# Patient Record
Sex: FEMALE | Race: BLACK OR AFRICAN AMERICAN | NOT HISPANIC OR LATINO | Employment: OTHER | URBAN - METROPOLITAN AREA
[De-identification: names, ages, dates, MRNs, and addresses within clinical notes are randomized per-mention and may not be internally consistent; named-entity substitution may affect disease eponyms.]

---

## 2017-01-04 ENCOUNTER — TELEPHONE (OUTPATIENT)
Dept: INTERNAL MEDICINE | Facility: CLINIC | Age: 57
End: 2017-01-04

## 2017-04-06 ENCOUNTER — HOSPITAL ENCOUNTER (INPATIENT)
Facility: HOSPITAL | Age: 57
LOS: 6 days | Discharge: HOME | DRG: 292 | End: 2017-04-12
Attending: EMERGENCY MEDICINE | Admitting: EMERGENCY MEDICINE
Payer: MEDICARE

## 2017-04-06 DIAGNOSIS — I25.5 ISCHEMIC CARDIOMYOPATHY: ICD-10-CM

## 2017-04-06 DIAGNOSIS — E88.09 HYPOALBUMINEMIA: ICD-10-CM

## 2017-04-06 DIAGNOSIS — I25.10 CORONARY ARTERY DISEASE INVOLVING NATIVE CORONARY ARTERY OF NATIVE HEART WITHOUT ANGINA PECTORIS: ICD-10-CM

## 2017-04-06 DIAGNOSIS — R07.9 CHEST PAIN: ICD-10-CM

## 2017-04-06 DIAGNOSIS — F14.10 COCAINE ABUSE: ICD-10-CM

## 2017-04-06 DIAGNOSIS — R06.03 RESPIRATORY DISTRESS: ICD-10-CM

## 2017-04-06 DIAGNOSIS — I50.9 ACUTE ON CHRONIC CONGESTIVE HEART FAILURE, UNSPECIFIED CONGESTIVE HEART FAILURE TYPE: Primary | ICD-10-CM

## 2017-04-06 DIAGNOSIS — I07.1 MODERATE TRICUSPID REGURGITATION: ICD-10-CM

## 2017-04-06 DIAGNOSIS — I10 ESSENTIAL HYPERTENSION: ICD-10-CM

## 2017-04-06 DIAGNOSIS — F25.0 SCHIZOAFFECTIVE DISORDER, BIPOLAR TYPE: ICD-10-CM

## 2017-04-06 DIAGNOSIS — I50.23 ACUTE ON CHRONIC SYSTOLIC (CONGESTIVE) HEART FAILURE: ICD-10-CM

## 2017-04-06 DIAGNOSIS — E11.9 TYPE 2 DIABETES MELLITUS WITHOUT COMPLICATION, UNSPECIFIED LONG TERM INSULIN USE STATUS: ICD-10-CM

## 2017-04-06 DIAGNOSIS — I50.9 CHF (CONGESTIVE HEART FAILURE): ICD-10-CM

## 2017-04-06 DIAGNOSIS — E83.51 HYPOCALCEMIA: ICD-10-CM

## 2017-04-06 DIAGNOSIS — R07.89 ATYPICAL CHEST PAIN: ICD-10-CM

## 2017-04-06 DIAGNOSIS — F14.99 COCAINE USE, UNSPECIFIED WITH UNSPECIFIED COCAINE-INDUCED DISORDER: ICD-10-CM

## 2017-04-06 DIAGNOSIS — D64.9 NORMOCYTIC ANEMIA: ICD-10-CM

## 2017-04-06 DIAGNOSIS — F29 PSYCHOSIS, UNSPECIFIED PSYCHOSIS TYPE: ICD-10-CM

## 2017-04-06 LAB
ALBUMIN SERPL BCP-MCNC: 2.8 G/DL
ALLENS TEST: ABNORMAL
ALP SERPL-CCNC: 116 U/L
ALT SERPL W/O P-5'-P-CCNC: 29 U/L
ANION GAP SERPL CALC-SCNC: 9 MMOL/L
AST SERPL-CCNC: 28 U/L
BASOPHILS # BLD AUTO: 0.01 K/UL
BASOPHILS NFR BLD: 0.2 %
BILIRUB SERPL-MCNC: 1.9 MG/DL
BNP SERPL-MCNC: 2588 PG/ML
BUN SERPL-MCNC: 11 MG/DL
CALCIUM SERPL-MCNC: 7.8 MG/DL
CHLORIDE SERPL-SCNC: 106 MMOL/L
CO2 SERPL-SCNC: 21 MMOL/L
CREAT SERPL-MCNC: 0.8 MG/DL
DELSYS: ABNORMAL
DIFFERENTIAL METHOD: ABNORMAL
EOSINOPHIL # BLD AUTO: 0 K/UL
EOSINOPHIL NFR BLD: 0.5 %
EP: 5
ERYTHROCYTE [DISTWIDTH] IN BLOOD BY AUTOMATED COUNT: 17.9 %
EST. GFR  (AFRICAN AMERICAN): >60 ML/MIN/1.73 M^2
EST. GFR  (NON AFRICAN AMERICAN): >60 ML/MIN/1.73 M^2
FIO2: 50
GLUCOSE SERPL-MCNC: 74 MG/DL
HCO3 UR-SCNC: 19.8 MMOL/L (ref 24–28)
HCT VFR BLD AUTO: 33.5 %
HGB BLD-MCNC: 11.2 G/DL
INR PPP: 1.3
IP: 10
LYMPHOCYTES # BLD AUTO: 1.7 K/UL
LYMPHOCYTES NFR BLD: 29.7 %
MCH RBC QN AUTO: 28.1 PG
MCHC RBC AUTO-ENTMCNC: 33.4 %
MCV RBC AUTO: 84 FL
MODE: ABNORMAL
MONOCYTES # BLD AUTO: 0.6 K/UL
MONOCYTES NFR BLD: 10.1 %
NEUTROPHILS # BLD AUTO: 3.5 K/UL
NEUTROPHILS NFR BLD: 59.2 %
PCO2 BLDA: 36 MMHG (ref 35–45)
PH SMN: 7.35 [PH] (ref 7.35–7.45)
PLATELET # BLD AUTO: 298 K/UL
PMV BLD AUTO: 9.9 FL
PO2 BLDA: 43 MMHG (ref 40–60)
POC BE: -6 MMOL/L
POC SATURATED O2: 76 % (ref 95–100)
POC TCO2: 21 MMOL/L (ref 24–29)
POTASSIUM SERPL-SCNC: 3.8 MMOL/L
PROT SERPL-MCNC: 6.2 G/DL
PROTHROMBIN TIME: 13.1 SEC
RBC # BLD AUTO: 3.98 M/UL
SAMPLE: ABNORMAL
SITE: ABNORMAL
SODIUM SERPL-SCNC: 136 MMOL/L
TROPONIN I SERPL DL<=0.01 NG/ML-MCNC: 0.02 NG/ML
WBC # BLD AUTO: 5.83 K/UL

## 2017-04-06 PROCEDURE — 63600175 PHARM REV CODE 636 W HCPCS: Performed by: EMERGENCY MEDICINE

## 2017-04-06 PROCEDURE — 27000190 HC CPAP FULL FACE MASK W/VALVE

## 2017-04-06 PROCEDURE — 85025 COMPLETE CBC W/AUTO DIFF WBC: CPT

## 2017-04-06 PROCEDURE — 99291 CRITICAL CARE FIRST HOUR: CPT

## 2017-04-06 PROCEDURE — 99285 EMERGENCY DEPT VISIT HI MDM: CPT | Mod: ,,, | Performed by: INTERNAL MEDICINE

## 2017-04-06 PROCEDURE — 96374 THER/PROPH/DIAG INJ IV PUSH: CPT

## 2017-04-06 PROCEDURE — 83880 ASSAY OF NATRIURETIC PEPTIDE: CPT

## 2017-04-06 PROCEDURE — 84484 ASSAY OF TROPONIN QUANT: CPT

## 2017-04-06 PROCEDURE — 94660 CPAP INITIATION&MGMT: CPT

## 2017-04-06 PROCEDURE — 82803 BLOOD GASES ANY COMBINATION: CPT

## 2017-04-06 PROCEDURE — 80053 COMPREHEN METABOLIC PANEL: CPT

## 2017-04-06 PROCEDURE — 25000003 PHARM REV CODE 250: Performed by: EMERGENCY MEDICINE

## 2017-04-06 PROCEDURE — 85610 PROTHROMBIN TIME: CPT

## 2017-04-06 PROCEDURE — 12000002 HC ACUTE/MED SURGE SEMI-PRIVATE ROOM

## 2017-04-06 PROCEDURE — 99900035 HC TECH TIME PER 15 MIN (STAT)

## 2017-04-06 PROCEDURE — 93010 ELECTROCARDIOGRAM REPORT: CPT | Mod: ,,, | Performed by: INTERNAL MEDICINE

## 2017-04-06 RX ORDER — ATORVASTATIN CALCIUM 20 MG/1
40 TABLET, FILM COATED ORAL DAILY
Status: DISCONTINUED | OUTPATIENT
Start: 2017-04-07 | End: 2017-04-12 | Stop reason: HOSPADM

## 2017-04-06 RX ORDER — ALBUTEROL SULFATE 0.83 MG/ML
2.5 SOLUTION RESPIRATORY (INHALATION) EVERY 6 HOURS PRN
Status: ON HOLD | COMMUNITY
End: 2017-04-11

## 2017-04-06 RX ORDER — MIRTAZAPINE 15 MG/1
30 TABLET, FILM COATED ORAL NIGHTLY
Status: DISCONTINUED | OUTPATIENT
Start: 2017-04-07 | End: 2017-04-07

## 2017-04-06 RX ORDER — LISINOPRIL 5 MG/1
5 TABLET ORAL DAILY
Status: DISCONTINUED | OUTPATIENT
Start: 2017-04-07 | End: 2017-04-12

## 2017-04-06 RX ORDER — ASPIRIN 81 MG/1
81 TABLET ORAL DAILY
Status: DISCONTINUED | OUTPATIENT
Start: 2017-04-07 | End: 2017-04-12 | Stop reason: HOSPADM

## 2017-04-06 RX ORDER — CLOPIDOGREL BISULFATE 75 MG/1
75 TABLET ORAL DAILY
Status: DISCONTINUED | OUTPATIENT
Start: 2017-04-07 | End: 2017-04-10

## 2017-04-06 RX ORDER — HALOPERIDOL 5 MG/1
5 TABLET ORAL EVERY MORNING
Status: DISCONTINUED | OUTPATIENT
Start: 2017-04-07 | End: 2017-04-07

## 2017-04-06 RX ORDER — IPRATROPIUM BROMIDE AND ALBUTEROL SULFATE 2.5; .5 MG/3ML; MG/3ML
3 SOLUTION RESPIRATORY (INHALATION) EVERY 4 HOURS PRN
Status: DISCONTINUED | OUTPATIENT
Start: 2017-04-07 | End: 2017-04-09

## 2017-04-06 RX ORDER — RISPERIDONE 1 MG/1
4 TABLET ORAL NIGHTLY
Status: DISCONTINUED | OUTPATIENT
Start: 2017-04-07 | End: 2017-04-07

## 2017-04-06 RX ORDER — ASPIRIN 325 MG
325 TABLET ORAL
Status: COMPLETED | OUTPATIENT
Start: 2017-04-06 | End: 2017-04-06

## 2017-04-06 RX ORDER — BENZTROPINE MESYLATE 1 MG/1
1 TABLET ORAL NIGHTLY
Status: DISCONTINUED | OUTPATIENT
Start: 2017-04-07 | End: 2017-04-07

## 2017-04-06 RX ORDER — FLUOXETINE HYDROCHLORIDE 20 MG/1
20 CAPSULE ORAL DAILY
Status: DISCONTINUED | OUTPATIENT
Start: 2017-04-07 | End: 2017-04-07

## 2017-04-06 RX ORDER — HALOPERIDOL 10 MG/1
10 TABLET ORAL NIGHTLY
Status: DISCONTINUED | OUTPATIENT
Start: 2017-04-07 | End: 2017-04-07

## 2017-04-06 RX ORDER — SODIUM CHLORIDE 0.9 % (FLUSH) 0.9 %
3 SYRINGE (ML) INJECTION EVERY 8 HOURS
Status: DISCONTINUED | OUTPATIENT
Start: 2017-04-07 | End: 2017-04-12 | Stop reason: HOSPADM

## 2017-04-06 RX ORDER — TRAZODONE HYDROCHLORIDE 50 MG/1
150 TABLET ORAL NIGHTLY
Status: DISCONTINUED | OUTPATIENT
Start: 2017-04-07 | End: 2017-04-12 | Stop reason: HOSPADM

## 2017-04-06 RX ORDER — FUROSEMIDE 10 MG/ML
80 INJECTION INTRAMUSCULAR; INTRAVENOUS
Status: COMPLETED | OUTPATIENT
Start: 2017-04-06 | End: 2017-04-06

## 2017-04-06 RX ORDER — NITROGLYCERIN 0.4 MG/1
0.4 TABLET SUBLINGUAL EVERY 5 MIN PRN
Status: DISCONTINUED | OUTPATIENT
Start: 2017-04-07 | End: 2017-04-12 | Stop reason: HOSPADM

## 2017-04-06 RX ADMIN — FUROSEMIDE 80 MG: 10 INJECTION, SOLUTION INTRAMUSCULAR; INTRAVENOUS at 10:04

## 2017-04-06 RX ADMIN — ASPIRIN 325 MG ORAL TABLET 325 MG: 325 PILL ORAL at 09:04

## 2017-04-06 NOTE — IP AVS SNAPSHOT
[facility]  [address]  [facility] [address]  Phone: [phone]           Patient Discharge Instructions   Our goal is to set you up for success. This packet includes information on your condition, medications, and your home care.  It will help you care for yourself to prevent having to return to the hospital.     Please ask your nurse if you have any questions.      There are many details to remember when preparing to leave the hospital. Here is what you will need to do:    1. Take your medicine. If you are prescribed medications, review your Medication List on the following pages. You may have new medications to  at the pharmacy and others that you'll need to stop taking. Review the instructions for how and when to take your medications. Talk with your doctor or nurses if you are unsure of what to do.     2. Go to your follow-up appointments. Specific follow-up information is listed in the following pages. Your may be contacted by a nurse or clinical provider about future appointments. Be sure we have all of the phone numbers to reach you. Please contact your provider's office if you are unable to make an appointment.     3. Watch for warning signs. Your doctor or nurse will give you detailed warning signs to watch for and when to call for assistance. These instructions may also include educational information about your condition. If you experience any of warning signs to your health, call your doctor.           [facility]  Unless otherwise directed by your provider, please   contact [facility], our nurse care line   that is available for 24/7 assistance.     [phone] (toll-free)     Registered nurses in the [facility]   provide: appointment scheduling, clinical advisement, health education, and other advisory services.                  ** Verify the list of medication(s) below is accurate and up to date. Carry this with you in case of  emergency. If your medications have changed, please notify your healthcare provider.             Medication List      START taking these medications        Additional Info    Begin Date AM Noon PM Bedtime    dextromethorphan-guaifenesin  mg  mg per 12 hr tablet   Commonly known as:  MUCINEX DM   Refills:  0   Dose:  1 tablet    Last time this was given:  1 tablet on 4/12/2017  9:49 AM   Instructions:  Take 1 tablet by mouth 2 (two) times daily.                        4/12/17       lidocaine 5 %   Commonly known as:  LIDODERM   Quantity:  10 patch   Refills:  0   Dose:  1 patch    Last time this was given:  1 patch on 4/11/2017  3:27 PM   Instructions:  Place 1 patch onto the skin once daily. Remove & Discard patch within 12 hours or as directed by MD            4/13/17                   predniSONE 20 MG tablet   Commonly known as:  DELTASONE   Quantity:  2 tablet   Refills:  0   Dose:  20 mg    Last time this was given:  20 mg on 4/12/2017  9:49 AM   Instructions:  Take 1 tablet (20 mg total) by mouth once daily.            4/13/17                     CHANGE how you take these medications        Additional Info    Begin Date AM Noon PM Bedtime    furosemide 40 MG tablet   Commonly known as:  LASIX   Quantity:  30 tablet   Refills:  0   Dose:  20 mg   What changed:    - how much to take  - how to take this  - when to take this  - additional instructions    Last time this was given:  40 mg on 4/11/2017  5:15 PM   Instructions:  Take 0.5 tablets (20 mg total) by mouth once daily.            4/13/17                     CONTINUE taking these medications        Additional Info    Begin Date AM Noon PM Bedtime    albuterol 2.5 mg /3 mL (0.083 %) nebulizer solution   Commonly known as:  PROVENTIL   Quantity:  1 Box   Refills:  0   Dose:  2.5 mg    Instructions:  Take 3 mLs (2.5 mg total) by nebulization every 6 (six) hours as needed for Wheezing. Rescue                            aspirin 81 MG EC tablet    Commonly known as:  ECOTRIN   Quantity:  90 tablet   Refills:  3   Dose:  81 mg    Last time this was given:  81 mg on 4/12/2017  9:49 AM   Instructions:  Take 1 tablet (81 mg total) by mouth once daily.            4/13/17                   atorvastatin 40 MG tablet   Commonly known as:  LIPITOR   Quantity:  30 tablet   Refills:  6   Dose:  40 mg    Last time this was given:  40 mg on 4/12/2017  9:49 AM   Instructions:  Take 1 tablet (40 mg total) by mouth once daily.            4/13/17                   fluticasone furoate 100 mcg/actuation Dsdv   Refills:  0   Dose:  100 mcg    Instructions:  Inhale 100 mcg into the lungs once daily. Controller            4/13/17                   glimepiride 2 MG tablet   Commonly known as:  AMARYL   Quantity:  60 tablet   Refills:  6   Dose:  2 mg    Instructions:  Take 1 tablet (2 mg total) by mouth before breakfast.            4/13/17                   lisinopril 5 MG tablet   Commonly known as:  PRINIVIL,ZESTRIL   Quantity:  30 tablet   Refills:  0   Dose:  5 mg    Last time this was given:  5 mg on 4/11/2017  9:27 AM   Instructions:  Take 1 tablet (5 mg total) by mouth once daily.            4/13/17                   nitroGLYCERIN 0.4 MG SL tablet   Commonly known as:  NITROSTAT   Quantity:  30 tablet   Refills:  4   Dose:  0.4 mg    Instructions:  Place 1 tablet (0.4 mg total) under the tongue every 5 (five) minutes as needed for Chest pain.                            trazodone 150 MG tablet   Commonly known as:  DESYREL   Quantity:  30 tablet   Refills:  0   Dose:  150 mg    Last time this was given:  150 mg on 4/11/2017  9:02 PM   Instructions:  Take 1 tablet (150 mg total) by mouth every evening.                        4/12/17       walker Misc   Quantity:  1 each   Refills:  0   Comments:  Please provide Rolator type walker    Instructions:  Use walker daily PRN                              STOP taking these medications     benztropine 1 MG tablet   Commonly known as:   COGENTIN       carvedilol 6.25 MG tablet   Commonly known as:  COREG       ciclopirox 8 % Soln   Commonly known as:  PENLAC       clopidogrel 75 mg tablet   Commonly known as:  PLAVIX       fluoxetine 20 MG capsule   Commonly known as:  PROZAC       haloperidol 10 MG tablet   Commonly known as:  HALDOL       mirtazapine 30 MG tablet   Commonly known as:  REMERON       risperidone 4 MG tablet   Commonly known as:  RISPERDAL            Where to Get Your Medications      These medications were sent to [facility]/pharmacy #[id number] - [place], [place] - [address]  [address] [facility] [address]     Phone:  [phone]     albuterol 2.5 mg /3 mL (0.083 %) nebulizer solution    furosemide 40 MG tablet    lidocaine 5 %    lisinopril 5 MG tablet    predniSONE 20 MG tablet    trazodone 150 MG tablet         You can get these medications from any pharmacy     You don't need a prescription for these medications     dextromethorphan-guaifenesin  mg  mg per 12 hr tablet                  Please bring to all follow up appointments:    1. A copy of your discharge instructions.  2. All medicines you are currently taking in their original bottles.  3. Identification and insurance card.    Please arrive 15 minutes ahead of scheduled appointment time.    Please call 24 hours in advance if you must reschedule your appointment and/or time.        Follow-up Information     Schedule an appointment as soon as possible for a visit with Iza Kwan MD.    Specialty:  Family Medicine    Contact information:    [address]  [facility] [id number] [phone]          Schedule an appointment as soon as possible for a visit with Outpatient Psychiatrist.      Referrals     Future Orders    Ambulatory referral to Outpatient Case Management     Questions:    Does the patient have a chronic or uncontrolled disease process?:      Does the patient have a new diagnosis of a catastrophic or life altering  "illness/treatment?:      Does the patient have any psycho-social issues that may affect their ability to adhere to treatment plan?:      Does patient have any behaviors or circumstances that may impede ability to adhere to treatment plan?:      Is patient at risk for admission/readmission?:            Discharge Instructions       Call [facility] for psychiatric appointment: [phone].  Prescriptions sent to [facility] pharmacy on [address].      Primary Diagnosis     Your primary diagnosis was:  Heart Failure      Admission Information     Date & Time Provider Department CSN    4/6/2017  8:34 PM Andreina Lombardi MD [facility] [id number]      Care Providers     Provider Role Specialty Primary office phone    Andreina Lombardi MD Attending Provider Hospitalist [phone]    Mauricio Angulo MD Team Attending  Hospitalist [phone]    Andreina Lombardi MD Team Attending  Hospitalist [phone]      Your Vitals Were     BP Pulse Temp Resp Height Weight    83/62 (BP Method: cNIBP) 79 97.5 °F (36.4 °C) (Oral) 24 5' 2" (1.575 m) 66.1 kg (145 lb 11.6 oz)    SpO2 BMI             91% 26.65 kg/m2         Recent Lab Values        2/26/2014 11/18/2014 1/5/2015 5/25/2016 4/7/2017               5:04 AM 12:15 PM 11:57 AM  9:50 AM  8:19 AM       A1C 6.5 (H) 6.2 6.3 (H) 5.8 6.2       Comment for A1C at  8:19 AM on 4/7/2017:  According to ADA guidelines, hemoglobin A1C <7.0% represents  optimal control in non-pregnant diabetic patients.  Different  metrics may apply to specific populations.   Standards of Medical Care in Diabetes - 2016.  For the purpose of screening for the presence of diabetes:  <5.7%     Consistent with the absence of diabetes  5.7-6.4%  Consistent with increasing risk for diabetes   (prediabetes)  >or=6.5%  Consistent with diabetes  Currently no consensus exists for use of hemoglobin A1C  for diagnosis of diabetes for children.        Allergies as of 4/12/2017     No Known Allergies "      Advance Directives     An advance directive is a document which, in the event you are no longer able to make decisions for yourself, tells your healthcare team what kind of treatment you do or do not want to receive, or who you would like to make those decisions for you.  If you do not currently have an advance directive, [facility] encourages you to create one.  For more information call:  [phone] ([phone]), [phone] ([phone]),  or log on to [email or web address].        Smoking Cessation     If you would like to quit smoking:   You may be eligible for free services if you are a [place] resident and started smoking cigarettes before September 1, 1988.  Call the Smoking Cessation Trust (SCT) toll free at (114) 013-3711 or (291) 293-3814.   Call 0-464-QUIT-NOW if you do not meet the above criteria.   Contact us via email: [email or web address]   View our website for more information: [email or web address]        Language Assistance Services     ATTENTION: Language assistance services are available, free of charge. Please call [phone].      ATENCIÓN: Si habla español, tiene a españa disposición servicios gratuitos de asistencia lingüística. Llame al [phone].     CHÚ Ý: N?u b?n nói Ti?ng Vi?t, có các d?ch v? h? tr? ngôn ng? mi?n phí dành cho b?n. G?i s? [phone].        Heart Failure Education       Heart Failure: Being Active  You have a condition called heart failure. Being active doesnt mean that you have to wear yourself out. Even a little movement each day helps to strengthen your heart. If you cant get out to exercise, you can do simple stretching and strengthening exercises at home. These are good ways to keep you well-conditioned and prevent you and your heart from becoming excessively weak.    Ideas to get you started  · Add a little movement to things you do now. Walk to mail letters. Park your car at the far end of the parking lot and walk to the store.  Walk up a flight of stairs instead of taking the elevator.  · Choose activities you enjoy. You might walk, swim, or ride an exercise bike. Things like gardening and washing the car count, too. Other possibilities include: washing dishes, walking the dog, walking around the mall, and doing aerobic activities with friends.  · Join a group exercise program at a [facility] or [facility], a senior center, or a community center. Or look into a hospital cardiac rehabilitation program. Ask your doctor if you qualify.  Tips to keep you going  · Get up and get dressed each day. Go to a coffee shop and read a newspaper or go somewhere that you'll be in the presence of other active people. Youll feel more like being active.  · Make a plan. Choose one or more activities that you enjoy and that you can easily do. Then plan to do at least one each day. You might write your plan on a calendar.  · Go with a friend or a group if you like company. This can help you feel supported and stay motivated, too.  · Plan social events that you enjoy. This will keep you mentally engaged as well as physically motivated to do things you find pleasure in.  For your safety  · Talk with your healthcare provider before starting an exercise program.  · Exercise indoors when its too hot or too cold outside, or when the air quality is poor. Try walking at a shopping mall.  · Wear socks and sturdy shoes to maintain your balance and prevent falls.  · Start slowly. Do a few minutes several times a day at first. Increase your time and speed little by little.  · Stop and rest whenever you feel tired or get short of breath.  · Dont push yourself on days when you dont feel well.  Date Last Reviewed: 3/20/2016  © 2945-6607 [employer]. [address] [place] [address], [place] [address]. All rights reserved. This information is not intended as a substitute for professional medical care. Always follow your healthcare professional's instructions.              Heart  Failure: Evaluating Your Heart  You have a condition called heart failure. To evaluate your condition, your doctor will examine you, ask questions, and do some tests. Along with looking for signs of heart failure, the doctor looks for any other health problems that may have led to heart failure. The results of your evaluation will help your doctor form a treatment plan.  Health history and physical exam  Your visit will start with a health history. Tell the doctor about any symptoms youve noticed and about all medicines you take. Then youll have a physical exam. This includes listening to your heartbeat and breathing. Youll also be checked for swelling (edema) in your legs and neck. When you have fluid buildup or fluid in the lungs, it may be called congestive heart failure.  Diagnosing heart failure     During an echocardiogram, sound waves bounce off the heart. These are converted into a picture on the screen.   The following may be done to help your doctor form a diagnosis:  · X-rays show the size and shape of your heart. These pictures can also show fluid in your lungs.  · An electrocardiogram (ECG or EKG) shows the pattern of your heartbeat. Small pads (electrodes) are placed on your chest, arms, and legs. Wires connect the pads to the ECG machine, which records your hearts electrical signals. This can give the doctor information about heart function.  · An echocardiogram uses ultrasound waves to show the structure and movement of your heart muscle. This shows how well the heart pumps. It also shows the thickness of the heart walls, and if the heart is enlarged. It is one of the most useful, non-invasive tests as it provides information about the heart's general function. This helps your doctor make treatment decisions.  · Lab tests evaluate small amounts of blood or urine for signs of problems. A BNP lab test can help diagnose and evaluate heart failure. BNP stands for B-type natriuretic peptide. The  ventricles secrete more BNP when heart failure worsens. Lab tests can also provide information about metabolic dysfunction or heart dysfunction.  Your treatment plan  Based on the results of your evaluation and tests, your doctor will develop a treatment plan. This plan is designed to relieve some of your heart failure symptoms and help make you more comfortable. Your treatment plan may include:  · Medicine to help your heart work better and improve your quality of life  · Changes in what you eat and drink to help prevent fluid from backing up in your body  · Daily monitoring of your weight and heart failure symptoms to see how well your treatment plan is working  · Exercise to help you stay healthy  · Help with quitting smoking  · Emotional and psychological support to help adjust to the changes  · Referrals to other specialists to make sure you are being treated comprehensively  Date Last Reviewed: 3/21/2016  © 7359-9399 [employer]. [address] [place] [address], [place] [address]. All rights reserved. This information is not intended as a substitute for professional medical care. Always follow your healthcare professional's instructions.              Heart Failure: Making Changes to Your Diet  You have a condition called heart failure. When you have heart failure, excess fluid is more likely to build up in your body because your heart isn't working well. This makes the heart work harder to pump blood. Fluid buildup causes symptoms such as shortness of breath and swelling (edema). This is often referred to as congestive heart failure or CHF. Controlling the amount of salt (sodium) you eat may help stop fluid from building up. Your doctor may also tell you to reduce the amount of fluid you drink.  Reading food labels    Your healthcare provider will tell you how much sodium you can eat each day. Read food labels to keep track. Keep in mind that certain foods are high in salt. These include canned, frozen,  and processed foods. Check the amount of sodium in each serving. Watch out for high-sodium ingredients. These include MSG (monosodium glutamate), baking soda, and sodium phosphate.   Eating less salt  Give yourself time to get used to eating less salt. It may take a little while. Here are some tips to help:  · Take the saltshaker off the table. Replace it with salt-free herb mixes and spices.  · Eat fresh or plain frozen vegetables. These have much less salt than canned vegetables.  · Choose low-sodium snacks like sodium-free pretzels, crackers, or air-popped popcorn.  · Dont add salt to your food when youre cooking. Instead, season your foods with pepper, lemon, garlic, or onion.  · When you eat out, ask that your food be cooked without added salt.  · Avoid eating fried foods as these often have a great deal of salt.  If youre told to limit fluids  You may need to limit how much fluid you have to help prevent swelling. This includes anything that is liquid at room temperature, such as ice cream and soup. If your doctor tells you to limit fluid, try these tips:  · Measure drinks in a measuring cup before you drink them. This will help you meet daily goals.  · Chill drinks to make them more refreshing.  · Suck on frozen lemon wedges to quench thirst.  · Only drink when youre thirsty.  · Chew sugarless gum or suck on hard candy to keep your mouth moist.  · Weigh yourself daily to know if your body's fluid content is rising.  My sodium goal  Your healthcare provider may give you a sodium goal to meet each day. This includes sodium found in food as well as salt that you add. My goal is to eat no more than ___________ mg of sodium per day.     When to call your doctor  Call your doctor right away if you have any symptoms of worsening heart failure. These can include:  · Sudden weight gain  · Increased swelling of your legs or ankles  · Trouble breathing when youre resting or at night  · Increase in the number of  pillows you have to sleep on  · Chest pain, pressure, discomfort, or pain in the jaw, neck, or back   Date Last Reviewed: 3/21/2016  © 1821-4802 [employer]. [address] [place] [address], [place] [address]. All rights reserved. This information is not intended as a substitute for professional medical care. Always follow your healthcare professional's instructions.              Heart Failure: Medicines to Help Your Heart    You have a condition called heart failure (also known as congestive heart failure, or CHF). Your doctor will likely prescribe medicines for heart failure and any underlying health problems you have. Most heart failure patients take one or more types of medicinen. Your healthcare provider will work to find the combination of medicines that works best for you.  Heart failure medicines  Here are the most common heart failure medicines:  · ACE inhibitors lower blood pressure and decrease strain on the heart. This makes it easier for the heart to pump. Angiotensin receptor blockers have similar effects. These are prescribed for some patients instead of ACE inhibitors.  · Beta-blockers relieve stress on the heart. They also improve symptoms. They may also improve the heart's pumping action over time.  · Diuretics (also called water pills) help rid your body of excess water. This can help rid your body of swelling (edema). Having less fluid to pump means your heart doesnt have to work as hard. Some diuretics make your body lose a mineral called potassium. Your doctor will tell you if you need to take supplements or eat more foods high in potassium.  · Digoxin helps your heart pump with more strength. This helps your heart pump more blood with each beat. So, more oxygen-rich blood travels to the rest of the body.  · Aldosterone antagonists help alter hormones and decrease strain on the heart.  · Hydralazine and nitrates are two separate medicines used together to treat heart failure. They may  come in one combination pill. They lower blood pressure and decrease how hard the heart has to pump.  Medicines for related conditions  Controlling other heart problems helps keep heart failure under control, too. Depending on other heart problems you have, medicines may be prescribed to:  · Lower blood pressure (antihypertensives).  · Lower cholesterol levels (statins).  · Prevent blood clots (anticoagulants or aspirin).  · Keep the heartbeat steady (antiarrhythmics).  Date Last Reviewed: 3/5/2016  © 4514-6757 [employer]. [address] [place] [address], [place] [address]. All rights reserved. This information is not intended as a substitute for professional medical care. Always follow your healthcare professional's instructions.              Heart Failure: Procedures That May Help    The heart is a muscle that pumps oxygen-rich blood to all parts of the body. When you have heart failure, the heart is not able to pump as well as it should. Blood and fluid may back up into the lungs (congestive heart failure), and some parts of the body dont get enough oxygen-rich blood to work normally. These problems lead to the symptoms of heart failure.     Certain procedures may help the heart pump better in some cases of heart failure. Some procedures are done to treat health problems that may have caused the heart failure such as coronary artery disease or heart rhythm problems. For more serious heart failure, other options are available.  Treating artery and valve problems  If you have coronary artery disease or valve disease, procedures may be done to improve blood flow. This helps the heart pump better, which can improve heart failure symptoms. First, your doctor may do a cardiac catheterization to help detect clogged blood vessels or valve damage. During this procedure, a  thin tube (catheter) in inserted into a blood vessel and guided to the heart. There a dye is injected and a special type of X-ray (angiogram)  is taken of the blood vessels. Procedures to open a blocked artery or fix damaged valves can also be done using catheterization.  · Angioplasty uses a balloon-tipped instrument at the end of the catheter. The balloon is inflated to widen the narrowed artery. In many cases, a stent is expanded to further support the narrowed artery. A stent is a metal mesh tube.  · Valve surgery repairs or replacement of faulty valves can also be done during catheterization so blood can flow properly through the chambers of the heart.  Bypass surgery is another option to help treat blocked arteries. It uses a healthy blood vessel from elsewhere in the body. The healthy blood vessel is attached above and below the blocked area so that blood can flow around the blocked artery.  Treating heart rhythm problems  A device may be placed in the chest to help a weak heart maintain a healthy, heartbeat so the heart can pump more effectively:  · Pacemaker. A pacemaker is an implanted device that regulates your heartbeat electronically. It monitors your heart's rhythm and generates a painless electric impulse that helps the heart beat in a regular rhythm. A pacemaker is programmed to meet your specific heart rhythm needs.  · Biventricular pacing/cardiac resynchronization therapy. A type of pacemaker that paces both pumping chambers of the heart at the same time to coordinate contractions and to improve the heart's function. Some people with heart failure are candidates for this therapy.  · Implantable cardioverter defibrillator. A device similar to a pacemaker that senses when the heart is beating too fast and delivers an electrical shock to convert the fast rhythm to a normal rhythm. This can be a life saving device.  In severe cases  In more serious cases of heart failure when other treatments no longer work, other options may include:  · Ventricular assist devices (VADs). These are mechanical devices used to take over the pumping function for  one or both of the heart's ventricles, or pumping chambers. A VAD may be necessary when heart failure progresses to the point that medicines and other treatments no longer help. In some cases, a VAD may be used as a bridge to transplant.  · Heart transplant. This is replacing the diseased heart with a healthy one from a donor. This is an option for a few people who are very sick. A heart transplant is very serious and not an option for all patients. Your doctor can tell you more.  Date Last Reviewed: 3/20/2016  © 1680-0306 [employer]. [address] [place] [id number]. All rights reserved. This information is not intended as a substitute for professional medical care. Always follow your healthcare professional's instructions.              Heart Failure: Tracking Your Weight  You have a condition called heart failure. When you have heart failure, a sudden weight gain or a steady rise in weight is a warning sign that your body is retaining too much water and salt. This could mean your heart failure is getting worse. If left untreated, it can cause problems for your lungs and result in shortness of breath. Weighing yourself each day is the best way to know if youre retaining water. If your weight goes up quickly, call your doctor. You will be given instructions on how to get rid of the excess water. You will likely need medicines and to avoid salt. This will help your heart work better.  Call your doctor if you gain more than 2 pounds in 1 day, more than 5 pounds in 1 week, or whatever weight gain you were told to report by your doctor. This is often a sign of worsening heart failure and needs to be evaluated and treated. Your doctor will tell you what to do next.   Tips for weighing yourself    · Weigh yourself at the same time each morning, wearing the same clothes. Weigh yourself after urinating and before eating.  · Use the same scale each day. Make sure the numbers are easy to read. Put the  scale on a flat, hard surface -- not on a rug or carpet.  · Do not stop weighing yourself. If you forget one day, weigh again the next morning.  How to use your weight chart  · Keep your weight chart near the scale. Write your weight on the chart as soon as you get off the scale.  · Fill in the month and the start date on the chart. Then write down your weight each day. Your chart will look like this:    · If you miss a day, leave the space blank. Weigh yourself the next day and write your weight in the next space.  · Take your weight chart with you when you go to see your doctor.  Date Last Reviewed: 3/20/2016  © 4069-8407 [employer]. [address] [place] [address], [place] [address]. All rights reserved. This information is not intended as a substitute for professional medical care. Always follow your healthcare professional's instructions.              Heart Failure: Warning Signs of a Flare-Up  You have a condition called heart failure. Once you have heart failure, flare-ups can happen. Below are signs that can mean your heart failure is getting worse. If you notice any of these warning signs, call your healthcare provider.  Swelling    · Your feet, ankles, or lower legs get puffier.  · You notice skin changes on your lower legs.  · Your shoes feel too tight.  · Your clothes are tighter in the waist.  · You have trouble getting rings on or off your fingers.  Shortness of breath  · You have to breathe harder even when youre doing your normal activities or when youre resting.  · You are short of breath walking up stairs or even short distances.  · You wake up at night short of breath or coughing.  · You need to use more pillows or sit up to sleep.  · You wake up tired or restless.  Other warning signs  · You feel weaker, dizzy, or more tired.  · You have chest pain or changes in your heartbeat.  · You have a cough that wont go away.  · You cant remember things or dont feel like eating.  Tracking your  weight  Gaining weight is often the first warning sign that heart failure is getting worse. Gaining even a few pounds can be a sign that your body is retaining excess water and salt. Weighing yourself each day in the morning after you urinate and before you eat, is the best way to know if you're retaining water. Get a scale that is easy to read and make sure you wear the same clothes and use the same scale every time you weigh. Your healthcare provider will show you how to track your weight. Call your doctor if you gain more than 2 pounds in 1 day, 5 pounds in 1 week, or whatever weight gain you were told to report by your doctor. This is often a sign of worsening heart failure and needs to be evaluated and treated before it compromises your breathing. Your doctor will tell you what to do next.    Date Last Reviewed: 3/15/2016  © 0531-4621 [employer]. [address] [place] [address]. All rights reserved. This information is not intended as a substitute for professional medical care. Always follow your healthcare professional's instructions.              Diabetes Discharge Instructions                                   [facility] Sign-Up     Activating your [facility] account is as easy as 1-2-3!     1) Visit [email or web address], select Sign Up Now, enter this activation code and your date of birth, then select Next.  [email or web address]  Expires: 5/27/2017 11:34 AM      2) Create a username and password to use when you visit [facility] in the future and select a security question in case you lose your password and select Next.    3) Enter your e-mail address and click Sign Up!    Additional Information  If you have questions, please e-mail [email or web address] or call [phone] to talk to our [facility] staff. Remember, [facility] is NOT to be used for urgent needs. For medical emergencies, dial 911.          [facility] complies with applicable Federal civil rights laws and does  not discriminate on the basis of race, color, national origin, age, disability, or sex.

## 2017-04-07 ENCOUNTER — OUTPATIENT CASE MANAGEMENT (OUTPATIENT)
Dept: ADMINISTRATIVE | Facility: OTHER | Age: 57
End: 2017-04-07

## 2017-04-07 PROBLEM — F14.99: Status: ACTIVE | Noted: 2017-04-07

## 2017-04-07 PROBLEM — E83.51 HYPOCALCEMIA: Status: ACTIVE | Noted: 2017-04-07

## 2017-04-07 PROBLEM — F14.10 COCAINE ABUSE: Status: ACTIVE | Noted: 2017-04-07

## 2017-04-07 PROBLEM — D64.9 NORMOCYTIC ANEMIA: Status: ACTIVE | Noted: 2017-04-07

## 2017-04-07 PROBLEM — R07.89 ATYPICAL CHEST PAIN: Status: ACTIVE | Noted: 2017-04-07

## 2017-04-07 PROBLEM — F29 PSYCHOSIS: Status: ACTIVE | Noted: 2017-04-07

## 2017-04-07 PROBLEM — R10.9 ABDOMINAL PAIN: Status: ACTIVE | Noted: 2017-04-07

## 2017-04-07 LAB
ALBUMIN SERPL BCP-MCNC: 2.9 G/DL
ALP SERPL-CCNC: 111 U/L
ALT SERPL W/O P-5'-P-CCNC: 28 U/L
AMPHET+METHAMPHET UR QL: NEGATIVE
ANION GAP SERPL CALC-SCNC: 6 MMOL/L
AST SERPL-CCNC: 29 U/L
BARBITURATES UR QL SCN>200 NG/ML: NEGATIVE
BASOPHILS # BLD AUTO: 0.01 K/UL
BASOPHILS NFR BLD: 0.2 %
BENZODIAZ UR QL SCN>200 NG/ML: NEGATIVE
BILIRUB SERPL-MCNC: 2.4 MG/DL
BUN SERPL-MCNC: 10 MG/DL
BZE UR QL SCN: ABNORMAL
CALCIUM SERPL-MCNC: 7.9 MG/DL
CANNABINOIDS UR QL SCN: NEGATIVE
CHLORIDE SERPL-SCNC: 102 MMOL/L
CO2 SERPL-SCNC: 29 MMOL/L
CREAT SERPL-MCNC: 0.9 MG/DL
CREAT UR-MCNC: <5 MG/DL
DIASTOLIC DYSFUNCTION: YES
DIFFERENTIAL METHOD: ABNORMAL
EOSINOPHIL # BLD AUTO: 0 K/UL
EOSINOPHIL NFR BLD: 0.6 %
ERYTHROCYTE [DISTWIDTH] IN BLOOD BY AUTOMATED COUNT: 17.9 %
EST. GFR  (AFRICAN AMERICAN): >60 ML/MIN/1.73 M^2
EST. GFR  (NON AFRICAN AMERICAN): >60 ML/MIN/1.73 M^2
ESTIMATED AVG GLUCOSE: 131 MG/DL
ESTIMATED PA SYSTOLIC PRESSURE: 84
GLUCOSE SERPL-MCNC: 97 MG/DL
HBA1C MFR BLD HPLC: 6.2 %
HCT VFR BLD AUTO: 33.6 %
HGB BLD-MCNC: 11.4 G/DL
LIPASE SERPL-CCNC: 25 U/L
LYMPHOCYTES # BLD AUTO: 1.8 K/UL
LYMPHOCYTES NFR BLD: 34.1 %
MAGNESIUM SERPL-MCNC: 1.7 MG/DL
MCH RBC QN AUTO: 27.9 PG
MCHC RBC AUTO-ENTMCNC: 33.9 %
MCV RBC AUTO: 82 FL
METHADONE UR QL SCN>300 NG/ML: NEGATIVE
MITRAL VALVE REGURGITATION: ABNORMAL
MONOCYTES # BLD AUTO: 0.6 K/UL
MONOCYTES NFR BLD: 10.8 %
NEUTROPHILS # BLD AUTO: 2.8 K/UL
NEUTROPHILS NFR BLD: 53.9 %
OPIATES UR QL SCN: NEGATIVE
PCP UR QL SCN>25 NG/ML: NEGATIVE
PLATELET # BLD AUTO: 279 K/UL
PMV BLD AUTO: 9.2 FL
POCT GLUCOSE: 102 MG/DL (ref 70–110)
POCT GLUCOSE: 115 MG/DL (ref 70–110)
POCT GLUCOSE: 129 MG/DL (ref 70–110)
POCT GLUCOSE: 148 MG/DL (ref 70–110)
POCT GLUCOSE: 172 MG/DL (ref 70–110)
POTASSIUM SERPL-SCNC: 4.1 MMOL/L
PROT SERPL-MCNC: 6.2 G/DL
RBC # BLD AUTO: 4.09 M/UL
RETIRED EF AND QEF - SEE NOTES: 20 (ref 55–65)
SODIUM SERPL-SCNC: 137 MMOL/L
TOXICOLOGY INFORMATION: ABNORMAL
TRICUSPID VALVE REGURGITATION: ABNORMAL
TROPONIN I SERPL DL<=0.01 NG/ML-MCNC: 0.04 NG/ML
WBC # BLD AUTO: 5.19 K/UL

## 2017-04-07 PROCEDURE — C8929 TTE W OR WO FOL WCON,DOPPLER: HCPCS

## 2017-04-07 PROCEDURE — 80053 COMPREHEN METABOLIC PANEL: CPT

## 2017-04-07 PROCEDURE — 96375 TX/PRO/DX INJ NEW DRUG ADDON: CPT

## 2017-04-07 PROCEDURE — 63600175 PHARM REV CODE 636 W HCPCS: Performed by: INTERNAL MEDICINE

## 2017-04-07 PROCEDURE — 63600175 PHARM REV CODE 636 W HCPCS: Performed by: HOSPITALIST

## 2017-04-07 PROCEDURE — 83036 HEMOGLOBIN GLYCOSYLATED A1C: CPT

## 2017-04-07 PROCEDURE — 93306 TTE W/DOPPLER COMPLETE: CPT | Mod: 26,,, | Performed by: INTERNAL MEDICINE

## 2017-04-07 PROCEDURE — 93005 ELECTROCARDIOGRAM TRACING: CPT

## 2017-04-07 PROCEDURE — 84484 ASSAY OF TROPONIN QUANT: CPT

## 2017-04-07 PROCEDURE — 36415 COLL VENOUS BLD VENIPUNCTURE: CPT

## 2017-04-07 PROCEDURE — 99222 1ST HOSP IP/OBS MODERATE 55: CPT | Mod: ,,, | Performed by: INTERNAL MEDICINE

## 2017-04-07 PROCEDURE — 25000003 PHARM REV CODE 250: Performed by: INTERNAL MEDICINE

## 2017-04-07 PROCEDURE — 97802 MEDICAL NUTRITION INDIV IN: CPT

## 2017-04-07 PROCEDURE — 82962 GLUCOSE BLOOD TEST: CPT

## 2017-04-07 PROCEDURE — 80307 DRUG TEST PRSMV CHEM ANLYZR: CPT

## 2017-04-07 PROCEDURE — 83690 ASSAY OF LIPASE: CPT

## 2017-04-07 PROCEDURE — 90792 PSYCH DIAG EVAL W/MED SRVCS: CPT | Mod: GC,,, | Performed by: PSYCHIATRY & NEUROLOGY

## 2017-04-07 PROCEDURE — 82570 ASSAY OF URINE CREATININE: CPT

## 2017-04-07 PROCEDURE — 99233 SBSQ HOSP IP/OBS HIGH 50: CPT | Mod: GC,,, | Performed by: INTERNAL MEDICINE

## 2017-04-07 PROCEDURE — 83735 ASSAY OF MAGNESIUM: CPT

## 2017-04-07 PROCEDURE — 85025 COMPLETE CBC W/AUTO DIFF WBC: CPT

## 2017-04-07 PROCEDURE — 20600001 HC STEP DOWN PRIVATE ROOM

## 2017-04-07 PROCEDURE — 96372 THER/PROPH/DIAG INJ SC/IM: CPT

## 2017-04-07 RX ORDER — IBUPROFEN 200 MG
16 TABLET ORAL
Status: DISCONTINUED | OUTPATIENT
Start: 2017-04-07 | End: 2017-04-12 | Stop reason: HOSPADM

## 2017-04-07 RX ORDER — MAGNESIUM SULFATE HEPTAHYDRATE 40 MG/ML
2 INJECTION, SOLUTION INTRAVENOUS ONCE
Status: COMPLETED | OUTPATIENT
Start: 2017-04-07 | End: 2017-04-07

## 2017-04-07 RX ORDER — HALOPERIDOL 10 MG/1
10 TABLET ORAL EVERY 8 HOURS PRN
Status: DISCONTINUED | OUTPATIENT
Start: 2017-04-07 | End: 2017-04-10

## 2017-04-07 RX ORDER — INSULIN ASPART 100 [IU]/ML
0-5 INJECTION, SOLUTION INTRAVENOUS; SUBCUTANEOUS
Status: DISCONTINUED | OUTPATIENT
Start: 2017-04-07 | End: 2017-04-12 | Stop reason: HOSPADM

## 2017-04-07 RX ORDER — FUROSEMIDE 10 MG/ML
80 INJECTION INTRAMUSCULAR; INTRAVENOUS 2 TIMES DAILY
Status: DISCONTINUED | OUTPATIENT
Start: 2017-04-07 | End: 2017-04-08

## 2017-04-07 RX ORDER — OLANZAPINE 10 MG/2ML
10 INJECTION, POWDER, FOR SOLUTION INTRAMUSCULAR 3 TIMES DAILY PRN
Status: DISCONTINUED | OUTPATIENT
Start: 2017-04-07 | End: 2017-04-12 | Stop reason: HOSPADM

## 2017-04-07 RX ORDER — ONDANSETRON 2 MG/ML
4 INJECTION INTRAMUSCULAR; INTRAVENOUS ONCE
Status: COMPLETED | OUTPATIENT
Start: 2017-04-07 | End: 2017-04-07

## 2017-04-07 RX ORDER — IBUPROFEN 200 MG
24 TABLET ORAL
Status: DISCONTINUED | OUTPATIENT
Start: 2017-04-07 | End: 2017-04-12 | Stop reason: HOSPADM

## 2017-04-07 RX ORDER — GLUCAGON 1 MG
1 KIT INJECTION
Status: DISCONTINUED | OUTPATIENT
Start: 2017-04-07 | End: 2017-04-12 | Stop reason: HOSPADM

## 2017-04-07 RX ORDER — HEPARIN SODIUM 5000 [USP'U]/ML
5000 INJECTION, SOLUTION INTRAVENOUS; SUBCUTANEOUS EVERY 8 HOURS
Status: DISCONTINUED | OUTPATIENT
Start: 2017-04-07 | End: 2017-04-08

## 2017-04-07 RX ORDER — OLANZAPINE 10 MG/1
10 TABLET, ORALLY DISINTEGRATING ORAL 3 TIMES DAILY PRN
Status: DISCONTINUED | OUTPATIENT
Start: 2017-04-07 | End: 2017-04-12 | Stop reason: HOSPADM

## 2017-04-07 RX ADMIN — Medication 3 ML: at 06:04

## 2017-04-07 RX ADMIN — HEPARIN SODIUM 5000 UNITS: 5000 INJECTION, SOLUTION INTRAVENOUS; SUBCUTANEOUS at 09:04

## 2017-04-07 RX ADMIN — ASPIRIN 81 MG: 81 TABLET, COATED ORAL at 08:04

## 2017-04-07 RX ADMIN — LISINOPRIL 5 MG: 5 TABLET ORAL at 08:04

## 2017-04-07 RX ADMIN — ATORVASTATIN CALCIUM 40 MG: 20 TABLET, FILM COATED ORAL at 08:04

## 2017-04-07 RX ADMIN — HEPARIN SODIUM 5000 UNITS: 5000 INJECTION, SOLUTION INTRAVENOUS; SUBCUTANEOUS at 06:04

## 2017-04-07 RX ADMIN — FUROSEMIDE 80 MG: 10 INJECTION, SOLUTION INTRAMUSCULAR; INTRAVENOUS at 08:04

## 2017-04-07 RX ADMIN — HEPARIN SODIUM 5000 UNITS: 5000 INJECTION, SOLUTION INTRAVENOUS; SUBCUTANEOUS at 12:04

## 2017-04-07 RX ADMIN — FLUOXETINE 20 MG: 20 CAPSULE ORAL at 08:04

## 2017-04-07 RX ADMIN — HALOPERIDOL 5 MG: 5 TABLET ORAL at 08:04

## 2017-04-07 RX ADMIN — MAGNESIUM SULFATE IN WATER 2 G: 40 INJECTION, SOLUTION INTRAVENOUS at 03:04

## 2017-04-07 RX ADMIN — TRAZODONE HYDROCHLORIDE 150 MG: 50 TABLET ORAL at 09:04

## 2017-04-07 RX ADMIN — ONDANSETRON 4 MG: 2 INJECTION INTRAMUSCULAR; INTRAVENOUS at 12:04

## 2017-04-07 RX ADMIN — TRAZODONE HYDROCHLORIDE 150 MG: 50 TABLET ORAL at 12:04

## 2017-04-07 RX ADMIN — CLOPIDOGREL 75 MG: 75 TABLET, FILM COATED ORAL at 08:04

## 2017-04-07 RX ADMIN — HALOPERIDOL 10 MG: 5 TABLET ORAL at 12:04

## 2017-04-07 RX ADMIN — FUROSEMIDE 80 MG: 10 INJECTION, SOLUTION INTRAMUSCULAR; INTRAVENOUS at 05:04

## 2017-04-07 RX ADMIN — BENZTROPINE MESYLATE 1 MG: 1 TABLET ORAL at 12:04

## 2017-04-07 RX ADMIN — HEPARIN SODIUM 5000 UNITS: 5000 INJECTION, SOLUTION INTRAVENOUS; SUBCUTANEOUS at 03:04

## 2017-04-07 NOTE — SUBJECTIVE & OBJECTIVE
Past Medical History:   Diagnosis Date    Asthma     Bipolar 1 disorder     Cardiomyopathy     Ischemic Cardiomyopathy with EF 25-30% by echo 5/17/16    Chronic systolic (congestive) heart failure     COPD (chronic obstructive pulmonary disease)     emphysema    Coronary artery disease     ROCHELLE 2006 and 2013    Depression     Diabetes mellitus     H/O echocardiogram 05/17/2016    EF 25-30%. LV diastolic dysfxn. Severe LAE. mod MR. BRAMBILA 86.    Hypertension     ICD (implantable cardioverter-defibrillator) in place 07/09/2013    MI (myocardial infarction)     x 3    PTSD (post-traumatic stress disorder)     Schizophrenia     Seizures        Past Surgical History:   Procedure Laterality Date    CARDIAC CATHETERIZATION      CARDIAC DEFIBRILLATOR PLACEMENT      [place]    [place]    CORONARY STENT PLACEMENT      INSERT / REPLACE / REMOVE PACEMAKER  2013     [facility]       Review of patient's allergies indicates:  No Known Allergies    No current facility-administered medications on file prior to encounter.      Current Outpatient Prescriptions on File Prior to Encounter   Medication Sig    aspirin (ECOTRIN) 81 MG EC tablet Take 1 tablet (81 mg total) by mouth once daily.    atorvastatin (LIPITOR) 40 MG tablet Take 1 tablet (40 mg total) by mouth once daily.    benztropine (COGENTIN) 1 MG tablet Take 1 tablet (1 mg total) by mouth every evening.    carvedilol (COREG) 6.25 MG tablet Take 1 tablet (6.25 mg total) by mouth 2 (two) times daily with meals.    ciclopirox (PENLAC) 8 % Soln Apply topically once daily. Apply topically to affected nails once daily.  Remove once weekly.  Repeat.  Follow package insert.    clopidogrel (PLAVIX) 75 mg tablet Take 1 tablet (75 mg total) by mouth once daily.    fluoxetine (PROZAC) 20 MG capsule TAKE 1 CAPSULE (20 MG TOTAL) BY MOUTH ONCE DAILY.    furosemide (LASIX) 40 MG tablet TAKE 1 TABLET (40 MG TOTAL) BY MOUTH 2 (TWO) TIMES  DAILY BEFORE MEALS.    glimepiride (AMARYL) 2 MG tablet Take 1 tablet (2 mg total) by mouth before breakfast.    haloperidol (HALDOL) 10 MG tablet Please take 1/2 tab (5 mg) in a.m and 1 tab (10 mg) at night    lisinopril (PRINIVIL,ZESTRIL) 5 MG tablet Take 1 tablet (5 mg total) by mouth once daily.    mirtazapine (REMERON) 30 MG tablet Take 1 tablet (30 mg total) by mouth every evening.    nitroGLYCERIN (NITROSTAT) 0.4 MG SL tablet Place 1 tablet (0.4 mg total) under the tongue every 5 (five) minutes as needed for Chest pain.    risperidone (RISPERDAL) 4 MG tablet Take 1 tablet (4 mg total) by mouth every evening.    trazodone (DESYREL) 150 MG tablet Take 1 tablet (150 mg total) by mouth every evening.    walker Misc Use walker daily PRN     Family History     None        Social History Main Topics    Smoking status: Current Every Day Smoker     Packs/day: 0.50     Years: 20.00     Last attempt to quit: 3/6/2013    Smokeless tobacco: Not on file    Alcohol use No    Drug use: No    Sexual activity: No     Review of Systems   Constitution: Negative for chills and fever.   HENT: Negative for ear discharge and headaches.    Eyes: Negative for pain and visual disturbance.   Cardiovascular: Positive for chest pain, dyspnea on exertion and leg swelling. Negative for irregular heartbeat, orthopnea, palpitations, paroxysmal nocturnal dyspnea and syncope.   Respiratory: Positive for shortness of breath. Negative for hemoptysis and wheezing.    Skin: Negative for rash and suspicious lesions.   Musculoskeletal: Negative for joint pain and muscle weakness.   Gastrointestinal: Positive for abdominal pain, nausea and vomiting. Negative for diarrhea, hematemesis, hematochezia and melena.   Genitourinary: Negative for dysuria and frequency.   Neurological: Negative for focal weakness and tremors.   Psychiatric/Behavioral: Negative for altered mental status, suicidal ideas and thoughts of violence.     Objective:      Vital Signs (Most Recent):  Temp: 97.5 °F (36.4 °C) (04/06/17 1947)  Pulse: 77 (04/06/17 2331)  Resp: 19 (04/06/17 2327)  BP: 117/62 (04/06/17 2331)  SpO2: 100 % (04/06/17 2331) Vital Signs (24h Range):  Temp:  [97.5 °F (36.4 °C)] 97.5 °F (36.4 °C)  Pulse:  [72-86] 77  Resp:  [19-42] 19  SpO2:  [94 %-100 %] 100 %  BP: (113-141)/(62-86) 117/62     Weight: 69 kg (152 lb 1.9 oz)  Body mass index is 27.82 kg/(m^2).    SpO2: 100 %  O2 Device (Oxygen Therapy): nasal cannula      Intake/Output Summary (Last 24 hours) at 04/07/17 0023  Last data filed at 04/07/17 0003   Gross per 24 hour   Intake                0 ml   Output              450 ml   Net             -450 ml       Lines/Drains/Airways     Peripheral Intravenous Line                 Peripheral IV - Single Lumen 04/06/17 2107 Left Antecubital less than 1 day                Physical Exam   Constitutional: She is oriented to person, place, and time. She appears well-developed.   HENT:   Head: Normocephalic and atraumatic.   Eyes: EOM are normal.   Neck: Normal range of motion.   Cardiovascular: Normal rate, regular rhythm and normal heart sounds.  Exam reveals no gallop and no friction rub.    No murmur heard.  Pulses:       Radial pulses are 2+ on the right side, and 2+ on the left side.   Pulmonary/Chest: Effort normal. She has no wheezes. She has no rales.   Abdominal: Soft. Bowel sounds are normal. She exhibits no distension. There is no tenderness. There is no rebound and no guarding.   Musculoskeletal: Normal range of motion. She exhibits no edema.   Neurological: She is alert and oriented to person, place, and time. She has normal strength. No cranial nerve deficit or sensory deficit.   Skin: Skin is warm. No rash noted. No erythema.   Psychiatric: She has a normal mood and affect.       Significant Labs:   CMP   Recent Labs  Lab 04/06/17 2107      K 3.8      CO2 21*   GLU 74   BUN 11   CREATININE 0.8   CALCIUM 7.8*   PROT 6.2   ALBUMIN 2.8*    BILITOT 1.9*   ALKPHOS 116   AST 28   ALT 29   ANIONGAP 9   ESTGFRAFRICA >60.0   EGFRNONAA >60.0   , CBC   Recent Labs  Lab 04/06/17 2107   WBC 5.83   HGB 11.2*   HCT 33.5*       and Troponin   Recent Labs  Lab 04/06/17 2107   TROPONINI 0.023       Significant Imaging: Reviewed

## 2017-04-07 NOTE — PROGRESS NOTES
Pt admitted to CSU. Pt oriented to room. Educate pt to call for assistance when needing to get up out of bed. Glucose monitored. No coverage needed. Telemetry monitor on pt. VSS. Pt denies pain.  Plan of care reviewed with pt. Pt verbalizes understanding.

## 2017-04-07 NOTE — PROGRESS NOTES
Pt drug screen presumptive positive for cocaine. Per team huddle, this is an ongoing concern as well as the fact that the pt does not take her medications as prescribed and has not been taking her psych meds regularly. Pt not appropriate for [facility] program at this time.    Removed from HF list.

## 2017-04-07 NOTE — ED NOTES
Patient identifiers verified and correct for Polly Kwan.    LOC: The patient is awake, alert and aware of environment with an appropriate affect, the patient is oriented x 3 and speaking appropriately.  APPEARANCE: Patient is clean and well groomed, patient's clothing is properly fastened.  SKIN: The skin is warm and dry, color consistent with ethnicity, patient has normal skin turgor and moist mucus membranes, skin intact, no breakdown or bruising noted.  MUSCULOSKELETAL: Patient moving all extremities spontaneously, no obvious swelling or deformities noted.  RESPIRATORY: Airway is open and patent, respirations are spontaneous, breathing is labored, patient is tachypneic, accessory muscle use noted,   CARDIAC: Patient has a normal rate and regular rhythm, no periphreal edema noted, capillary refill < 3 seconds.  ABDOMEN: abdominal distension noted, tender to palpation, normoactive bowel sounds present in all four quadrants.  NEUROLOGIC: eyes open spontaneously, behavior appropriate to situation, follows commands, facial expression symmetrical, bilateral hand grasp equal and even, purposeful motor response noted, normal sensation in all extremities when touched with a finger.

## 2017-04-07 NOTE — ED TRIAGE NOTES
Pt reports SOB, chest tightness, abdominal pain, and N/V since last night. Pt reports not taking any of her medications in three days.

## 2017-04-07 NOTE — PROGRESS NOTES
For your Information:    Please note the following patient has been assigned to Audrey Torres RN with [facility] for screening.    Reason: Acute on chronic congestive heart failure, unspecified congestive heart failure type [I50.9]    Please contact [facility] at ext [phone] with questions.    Thank you    Shweta Arce, SSC

## 2017-04-07 NOTE — ASSESSMENT & PLAN NOTE
- CP in the setting of cocaine abuse  - nitrates/CCB as tolerated; avoid BB for now  - aspirin, atorvastatin, clopidogrel, lisinopril  - trend Tn and consider ischemic re-eval  - NTG SL prn  - ECHO

## 2017-04-07 NOTE — PLAN OF CARE
04/07/17 1046   Discharge Assessment   Assessment Type Discharge Planning Assessment   Confirmed/corrected address and phone number on facesheet? No   Assessment information obtained from? Medical Record   Expected Length of Stay (days) 2   Communicated expected length of stay with patient/caregiver no   Prior to hospitilization cognitive status: Alert/Oriented   Prior to hospitalization functional status: Independent   Current cognitive status: Alert/Oriented   Current Functional Status: Independent   Arrived From home or self-care   Lives With significant other   Able to Return to Prior Arrangements yes   Is patient able to care for self after discharge? Yes   How many people do you have in your home that can help with your care after discharge? 1   Who are your caregiver(s) and their phone number(s)? SO, George Teofilo  [phone]   Patient's perception of discharge disposition home or selfcare   Readmission Within The Last 30 Days no previous admission in last 30 days   Patient currently being followed by outpatient case management? No   Patient currently receives home health services? No   Does the patient currently use HME? No   Patient currently receives private duty nursing? No   Patient currently receives any other outside agency services? No   Equipment Currently Used at Home cane, straight   Do you have any problems affording any of your prescribed medications? No   Is the patient taking medications as prescribed? yes   Do you have any financial concerns preventing you from receiving the healthcare you need? No   Does the patient have transportation to healthcare appointments? Yes   Transportation Available family or friend will provide;public transportation   On Dialysis? No   Does the patient receive services at the [facility]mad[facility] Clinic? No   Are there any open cases? No   Discharge Plan A Home   Admitted with CHF, + Cocaine. CM went to the room for DC needs assessment. Pt off the floor for testing.  Cm will return to see the pt. Information from EMR. Lives with SO and is independent in her ADLs. Plan is to DC home. No DC needs anticipated.

## 2017-04-07 NOTE — ED PROVIDER NOTES
Encounter Date: 4/6/2017    SCRIBE #1 NOTE: I, Whit Montiel, am scribing for, and in the presence of, Dr. Garza.       History     Chief Complaint   Patient presents with    Abdominal Pain     for three or four days    Chest Pain     Review of patient's allergies indicates:  No Known Allergies  HPI Comments: Time patient was seen by the provider: 8:44 PM      The patient is a 57 y.o. female with hx of: DM, CAD, CHF, MI x3, HTN, seizures that presents to the ED with a complaint of SOB with constant CP and cough since last night. She takes her albuterol PRN but did not use it today. She denies alcohol or drug usage. Pt also with vomiting, abdominal pain, and abdominal distension.    Past Medical History:   Diagnosis Date    Asthma     Bipolar 1 disorder     Cardiomyopathy     Ischemic Cardiomyopathy with EF 25-30% by echo 5/17/16    Chronic systolic (congestive) heart failure     COPD (chronic obstructive pulmonary disease)     emphysema    Coronary artery disease     ROCHELLE 2006 and 2013    Depression     Diabetes mellitus     H/O echocardiogram 05/17/2016    EF 25-30%. LV diastolic dysfxn. Severe LAE. mod MR. PASP 86.    Hypertension     ICD (implantable cardioverter-defibrillator) in place 07/09/2013    MI (myocardial infarction)     x 3    PTSD (post-traumatic stress disorder)     Schizophrenia     Seizures      Past Surgical History:   Procedure Laterality Date    CARDIAC CATHETERIZATION      CARDIAC DEFIBRILLATOR PLACEMENT      [place]  2006    [place]    CORONARY STENT PLACEMENT      INSERT / REPLACE / REMOVE PACEMAKER  2013     [facility]     Family History   Problem Relation Age of Onset    Adopted: Yes     Social History   Substance Use Topics    Smoking status: Current Every Day Smoker     Packs/day: 0.50     Years: 20.00     Last attempt to quit: 3/6/2013    Smokeless tobacco: None    Alcohol use No     Review of Systems   Constitutional: Negative for fever.    HENT: Negative for sore throat.    Respiratory: Positive for cough and shortness of breath.    Cardiovascular: Positive for chest pain.   Gastrointestinal: Positive for abdominal distention, abdominal pain and vomiting. Negative for nausea.   Genitourinary: Negative for dysuria.   Musculoskeletal: Negative for back pain.   Skin: Negative for rash.   Neurological: Negative for weakness.   Hematological: Does not bruise/bleed easily.   All other systems reviewed and are negative.      Physical Exam   Initial Vitals   BP Pulse Resp Temp SpO2   04/06/17 1947 04/06/17 1947 04/06/17 1947 04/06/17 1947 04/06/17 1947   141/83 86 22 97.5 °F (36.4 °C) 100 %     Physical Exam    Vitals reviewed.  Constitutional: She appears well-developed and well-nourished. She appears distressed.   HENT:   Head: Normocephalic and atraumatic.   Eyes: EOM are normal. Pupils are equal, round, and reactive to light.   Neck: Normal range of motion. Neck supple.   Cardiovascular: Normal rate and regular rhythm.   Pulmonary/Chest: Tachypnea noted. She is in respiratory distress. She has wheezes.   Diminished breath sounds and air movement   Abdominal: Soft. There is no tenderness.   Musculoskeletal: Normal range of motion. She exhibits edema (1+ BLE).   Neurological: She is alert and oriented to person, place, and time.   Skin: Skin is warm and dry.         ED Course   Procedures  Labs Reviewed   CBC W/ AUTO DIFFERENTIAL - Abnormal; Notable for the following:        Result Value    RBC 3.98 (*)     Hemoglobin 11.2 (*)     Hematocrit 33.5 (*)     RDW 17.9 (*)     All other components within normal limits   COMPREHENSIVE METABOLIC PANEL - Abnormal; Notable for the following:     CO2 21 (*)     Calcium 7.8 (*)     Albumin 2.8 (*)     Total Bilirubin 1.9 (*)     All other components within normal limits   B-TYPE NATRIURETIC PEPTIDE - Abnormal; Notable for the following:     BNP 2588 (*)     All other components within normal limits   PROTIME-INR -  Abnormal; Notable for the following:     Prothrombin Time 13.1 (*)     INR 1.3 (*)     All other components within normal limits   DRUG SCREEN PANEL, URINE EMERGENCY - Abnormal; Notable for the following:     Creatinine, Random Ur <5.0 (*)     All other components within normal limits   ISTAT PROCEDURE - Abnormal; Notable for the following:     POC PH 7.349 (*)     POC HCO3 19.8 (*)     POC SATURATED O2 76 (*)     POC TCO2 21 (*)     All other components within normal limits   POCT GLUCOSE - Abnormal; Notable for the following:     POCT Glucose 129 (*)     All other components within normal limits   TROPONIN I   POCT GLUCOSE MONITORING CONTINUOUS     EKG Readings: (Independently Interpreted)   Initial Reading: No STEMI. Heart Rate: 80.       X-Rays:   Independently Interpreted Readings:   Chest X-Ray: Cardiomegaly w/ pacer in left chest. No pulmonary edema.     Medical Decision Making:   History:   Old Medical Records: I decided to obtain old medical records.  Initial Assessment:   This is an emergent evaluation of a 57 y.o. female presenting with  CP, SOB, and respiratory distress. Pt has significant cardiac hx and is also endorsing COPD. Will start BiPAP, check labs, CXR. Anticipate admission.  Independently Interpreted Test(s):   I have ordered and independently interpreted X-rays - see prior notes.  Clinical Tests:   Lab Tests: Ordered and Reviewed  Radiological Study: Reviewed and Ordered  Medical Tests: Reviewed and Ordered            Scribe Attestation:   Scribe #1: I performed the above scribed service and the documentation accurately describes the services I performed. I attest to the accuracy of the note.    Attending Attestation:           Physician Attestation for Scribe:  Physician Attestation Statement for Scribe #1: I, Dr. Garza, reviewed documentation, as scribed by Whit Montiel in my presence, and it is both accurate and complete.         Attending ED Notes:   The patient to come off BiPAP.   Initial CO2 readings within normal limits.  Concern for a CHF exacerbation given her BNP levels.  IV Lasix was given.  Patient will be admitted to the hospital for further respiratory support and diuresis.          ED Course     Clinical Impression:   The primary encounter diagnosis was Acute on chronic congestive heart failure, unspecified congestive heart failure type. Diagnoses of Chest pain, Respiratory distress, CHF (congestive heart failure), Acute on chronic systolic (congestive) heart failure, Psychosis, unspecified psychosis type, and Cocaine use, unspecified with unspecified cocaine-induced disorder were also pertinent to this visit.    Disposition:   Disposition: Admitted  Condition: Janneth Garza MD  04/09/17 1328
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
## 2017-04-07 NOTE — PLAN OF CARE
Problem: Patient Care Overview  Goal: Plan of Care Review  Outcome: Ongoing (interventions implemented as appropriate)  Pt verbalizes no complaints. Denies CP, SOB, or other discomforts. Pt receiving IVP lasix to remove fluid Pt remains free of fall or injury. Pt verbalizes understanding of plan of care. Will continue to monitor.

## 2017-04-07 NOTE — PROGRESS NOTES
Pt currently PEC'd. Pt tansferred to private room. All of the following items have been removed from the pt room:  - Metal utensils or any object that can be used to cause harm  - easily accessible personal items  - Plastic bads or plastic wrappers  - Belt or shoestrings  - Linen on the floor of hte room or bathroom  - Biohazard or linen containers in the room  - Regular food trays  - Cloth clothing    Pt is in a paper linen. Sitter is with pt at bedside. Will continue to monitor

## 2017-04-07 NOTE — CONSULTS
[facility]  Adult Nutrition  Consult Note    SUMMARY     Recommendations    Recommendation/Intervention:   1. Continue current diet order.   2. Encourage good PO intake of meals. Will monitor.   Goals: PO intake >75%.   Nutrition Goal Status: new  Communication of RD Recs: reviewed with RN    Reason for Assessment    Reason for Assessment: physician consult  Diagnosis: cardiac disease  Relevent Medical History: HF, CAD, DM, HTN   Nutrition Discharge Planning: Adequate PO intake for optimal nutrition.     Nutrition Prescription Ordered    Current Diet Order: Cardiac     Nutrition Risk Screen     Nutrition Risk Screen: no indicators present    Nutrition/Diet History    Typical Food/Fluid Intake: Pt sleeping during visit. Consumed 100% of breakfast this AM, tray still in room.   Factors Affecting Nutritional Intake: other (see comments) (none)    Labs/Tests/Procedures/Meds    Pertinent Labs Reviewed: reviewed  Pertinent Labs Comments: Ca 7.8, Alb 2.8  Pertinent Medications Reviewed: reviewed  Pertinent Medications Comments: lasix    Physical Findings    Overall Physical Appearance: other (see comments) (nourished)  Oral/Mouth Cavity: tooth/teeth missing  Skin: intact    Anthropometrics    Height (inches): 62.01 in  Weight Method: Stated  Weight (kg): 69 kg  Ideal Body Weight (IBW), Female: 110.05 lb  % Ideal Body Weight, Female (lb): 138.23   BMI (kg/m2): 27.82  BMI Grade: 25 - 29.9 - overweight    Estimated/Assessed Needs    Weight Used For Calorie Calculations: 69 kg (152 lb 1.9 oz)   Height (cm): 157.5 cm  Energy Need Method: [place]-St Jeor (1.3 PAL: 1601kcal)  RMR ([place]-St. Jeor Equation): 1232.24  Weight Used For Protein Calculations: 69 kg (152 lb 1.9 oz)  Protein Requirements: 69-82g (1-1.2g/kg)  Fluid Need Method: RDA Method (or per MD)    Monitor and Evaluation    Food and Nutrient Intake: energy intake, food and beverage intake  Food and Nutrient Adminstration: diet  order  Anthropometric Measurements: weight, weight change  Biochemical Data, Medical Tests and Procedures: other (specify) (All labs)  Nutrition-Focused Physical Findings: overall appearance    Nutrition Risk    Level of Risk: other (see comments) (1X/week)    Nutrition Follow-Up    RD Follow-up?: Yes    Assessment and Plan    No nutrition related risk factor present at this time.

## 2017-04-07 NOTE — H&P
"[facility]  Cardiology  History and Physical     Patient Name: Polly Kwan  MRN: [id number]  Admission Date: 4/6/2017  Code Status: Full Code   Attending Provider: Opal Garza MD   Primary Care Physician: Iza Kwan MD  Principal Problem:Acute on chronic systolic (congestive) heart failure    Patient information was obtained from patient.     Subjective:     Chief Complaint:  CP     HPI:  The patient reports bilateral chest pain that started last night while she was "laying down". She says "my stomach was tight.. I was cold". When asked about the quality of the chest pain, she says it was "hurting.. I couldn't breathe or get comfortable". The pain is worse with coughing and it radiates to "my sides". She endorses SOB, VIEYRA, nausea and vomiting. The patient denies salty food intake and has been off her medications for "three days". The patient denies hemoptysis, fever, bloody stools, EtOH use and palpitations. She endorses swelling in her legs.       Past Medical History:   Diagnosis Date    Asthma     Bipolar 1 disorder     Cardiomyopathy     Ischemic Cardiomyopathy with EF 25-30% by echo 5/17/16    Chronic systolic (congestive) heart failure     COPD (chronic obstructive pulmonary disease)     emphysema    Coronary artery disease     ROCHELLE 2006 and 2013    Depression     Diabetes mellitus     H/O echocardiogram 05/17/2016    EF 25-30%. LV diastolic dysfxn. Severe LAE. mod MR. PASP 86.    Hypertension     ICD (implantable cardioverter-defibrillator) in place 07/09/2013    MI (myocardial infarction)     x 3    PTSD (post-traumatic stress disorder)     Schizophrenia     Seizures        Past Surgical History:   Procedure Laterality Date    CARDIAC CATHETERIZATION      CARDIAC DEFIBRILLATOR PLACEMENT      [place]  2006    [place]    CORONARY STENT PLACEMENT      INSERT / REPLACE / REMOVE PACEMAKER  2013     ICD [facility]       Review of patient's allergies " indicates:  No Known Allergies    No current facility-administered medications on file prior to encounter.      Current Outpatient Prescriptions on File Prior to Encounter   Medication Sig    aspirin (ECOTRIN) 81 MG EC tablet Take 1 tablet (81 mg total) by mouth once daily.    atorvastatin (LIPITOR) 40 MG tablet Take 1 tablet (40 mg total) by mouth once daily.    benztropine (COGENTIN) 1 MG tablet Take 1 tablet (1 mg total) by mouth every evening.    carvedilol (COREG) 6.25 MG tablet Take 1 tablet (6.25 mg total) by mouth 2 (two) times daily with meals.    ciclopirox (PENLAC) 8 % Soln Apply topically once daily. Apply topically to affected nails once daily.  Remove once weekly.  Repeat.  Follow package insert.    clopidogrel (PLAVIX) 75 mg tablet Take 1 tablet (75 mg total) by mouth once daily.    fluoxetine (PROZAC) 20 MG capsule TAKE 1 CAPSULE (20 MG TOTAL) BY MOUTH ONCE DAILY.    furosemide (LASIX) 40 MG tablet TAKE 1 TABLET (40 MG TOTAL) BY MOUTH 2 (TWO) TIMES DAILY BEFORE MEALS.    glimepiride (AMARYL) 2 MG tablet Take 1 tablet (2 mg total) by mouth before breakfast.    haloperidol (HALDOL) 10 MG tablet Please take 1/2 tab (5 mg) in a.m and 1 tab (10 mg) at night    lisinopril (PRINIVIL,ZESTRIL) 5 MG tablet Take 1 tablet (5 mg total) by mouth once daily.    mirtazapine (REMERON) 30 MG tablet Take 1 tablet (30 mg total) by mouth every evening.    nitroGLYCERIN (NITROSTAT) 0.4 MG SL tablet Place 1 tablet (0.4 mg total) under the tongue every 5 (five) minutes as needed for Chest pain.    risperidone (RISPERDAL) 4 MG tablet Take 1 tablet (4 mg total) by mouth every evening.    trazodone (DESYREL) 150 MG tablet Take 1 tablet (150 mg total) by mouth every evening.    walker Misc Use walker daily PRN     Family History     None        Social History Main Topics    Smoking status: Current Every Day Smoker     Packs/day: 0.50     Years: 20.00     Last attempt to quit: 3/6/2013    Smokeless tobacco: Not  on file    Alcohol use No    Drug use: No    Sexual activity: No     Review of Systems   Constitution: Negative for chills and fever.   HENT: Negative for ear discharge and headaches.    Eyes: Negative for pain and visual disturbance.   Cardiovascular: Positive for chest pain, dyspnea on exertion and leg swelling. Negative for irregular heartbeat, orthopnea, palpitations, paroxysmal nocturnal dyspnea and syncope.   Respiratory: Positive for shortness of breath. Negative for hemoptysis and wheezing.    Skin: Negative for rash and suspicious lesions.   Musculoskeletal: Negative for joint pain and muscle weakness.   Gastrointestinal: Positive for abdominal pain, nausea and vomiting. Negative for diarrhea, hematemesis, hematochezia and melena.   Genitourinary: Negative for dysuria and frequency.   Neurological: Negative for focal weakness and tremors.   Psychiatric/Behavioral: Negative for altered mental status, suicidal ideas and thoughts of violence.     Objective:     Vital Signs (Most Recent):  Temp: 97.5 °F (36.4 °C) (04/06/17 1947)  Pulse: 77 (04/06/17 2331)  Resp: 19 (04/06/17 2327)  BP: 117/62 (04/06/17 2331)  SpO2: 100 % (04/06/17 2331) Vital Signs (24h Range):  Temp:  [97.5 °F (36.4 °C)] 97.5 °F (36.4 °C)  Pulse:  [72-86] 77  Resp:  [19-42] 19  SpO2:  [94 %-100 %] 100 %  BP: (113-141)/(62-86) 117/62     Weight: 69 kg (152 lb 1.9 oz)  Body mass index is 27.82 kg/(m^2).    SpO2: 100 %  O2 Device (Oxygen Therapy): nasal cannula      Intake/Output Summary (Last 24 hours) at 04/07/17 0023  Last data filed at 04/07/17 0003   Gross per 24 hour   Intake                0 ml   Output              450 ml   Net             -450 ml       Lines/Drains/Airways     Peripheral Intravenous Line                 Peripheral IV - Single Lumen 04/06/17 2107 Left Antecubital less than 1 day                Physical Exam   Constitutional: She is oriented to person, place, and time. She appears well-developed.   HENT:   Head:  Normocephalic and atraumatic.   Eyes: EOM are normal.   Neck: Normal range of motion.   Cardiovascular: Normal rate, regular rhythm and normal heart sounds.  Exam reveals no gallop and no friction rub.    No murmur heard.  Pulses:       Radial pulses are 2+ on the right side, and 2+ on the left side.   Pulmonary/Chest: Effort normal. She has no wheezes. She has no rales.   Abdominal: Soft. Bowel sounds are normal. She exhibits no distension. There is no tenderness. There is no rebound and no guarding.   Musculoskeletal: Normal range of motion. She exhibits no edema.   Neurological: She is alert and oriented to person, place, and time. She has normal strength. No cranial nerve deficit or sensory deficit.   Skin: Skin is warm. No rash noted. No erythema.   Psychiatric: She has a normal mood and affect.       Significant Labs:   CMP   Recent Labs  Lab 04/06/17 2107      K 3.8      CO2 21*   GLU 74   BUN 11   CREATININE 0.8   CALCIUM 7.8*   PROT 6.2   ALBUMIN 2.8*   BILITOT 1.9*   ALKPHOS 116   AST 28   ALT 29   ANIONGAP 9   ESTGFRAFRICA >60.0   EGFRNONAA >60.0   , CBC   Recent Labs  Lab 04/06/17 2107   WBC 5.83   HGB 11.2*   HCT 33.5*       and Troponin   Recent Labs  Lab 04/06/17 2107   TROPONINI 0.023       Significant Imaging: Reviewed    Assessment and Plan:     * Acute on chronic systolic (congestive) heart failure  Ischemic cardiomyopathy  Moderate tricuspid regurgitation  - wet and warm exacerbation in the setting of medical nonadherence and cocaine abuse  - 80 mg IV Lasix BID, monitor weight/UOP  - TTE with CFD    Schizoaffective disorder, bipolar type  Cocaine abuse  - benztropine, fluoxetine, haloperidol, mirtazapine, risperidone, trazodone    - PSYCH consult    Atypical chest pain  CAD (coronary artery disease)  - CP in the setting of cocaine abuse  - nitrates/CCB as tolerated; avoid BB for now  - aspirin, atorvastatin, clopidogrel, lisinopril  - trend Tn and consider ischemic  re-eval  - NTG SL prn  - ECHO    HTN (hypertension)  - lisinopril     Abdominal pain  - no e/o acute abdomen currently  - KUB negative for obstruction  - consider RUQ US  - lipase    Diabetes mellitus  - SSI  - HgbA1c    Asthma  - no e/o exacerbation currently  - DuoNeb prn    Hypoalbuminemia  - nutrition consult    Normocytic anemia  - outpt cancer screening  - no overt s/s of GIB  - continue to monitor    Hypocalcemia  - ionized calcium      VTE Risk Mitigation         Ordered     Medium Risk of VTE  Once      04/07/17 0018     Place LEXIS hose  Until discontinued      04/07/17 0018     heparin (porcine) injection 5,000 Units  Every 8 hours     Route:  Subcutaneous        04/07/17 0012          Isaiah Hua MD  Cardiology   [facility]
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
## 2017-04-07 NOTE — PROGRESS NOTES
Consent obtained. optison given ivp via left a/c sl for imaging. Denies transfusion rxn. Tolerated well. Sl flushed before and after with ns.

## 2017-04-07 NOTE — ASSESSMENT & PLAN NOTE
- wet and warm exacerbation in the setting of medical nonadherence and cocaine abuse  - 80 mg IV Lasix BID, monitor weight/UOP  - TTE with CFD

## 2017-04-08 LAB
ALBUMIN SERPL BCP-MCNC: 2.7 G/DL
ALP SERPL-CCNC: 106 U/L
ALT SERPL W/O P-5'-P-CCNC: 23 U/L
ANION GAP SERPL CALC-SCNC: 9 MMOL/L
AST SERPL-CCNC: 22 U/L
BASOPHILS # BLD AUTO: 0.01 K/UL
BASOPHILS NFR BLD: 0.2 %
BILIRUB SERPL-MCNC: 1.5 MG/DL
BUN SERPL-MCNC: 10 MG/DL
CALCIUM SERPL-MCNC: 7.9 MG/DL
CHLORIDE SERPL-SCNC: 98 MMOL/L
CO2 SERPL-SCNC: 32 MMOL/L
CREAT SERPL-MCNC: 1 MG/DL
DIFFERENTIAL METHOD: ABNORMAL
EOSINOPHIL # BLD AUTO: 0 K/UL
EOSINOPHIL NFR BLD: 0.6 %
ERYTHROCYTE [DISTWIDTH] IN BLOOD BY AUTOMATED COUNT: 18 %
EST. GFR  (AFRICAN AMERICAN): >60 ML/MIN/1.73 M^2
EST. GFR  (NON AFRICAN AMERICAN): >60 ML/MIN/1.73 M^2
GLUCOSE SERPL-MCNC: 103 MG/DL
HCT VFR BLD AUTO: 32.5 %
HGB BLD-MCNC: 11 G/DL
LYMPHOCYTES # BLD AUTO: 1.6 K/UL
LYMPHOCYTES NFR BLD: 31.2 %
MAGNESIUM SERPL-MCNC: 1.8 MG/DL
MCH RBC QN AUTO: 27.9 PG
MCHC RBC AUTO-ENTMCNC: 33.8 %
MCV RBC AUTO: 83 FL
MONOCYTES # BLD AUTO: 0.5 K/UL
MONOCYTES NFR BLD: 9 %
NEUTROPHILS # BLD AUTO: 3 K/UL
NEUTROPHILS NFR BLD: 58.8 %
PLATELET # BLD AUTO: 304 K/UL
PMV BLD AUTO: 9.6 FL
POCT GLUCOSE: 123 MG/DL (ref 70–110)
POCT GLUCOSE: 159 MG/DL (ref 70–110)
POCT GLUCOSE: 165 MG/DL (ref 70–110)
POCT GLUCOSE: 170 MG/DL (ref 70–110)
POTASSIUM SERPL-SCNC: 3.5 MMOL/L
PROT SERPL-MCNC: 5.9 G/DL
RBC # BLD AUTO: 3.94 M/UL
SODIUM SERPL-SCNC: 139 MMOL/L
WBC # BLD AUTO: 5.1 K/UL

## 2017-04-08 PROCEDURE — 25000003 PHARM REV CODE 250: Performed by: HOSPITALIST

## 2017-04-08 PROCEDURE — 20600001 HC STEP DOWN PRIVATE ROOM

## 2017-04-08 PROCEDURE — 63600175 PHARM REV CODE 636 W HCPCS: Performed by: INTERNAL MEDICINE

## 2017-04-08 PROCEDURE — 80053 COMPREHEN METABOLIC PANEL: CPT

## 2017-04-08 PROCEDURE — 36415 COLL VENOUS BLD VENIPUNCTURE: CPT

## 2017-04-08 PROCEDURE — 63600175 PHARM REV CODE 636 W HCPCS: Performed by: HOSPITALIST

## 2017-04-08 PROCEDURE — 85025 COMPLETE CBC W/AUTO DIFF WBC: CPT

## 2017-04-08 PROCEDURE — 25000003 PHARM REV CODE 250: Performed by: INTERNAL MEDICINE

## 2017-04-08 PROCEDURE — 83735 ASSAY OF MAGNESIUM: CPT

## 2017-04-08 PROCEDURE — 99232 SBSQ HOSP IP/OBS MODERATE 35: CPT | Mod: ,,, | Performed by: HOSPITALIST

## 2017-04-08 RX ORDER — FUROSEMIDE 10 MG/ML
80 INJECTION INTRAMUSCULAR; INTRAVENOUS DAILY
Status: DISCONTINUED | OUTPATIENT
Start: 2017-04-09 | End: 2017-04-08

## 2017-04-08 RX ORDER — ENOXAPARIN SODIUM 100 MG/ML
40 INJECTION SUBCUTANEOUS EVERY 24 HOURS
Status: DISCONTINUED | OUTPATIENT
Start: 2017-04-09 | End: 2017-04-12 | Stop reason: HOSPADM

## 2017-04-08 RX ORDER — POTASSIUM CHLORIDE 20 MEQ/1
60 TABLET, EXTENDED RELEASE ORAL ONCE
Status: COMPLETED | OUTPATIENT
Start: 2017-04-08 | End: 2017-04-08

## 2017-04-08 RX ORDER — ACETAMINOPHEN 325 MG/1
650 TABLET ORAL EVERY 6 HOURS PRN
Status: DISCONTINUED | OUTPATIENT
Start: 2017-04-08 | End: 2017-04-12 | Stop reason: HOSPADM

## 2017-04-08 RX ORDER — MAGNESIUM SULFATE HEPTAHYDRATE 40 MG/ML
2 INJECTION, SOLUTION INTRAVENOUS ONCE
Status: COMPLETED | OUTPATIENT
Start: 2017-04-08 | End: 2017-04-08

## 2017-04-08 RX ORDER — LIDOCAINE 50 MG/G
1 PATCH TOPICAL
Status: DISCONTINUED | OUTPATIENT
Start: 2017-04-08 | End: 2017-04-12 | Stop reason: HOSPADM

## 2017-04-08 RX ADMIN — HEPARIN SODIUM 5000 UNITS: 5000 INJECTION, SOLUTION INTRAVENOUS; SUBCUTANEOUS at 02:04

## 2017-04-08 RX ADMIN — LISINOPRIL 5 MG: 5 TABLET ORAL at 08:04

## 2017-04-08 RX ADMIN — POTASSIUM CHLORIDE 60 MEQ: 1500 TABLET, EXTENDED RELEASE ORAL at 03:04

## 2017-04-08 RX ADMIN — TRAZODONE HYDROCHLORIDE 150 MG: 50 TABLET ORAL at 09:04

## 2017-04-08 RX ADMIN — HEPARIN SODIUM 5000 UNITS: 5000 INJECTION, SOLUTION INTRAVENOUS; SUBCUTANEOUS at 06:04

## 2017-04-08 RX ADMIN — FUROSEMIDE 80 MG: 10 INJECTION, SOLUTION INTRAMUSCULAR; INTRAVENOUS at 08:04

## 2017-04-08 RX ADMIN — ASPIRIN 81 MG: 81 TABLET, COATED ORAL at 08:04

## 2017-04-08 RX ADMIN — ACETAMINOPHEN 650 MG: 325 TABLET ORAL at 03:04

## 2017-04-08 RX ADMIN — MAGNESIUM SULFATE IN WATER 2 G: 40 INJECTION, SOLUTION INTRAVENOUS at 03:04

## 2017-04-08 RX ADMIN — ATORVASTATIN CALCIUM 40 MG: 20 TABLET, FILM COATED ORAL at 08:04

## 2017-04-08 RX ADMIN — FUROSEMIDE 80 MG: 10 INJECTION, SOLUTION INTRAMUSCULAR; INTRAVENOUS at 05:04

## 2017-04-08 RX ADMIN — CLOPIDOGREL 75 MG: 75 TABLET, FILM COATED ORAL at 08:04

## 2017-04-08 RX ADMIN — LIDOCAINE 1 PATCH: 50 PATCH TOPICAL at 03:04

## 2017-04-08 NOTE — ASSESSMENT & PLAN NOTE
- Continue Trazodone 150 mg PO qHS  - Continue Zyprexa 10 mg PO/IM q8 PRN agitation  - Hold all other psychotropic medication.  - Continue PEC  - Will admit to inpatient psychiatric unit once medically cleared.
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
## 2017-04-08 NOTE — PLAN OF CARE
Problem: Patient Care Overview  Goal: Plan of Care Review  Outcome: Ongoing (interventions implemented as appropriate)  Pt verbalizes no complaints. Denies CP, SOB, or other discomforts. Pt remains free of fall or injury. Pt currently PEC'd with a sitter. Pt calm and AAO x4. Pt has not expressed any thoughts of suicide/homicide. Pt being diuresed with IVP lasix. Pt verbalizes understanding of plan of care. Will continue to monitor.

## 2017-04-08 NOTE — PROGRESS NOTES
Brief update    -Contacted by Psychiatry service who saw patient, she was expressing auditory hallucinations and thoughts of harming others, it was recommended to PEC patient.  Paperwork completed and order placed in EPIC    -[facility] Pharm D contacted her pharmacy and previous home psychiatric meds listed in chart have not been filled since December.  Many of her cardiac medications also reportedly not filled.     -For psych med management - Will d/c - Risperidone/Fluoxetine/Haldol/Benzttropine/haldol/Mirtazapine    >Continue Trazodone.  PRN Olanzapine PO or IM for psychotic agitation    -For CHF exacerbation - will continue medical management with IV lasix/lisinopril.  Beta blocker on hold in setting of cocaine use.  If further hypertension will consider nitrates vs ccb.         Mauricio Angulo M.D.  Attending Physician  [facility] Medicine Dept.  Pager: [phone]

## 2017-04-08 NOTE — SUBJECTIVE & OBJECTIVE
"Interval History: Patient calm and cooperative on eval. AAOx3. Eating and sleeping well. States that she continues to experience AH but refuses to discuss content.     Psychotherapeutics     Start     Stop Route Frequency Ordered    04/07/17 0100  trazodone tablet 150 mg      -- Oral Nightly 04/06/17 2345    04/07/17 1311  haloperidol tablet 10 mg      -- Oral Every 8 hours PRN 04/07/17 1213    04/07/17 1409  olanzapine injection 10 mg      -- IM 3 times daily PRN 04/07/17 1311    04/07/17 1410  olanzapine zydis disintegrating tablet 10 mg      -- Oral 3 times daily PRN 04/07/17 1311           Review of Systems   Neurological: Negative for dizziness, weakness and headaches.   Psychiatric/Behavioral: Positive for hallucinations. Negative for agitation, behavioral problems, confusion, decreased concentration, dysphoric mood, self-injury and sleep disturbance. The patient is not nervous/anxious.      Objective:     Vital Signs (Most Recent):  Temp: 98.1 °F (36.7 °C) (04/08/17 1100)  Pulse: 71 (04/08/17 1515)  Resp: (!) 23 (04/08/17 1515)  BP: 98/74 (04/08/17 1515)  SpO2: 95 % (04/08/17 1515) Vital Signs (24h Range):  Temp:  [98.1 °F (36.7 °C)-98.2 °F (36.8 °C)] 98.1 °F (36.7 °C)  Pulse:  [71-85] 71  Resp:  [18-27] 23  SpO2:  [93 %-95 %] 95 %  BP: ()/(72-85) 98/74     Height: 5' 2" (157.5 cm)  Weight: 66.9 kg (147 lb 7.8 oz)  Body mass index is 26.98 kg/(m^2).      Intake/Output Summary (Last 24 hours) at 04/08/17 1601  Last data filed at 04/08/17 1500   Gross per 24 hour   Intake             1865 ml   Output             6760 ml   Net            -4895 ml       Physical Exam   Constitutional: She is oriented to person, place, and time. She appears well-developed and well-nourished.   Musculoskeletal: Normal range of motion.   Neurological: She is alert and oriented to person, place, and time.   Nursing note and vitals reviewed.    Mental Status Exam:  Appearance: age appropriate, well nourished, lying in " bed  Grooming: appropriate to situation  Arousal: alert, awake  Behavior/Cooperation: cooperative, restless and fidgety , eye contact minimal  Speech: normal tone, normal rate, normal pitch, normal volume, non-spontaneous  Language: appropriate english vocabulary  Mood: fine  Affect: constricted  Thought Process: logical  Thought Content: (+) AH with unknown content, denies SI/HI  Associations: no loose associations noted  Orientation: grossly intact  Memory: Grossly intact  Fund of Knowledge: appropriate for education level  Attention Span/Concentration: Grossly intact  Cognition: grossly intact  Insight: intact  Judgment: intact         Significant Labs:   Last 24 Hours:   Recent Lab Results       04/08/17  1137 04/08/17  0818 04/08/17  0658 04/07/17  2124 04/07/17  1723      Albumin   2.7(L)       Alkaline Phosphatase   106       ALT   23       Anion Gap   9       AST   22       Baso #   0.01       Basophil%   0.2       Total Bilirubin   1.5  Comment:  For infants and newborns, interpretation of results should be based  on gestational age, weight and in agreement with clinical  observations.  Premature Infant recommended reference ranges:  Up to 24 hours.............<8.0 mg/dL  Up to 48 hours............<12.0 mg/dL  3-5 days..................<15.0 mg/dL  6-29 days.................<15.0 mg/dL  (H)       BUN, Bld   10       Calcium   7.9(L)       Chloride   98       CO2   32(H)       Creatinine   1.0       Differential Method   Automated       eGFR if    >60.0       eGFR if non    >60.0  Comment:  Calculation used to obtain the estimated glomerular filtration  rate (eGFR) is the CKD-EPI equation. Since race is unknown   in our information system, the eGFR values for   -American and Non--American patients are given   for each creatinine result.         Eos #   0.0       Eosinophil%   0.6       Glucose   103       Gran #   3.0       Gran%   58.8       Hematocrit   32.5(L)        Hemoglobin   11.0(L)       Lymph #   1.6       Lymph%   31.2       Magnesium   1.8       MCH   27.9       MCHC   33.8       MCV   83       Mono #   0.5       Mono%   9.0       MPV   9.6       Platelets   304       POCT Glucose 123(H) 170(H)  159(H) 172(H)     Potassium   3.5       Total Protein   5.9(L)       RBC   3.94(L)       RDW   18.0(H)       Sodium   139       WBC   5.10                       Significant Imaging: None

## 2017-04-09 PROBLEM — F29 UNSPECIFIED PSYCHOSIS: Status: ACTIVE | Noted: 2017-04-06

## 2017-04-09 LAB
ALBUMIN SERPL BCP-MCNC: 2.7 G/DL
ALP SERPL-CCNC: 100 U/L
ALT SERPL W/O P-5'-P-CCNC: 21 U/L
ANION GAP SERPL CALC-SCNC: 8 MMOL/L
AST SERPL-CCNC: 19 U/L
BASOPHILS # BLD AUTO: 0 K/UL
BASOPHILS NFR BLD: 0 %
BILIRUB SERPL-MCNC: 1.1 MG/DL
BNP SERPL-MCNC: 548 PG/ML
BUN SERPL-MCNC: 14 MG/DL
CALCIUM SERPL-MCNC: 8.2 MG/DL
CHLORIDE SERPL-SCNC: 96 MMOL/L
CO2 SERPL-SCNC: 29 MMOL/L
CREAT SERPL-MCNC: 0.9 MG/DL
DIFFERENTIAL METHOD: ABNORMAL
EOSINOPHIL # BLD AUTO: 0.1 K/UL
EOSINOPHIL NFR BLD: 0.9 %
ERYTHROCYTE [DISTWIDTH] IN BLOOD BY AUTOMATED COUNT: 17.7 %
EST. GFR  (AFRICAN AMERICAN): >60 ML/MIN/1.73 M^2
EST. GFR  (NON AFRICAN AMERICAN): >60 ML/MIN/1.73 M^2
GLUCOSE SERPL-MCNC: 103 MG/DL
HCT VFR BLD AUTO: 33.3 %
HGB BLD-MCNC: 11.2 G/DL
LACTATE SERPL-SCNC: 1.8 MMOL/L
LYMPHOCYTES # BLD AUTO: 2 K/UL
LYMPHOCYTES NFR BLD: 37.3 %
MAGNESIUM SERPL-MCNC: 2 MG/DL
MCH RBC QN AUTO: 27.9 PG
MCHC RBC AUTO-ENTMCNC: 33.6 %
MCV RBC AUTO: 83 FL
MONOCYTES # BLD AUTO: 0.4 K/UL
MONOCYTES NFR BLD: 7.4 %
NEUTROPHILS # BLD AUTO: 2.9 K/UL
NEUTROPHILS NFR BLD: 54 %
PLATELET # BLD AUTO: 300 K/UL
PMV BLD AUTO: 9.5 FL
POCT GLUCOSE: 137 MG/DL (ref 70–110)
POCT GLUCOSE: 156 MG/DL (ref 70–110)
POTASSIUM SERPL-SCNC: 4.1 MMOL/L
PROT SERPL-MCNC: 6.1 G/DL
RBC # BLD AUTO: 4.02 M/UL
SODIUM SERPL-SCNC: 133 MMOL/L
WBC # BLD AUTO: 5.28 K/UL

## 2017-04-09 PROCEDURE — 36415 COLL VENOUS BLD VENIPUNCTURE: CPT

## 2017-04-09 PROCEDURE — 80053 COMPREHEN METABOLIC PANEL: CPT

## 2017-04-09 PROCEDURE — 25000242 PHARM REV CODE 250 ALT 637 W/ HCPCS: Performed by: HOSPITALIST

## 2017-04-09 PROCEDURE — 83735 ASSAY OF MAGNESIUM: CPT

## 2017-04-09 PROCEDURE — 25000003 PHARM REV CODE 250: Performed by: INTERNAL MEDICINE

## 2017-04-09 PROCEDURE — 85025 COMPLETE CBC W/AUTO DIFF WBC: CPT

## 2017-04-09 PROCEDURE — 94640 AIRWAY INHALATION TREATMENT: CPT

## 2017-04-09 PROCEDURE — 99232 SBSQ HOSP IP/OBS MODERATE 35: CPT | Mod: ,,, | Performed by: HOSPITALIST

## 2017-04-09 PROCEDURE — 83605 ASSAY OF LACTIC ACID: CPT

## 2017-04-09 PROCEDURE — 25000003 PHARM REV CODE 250: Performed by: HOSPITALIST

## 2017-04-09 PROCEDURE — 63600175 PHARM REV CODE 636 W HCPCS: Performed by: HOSPITALIST

## 2017-04-09 PROCEDURE — 20600001 HC STEP DOWN PRIVATE ROOM

## 2017-04-09 PROCEDURE — 27000221 HC OXYGEN, UP TO 24 HOURS

## 2017-04-09 PROCEDURE — 83880 ASSAY OF NATRIURETIC PEPTIDE: CPT

## 2017-04-09 RX ORDER — FAMOTIDINE 10 MG/ML
20 INJECTION INTRAVENOUS 2 TIMES DAILY
Status: DISCONTINUED | OUTPATIENT
Start: 2017-04-09 | End: 2017-04-12 | Stop reason: HOSPADM

## 2017-04-09 RX ORDER — PROCHLORPERAZINE EDISYLATE 5 MG/ML
5 INJECTION INTRAMUSCULAR; INTRAVENOUS EVERY 6 HOURS PRN
Status: DISCONTINUED | OUTPATIENT
Start: 2017-04-09 | End: 2017-04-12 | Stop reason: HOSPADM

## 2017-04-09 RX ORDER — IPRATROPIUM BROMIDE AND ALBUTEROL SULFATE 2.5; .5 MG/3ML; MG/3ML
3 SOLUTION RESPIRATORY (INHALATION)
Status: DISCONTINUED | OUTPATIENT
Start: 2017-04-09 | End: 2017-04-12 | Stop reason: HOSPADM

## 2017-04-09 RX ORDER — ONDANSETRON 2 MG/ML
4 INJECTION INTRAMUSCULAR; INTRAVENOUS EVERY 6 HOURS PRN
Status: DISCONTINUED | OUTPATIENT
Start: 2017-04-09 | End: 2017-04-12 | Stop reason: HOSPADM

## 2017-04-09 RX ADMIN — FAMOTIDINE 20 MG: 10 INJECTION, SOLUTION INTRAVENOUS at 10:04

## 2017-04-09 RX ADMIN — IPRATROPIUM BROMIDE AND ALBUTEROL SULFATE 3 ML: .5; 3 SOLUTION RESPIRATORY (INHALATION) at 08:04

## 2017-04-09 RX ADMIN — GUAIFENESIN AND DEXTROMETHORPHAN HYDROBROMIDE 1 TABLET: 600; 30 TABLET, EXTENDED RELEASE ORAL at 10:04

## 2017-04-09 RX ADMIN — SODIUM CHLORIDE 250 ML: 0.9 INJECTION, SOLUTION INTRAVENOUS at 08:04

## 2017-04-09 RX ADMIN — ASPIRIN 81 MG: 81 TABLET, COATED ORAL at 08:04

## 2017-04-09 RX ADMIN — ATORVASTATIN CALCIUM 40 MG: 20 TABLET, FILM COATED ORAL at 08:04

## 2017-04-09 RX ADMIN — IPRATROPIUM BROMIDE AND ALBUTEROL SULFATE 3 ML: .5; 3 SOLUTION RESPIRATORY (INHALATION) at 02:04

## 2017-04-09 RX ADMIN — FAMOTIDINE 20 MG: 10 INJECTION, SOLUTION INTRAVENOUS at 09:04

## 2017-04-09 RX ADMIN — GUAIFENESIN AND DEXTROMETHORPHAN HYDROBROMIDE 1 TABLET: 600; 30 TABLET, EXTENDED RELEASE ORAL at 09:04

## 2017-04-09 RX ADMIN — CLOPIDOGREL 75 MG: 75 TABLET, FILM COATED ORAL at 08:04

## 2017-04-09 RX ADMIN — TRAZODONE HYDROCHLORIDE 150 MG: 50 TABLET ORAL at 09:04

## 2017-04-09 RX ADMIN — LISINOPRIL 5 MG: 5 TABLET ORAL at 08:04

## 2017-04-09 RX ADMIN — ENOXAPARIN SODIUM 40 MG: 100 INJECTION SUBCUTANEOUS at 07:04

## 2017-04-09 RX ADMIN — IPRATROPIUM BROMIDE AND ALBUTEROL SULFATE 3 ML: .5; 3 SOLUTION RESPIRATORY (INHALATION) at 11:04

## 2017-04-09 NOTE — PROGRESS NOTES
"[facility] Hospital Medicine  Progress Note    Patient Name: Polly Kwan  MRN: [id number]  Patient Class: IP- Inpatient   Admission Date: 4/6/2017  Length of Stay: 2 days  Attending Physician: Mauricio Angulo MD  Primary Care Provider: Iza Kwan MD    [facility] Medicine Team: [facility]OS[facility] MED C Mauricio Angulo MD    Subjective:     Principal Problem:Acute on chronic systolic (congestive) heart failure    HPI:The patient reports bilateral chest pain that started last night while she was "laying down". She says "my stomach was tight.. I was cold". When asked about the quality of the chest pain, she says it was "hurting.. I couldn't breathe or get comfortable". The pain is worse with coughing and it radiates to "my sides". She endorses SOB, VIEYRA, nausea and vomiting. The patient denies salty food intake and has been off her medications for "three days". The patient denies hemoptysis, fever, bloody stools, EtOH use and palpitations. She endorses swelling in her legs.       Hospital Course: Patient admitted to -C service on 4/7.  She was started on iv lasix diuresis for management of heart failure and has remained hemodynamically stable w/o any signs of respiratory compromise or acute ischemia.  She was evaluated by Psychiatric consults and pt did express symptoms concerning for acute psychosis with hallucinations/and had expressed thoughts of harming others.  She was PEC'd, many of prior psych meds were tapered as she does not regularly take them.     Interval History: Overnight patient with high amount of urine output, she has remained hemodynamically stable.  She is more awake today, sitter report pt has been up interacting, watching tv and eating her meals.  The patient reports her dyspnea is improved, is wearing nasal cannula.     She denies any thoughts of SI/HI at this time, reports mood is good but she does answer all my questions about active psychiatric symptoms.     Review of Systems "   Constitutional: Negative for chills and fever.   HENT: Negative for sore throat.    Eyes: Negative for visual disturbance.   Respiratory: Negative for cough.    Cardiovascular: Negative for chest pain.   Gastrointestinal: Negative for abdominal pain, constipation, diarrhea and nausea.     Objective:     Vital Signs (Most Recent):  Temp: 98.1 °F (36.7 °C) (04/08/17 1100)  Pulse: 71 (04/08/17 1515)  Resp: (!) 23 (04/08/17 1515)  BP: 98/74 (04/08/17 1515)  SpO2: 95 % (04/08/17 1515) Vital Signs (24h Range):  Temp:  [98.1 °F (36.7 °C)-98.2 °F (36.8 °C)] 98.1 °F (36.7 °C)  Pulse:  [71-85] 71  Resp:  [18-27] 23  SpO2:  [93 %-95 %] 95 %  BP: ()/(74-85) 98/74     Weight: 66.9 kg (147 lb 7.8 oz)  Body mass index is 26.98 kg/(m^2).    Intake/Output Summary (Last 24 hours) at 04/08/17 1909  Last data filed at 04/08/17 1500   Gross per 24 hour   Intake             1865 ml   Output             6760 ml   Net            -4895 ml      Physical Exam   Constitutional:   Appears disheveled   HENT:   Mouth/Throat: Oropharynx is clear and moist.   Poor dentition   Eyes: Conjunctivae are normal. No scleral icterus.   Neck: Normal range of motion. JVD present.   Cardiovascular: Normal rate, regular rhythm and normal heart sounds.  Exam reveals no gallop.    No murmur heard.  Pulmonary/Chest: Effort normal and breath sounds normal.   Basilar crackles present   Abdominal: Soft. Bowel sounds are normal. She exhibits no distension. There is no tenderness. There is no guarding.   Neurological: She is alert.   Skin: Skin is warm and dry.   Psychiatric: She has a normal mood and affect. Her speech is normal. She is slowed.       Significant Labs:   BMP:   Recent Labs  Lab 04/08/17  0658         K 3.5   CL 98   CO2 32*   BUN 10   CREATININE 1.0   CALCIUM 7.9*   MG 1.8     Cardiac Markers:   Recent Labs  Lab 04/06/17  2107   BNP 2588*     Troponin:   Recent Labs  Lab 04/06/17  2107 04/07/17  0820   TROPONINI 0.023 0.045*        Significant Imaging: I have reviewed all pertinent imaging results/findings within the past 24 hours.    Assessment/Plan:      Acute on chronic sytolic HF/Ischemic  Cardiomyopathy  Cocaine Abuse  -Patient diuresing large amounts with 80mg IV BID.  Will transition to oral dose for tomorrow.   -continue lisinopril    Coronary artery disease  Continue asa/plavix   -unsure if she needs plavix     Schizophrenia  Bipolar d/o  -psych consults following and patient has been PECd. Patient endorsing ongoing auditory hallucinations to psych team  -remain on Trazodone. Use Olanzapine PO or IM for psychotic agitation PRN.   -Psychiatry will admit patient when she is medically stable.     -Full code  -DVT ppx - switch to enoxaparin    Active Diagnoses:    Diagnosis Date Noted POA    PRINCIPAL PROBLEM:  Acute on chronic systolic (congestive) heart failure [I50.23] 05/17/2016 Yes    Abdominal pain [R10.9] 04/07/2017 Yes    Cocaine abuse [F14.10] 04/07/2017 Yes    Atypical chest pain [R07.89] 04/07/2017 Yes    Normocytic anemia [D64.9] 04/07/2017 Yes    Hypocalcemia [E83.51] 04/07/2017 Yes    Cocaine use, unspecified with unspecified cocaine-induced disorder [F14.99] 04/07/2017 Yes    Psychosis [F29] 04/07/2017 Yes    Schizoaffective disorder, bipolar type [F25.0] 04/06/2017 Yes    Moderate tricuspid regurgitation [I07.1] 04/06/2017 Yes    Hypoalbuminemia [E88.09] 04/06/2017 Yes    CAD (coronary artery disease) [I25.10] 03/06/2014 Yes    Ischemic cardiomyopathy [I25.5] 03/06/2014 Yes    Asthma [J45.909] 02/25/2014 Yes    HTN (hypertension) [I10] 02/25/2014 Yes    Diabetes mellitus [E11.9] 02/25/2014 Yes      Problems Resolved During this Admission:    Diagnosis Date Noted Date Resolved POA     VTE Risk Mitigation         Ordered     Medium Risk of VTE  Once      04/07/17 0018     Place LEXIS hose  Until discontinued      04/07/17 0018     heparin (porcine) injection 5,000 Units  Every 8 hours     Route:   Subcutaneous        04/07/17 0012          Mauricio Angulo MD  Department of Hospital Medicine   [facility]

## 2017-04-09 NOTE — NURSING
BP 86/54. Pt continues to be asymptomatic, resting comfortably in bed. Dr. Marie notified, stated to continue to monitor. Educated pt on importance of getting assistance prior to getting up, verbalized understanding. Sitter at bedside. Will continue to monitor.

## 2017-04-09 NOTE — PLAN OF CARE
Problem: Patient Care Overview  Goal: Plan of Care Review  Outcome: Ongoing (interventions implemented as appropriate)  Plan of care reviewed with pt. Pt. Remains free from falls/trauma/injury. Denies SOB/CP. Sitter at bedside.  Pt. Tolerating plan of care well. Will continue to monitor.

## 2017-04-09 NOTE — PROGRESS NOTES
"[facility] Hospital Medicine  Progress Note    Patient Name: Polly Kwan  MRN: [id number]  Patient Class: IP- Inpatient   Admission Date: 4/6/2017  Length of Stay: 3 days  Attending Physician: Mauricio Angulo MD  Primary Care Provider: zIa Kwan MD    [facility] Medicine Team: [facility] [place] [place] HOSP MED C Mauricio Angulo MD    Subjective:     Principal Problem:Acute on chronic systolic (congestive) heart failure    HPI:The patient reports bilateral chest pain that started last night while she was "laying down". She says "my stomach was tight.. I was cold". When asked about the quality of the chest pain, she says it was "hurting.. I couldn't breathe or get comfortable". The pain is worse with coughing and it radiates to "my sides". She endorses SOB, VIEYRA, nausea and vomiting. The patient denies salty food intake and has been off her medications for "three days". The patient denies hemoptysis, fever, bloody stools, EtOH use and palpitations. She endorses swelling in her legs.       Hospital Course: Patient admitted to IM-C service on 4/7.  She was started on iv lasix diuresis for management of heart failure and has remained hemodynamically stable w/o any signs of respiratory compromise or acute ischemia.  She was evaluated by Psychiatric consults and pt did express symptoms concerning for acute psychosis with hallucinations/and had expressed thoughts of harming others.  She was PEC'd, many of prior psych meds were tapered as she does not regularly take them.  Patient had brisk response to IV diuresis and had large amounts of urine output    Interval History: patient with some hypotension this AM likely due to increased response to diuresis, may need IVF back.     She reports dyspnea and coughing that  Occurs after eating.  She had some wretching in the room w/o emesis. Comments on RUQ pain        Review of Systems   Constitutional: Negative for chills and fever.   HENT: Negative for sore throat.    Eyes: " Negative for visual disturbance.   Respiratory: Positive for cough.    Cardiovascular: Negative for chest pain.   Gastrointestinal: Negative for constipation and diarrhea.        Abdominal complaints as above   Genitourinary: Negative for dysuria.   Psychiatric/Behavioral: Negative for agitation. The patient is nervous/anxious.      Objective:     Vital Signs (Most Recent):  Temp: 97.5 °F (36.4 °C) (04/09/17 0831)  Pulse: 72 (04/09/17 1200)  Resp: (!) 22 (04/09/17 1200)  BP: 94/69 (04/09/17 1200)  SpO2: (!) 94 % (04/09/17 1200) Vital Signs (24h Range):  Temp:  [97.5 °F (36.4 °C)-98.5 °F (36.9 °C)] 97.5 °F (36.4 °C)  Pulse:  [68-77] 72  Resp:  [16-25] 22  SpO2:  [90 %-98 %] 94 %  BP: (75-98)/(43-74) 94/69     Weight: 66.9 kg (147 lb 7.8 oz)  Body mass index is 26.98 kg/(m^2).    Intake/Output Summary (Last 24 hours) at 04/09/17 1447  Last data filed at 04/09/17 0600   Gross per 24 hour   Intake             1460 ml   Output             4450 ml   Net            -2990 ml      Physical Exam   Constitutional:   Appears disheveled   HENT:   Mouth/Throat: Oropharynx is clear and moist.   Poor dentition   Eyes: Conjunctivae are normal. No scleral icterus.   Neck: Normal range of motion. JVD present.   Cardiovascular: Normal rate, regular rhythm and normal heart sounds.  Exam reveals no gallop.    No murmur heard.  Pulmonary/Chest: Effort normal and breath sounds normal.   Basilar crackles present   Abdominal: Soft. Bowel sounds are normal. She exhibits no distension. There is no tenderness. There is no guarding.   Neurological: She is alert.   Skin: Skin is warm and dry.   Psychiatric: She has a normal mood and affect. Her speech is normal. She is slowed.       Significant Labs:   BMP:     Recent Labs  Lab 04/09/17  0650      *   K 4.1   CL 96   CO2 29   BUN 14   CREATININE 0.9   CALCIUM 8.2*   MG 2.0     Cardiac Markers:     Recent Labs  Lab 04/09/17  1008   *     Troponin: No results for input(s):  TROPONINI in the last 48 hours.        Significant Imaging: I have reviewed all pertinent imaging results/findings within the past 24 hours.    Assessment/Plan:      Acute on chronic sytolic HF/Ischemic  Cardiomyopathy  Cocaine Abuse  -Hypotensive today - holding diuretics today and giving at 250cc bolus   -continue lisinopril  - repeat BNP tomorrow    Coronary artery disease  Continue asa/plavix   -unsure if she needs plavix     Schizophrenia  Bipolar d/o  -psych consults following and patient has been PECd. Patient endorsing ongoing auditory hallucinations to psych team  -remain on Trazodone. Use Olanzapine PO or IM for psychotic agitation PRN.   -Psychiatry will admit patient when she is medically stable.     Cough  -repeat CXR to r/o infiltrate or possible signs of aspiration  -NEBS scheduled   -  Nausea/Abdominal pain  -check lactate, ultrasound abdomen with post-prandial pain r/o biliary pathology    -Full code  -DVT ppx - switch to enoxaparin    Active Diagnoses:    Diagnosis Date Noted POA    PRINCIPAL PROBLEM:  Acute on chronic systolic (congestive) heart failure [I50.23] 05/17/2016 Yes    Abdominal pain [R10.9] 04/07/2017 Yes    Cocaine abuse [F14.10] 04/07/2017 Yes    Atypical chest pain [R07.89] 04/07/2017 Yes    Normocytic anemia [D64.9] 04/07/2017 Yes    Hypocalcemia [E83.51] 04/07/2017 Yes    Cocaine use, unspecified with unspecified cocaine-induced disorder [F14.99] 04/07/2017 Yes    Psychosis [F29] 04/07/2017 Yes    Schizoaffective disorder, bipolar type [F25.0] 04/06/2017 Yes    Moderate tricuspid regurgitation [I07.1] 04/06/2017 Yes    Hypoalbuminemia [E88.09] 04/06/2017 Yes    CAD (coronary artery disease) [I25.10] 03/06/2014 Yes    Ischemic cardiomyopathy [I25.5] 03/06/2014 Yes    Asthma [J45.909] 02/25/2014 Yes    HTN (hypertension) [I10] 02/25/2014 Yes    Diabetes mellitus [E11.9] 02/25/2014 Yes      Problems Resolved During this Admission:    Diagnosis Date Noted Date Resolved  POA     VTE Risk Mitigation         Ordered     enoxaparin injection 40 mg  Daily     Route:  Subcutaneous        04/08/17 1922     Medium Risk of VTE  Once      04/07/17 0018     Place LEXIS hose  Until discontinued      04/07/17 0018          Mauricio Angulo MD  Department of Hospital Medicine   [facility]

## 2017-04-09 NOTE — PLAN OF CARE
Problem: Patient Care Overview  Goal: Plan of Care Review  Outcome: Ongoing (interventions implemented as appropriate)  Plan of care reviewed with patient at bedside, verbalized understanding. Pt currently PEC'd, sitter at bedside, no reports of thoughts of suicide or homicide. BP low throughout shift, see clinical notes. Pt transitioning to PO lasix today. Continued to re-educate pt on fluid restriction, verbalized understanding. Will continue to monitor.

## 2017-04-09 NOTE — NURSING
Pt's BP 72/54. Pt asymptomatic in bed; educated pt on importance of not getting up without assistance. Sitter at bedside, bed alarm on. Dr. Marie notified, stated to wait 1 hr, reassess. Will implement. Will continue to monitor.

## 2017-04-10 PROBLEM — F19.951 SUBSTANCE-INDUCED PSYCHOTIC DISORDER WITH HALLUCINATIONS: Status: ACTIVE | Noted: 2017-04-06

## 2017-04-10 LAB
ANION GAP SERPL CALC-SCNC: 6 MMOL/L
BNP SERPL-MCNC: 893 PG/ML
BUN SERPL-MCNC: 13 MG/DL
CALCIUM SERPL-MCNC: 8.9 MG/DL
CHLORIDE SERPL-SCNC: 97 MMOL/L
CO2 SERPL-SCNC: 31 MMOL/L
CREAT SERPL-MCNC: 0.9 MG/DL
EST. GFR  (AFRICAN AMERICAN): >60 ML/MIN/1.73 M^2
EST. GFR  (NON AFRICAN AMERICAN): >60 ML/MIN/1.73 M^2
GLUCOSE SERPL-MCNC: 123 MG/DL
MAGNESIUM SERPL-MCNC: 2.1 MG/DL
POCT GLUCOSE: 123 MG/DL (ref 70–110)
POCT GLUCOSE: 215 MG/DL (ref 70–110)
POCT GLUCOSE: 236 MG/DL (ref 70–110)
POTASSIUM SERPL-SCNC: 4.8 MMOL/L
SODIUM SERPL-SCNC: 134 MMOL/L

## 2017-04-10 PROCEDURE — 20600001 HC STEP DOWN PRIVATE ROOM

## 2017-04-10 PROCEDURE — 94761 N-INVAS EAR/PLS OXIMETRY MLT: CPT

## 2017-04-10 PROCEDURE — 83880 ASSAY OF NATRIURETIC PEPTIDE: CPT

## 2017-04-10 PROCEDURE — 99232 SBSQ HOSP IP/OBS MODERATE 35: CPT | Mod: ,,, | Performed by: HOSPITALIST

## 2017-04-10 PROCEDURE — 80048 BASIC METABOLIC PNL TOTAL CA: CPT

## 2017-04-10 PROCEDURE — 25000242 PHARM REV CODE 250 ALT 637 W/ HCPCS: Performed by: HOSPITALIST

## 2017-04-10 PROCEDURE — 99406 BEHAV CHNG SMOKING 3-10 MIN: CPT

## 2017-04-10 PROCEDURE — 63600175 PHARM REV CODE 636 W HCPCS: Performed by: INTERNAL MEDICINE

## 2017-04-10 PROCEDURE — 25000003 PHARM REV CODE 250: Performed by: HOSPITALIST

## 2017-04-10 PROCEDURE — 36415 COLL VENOUS BLD VENIPUNCTURE: CPT

## 2017-04-10 PROCEDURE — 94640 AIRWAY INHALATION TREATMENT: CPT

## 2017-04-10 PROCEDURE — 27000221 HC OXYGEN, UP TO 24 HOURS

## 2017-04-10 PROCEDURE — 63600175 PHARM REV CODE 636 W HCPCS: Performed by: HOSPITALIST

## 2017-04-10 PROCEDURE — 25000003 PHARM REV CODE 250: Performed by: INTERNAL MEDICINE

## 2017-04-10 PROCEDURE — 99232 SBSQ HOSP IP/OBS MODERATE 35: CPT | Mod: S$PBB,,, | Performed by: PSYCHIATRY & NEUROLOGY

## 2017-04-10 PROCEDURE — 83735 ASSAY OF MAGNESIUM: CPT

## 2017-04-10 RX ORDER — FLUTICASONE FUROATE AND VILANTEROL 100; 25 UG/1; UG/1
1 POWDER RESPIRATORY (INHALATION) DAILY
Status: DISCONTINUED | OUTPATIENT
Start: 2017-04-10 | End: 2017-04-12 | Stop reason: HOSPADM

## 2017-04-10 RX ORDER — FUROSEMIDE 10 MG/ML
40 INJECTION INTRAMUSCULAR; INTRAVENOUS 2 TIMES DAILY
Status: DISCONTINUED | OUTPATIENT
Start: 2017-04-10 | End: 2017-04-11

## 2017-04-10 RX ORDER — PREDNISONE 20 MG/1
20 TABLET ORAL DAILY
Status: DISCONTINUED | OUTPATIENT
Start: 2017-04-10 | End: 2017-04-12 | Stop reason: HOSPADM

## 2017-04-10 RX ADMIN — IPRATROPIUM BROMIDE AND ALBUTEROL SULFATE 3 ML: .5; 3 SOLUTION RESPIRATORY (INHALATION) at 12:04

## 2017-04-10 RX ADMIN — LIDOCAINE 1 PATCH: 50 PATCH TOPICAL at 03:04

## 2017-04-10 RX ADMIN — FLUTICASONE FUROATE AND VILANTEROL TRIFENATATE 1 PUFF: 100; 25 POWDER RESPIRATORY (INHALATION) at 12:04

## 2017-04-10 RX ADMIN — FUROSEMIDE 40 MG: 10 INJECTION, SOLUTION INTRAMUSCULAR; INTRAVENOUS at 08:04

## 2017-04-10 RX ADMIN — FAMOTIDINE 20 MG: 10 INJECTION, SOLUTION INTRAVENOUS at 08:04

## 2017-04-10 RX ADMIN — FUROSEMIDE 40 MG: 10 INJECTION, SOLUTION INTRAMUSCULAR; INTRAVENOUS at 05:04

## 2017-04-10 RX ADMIN — IPRATROPIUM BROMIDE AND ALBUTEROL SULFATE 3 ML: .5; 3 SOLUTION RESPIRATORY (INHALATION) at 08:04

## 2017-04-10 RX ADMIN — FAMOTIDINE 20 MG: 10 INJECTION, SOLUTION INTRAVENOUS at 10:04

## 2017-04-10 RX ADMIN — Medication 3 ML: at 03:04

## 2017-04-10 RX ADMIN — Medication 3 ML: at 10:04

## 2017-04-10 RX ADMIN — ASPIRIN 81 MG: 81 TABLET, COATED ORAL at 08:04

## 2017-04-10 RX ADMIN — IPRATROPIUM BROMIDE AND ALBUTEROL SULFATE 3 ML: .5; 3 SOLUTION RESPIRATORY (INHALATION) at 09:04

## 2017-04-10 RX ADMIN — IPRATROPIUM BROMIDE AND ALBUTEROL SULFATE 3 ML: .5; 3 SOLUTION RESPIRATORY (INHALATION) at 04:04

## 2017-04-10 RX ADMIN — GUAIFENESIN AND DEXTROMETHORPHAN HYDROBROMIDE 1 TABLET: 600; 30 TABLET, EXTENDED RELEASE ORAL at 10:04

## 2017-04-10 RX ADMIN — CLOPIDOGREL 75 MG: 75 TABLET, FILM COATED ORAL at 08:04

## 2017-04-10 RX ADMIN — INSULIN ASPART 1 UNITS: 100 INJECTION, SOLUTION INTRAVENOUS; SUBCUTANEOUS at 10:04

## 2017-04-10 RX ADMIN — ATORVASTATIN CALCIUM 40 MG: 20 TABLET, FILM COATED ORAL at 08:04

## 2017-04-10 RX ADMIN — PREDNISONE 20 MG: 20 TABLET ORAL at 12:04

## 2017-04-10 RX ADMIN — TRAZODONE HYDROCHLORIDE 150 MG: 50 TABLET ORAL at 10:04

## 2017-04-10 RX ADMIN — LISINOPRIL 5 MG: 5 TABLET ORAL at 08:04

## 2017-04-10 RX ADMIN — ENOXAPARIN SODIUM 40 MG: 100 INJECTION SUBCUTANEOUS at 05:04

## 2017-04-10 RX ADMIN — GUAIFENESIN AND DEXTROMETHORPHAN HYDROBROMIDE 1 TABLET: 600; 30 TABLET, EXTENDED RELEASE ORAL at 08:04

## 2017-04-10 NOTE — PHYSICIAN QUERY
PT Name: Polly Kwan  MR #: [id number]    Physician Query Form - Respiratory Condition Clarification      CDS/: Audrey Hugo RN CCDS        Contact information: [email or web address]    This form is a permanent document in the medical record.    Query Date: April 10, 2017    By submitting this query, we are merely seeking further clarification of documentation. Please utilize your independent clinical judgment when addressing the question(s) below.    The Medical record contains the following   Indicators   Supporting Clinical Findings Location in Medical Record   X   SOB, VIEYRA, Wheezing, Productive Cough, Use of Accessory Muscles, etc. Sob, has wheezes,   breathing is labored, patient is tachypneic, accessory muscle use noted,  4/6 ed provider note  4/6 ed nurse note    X   Acute/Chronic Illness Acute on chronic systolic (congestive) heart failure 4/7 h/p   X   Radiology Findings   Cardiomegaly 4/6 cxr   X   Respiratory Distress or Failure Is in respiratory distress Ed provider note 4/6       Hypoxia or Hypercapnia     X   RR         ABGs         O2 sat Resp labored at rest, difficult to obtain sats, low 90s on 2LPMnc Ed nurse note 4/6    X   BiPAP/Intubation . Pt placed on Bipap by respiratory per Dr. Garza  Ed nurse note 4/6       Supplemental O2        Home O2, Oxygen Dependence     X   Treatment IV Lasix was given. Patient will be admitted to the hospital for further respiratory support and diuresis. 4/6 ed provider note       Other       Provider, please specify diagnosis or diagnoses associated with above clinical findings.    [  ] Acute Respiratory Failure with Hypoxia  [  ] Acute Respiratory Failure with Hypoxia and Hypercapnia  [  ] Other Acute Respiratory Failure  [X  ] Other Respiratory Diagnosis (please specify): ________acute systolic heart failure exacerbation_______________________  [  ] Clinically Undetermined    Please document in your progress notes daily for the duration of treatment until  resolved and include in your discharge summary.

## 2017-04-10 NOTE — PROGRESS NOTES
"[facility] Hospital Medicine  Progress Note    Patient Name: Polly Kwan  MRN: [id number]  Patient Class: IP- Inpatient   Admission Date: 4/6/2017  Length of Stay: 4 days  Attending Physician: Mauricio Angulo MD  Primary Care Provider: Iza Kwan MD    [facility] Medicine Team: [facility] [place] [facility]OSP MED C Mauricio Angulo MD    Subjective:     Principal Problem:Acute on chronic systolic (congestive) heart failure    HPI:The patient reports bilateral chest pain that started last night while she was "laying down". She says "my stomach was tight.. I was cold". When asked about the quality of the chest pain, she says it was "hurting.. I couldn't breathe or get comfortable". The pain is worse with coughing and it radiates to "my sides". She endorses SOB, VIEYRA, nausea and vomiting. The patient denies salty food intake and has been off her medications for "three days". The patient denies hemoptysis, fever, bloody stools, EtOH use and palpitations. She endorses swelling in her legs.       Hospital Course: Patient admitted to -C service on 4/7.  She was started on iv lasix diuresis for management of heart failure and has remained hemodynamically stable w/o any signs of respiratory compromise or acute ischemia.  She was evaluated by Psychiatric consults and pt did express symptoms concerning for acute psychosis with hallucinations/and had expressed thoughts of harming others.  She was PEC'd, many of prior psych meds were tapered as she does not regularly take them.  Patient had brisk response to IV diuresis and had large amounts of urine output    Interval History: patient reports ongoing coughing, states LE swelling has improved, no abdominal pain or naseua/wretching noted this AM.  Psych sitter at bedside.         Review of Systems   Constitutional: Negative for chills and fever.   HENT: Negative for sore throat.    Eyes: Negative for visual disturbance.   Respiratory: Positive for cough.    Cardiovascular: " Negative for chest pain.   Gastrointestinal: Negative for constipation and diarrhea.        Abdominal complaints as above   Genitourinary: Negative for dysuria.   Psychiatric/Behavioral: Negative for agitation, confusion, self-injury and suicidal ideas. The patient is not nervous/anxious and is not hyperactive.      Objective:     Vital Signs (Most Recent):  Temp: 97.6 °F (36.4 °C) (04/10/17 0737)  Pulse: 78 (04/10/17 1225)  Resp: 16 (04/10/17 1225)  BP: 117/81 (04/10/17 1110)  SpO2: 96 % (04/10/17 1225) Vital Signs (24h Range):  Temp:  [97.6 °F (36.4 °C)-98.5 °F (36.9 °C)] 97.6 °F (36.4 °C)  Pulse:  [69-87] 78  Resp:  [12-25] 16  SpO2:  [93 %-98 %] 96 %  BP: ()/(62-81) 117/81     Weight: 68.4 kg (150 lb 12.7 oz)  Body mass index is 27.58 kg/(m^2).    Intake/Output Summary (Last 24 hours) at 04/10/17 1350  Last data filed at 04/10/17 0600   Gross per 24 hour   Intake              975 ml   Output              801 ml   Net              174 ml      Physical Exam   Constitutional:   Appears disheveled   HENT:   Mouth/Throat: Oropharynx is clear and moist.   Poor dentition   Eyes: Conjunctivae are normal. No scleral icterus.   Neck: Normal range of motion. JVD present.   JVD near angle of jaw, hepatojugular reflux present   Cardiovascular: Normal rate, regular rhythm and normal heart sounds.  Exam reveals no gallop.    No murmur heard.  Pulmonary/Chest: Effort normal and breath sounds normal.   Basilar crackles present   Abdominal: Soft. Bowel sounds are normal. She exhibits no distension. There is no tenderness. There is no guarding.   Neurological: She is alert.   Skin: Skin is warm and dry.   Psychiatric: She has a normal mood and affect. Her speech is normal. She is slowed.       Significant Labs:   BMP:     Recent Labs  Lab 04/10/17  0641   *   *   K 4.8   CL 97   CO2 31*   BUN 13   CREATININE 0.9   CALCIUM 8.9   MG 2.1     Cardiac Markers:     Recent Labs  Lab 04/10/17  0641   *      Troponin: No results for input(s): TROPONINI in the last 48 hours.        Significant Imaging: I have reviewed all pertinent imaging results/findings within the past 24 hours.     Chest XRAY 17  TWO VIEW CHEST:     Comparison: 17    Findings:    Pacer.The cardiac silhouette and the pulmonary vasculature are enlarged in size.  The mediastinal and hilar contours are unremarkable.  There is no pneumothorax.  No pleural effusion.  Subsegmental atelectasis LEFT midlung zone.  No acute bony abnormality.       Impression          Cardiomegaly.       Electronically signed by: Dr. Dany Tyler MD  Date: 17  Time: 16:45         Encounter      View Encounter                  Assessment/Plan:      Acute on chronic sytolic HF/Ischemic  Cardiomyopathy  Cocaine Abuse  -Patients BNP is risig, remains with elevated JVP near angle of jaw.  Restart lasix but at a decreased dose of 40mg IV BID  -continue lisinopril  -hypertension is well controlled.     Coronary artery disease  Continue asa  -I will d/c plavix today, it has been on her med list since  and last report or record of [facility] placement was in .     Schizophrenia  Bipolar d/o  -remain on Trazodone. Use Olanzapine PO or IM for psychotic agitation PRN.    >Psychiatry may add additional medication for psychiatric symptoms f/u recs  -Psychiatry will admit patient when she is medically stable, will f/u with psychiatry progress notes if patient is still expressing psychotic symptoms.  -Will have to re-do PEC form today, apparently [facility] office was unaware of our initial filed PEC and initial PEC has .      COPD/Emphysema  -patient self reports this diagnosis  -wheezes on exam today  -start prednisone 20mg x 3 days  -Nebs are scheduled.   -repeat CXR   -  Nausea/Abdominal pain  -lactate normal and abdominal ultrasound w/o acute findings beyond dilated IVC  -Symptoms may be related to right sided heart failure and hepatic/bowel congestion. See HF  for management recs.    -Full code  -DVT ppx - switch to enoxaparin    Dispo - Inpatient psychiatry vs Home pending psych eval    Active Diagnoses:    Diagnosis Date Noted POA    PRINCIPAL PROBLEM:  Acute on chronic systolic (congestive) heart failure [I50.23] 05/17/2016 Yes    Abdominal pain [R10.9] 04/07/2017 Yes    Cocaine abuse [F14.10] 04/07/2017 Yes    Atypical chest pain [R07.89] 04/07/2017 Yes    Normocytic anemia [D64.9] 04/07/2017 Yes    Hypocalcemia [E83.51] 04/07/2017 Yes    Cocaine use, unspecified with unspecified cocaine-induced disorder [F14.99] 04/07/2017 Yes    Psychosis [F29] 04/07/2017 Yes    Unspecified psychosis [F29] 04/06/2017 Yes    Moderate tricuspid regurgitation [I07.1] 04/06/2017 Yes    Hypoalbuminemia [E88.09] 04/06/2017 Yes    CAD (coronary artery disease) [I25.10] 03/06/2014 Yes    Ischemic cardiomyopathy [I25.5] 03/06/2014 Yes    Asthma [J45.909] 02/25/2014 Yes    HTN (hypertension) [I10] 02/25/2014 Yes    Diabetes mellitus [E11.9] 02/25/2014 Yes      Problems Resolved During this Admission:    Diagnosis Date Noted Date Resolved POA     VTE Risk Mitigation         Ordered     enoxaparin injection 40 mg  Daily     Route:  Subcutaneous        04/08/17 1922     Medium Risk of VTE  Once      04/07/17 0018     Place LEXIS hose  Until discontinued      04/07/17 0018          Mauricio Angulo MD  Department of Hospital Medicine   [facility]

## 2017-04-10 NOTE — PLAN OF CARE
Problem: Patient Care Overview  Goal: Plan of Care Review  Outcome: Ongoing (interventions implemented as appropriate)  Plan of care reviewed with patient at bedside, verbalized understanding. Pt currently PEC'd, sitter at bedside, no reports of thoughts of suicide or homicide. Pt denies pain, SOB at this time. Will continue to monitor.

## 2017-04-10 NOTE — ASSESSMENT & PLAN NOTE
- Continue Trazodone 150 mg PO qHS  - Continue Zyprexa 10 mg PO/IM q8 PRN agitation  - Hold all other psychotropic medication.  - Patient continues to endorse auditory hallucinations that are ego-dystonic and alleviated by other noises/activity, but has remained under good behavior control  - Collateral from daughter indicates that substance induced psychosis is more likely  - Continue PEC.
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
## 2017-04-10 NOTE — SUBJECTIVE & OBJECTIVE
"Interval History: Patient reports feeling "good" today. She is sleeping better with the addition of trazodone. She is enjoying her dinner of [employer]. She states that she receives disability, has a roommate in her apartment [place] [address]. She admits to taking psych meds in the past, but hasn't for the past year because she can't afford the bus to her follow-up appointments.    Patient admits to using cocaine intranasally last week. She states she relapsed after 20 years of sobriety because she was "worried about my 7 daughters", one of which is getting  this week.     She denies SI/HI, but continues to hear voices "off and on" that sometimes scream and sometimes yell. They are distressing to her, but she can block out the voices by having a conversation with someone else or turning up the TV.    Collateral from patient's daughter, [phone] Elza Kwan:  Daughter knows that patient has had a problem with drugs for ~40 years. She has been hospitalized multiple times for similar reasons (heart failure, breathing problems, not taking meds, behavior issues; this is the 3rd time in 3 months).  No family history of schizophrenia, and mother always "acts normal" around her family, and has never voiced voices to family members. Daughter is not in [place] to monitor her, and pt.s roommate is not usually around.      Psychotherapeutics     Start     Stop Route Frequency Ordered    04/07/17 0100  trazodone tablet 150 mg      -- Oral Nightly 04/06/17 2345    04/07/17 1311  haloperidol tablet 10 mg      -- Oral Every 8 hours PRN 04/07/17 1213    04/07/17 1409  olanzapine injection 10 mg      -- IM 3 times daily PRN 04/07/17 1311    04/07/17 1410  olanzapine zydis disintegrating tablet 10 mg      -- Oral 3 times daily PRN 04/07/17 1311           Review of Systems   Constitutional: Negative for chills and fever.   Respiratory: Negative for cough, choking and shortness of breath.    Neurological: Negative for " "seizures, numbness and headaches.     Objective:     Vital Signs (Most Recent):  Temp: 97.5 °F (36.4 °C) (04/09/17 0831)  Pulse: 75 (04/09/17 2013)  Resp: 16 (04/09/17 2013)  BP: 97/62 (04/09/17 2010)  SpO2: 95 % (04/09/17 2013) Vital Signs (24h Range):  Temp:  [97.5 °F (36.4 °C)] 97.5 °F (36.4 °C)  Pulse:  [68-77] 75  Resp:  [12-25] 16  SpO2:  [90 %-98 %] 95 %  BP: ()/(54-73) 97/62     Height: 5' 2" (157.5 cm)  Weight: 66.9 kg (147 lb 7.8 oz)  Body mass index is 26.98 kg/(m^2).      Intake/Output Summary (Last 24 hours) at 04/09/17 2019  Last data filed at 04/09/17 1400   Gross per 24 hour   Intake             1040 ml   Output             1101 ml   Net              -61 ml       Physical Exam   Constitutional: She appears well-developed and well-nourished.   HENT:   Head: Normocephalic and atraumatic.   Few teeth, disheveled hair   Psychiatric:   Mental Status Exam:   Arousal: Alert, awake  Appearance:  poor grooming  Sensorium/Orientation: Oriented to person, place, day, month and year  Behavior/Cooperation: Cooperative, friendly  Psychomotor: +PMA  Speech: Normal rate, rhythm, prosody and tone  Language: Garbled, accented english  Mood: "Good"  Affect: Reactive, mood congruent  Thought Process: Linear, paucity of thought   Thought Content: No SI/HI; +A hallucinations; no delusions  Associations: Intact to conversation  Attention/Concentration: Intact to conversation  Memory: Recent impaired and remote impaired  Fund of Knowledge: Below average   Insight: Limited  Judgment: Appropriate for setting     Nursing note and vitals reviewed.       Significant Labs:   Last 24 Hours:   Recent Lab Results       04/09/17  1008 04/09/17  0650 04/08/17  2130      Albumin  2.7(L)      Alkaline Phosphatase  100      ALT  21      Anion Gap  8      AST  19      Baso #  0.00      Basophil%  0.0      Total Bilirubin  1.1  Comment:  For infants and newborns, interpretation of results should be based  on gestational age, weight " and in agreement with clinical  observations.  Premature Infant recommended reference ranges:  Up to 24 hours.............<8.0 mg/dL  Up to 48 hours............<12.0 mg/dL  3-5 days..................<15.0 mg/dL  6-29 days.................<15.0 mg/dL  (H)        Comment:  Values of less than 100 pg/ml are consistent with non-CHF populations.(H)       BUN, Bld  14      Calcium  8.2(L)      Chloride  96      CO2  29      Creatinine  0.9      Differential Method  Automated      eGFR if   >60.0      eGFR if non   >60.0  Comment:  Calculation used to obtain the estimated glomerular filtration  rate (eGFR) is the CKD-EPI equation. Since race is unknown   in our information system, the eGFR values for   -American and Non--American patients are given   for each creatinine result.        Eos #  0.1      Eosinophil%  0.9      Glucose  103      Gran #  2.9      Gran%  54.0      Hematocrit  33.3(L)      Hemoglobin  11.2(L)      Lactate, Cecil 1.8  Comment:  Falsely low lactic acid results can be found in samples   containing >=13.0 mg/dL total bilirubin and/or >=3.5 mg/dL   direct bilirubin.         Lymph #  2.0      Lymph%  37.3      Magnesium  2.0      MCH  27.9      MCHC  33.6      MCV  83      Mono #  0.4      Mono%  7.4      MPV  9.5      Platelets  300      POCT Glucose   137(H)     Potassium  4.1      Total Protein  6.1      RBC  4.02      RDW  17.7(H)      Sodium  133(L)      WBC  5.28            Significant Imaging: None

## 2017-04-10 NOTE — ASSESSMENT & PLAN NOTE
-Counseled on the importance of cessation  -Patient has never been to rehab and is not currently interested

## 2017-04-10 NOTE — PROGRESS NOTES
Patient Consulted by CTTS:     The following was discussed by the Tobacco Treatment Specialist:  ? Relevance of Quitting  ? Risk to Health  ? Long Term Risk  ? Risk for Others  ? Rewards of Quitting  ? Motivation Intervention to Quit          Pt stated she no longer smokes.

## 2017-04-10 NOTE — PROGRESS NOTES
Patient had ~14 beat run of [place].  Patient asymptomatic.  Notified Dr. Angulo.  NO new orders received at this time. Will continue to monitor.

## 2017-04-10 NOTE — SUBJECTIVE & OBJECTIVE
Interval History:     Patient has done well over the weekend.  She denies any suicidal / homicidal ideation.  She does endorse auditory hallucinations of multiple people saying things to her at once.  She last heard these last night.  One voice tells her to sing, the other play, one wants a man, and the other likes to eat.  She jokes that she likes the one that likes to eat best.  She is not distressed by these voices and denies any intent to hurt herself or others because of them.  She was started on seroquel.  Patient has not required any prn medication over the weekend.     Patient not distressed about voices.  Says she never follows commands they say, are worsened by cocaine, and is open to not restarting risperdal.  She is future oriented to her appointment at [facility].      Collateral:  Justinamaddy Kwan  [phone]  She has been trying to contact amrik Clifton, but hasn't been able to get ahold of him.  His number updated is [phone].  She doesn't know if hes aware shes in hospital.  He has no transportation.  She does live with him.  They move fairly regularly.  She does not believe that her mom has schizophrenia.  The only time she hallucinates is when shes on drugs.  She has a long history of marijuana and cocaine use, for atleast thirty years.  She was hospitalized for CHF 3 months ago in [place] and several other times for CHF.  In the past couple years she has lost close to 200 pounds.  She has little contact with her.  She saw her Mom physically in the hospital and thinks she is doing well.  She believes she is ready to go home.        Psychotherapeutics     Start     Stop Route Frequency Ordered    04/07/17 0100  trazodone tablet 150 mg      -- Oral Nightly 04/06/17 2345    04/07/17 1311  haloperidol tablet 10 mg      -- Oral Every 8 hours PRN 04/07/17 1213    04/07/17 1409  olanzapine injection 10 mg      -- IM 3 times daily PRN 04/07/17 1311    04/07/17 1410  olanzapine  "zydis disintegrating tablet 10 mg      -- Oral 3 times daily PRN 04/07/17 1311           Review of Systems  Objective:     Vital Signs (Most Recent):  Temp: 97.6 °F (36.4 °C) (04/10/17 0737)  Pulse: 74 (04/10/17 0737)  Resp: (!) 21 (04/10/17 0737)  BP: 105/72 (04/10/17 0737)  SpO2: 96 % (04/10/17 0737) Vital Signs (24h Range):  Temp:  [97.5 °F (36.4 °C)-98.5 °F (36.9 °C)] 97.6 °F (36.4 °C)  Pulse:  [68-82] 74  Resp:  [12-25] 21  SpO2:  [94 %-98 %] 96 %  BP: ()/(62-73) 105/72     Height: 5' 2" (157.5 cm)  Weight: 68.4 kg (150 lb 12.7 oz)  Body mass index is 27.58 kg/(m^2).      Intake/Output Summary (Last 24 hours) at 04/10/17 0822  Last data filed at 04/10/17 0600   Gross per 24 hour   Intake              975 ml   Output              801 ml   Net              174 ml       Physical Exam   Psychiatric:   Mental Status Exam:   Arousal: Alert, awake  Appearance:  fair grooming  Sensorium/Orientation: Oriented to person, place, situation, month and year  Behavior/Cooperation: Cooperative, friendly  Psychomotor: No PMA or PMR  Speech: Normal rate, rhythm, prosody and tone  Language: Fluent english  Mood: "Good"  Affect: Reactive, mood congruent  Thought Process: Linear   Thought Content: No SI/HI;  Associations: Intact  Attention/Concentration: Intact  Memory: Recent and remote intact  Fund of Knowledge: Appropriate for education level   Insight: Fair   Judgment: Appropriate for setting            "

## 2017-04-10 NOTE — ASSESSMENT & PLAN NOTE
Increase Trazodone 200 mg PO qHS  - Continue Zyprexa 10 mg PO/IM q8 PRN agitation  - Hold all other psychotropic medication.    Allow PEC to .  Patient not exhibiting signs that she is a danger to herself others nor is she gravely disabled.  She has not exhibited psychotic behavior since being admitted.  She has not required any PRN.  Her daughter saw her over the weekend and feels that her psychosis is really related to her cocaine use.  She believes she is not a danger to herself / others nor gravely disabled.  Based on collateral and behavior, we will defer choice of antipsychotic use to outpatient MHC
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
## 2017-04-11 LAB
ANION GAP SERPL CALC-SCNC: 9 MMOL/L
BUN SERPL-MCNC: 14 MG/DL
CALCIUM SERPL-MCNC: 8.5 MG/DL
CHLORIDE SERPL-SCNC: 98 MMOL/L
CO2 SERPL-SCNC: 29 MMOL/L
CREAT SERPL-MCNC: 0.8 MG/DL
EST. GFR  (AFRICAN AMERICAN): >60 ML/MIN/1.73 M^2
EST. GFR  (NON AFRICAN AMERICAN): >60 ML/MIN/1.73 M^2
GLUCOSE SERPL-MCNC: 114 MG/DL
MAGNESIUM SERPL-MCNC: 1.8 MG/DL
POCT GLUCOSE: 106 MG/DL (ref 70–110)
POCT GLUCOSE: 128 MG/DL (ref 70–110)
POCT GLUCOSE: 249 MG/DL (ref 70–110)
POTASSIUM SERPL-SCNC: 4.2 MMOL/L
SODIUM SERPL-SCNC: 136 MMOL/L

## 2017-04-11 PROCEDURE — 25000003 PHARM REV CODE 250: Performed by: INTERNAL MEDICINE

## 2017-04-11 PROCEDURE — 94761 N-INVAS EAR/PLS OXIMETRY MLT: CPT

## 2017-04-11 PROCEDURE — 94640 AIRWAY INHALATION TREATMENT: CPT

## 2017-04-11 PROCEDURE — G8989 SELF CARE D/C STATUS: HCPCS | Mod: CJ

## 2017-04-11 PROCEDURE — 99233 SBSQ HOSP IP/OBS HIGH 50: CPT | Mod: ,,, | Performed by: INTERNAL MEDICINE

## 2017-04-11 PROCEDURE — 36415 COLL VENOUS BLD VENIPUNCTURE: CPT

## 2017-04-11 PROCEDURE — 97162 PT EVAL MOD COMPLEX 30 MIN: CPT

## 2017-04-11 PROCEDURE — 94760 N-INVAS EAR/PLS OXIMETRY 1: CPT

## 2017-04-11 PROCEDURE — 80048 BASIC METABOLIC PNL TOTAL CA: CPT

## 2017-04-11 PROCEDURE — 97165 OT EVAL LOW COMPLEX 30 MIN: CPT

## 2017-04-11 PROCEDURE — 63600175 PHARM REV CODE 636 W HCPCS: Performed by: HOSPITALIST

## 2017-04-11 PROCEDURE — G8988 SELF CARE GOAL STATUS: HCPCS | Mod: CJ

## 2017-04-11 PROCEDURE — 83735 ASSAY OF MAGNESIUM: CPT

## 2017-04-11 PROCEDURE — 20600001 HC STEP DOWN PRIVATE ROOM

## 2017-04-11 PROCEDURE — 25000003 PHARM REV CODE 250: Performed by: HOSPITALIST

## 2017-04-11 PROCEDURE — 25000242 PHARM REV CODE 250 ALT 637 W/ HCPCS: Performed by: HOSPITALIST

## 2017-04-11 PROCEDURE — G8987 SELF CARE CURRENT STATUS: HCPCS | Mod: CJ

## 2017-04-11 RX ORDER — LISINOPRIL 5 MG/1
5 TABLET ORAL DAILY
Qty: 30 TABLET | Refills: 0 | Status: ON HOLD | OUTPATIENT
Start: 2017-04-11 | End: 2017-05-26

## 2017-04-11 RX ORDER — PREDNISONE 20 MG/1
20 TABLET ORAL DAILY
Qty: 2 TABLET | Refills: 0 | Status: SHIPPED | OUTPATIENT
Start: 2017-04-11 | End: 2017-04-21

## 2017-04-11 RX ORDER — FUROSEMIDE 40 MG/1
40 TABLET ORAL 2 TIMES DAILY
Qty: 60 TABLET | Refills: 0 | Status: SHIPPED | OUTPATIENT
Start: 2017-04-11 | End: 2017-04-12

## 2017-04-11 RX ORDER — LIDOCAINE 50 MG/G
1 PATCH TOPICAL DAILY
Qty: 10 PATCH | Refills: 0 | Status: ON HOLD | OUTPATIENT
Start: 2017-04-11 | End: 2018-10-08 | Stop reason: HOSPADM

## 2017-04-11 RX ORDER — TRAZODONE HYDROCHLORIDE 150 MG/1
150 TABLET ORAL NIGHTLY
Qty: 30 TABLET | Refills: 0 | Status: SHIPPED | OUTPATIENT
Start: 2017-04-11 | End: 2017-06-14 | Stop reason: SDUPTHER

## 2017-04-11 RX ORDER — ALBUTEROL SULFATE 0.83 MG/ML
2.5 SOLUTION RESPIRATORY (INHALATION) EVERY 6 HOURS PRN
Qty: 1 BOX | Refills: 0 | Status: SHIPPED | OUTPATIENT
Start: 2017-04-11 | End: 2017-06-14

## 2017-04-11 RX ORDER — FUROSEMIDE 40 MG/1
40 TABLET ORAL 2 TIMES DAILY
Status: DISCONTINUED | OUTPATIENT
Start: 2017-04-11 | End: 2017-04-12

## 2017-04-11 RX ADMIN — FAMOTIDINE 20 MG: 10 INJECTION, SOLUTION INTRAVENOUS at 09:04

## 2017-04-11 RX ADMIN — GUAIFENESIN AND DEXTROMETHORPHAN HYDROBROMIDE 1 TABLET: 600; 30 TABLET, EXTENDED RELEASE ORAL at 09:04

## 2017-04-11 RX ADMIN — IPRATROPIUM BROMIDE AND ALBUTEROL SULFATE 3 ML: .5; 3 SOLUTION RESPIRATORY (INHALATION) at 08:04

## 2017-04-11 RX ADMIN — LISINOPRIL 5 MG: 5 TABLET ORAL at 09:04

## 2017-04-11 RX ADMIN — FUROSEMIDE 40 MG: 10 INJECTION, SOLUTION INTRAMUSCULAR; INTRAVENOUS at 09:04

## 2017-04-11 RX ADMIN — ASPIRIN 81 MG: 81 TABLET, COATED ORAL at 09:04

## 2017-04-11 RX ADMIN — LIDOCAINE 1 PATCH: 50 PATCH TOPICAL at 03:04

## 2017-04-11 RX ADMIN — ATORVASTATIN CALCIUM 40 MG: 20 TABLET, FILM COATED ORAL at 09:04

## 2017-04-11 RX ADMIN — PREDNISONE 20 MG: 20 TABLET ORAL at 09:04

## 2017-04-11 RX ADMIN — IPRATROPIUM BROMIDE AND ALBUTEROL SULFATE 3 ML: .5; 3 SOLUTION RESPIRATORY (INHALATION) at 12:04

## 2017-04-11 RX ADMIN — ENOXAPARIN SODIUM 40 MG: 100 INJECTION SUBCUTANEOUS at 05:04

## 2017-04-11 RX ADMIN — Medication 3 ML: at 03:04

## 2017-04-11 RX ADMIN — Medication 3 ML: at 09:04

## 2017-04-11 RX ADMIN — ACETAMINOPHEN 650 MG: 325 TABLET ORAL at 09:04

## 2017-04-11 RX ADMIN — TRAZODONE HYDROCHLORIDE 150 MG: 50 TABLET ORAL at 09:04

## 2017-04-11 RX ADMIN — FUROSEMIDE 40 MG: 40 TABLET ORAL at 05:04

## 2017-04-11 RX ADMIN — IPRATROPIUM BROMIDE AND ALBUTEROL SULFATE 3 ML: .5; 3 SOLUTION RESPIRATORY (INHALATION) at 03:04

## 2017-04-11 RX ADMIN — FLUTICASONE FUROATE AND VILANTEROL TRIFENATATE 1 PUFF: 100; 25 POWDER RESPIRATORY (INHALATION) at 09:04

## 2017-04-11 RX ADMIN — Medication 3 ML: at 04:04

## 2017-04-11 NOTE — PT/OT/SLP EVAL
Occupational Therapy  Evaluation/Discharge    Polly Kwan   MRN: [id number]   Admitting Diagnosis: Acute on chronic systolic (congestive) heart failure    OT Date of Treatment: 04/11/17   OT Start Time: 0857  OT Stop Time: 0914  OT Total Time (min): 17 min    Billable Minutes:  Evaluation 17 minutes    Diagnosis: Acute on chronic systolic (congestive) heart failure     Past Medical History:   Diagnosis Date    Asthma     Bipolar 1 disorder     Cardiomyopathy     Ischemic Cardiomyopathy with EF 25-30% by echo 5/17/16    Chronic systolic (congestive) heart failure     COPD (chronic obstructive pulmonary disease)     emphysema    Coronary artery disease     ROCHELLE 2006 and 2013    Depression     Diabetes mellitus     H/O echocardiogram 05/17/2016    EF 25-30%. LV diastolic dysfxn. Severe LAE. mod MR. PASP 86.    Hypertension     ICD (implantable cardioverter-defibrillator) in place 07/09/2013    MI (myocardial infarction)     x 3    PTSD (post-traumatic stress disorder)     Schizophrenia     Seizures       Past Surgical History:   Procedure Laterality Date    CARDIAC CATHETERIZATION      CARDIAC DEFIBRILLATOR PLACEMENT      [place]  2006    [place]    CORONARY STENT PLACEMENT      INSERT / REPLACE / REMOVE PACEMAKER  2013     [facility]       Referring physician: Dr. Angulo  Date referred to OT: 4/10/17    General Precautions: Standard, fall  Orthopedic Precautions: N/A  Braces: N/A    Do you have any cultural, spiritual, Adventist conflicts, given your current situation?: None     Patient History:  Living Environment  Lives With: other (see comments) (roommate)  Living Arrangements: apartment  Living Environment Comment: Pt reports she lives with roommate in 1st floor apt with 1 JONI. PTA, pt was using rollator for community ambulation and cane for household ambulation and requires assist with ADLs at times. Reports roommate is able to assist upon D/C.  Equipment Currently Used  at Home: cane, straight, rollator    Prior level of function:   Bed Mobility/Transfers: needs device  Grooming: independent  Bathing: independent  Upper Body Dressing: independent  Lower Body Dressing: independent  Toileting: independent  Home Management Skills: needs assist  Driving License: Yes     Dominant hand: right    Subjective:  Communicated with RN prior to session.    Chief Complaint: None stated  Patient/Family stated goals: None stated    Pain Rating:  (Did not rate)  Location - Side: Bilateral  Location: leg  Pain Addressed: Reposition, Distraction     Objective:  Patient found with: telemetry    Cognitive Exam:  Oriented to: Person, Place, Time and Situation  Follows Commands/attention: Follows multistep commands  Communication: clear/fluent  Memory:  No Deficits noted  Safety awareness/insight to disability: intact  Coping skills/emotional control: Appropriate to situation    Visual/perceptual:  Intact    Physical Exam:  Postural examination/scapula alignment: Head forward  Skin integrity: Visible skin intact  Edema: None noted     Sensation:   Intact B UE    Upper Extremity Range of Motion:  Right Upper Extremity: WFL  Left Upper Extremity: WFL    Upper Extremity Strength:  Right Upper Extremity: WFL  Left Upper Extremity: WFL   Strength: WFL    Fine motor coordination:   Intact    Functional Mobility:  Bed Mobility:  Supine to Sit: Stand by Assistance  Sit to Supine: Stand by Assistance    Transfers:  Sit <> Stand Assistance: Contact Guard Assistance from EOB; cues for hand placement  Sit <> Stand Assistive Device: Rolling Walker    Functional Ambulation: ~100 ft with CGA using RW; increased time required    Activities of Daily Living:  LE Dressing Level of Assistance: Stand by assistance to don shoes    Balance:   Static Sit: GOOD+: Takes MAXIMAL challenges from all directions.    Dynamic Sit: GOOD+: Maintains balance through MAXIMAL excursions of active trunk motion  Static Stand: FAIR+: Takes  "MINIMAL challenges from all directions  Dynamic stand: FAIR+: Needs CLOSE SUPERVISION during gait and is able to right self with minor LOB    Therapeutic Activities and Exercises:  OT/PT eval; white board updated; encouraged to continue ambulation and ADLs with supervision/assist from RN staff during hospital stay    AM-PAC 6 CLICK ADL  How much help from another person does this patient currently need?  1 = Unable, Total/Dependent Assistance  2 = A lot, Maximum/Moderate Assistance  3 = A little, Minimum/Contact Guard/Supervision  4 = None, Modified [place]/Independent    Putting on and taking off regular lower body clothing? : 3  Bathing (including washing, rinsing, drying)?: 3  Toileting, which includes using toilet, bedpan, or urinal? : 3  Putting on and taking off regular upper body clothing?: 4  Taking care of personal grooming such as brushing teeth?: 4  Eating meals?: 4  Total Score: 21    AM-PAC Raw Score CMS "G-Code Modifier Level of Impairment Assistance   6 % Total / Unable   7 - 9 CM 80 - 100% Maximal Assist   10 - 14 CL 60 - 80% Moderate Assist   15 - 19 CK 40 - 60% Moderate Assist   20 - 22 CJ 20 - 40% Minimal Assist   23 CI 1-20% SBA / CGA   24 CH 0% Independent/ Mod I       Patient left left sidelying with all lines intact and call button in reach    Assessment:  Polly Kwan is a 57 y.o. female with a medical diagnosis of Acute on chronic systolic (congestive) heart failure and presents with WFL strength and endurance needed for ADLs and functional mobility. Pt currently at baseline functional level and does not have acute OT goals at this time. Recommend return home with assist from roommate as needed.    Activity tolerance: Good    Discharge recommendations: Discharge Facility/Level Of Care Needs: home     Barriers to discharge: Barriers to Discharge: None    Equipment recommendations: none     GOALS:   Occupational Therapy Goals        Problem: Occupational Therapy Goal    Goal " Priority Disciplines Outcome Interventions   Occupational Therapy Goal     OT, PT/OT Ongoing (interventions implemented as appropriate)              PLAN:  (D/C OT)   Plan of Care reviewed with: patient    OT G-codes  Functional Assessment Tool Used: [facility]  Score: 21  Functional Limitation: Self care  Self Care Current Status ():   Self Care Goal Status ():   Self Care Discharge Status (): ASHWINI Boyle  04/11/2017

## 2017-04-11 NOTE — PLAN OF CARE
Problem: Patient Care Overview  Goal: Plan of Care Review  Outcome: Ongoing (interventions implemented as appropriate)     No acute events throughout the night. Reviewed plan of care with pt and family; all questions/concerns addressed. VS and assessment per flow sheet, patient progressing towards goals as tolerated. Will continue to monitor.

## 2017-04-11 NOTE — PLAN OF CARE
met with pt in room.  Pt answered questions appropriately.  Pt states she lives at home with her friend George Little and plans to return.  Pt states she is ambulatory with a walker.  Pt has not used home health services.    Pt may need a ride home when she is discharged.  sw to follow.

## 2017-04-11 NOTE — PT/OT/SLP EVAL
Physical Therapy  Evaluation/Discharge     Polly Kwan   MRN: [id number]   Admitting Diagnosis: Acute on chronic systolic (congestive) heart failure    PT Received On: 04/11/17  PT Start Time: 0856     PT Stop Time: 0915    PT Total Time (min): 19 min       Billable Minutes:  Evaluation 19 (co-eval with OT)     Diagnosis: Acute on chronic systolic (congestive) heart failure  H/O schizophrenia, bipolar disorder, and PTSD    Past Medical History:   Diagnosis Date    Asthma     Bipolar 1 disorder     Cardiomyopathy     Ischemic Cardiomyopathy with EF 25-30% by echo 5/17/16    Chronic systolic (congestive) heart failure     COPD (chronic obstructive pulmonary disease)     emphysema    Coronary artery disease     ROCHELLE 2006 and 2013    Depression     Diabetes mellitus     H/O echocardiogram 05/17/2016    EF 25-30%. LV diastolic dysfxn. Severe LAE. mod MR. PASP 86.    Hypertension     ICD (implantable cardioverter-defibrillator) in place 07/09/2013    MI (myocardial infarction)     x 3    PTSD (post-traumatic stress disorder)     Schizophrenia     Seizures       Past Surgical History:   Procedure Laterality Date    CARDIAC CATHETERIZATION      CARDIAC DEFIBRILLATOR PLACEMENT      [place]  2006    [place]    CORONARY STENT PLACEMENT      INSERT / REPLACE / REMOVE PACEMAKER  2013     [facility]       Referring physician: Mauricio Angulo MD  Date referred to PT: 4/10/17    General Precautions: Standard, fall  Orthopedic Precautions: N/A   Braces: N/A       Do you have any cultural, spiritual, Sikhism conflicts, given your current situation?: none noted     Patient History:  Lives With: other (see comments) (roommate)  Living Arrangements: apartment  Home Layout: Able to live on 1st floor  Transportation Available: family or friend will provide, car  Living Environment Comment: Pt lives with a roommate in a 1st floor apt with 1 JONI. Pt reports that PTA she used SPC for household  "ambulation and rollator for community mobility. Pt reports that PTA she "somtimes" needed assist with ADLs. Pt states that her roommate is able to provide assist.   Equipment Currently Used at Home: rollator, cane, straight  DME owned (not currently used): none    Previous Level of Function:  Ambulation Skills: needs device  Transfer Skills: needs device  ADL Skills: needs assist ("sometimes")    Subjective:  Communicated with RN prior to session.  Pt agreeable to therapy.   Chief Complaint: fatigue   Patient goals: return home     Pain Rating:  (did not rate)   Location - Side: Bilateral  Location - Orientation: generalized  Location: leg  Pain Addressed: Reposition, Distraction       Objective:   Patient found with: telemetry     Cognitive Exam:  Oriented to: Person, Place, Time and Situation    Follows Commands/attention: Follows one-step commands  Communication: clear/fluent  Safety awareness/insight to disability: impaired    Physical Exam:  Postural examination/scapula alignment: Rounded shoulder and Head forward    Skin integrity: Visible skin intact  Edema: None noted     Sensation:   Intact    Lower Extremity Range of Motion:  Right Lower Extremity: WFL  Left Lower Extremity: WFL    Lower Extremity Strength:  Right Lower Extremity: WFL  Left Lower Extremity: WFL    Functional Mobility:  Bed Mobility:  Supine to Sit: Stand by Assistance  Sit to Supine: Stand by Assistance    Transfers:  Sit <> Stand Assistance: Contact Guard Assistance  Sit <> Stand Assistive Device: Rolling Walker    Increased time required and increased forward flexion during sit>stand   Min v/c for technique and RW management     Gait:   Gait Distance: ~100 ft.   Assistance 1: Contact Guard Assistance  Gait Assistive Device: Rolling walker  Gait Pattern: swing-through gait  Gait Deviation(s): decreased ivan, decreased step length, decreased stride length, increased time in double stance, decreased velocity of limb motion, decreased " weight-shifting ability (increased trunk flexion )   Min v/c for upright posture and forward gaze   Increased time required for ambulation 2* slowed ivan     Balance:   Static Sit: supervision  Dynamic Sit: SBA  Static Stand: SBA-CGA  Dynamic stand: CGA    Therapeutic Activities and Exercises:  Pt educated on role of PT and PT POC. Pt verbalized understanding.   White board updated.  Pt encouraged to sit UIC later this date. Pt educated on the need for staff members to be present for all OOB activity. Pt verbalized understanding.     AM-PAC 6 CLICK MOBILITY  How much help from another person does this patient currently need?   1 = Unable, Total/Dependent Assistance  2 = A lot, Maximum/Moderate Assistance  3 = A little, Minimum/Contact Guard/Supervision  4 = None, Modified [place]/Independent    Turning over in bed (including adjusting bedclothes, sheets and blankets)?: 4  Sitting down on and standing up from a chair with arms (e.g., wheelchair, bedside commode, etc.): 3  Moving from lying on back to sitting on the side of the bed?: 4  Moving to and from a bed to a chair (including a wheelchair)?: 3  Need to walk in hospital room?: 3  Climbing 3-5 steps with a railing?: 2  Total Score: 19     AM-PAC Raw Score CMS G-Code Modifier Level of Impairment Assistance   6 % Total / Unable   7 - 9 CM 80 - 100% Maximal Assist   10 - 14 CL 60 - 80% Moderate Assist   15 - 19 CK 40 - 60% Moderate Assist   20 - 22 CJ 20 - 40% Minimal Assist   23 CI 1-20% SBA / CGA   24 CH 0% Independent/ Mod I     Patient left supine with all lines intact and call button in reach.    Assessment:   Polly Kwan is a 57 y.o. female with a medical diagnosis of Acute on chronic systolic (congestive) heart failure and presents with psychiatric history. Pt was able to perform functional mobility without physical assist. Required CGA for standing tasks for improved safety awareness. Pt ambulated at very slowed ivan, but with no SOB or  LOB noted. Pt is close to baseline with mobility and has no acute PT needs at this time. D/C IP PT services. Please re-consult as needed.     Rehab identified problem list/impairments: Rehab identified problem list/impairments: decreased safety awareness, impaired self care skills, impaired functional mobilty, gait instability, impaired cardiopulmonary response to activity    Rehab potential is good.    Activity tolerance: Good    Discharge recommendations: Discharge Facility/Level Of Care Needs: home     Barriers to discharge: Barriers to Discharge: None    Equipment recommendations: Equipment Needed After Discharge: none     GOALS:   Physical Therapy Goals     Not on file      Multidisciplinary Problems (Resolved)        Problem: Physical Therapy Goal    Goal Priority Disciplines Outcome Goal Variances Interventions   Physical Therapy Goal   (Resolved)     PT/OT, PT Outcome(s) achieved               PLAN:    Patient d/c from IP PT as she is close to baseline with mobility and has no acute PT needs at this time.   Plan of Care reviewed with: patient        Maday Crawford, PT, DPT   4/11/2017  [phone]

## 2017-04-11 NOTE — PROGRESS NOTES
Returned patient's belongings consisting of 2 cell phones and one phone  since PEC was D/C'd.  Will continue to monitor.

## 2017-04-11 NOTE — PLAN OF CARE
04/11/17 7711   Right Care Assessment   Can the patient answer the patient profile reliably? Yes, cognitively intact   How often would a person be available to care for the patient? Infrequently   Describe the patient's ability to walk at the present time. No restrictions   How does the patient rate their overall health at the present time? Fair   Number of comorbid conditions (as recorded on the chart) Three   During the past month, has the patient often been bothered by feeling down, depressed or hopeless? No   During the past month, has the patient often been bothered by little interest or pleasure in doing things? No   No DC needs. No Show for all previous appointments.

## 2017-04-11 NOTE — PLAN OF CARE
Problem: Occupational Therapy Goal  Goal: Occupational Therapy Goal  Outcome: Ongoing (interventions implemented as appropriate)  Eval and D/C OT 4/11/17

## 2017-04-11 NOTE — PLAN OF CARE
Problem: Physical Therapy Goal  Goal: Physical Therapy Goal  Outcome: Outcome(s) achieved Date Met:  04/11/17  PT evaluation complete. No goals established as pt is close to baseline with mobility and has no acute PT needs at this time. D/C from PT services.     Maday Crawford, PT, DPT   4/11/2017  [phone]

## 2017-04-12 VITALS
DIASTOLIC BLOOD PRESSURE: 64 MMHG | HEIGHT: 62 IN | RESPIRATION RATE: 12 BRPM | HEART RATE: 80 BPM | BODY MASS INDEX: 26.82 KG/M2 | SYSTOLIC BLOOD PRESSURE: 85 MMHG | OXYGEN SATURATION: 95 % | WEIGHT: 145.75 LBS | TEMPERATURE: 98 F

## 2017-04-12 PROBLEM — E83.51 HYPOCALCEMIA: Status: RESOLVED | Noted: 2017-04-07 | Resolved: 2017-04-12

## 2017-04-12 PROBLEM — R07.89 ATYPICAL CHEST PAIN: Status: RESOLVED | Noted: 2017-04-07 | Resolved: 2017-04-12

## 2017-04-12 PROBLEM — F29 PSYCHOSIS: Status: RESOLVED | Noted: 2017-04-07 | Resolved: 2017-04-12

## 2017-04-12 LAB
ANION GAP SERPL CALC-SCNC: 10 MMOL/L
BUN SERPL-MCNC: 17 MG/DL
CALCIUM SERPL-MCNC: 9.1 MG/DL
CHLORIDE SERPL-SCNC: 97 MMOL/L
CO2 SERPL-SCNC: 29 MMOL/L
CREAT SERPL-MCNC: 0.9 MG/DL
EST. GFR  (AFRICAN AMERICAN): >60 ML/MIN/1.73 M^2
EST. GFR  (NON AFRICAN AMERICAN): >60 ML/MIN/1.73 M^2
GLUCOSE SERPL-MCNC: 104 MG/DL
MAGNESIUM SERPL-MCNC: 2.1 MG/DL
POCT GLUCOSE: 118 MG/DL (ref 70–110)
POCT GLUCOSE: 119 MG/DL (ref 70–110)
POTASSIUM SERPL-SCNC: 4.5 MMOL/L
SODIUM SERPL-SCNC: 136 MMOL/L

## 2017-04-12 PROCEDURE — 94640 AIRWAY INHALATION TREATMENT: CPT

## 2017-04-12 PROCEDURE — 63600175 PHARM REV CODE 636 W HCPCS: Performed by: HOSPITALIST

## 2017-04-12 PROCEDURE — 83735 ASSAY OF MAGNESIUM: CPT

## 2017-04-12 PROCEDURE — 25000003 PHARM REV CODE 250: Performed by: HOSPITALIST

## 2017-04-12 PROCEDURE — 25000242 PHARM REV CODE 250 ALT 637 W/ HCPCS: Performed by: HOSPITALIST

## 2017-04-12 PROCEDURE — 36415 COLL VENOUS BLD VENIPUNCTURE: CPT

## 2017-04-12 PROCEDURE — 80048 BASIC METABOLIC PNL TOTAL CA: CPT

## 2017-04-12 PROCEDURE — 99238 HOSP IP/OBS DSCHRG MGMT 30/<: CPT | Mod: ,,, | Performed by: INTERNAL MEDICINE

## 2017-04-12 PROCEDURE — 27000221 HC OXYGEN, UP TO 24 HOURS

## 2017-04-12 PROCEDURE — 25000003 PHARM REV CODE 250: Performed by: INTERNAL MEDICINE

## 2017-04-12 RX ORDER — FUROSEMIDE 40 MG/1
20 TABLET ORAL DAILY
Qty: 30 TABLET | Refills: 0 | Status: ON HOLD | OUTPATIENT
Start: 2017-04-12 | End: 2017-05-26

## 2017-04-12 RX ADMIN — FAMOTIDINE 20 MG: 10 INJECTION, SOLUTION INTRAVENOUS at 09:04

## 2017-04-12 RX ADMIN — FLUTICASONE FUROATE AND VILANTEROL TRIFENATATE 1 PUFF: 100; 25 POWDER RESPIRATORY (INHALATION) at 09:04

## 2017-04-12 RX ADMIN — Medication 3 ML: at 06:04

## 2017-04-12 RX ADMIN — ATORVASTATIN CALCIUM 40 MG: 20 TABLET, FILM COATED ORAL at 09:04

## 2017-04-12 RX ADMIN — ASPIRIN 81 MG: 81 TABLET, COATED ORAL at 09:04

## 2017-04-12 RX ADMIN — IPRATROPIUM BROMIDE AND ALBUTEROL SULFATE 3 ML: .5; 3 SOLUTION RESPIRATORY (INHALATION) at 11:04

## 2017-04-12 RX ADMIN — PREDNISONE 20 MG: 20 TABLET ORAL at 09:04

## 2017-04-12 RX ADMIN — GUAIFENESIN AND DEXTROMETHORPHAN HYDROBROMIDE 1 TABLET: 600; 30 TABLET, EXTENDED RELEASE ORAL at 09:04

## 2017-04-12 RX ADMIN — IPRATROPIUM BROMIDE AND ALBUTEROL SULFATE 3 ML: .5; 3 SOLUTION RESPIRATORY (INHALATION) at 07:04

## 2017-04-12 NOTE — ASSESSMENT & PLAN NOTE
Well controlled.  1. Monitor glucose levels.  2. Outpatient follow up with PCP for further management.

## 2017-04-12 NOTE — SUBJECTIVE & OBJECTIVE
"Interval History: "OK but I'm short of breath". No acute overnight events. Complains of SOB upon being informed of potential discharge today.    Review of Systems   Constitutional: Negative for chills, fatigue, fever and unexpected weight change.   HENT: Negative for ear pain, facial swelling, hearing loss, mouth sores, nosebleeds, rhinorrhea, sinus pressure, sore throat, tinnitus, trouble swallowing and voice change.    Eyes: Negative for photophobia, pain, redness and visual disturbance.   Respiratory: Positive for shortness of breath. Negative for cough, chest tightness and wheezing.    Cardiovascular: Negative for chest pain, palpitations and leg swelling.   Gastrointestinal: Negative for abdominal pain, blood in stool, constipation, diarrhea, nausea and vomiting.   Endocrine: Negative for cold intolerance, heat intolerance, polydipsia, polyphagia and polyuria.   Genitourinary: Negative for decreased urine volume, dysuria, flank pain, frequency, hematuria, menstrual problem, urgency, vaginal bleeding, vaginal discharge and vaginal pain.   Musculoskeletal: Negative for arthralgias, back pain, joint swelling, myalgias and neck pain.   Skin: Negative for rash.   Allergic/Immunologic: Negative for environmental allergies, food allergies and immunocompromised state.   Neurological: Negative for dizziness, seizures, syncope, weakness, light-headedness, numbness and headaches.   Hematological: Negative for adenopathy. Does not bruise/bleed easily.   Psychiatric/Behavioral: Negative for confusion, hallucinations, self-injury, sleep disturbance and suicidal ideas. The patient is not nervous/anxious.      Objective:     Vital Signs (Most Recent):  Temp: 98 °F (36.7 °C) (04/11/17 2058)  Pulse: 81 (04/11/17 2058)  Resp: 14 (04/11/17 2058)  BP: 119/86 (04/11/17 2058)  SpO2: (!) 94 % (04/11/17 2058) Vital Signs (24h Range):  Temp:  [97.4 °F (36.3 °C)-98 °F (36.7 °C)] 98 °F (36.7 °C)  Pulse:  [70-87] 81  Resp:  [14-23] " 14  SpO2:  [92 %-98 %] 94 %  BP: ()/(66-86) 119/86     Weight: 68.5 kg (151 lb 0.2 oz)  Body mass index is 27.62 kg/(m^2).    Intake/Output Summary (Last 24 hours) at 04/11/17 2137  Last data filed at 04/11/17 1300   Gross per 24 hour   Intake              960 ml   Output             4400 ml   Net            -3440 ml      Physical Exam   Constitutional: She is oriented to person, place, and time. She appears well-developed and well-nourished. She is cooperative. She is easily aroused.  Non-toxic appearance. No distress.   HENT:   Head: Normocephalic and atraumatic. Head is without right periorbital erythema and without left periorbital erythema.   Right Ear: Hearing, tympanic membrane, external ear and ear canal normal. No swelling.   Left Ear: Hearing, tympanic membrane, external ear and ear canal normal. No swelling.   Nose: Nose normal. No nasal deformity.   Mouth/Throat: Uvula is midline, oropharynx is clear and moist and mucous membranes are normal. No oropharyngeal exudate.   Eyes: Conjunctivae, EOM and lids are normal. Pupils are equal, round, and reactive to light. Right eye exhibits no discharge and no exudate. Left eye exhibits no discharge and no exudate. Right conjunctiva is not injected. Left conjunctiva is not injected. No scleral icterus.   Neck: Normal range of motion. Neck supple. No tracheal deviation present. No thyromegaly present.   Cardiovascular: Normal rate, regular rhythm, S1 normal, S2 normal, normal heart sounds and intact distal pulses.  Exam reveals no gallop and no friction rub.    No murmur heard.  Pulmonary/Chest: Effort normal and breath sounds normal. No accessory muscle usage or stridor. No tachypnea. No respiratory distress. She has no wheezes. She has no rales. She exhibits no tenderness.   Abdominal: Soft. Normal appearance and bowel sounds are normal. She exhibits no distension. There is no tenderness. There is no rebound and no guarding.   Musculoskeletal: Normal range  of motion. She exhibits no edema or tenderness.   Neurological: She is alert, oriented to person, place, and time and easily aroused. No cranial nerve deficit. Coordination normal.   Skin: Skin is warm and dry. No lesion and no rash noted. She is not diaphoretic. No cyanosis or erythema. No pallor. Nails show no clubbing.   Psychiatric: She has a normal mood and affect. Her speech is normal and behavior is normal. Judgment and thought content normal.   Nursing note and vitals reviewed.      Significant Labs:   BMP:   Recent Labs  Lab 04/11/17  0652   *      K 4.2   CL 98   CO2 29   BUN 14   CREATININE 0.8   CALCIUM 8.5*   MG 1.8     CBC: No results for input(s): WBC, HGB, HCT, PLT in the last 48 hours.    Significant Imaging: I have reviewed all pertinent imaging results/findings within the past 24 hours.

## 2017-04-12 NOTE — DISCHARGE SUMMARY
"[facility] Hospital Medicine  Discharge Summary      Patient Name: Polly Kwan  MRN: [id number]  Admission Date: 4/6/2017  Hospital Length of Stay: 6 days  Discharge Date and Time:  04/12/2017 2:11 PM  Attending Physician: Andreina Lombardi MD   Discharging Provider: Andreina Lombardi MD  Primary Care Provider: Iza Kwan MD    [facility] Medicine Team: [facility] C Andreina Lombardi MD    HPI: The patient reports bilateral chest pain that started last night while she was "laying down". She says "my stomach was tight.. I was cold". When asked about the quality of the chest pain, she says it was "hurting.. I couldn't breathe or get comfortable". The pain is worse with coughing and it radiates to "my sides". She endorses SOB, VIEYRA, nausea and vomiting. The patient denies salty food intake and has been off her medications for "three days". The patient denies hemoptysis, fever, bloody stools, EtOH use and palpitations. She endorses swelling in her legs.    * No surgery found *      Indwelling Lines/Drains at time of discharge:   Lines/Drains/Airways          No matching active lines, drains, or airways        Hospital Course: Patient admitted to -C service on 4/7. She was started on iv lasix diuresis for management of heart failure and has remained hemodynamically stable w/o any signs of respiratory compromise or acute ischemia. She was evaluated by Psychiatric consults and pt did express symptoms concerning for acute psychosis with hallucinations/and had expressed thoughts of harming others. She was PEC'd, many of prior psych meds were tapered as she does not regularly take them. Patient had brisk response to IV diuresis and had large amounts of urine output.     Her nausea, vomiting, abdominal pain, and LE edema resolved. Patient cleared by psychiatry for removal of PEC. Patient hemodynamically stable for discharge but complained of worsening SOB when notified of discharge plan. On " hospital day 6 patient was medically stable for discharge.    Consults:   Consults         Status Ordering Provider     Inpatient consult to Dietary  Once     Provider:  (Not yet assigned)    Completed ALEJANDRO SUH     Inpatient consult to Psychiatry  Once     Provider:  (Not yet assigned)    Completed ALEJANDRO SUH          Significant Diagnostic Studies: Labs:   BMP:   Recent Labs  Lab 04/11/17  0652 04/12/17  0655   * 104    136   K 4.2 4.5   CL 98 97   CO2 29 29   BUN 14 17   CREATININE 0.8 0.9   CALCIUM 8.5* 9.1   MG 1.8 2.1    and CBC No results for input(s): WBC, HGB, HCT, PLT in the last 48 hours.    Pending Diagnostic Studies:     None        Final Active Diagnoses:    Diagnosis Date Noted POA    PRINCIPAL PROBLEM:  Acute on chronic systolic (congestive) heart failure [I50.23] 05/17/2016 Yes    Abdominal pain [R10.9] 04/07/2017 Yes    Cocaine abuse [F14.10] 04/07/2017 Yes    Atypical chest pain [R07.89] 04/07/2017 Yes    Normocytic anemia [D64.9] 04/07/2017 Yes    Hypocalcemia [E83.51] 04/07/2017 Yes    Psychosis [F29] 04/07/2017 Yes    Substance-induced psychotic disorder with hallucinations [F19.951] 04/06/2017 Yes    Moderate tricuspid regurgitation [I07.1] 04/06/2017 Yes    Hypoalbuminemia [E88.09] 04/06/2017 Yes    CAD (coronary artery disease) [I25.10] 03/06/2014 Yes    Ischemic cardiomyopathy [I25.5] 03/06/2014 Yes    Asthma [J45.909] 02/25/2014 Yes    HTN (hypertension) [I10] 02/25/2014 Yes    Diabetes mellitus [E11.9] 02/25/2014 Yes      Problems Resolved During this Admission:    Diagnosis Date Noted Date Resolved POA      Discharged Condition: good    Disposition: Home or Self Care    Follow Up:  Follow-up Information     Schedule an appointment as soon as possible for a visit with Iza Kwan MD.    Specialty:  Family Medicine    Contact information:    [address]  [facility] [address]  [phone]          Schedule an appointment as soon as possible  for a visit with Outpatient Psychiatrist.        Patient Instructions:     Ambulatory referral to Outpatient Case Management   Referral Priority: Routine Referral Type: Consultation   Referral Reason: Specialty Services Required    Number of Visits Requested: 1        Medications:  Reconciled Home Medications:   Current Discharge Medication List      START taking these medications    Details   dextromethorphan-guaifenesin  mg (MUCINEX DM)  mg per 12 hr tablet Take 1 tablet by mouth 2 (two) times daily.      lidocaine (LIDODERM) 5 % Place 1 patch onto the skin once daily. Remove & Discard patch within 12 hours or as directed by MD  Qty: 10 patch, Refills: 0      predniSONE (DELTASONE) 20 MG tablet Take 1 tablet (20 mg total) by mouth once daily.  Qty: 2 tablet, Refills: 0         CONTINUE these medications which have CHANGED    Details   albuterol (PROVENTIL) 2.5 mg /3 mL (0.083 %) nebulizer solution Take 3 mLs (2.5 mg total) by nebulization every 6 (six) hours as needed for Wheezing. Rescue  Qty: 1 Box, Refills: 0      furosemide (LASIX) 40 MG tablet Take 0.5 tablets (20 mg total) by mouth once daily.  Qty: 30 tablet, Refills: 0      lisinopril (PRINIVIL,ZESTRIL) 5 MG tablet Take 1 tablet (5 mg total) by mouth once daily.  Qty: 30 tablet, Refills: 0      trazodone (DESYREL) 150 MG tablet Take 1 tablet (150 mg total) by mouth every evening.  Qty: 30 tablet, Refills: 0    Associated Diagnoses: Schizoaffective disorder, bipolar type         CONTINUE these medications which have NOT CHANGED    Details   aspirin (ECOTRIN) 81 MG EC tablet Take 1 tablet (81 mg total) by mouth once daily.  Qty: 90 tablet, Refills: 3    Associated Diagnoses: CAD (coronary artery disease); Ischemic cardiomyopathy      atorvastatin (LIPITOR) 40 MG tablet Take 1 tablet (40 mg total) by mouth once daily.  Qty: 30 tablet, Refills: 6    Associated Diagnoses: Abnormal cholesterol test      fluticasone furoate 100 mcg/actuation DsDv  Inhale 100 mcg into the lungs once daily. Controller      glimepiride (AMARYL) 2 MG tablet Take 1 tablet (2 mg total) by mouth before breakfast.  Qty: 60 tablet, Refills: 6      nitroGLYCERIN (NITROSTAT) 0.4 MG SL tablet Place 1 tablet (0.4 mg total) under the tongue every 5 (five) minutes as needed for Chest pain.  Qty: 30 tablet, Refills: 4    Associated Diagnoses: Coronary artery disease involving native coronary artery of native heart without angina pectoris      walker Misc Use walker daily PRN  Qty: 1 each, Refills: 0    Comments: Please provide Rolator type walker  Associated Diagnoses: CAD (coronary artery disease); Asthma, unspecified asthma severity, uncomplicated; HTN (hypertension); CHF (congestive heart failure), unspecified failure chronicity, unspecified type         STOP taking these medications       benztropine (COGENTIN) 1 MG tablet Comments:   Reason for Stopping:         carvedilol (COREG) 6.25 MG tablet Comments:   Reason for Stopping:         ciclopirox (PENLAC) 8 % Soln Comments:   Reason for Stopping:         clopidogrel (PLAVIX) 75 mg tablet Comments:   Reason for Stopping:         fluoxetine (PROZAC) 20 MG capsule Comments:   Reason for Stopping:         haloperidol (HALDOL) 10 MG tablet Comments:   Reason for Stopping:         mirtazapine (REMERON) 30 MG tablet Comments:   Reason for Stopping:         risperidone (RISPERDAL) 4 MG tablet Comments:   Reason for Stopping:             Time spent on the discharge of patient: 30 minutes    Andreina Lombardi MD  Department of Hospital Medicine  [facility]

## 2017-04-12 NOTE — PROGRESS NOTES
Patient complaining of abdominal pain. Dr. Harjit Lombardi notified. MD stated that she will come to evaluate patient. Will continue to monitor.

## 2017-04-12 NOTE — ASSESSMENT & PLAN NOTE
Stable and without acute exacerbation.  1. Continue albuterol ipratropium.  2. Continue fluticasone-vilanterol.

## 2017-04-12 NOTE — ASSESSMENT & PLAN NOTE
Resolved. Likely secondary to cocaine use prior to admission.  1. Monitor.   2. Nitroglycerin as needed.

## 2017-04-12 NOTE — PROGRESS NOTES
Discharge instructions, home medications and prescriptions reviewed with patient. Patient informed of what medications need to be taken on tonight. Patient informed of follow-up appointments. Understanding verbalized. Last set of vital signs stable. Telemetry dc'd. PIV dc'd. Patient to be wheeled off unit at 1345 to receive ride home for service cab. Will continue to monitor.

## 2017-04-12 NOTE — ASSESSMENT & PLAN NOTE
Likely secondary to cocaine abuse. Now resolved.  1. Continue trazodone.  2. Outpatient follow up.

## 2017-04-12 NOTE — DISCHARGE INSTRUCTIONS
Call [facility] for psychiatric appointment: [phone].  Prescriptions sent to [facility] pharmacy on [address].

## 2017-04-12 NOTE — ASSESSMENT & PLAN NOTE
Patient euvolemic on examination today. No crackles on examination or edema. Net negative of 2 liters today.   1. Transition to oral furosemide 40 mg BID.  2. Continue lisinopril.

## 2017-04-12 NOTE — ASSESSMENT & PLAN NOTE
Patient euvolemic on examination today. No crackles on examination or edema. Net negative of 2.8 liters in past 24 hours. Low normal blood pressure today. No evidence of orthostatic hypotension. Lisinopril and furosemide held today.  1. Continue furosemide 20 mg daily tomorrow.  2. Continue lisinopril starting tomorrow.  3. Outpatient follow up with PCP for further management.

## 2017-04-12 NOTE — PROGRESS NOTES
"[facility] Hospital Medicine  Progress Note    Patient Name: Polly Kwan  MRN: [id number]  Patient Class: IP- Inpatient   Admission Date: 4/6/2017  Length of Stay: 6 days  Attending Physician: Andreina Lombardi MD  Primary Care Provider: Iza Kwan MD    [facility] Medicine Team: [facility] MED C Andreina Lombardi MD    Subjective:     Principal Problem:Acute on chronic systolic (congestive) heart failure    HPI:  The patient reports bilateral chest pain that started last night while she was "laying down". She says "my stomach was tight.. I was cold". When asked about the quality of the chest pain, she says it was "hurting.. I couldn't breathe or get comfortable". The pain is worse with coughing and it radiates to "my sides". She endorses SOB, VIEYRA, nausea and vomiting. The patient denies salty food intake and has been off her medications for "three days". The patient denies hemoptysis, fever, bloody stools, EtOH use and palpitations. She endorses swelling in her legs.    Hospital Course:  Patient admitted to IM-C service on 4/7.  She was started on iv lasix diuresis for management of heart failure and has remained hemodynamically stable w/o any signs of respiratory compromise or acute ischemia.  She was evaluated by Psychiatric consults and pt did express symptoms concerning for acute psychosis with hallucinations/and had expressed thoughts of harming others.  She was PEC'd, many of prior psych meds were tapered as she does not regularly take them.  Patient had brisk response to IV diuresis and had large amounts of urine output.    Her nausea, vomiting, abdominal pain, and LE edema resolved. Patient cleared by psychiatry for removal of PEC. Patient hemodynamically stable for discharge but complained of worsening SOB when notified of discharge plan. On hospital day 6 patient was medically stable for discharge.      Interval History: "I feel a little dizzy". No acute overnight events. This " morning blood pressure low at 88/60 mmHg however after it was rechecked Bp 101/61 mmHg.     Review of Systems   Constitutional: Negative for chills, fatigue, fever and unexpected weight change.   HENT: Negative for ear pain, facial swelling, hearing loss, mouth sores, nosebleeds, rhinorrhea, sinus pressure, sore throat, tinnitus, trouble swallowing and voice change.    Eyes: Negative for photophobia, pain, redness and visual disturbance.   Respiratory: Negative for cough, chest tightness, shortness of breath and wheezing.    Cardiovascular: Negative for chest pain, palpitations and leg swelling.   Gastrointestinal: Negative for abdominal pain, blood in stool, constipation, diarrhea, nausea and vomiting.   Endocrine: Negative for cold intolerance, heat intolerance, polydipsia, polyphagia and polyuria.   Genitourinary: Negative for decreased urine volume, dysuria, flank pain, frequency, hematuria, menstrual problem, urgency, vaginal bleeding, vaginal discharge and vaginal pain.   Musculoskeletal: Negative for arthralgias, back pain, joint swelling, myalgias and neck pain.   Skin: Negative for rash.   Allergic/Immunologic: Negative for environmental allergies, food allergies and immunocompromised state.   Neurological: Negative for dizziness, seizures, syncope, weakness, light-headedness, numbness and headaches.   Hematological: Negative for adenopathy. Does not bruise/bleed easily.   Psychiatric/Behavioral: Negative for confusion, hallucinations, self-injury, sleep disturbance and suicidal ideas. The patient is not nervous/anxious.      Objective:     Vital Signs (Most Recent):  Temp: 98 °F (36.7 °C) (04/11/17 2058)  Pulse: 74 (04/12/17 0514)  Resp: 19 (04/12/17 0514)  BP: (!) 107/56 (04/12/17 0514)  SpO2: 95 % (04/12/17 0514) Vital Signs (24h Range):  Temp:  [97.4 °F (36.3 °C)-98 °F (36.7 °C)] 98 °F (36.7 °C)  Pulse:  [70-87] 74  Resp:  [14-23] 19  SpO2:  [92 %-98 %] 95 %  BP: ()/(56-86) 107/56     Weight: 68.5  kg (151 lb 0.2 oz)  Body mass index is 27.62 kg/(m^2).    Intake/Output Summary (Last 24 hours) at 04/12/17 0536  Last data filed at 04/11/17 2245   Gross per 24 hour   Intake             1200 ml   Output             4500 ml   Net            -3300 ml      Physical Exam   Constitutional: She is oriented to person, place, and time. She appears well-developed and well-nourished. She is cooperative. She is easily aroused.  Non-toxic appearance. No distress.   HENT:   Head: Normocephalic and atraumatic. Head is without right periorbital erythema and without left periorbital erythema.   Right Ear: Hearing, tympanic membrane, external ear and ear canal normal. No swelling.   Left Ear: Hearing, tympanic membrane, external ear and ear canal normal. No swelling.   Nose: Nose normal. No nasal deformity.   Mouth/Throat: Uvula is midline, oropharynx is clear and moist and mucous membranes are normal. No oropharyngeal exudate.   Eyes: Conjunctivae, EOM and lids are normal. Pupils are equal, round, and reactive to light. Right eye exhibits no discharge and no exudate. Left eye exhibits no discharge and no exudate. Right conjunctiva is not injected. Left conjunctiva is not injected. No scleral icterus.   Neck: Normal range of motion. Neck supple. No tracheal deviation present. No thyromegaly present.   Cardiovascular: Normal rate, regular rhythm, S1 normal, S2 normal, normal heart sounds and intact distal pulses.  Exam reveals no gallop and no friction rub.    No murmur heard.  Pulmonary/Chest: Effort normal and breath sounds normal. No accessory muscle usage or stridor. No tachypnea. No respiratory distress. She has no wheezes. She has no rales. She exhibits no tenderness.   Abdominal: Soft. Normal appearance and bowel sounds are normal. She exhibits no distension. There is no tenderness. There is no rebound and no guarding.   Musculoskeletal: Normal range of motion. She exhibits no edema or tenderness.   Neurological: She is  alert, oriented to person, place, and time and easily aroused. No cranial nerve deficit. Coordination normal.   Skin: Skin is warm and dry. No lesion and no rash noted. She is not diaphoretic. No cyanosis or erythema. No pallor. Nails show no clubbing.   Psychiatric: She has a normal mood and affect. Her speech is normal and behavior is normal. Judgment and thought content normal.   Nursing note and vitals reviewed.      Significant Labs: All pertinent labs within the past 24 hours have been reviewed.    Significant Imaging: I have reviewed all pertinent imaging results/findings within the past 24 hours.    Assessment/Plan:      * Acute on chronic systolic (congestive) heart failure  Patient euvolemic on examination today. No crackles on examination or edema. Net negative of 2.8 liters in past 24 hours. Low normal blood pressure today. No evidence of orthostatic hypotension. Lisinopril and furosemide held today.  1. Continue furosemide 20 mg daily tomorrow.  2. Continue lisinopril starting tomorrow.  3. Outpatient follow up with PCP for further management.       HTN (hypertension)  Well controlled.  1. Continue lisinopril 5 mg tomorrow.      Diabetes mellitus  Well controlled.  1. Monitor glucose levels.  2. Outpatient follow up with PCP for further management.      Asthma  Stable and without acute exacerbation.  1. Continue albuterol ipratropium.  2. Continue fluticasone-vilanterol.      CAD (coronary artery disease)  Stable and asymptomatic.  1. Continue aspirin.      Ischemic cardiomyopathy  Stable.  1. Continue aspirin.  2. Continue atorvastatin.  3. Continue lisinopril.  4. Cocaine cessation.       Substance-induced psychotic disorder with hallucinations  As above.      Moderate tricuspid regurgitation  Stable.  1. Outpatient management.      Hypoalbuminemia  Secondary to poor oral intake.  1. Encouraged increased oral intake.      Abdominal pain  Now resolved. No acute abnormalities on diagnostic work up.  1.  Monitor.      Cocaine abuse  Chronic in nature. No intention for cessation at this time.  1. Encouraged cocaine cessation.       Atypical chest pain  Resolved. Likely secondary to cocaine use prior to admission.  1. Monitor.   2. Nitroglycerin as needed.    Normocytic anemia  Stable and asymptomatic.  1. No indication for RBC transfusion.  2. Monitor HgB and HCT.      Hypocalcemia  Pseudo-hypocalcemia secondary to low albumin.  1. Monitor.      Psychosis  Likely secondary to cocaine abuse. Now resolved.  1. Continue trazodone.  2. Outpatient follow up.      VTE Risk Mitigation         Ordered     enoxaparin injection 40 mg  Daily     Route:  Subcutaneous        04/08/17 1922     Medium Risk of VTE  Once      04/07/17 0018     Place LEXIS hose  Until discontinued      04/07/17 0018          Andreina Lombardi MD  Department of Hospital Medicine   [facility]

## 2017-04-12 NOTE — ASSESSMENT & PLAN NOTE
Stable.  1. Continue aspirin.  2. Continue atorvastatin.  3. Continue lisinopril.  4. Cocaine cessation.

## 2017-04-12 NOTE — PROGRESS NOTES
"[facility] Hospital Medicine  Progress Note    Patient Name: Polly Kwan  MRN: [id number]  Patient Class: IP- Inpatient   Admission Date: 4/6/2017  Length of Stay: 5 days  Attending Physician: Andreina Lombardi MD  Primary Care Provider: Iza Kwan MD    [facility] Medicine Team: [facility]OS[facility] MED C Andreina Lombardi MD    Subjective:     Principal Problem:Acute on chronic systolic (congestive) heart failure    HPI:  The patient reports bilateral chest pain that started last night while she was "laying down". She says "my stomach was tight.. I was cold". When asked about the quality of the chest pain, she says it was "hurting.. I couldn't breathe or get comfortable". The pain is worse with coughing and it radiates to "my sides". She endorses SOB, VIEYRA, nausea and vomiting. The patient denies salty food intake and has been off her medications for "three days". The patient denies hemoptysis, fever, bloody stools, EtOH use and palpitations. She endorses swelling in her legs.    Hospital Course:  Patient admitted to IM-C service on 4/7.  She was started on iv lasix diuresis for management of heart failure and has remained hemodynamically stable w/o any signs of respiratory compromise or acute ischemia.  She was evaluated by Psychiatric consults and pt did express symptoms concerning for acute psychosis with hallucinations/and had expressed thoughts of harming others.  She was PEC'd, many of prior psych meds were tapered as she does not regularly take them.  Patient had brisk response to IV diuresis and had large amounts of urine output.    Her nausea, vomiting, abdominal pain, and LE edema resolved. Patient cleared by psychiatry for removal of PEC. Patient hemodynamically stable for discharge but complained of worsening SOB when notified of discharge plan.      Interval History: "OK but I'm short of breath". No acute overnight events. Complains of SOB upon being informed of potential discharge " today.    Review of Systems   Constitutional: Negative for chills, fatigue, fever and unexpected weight change.   HENT: Negative for ear pain, facial swelling, hearing loss, mouth sores, nosebleeds, rhinorrhea, sinus pressure, sore throat, tinnitus, trouble swallowing and voice change.    Eyes: Negative for photophobia, pain, redness and visual disturbance.   Respiratory: Positive for shortness of breath. Negative for cough, chest tightness and wheezing.    Cardiovascular: Negative for chest pain, palpitations and leg swelling.   Gastrointestinal: Negative for abdominal pain, blood in stool, constipation, diarrhea, nausea and vomiting.   Endocrine: Negative for cold intolerance, heat intolerance, polydipsia, polyphagia and polyuria.   Genitourinary: Negative for decreased urine volume, dysuria, flank pain, frequency, hematuria, menstrual problem, urgency, vaginal bleeding, vaginal discharge and vaginal pain.   Musculoskeletal: Negative for arthralgias, back pain, joint swelling, myalgias and neck pain.   Skin: Negative for rash.   Allergic/Immunologic: Negative for environmental allergies, food allergies and immunocompromised state.   Neurological: Negative for dizziness, seizures, syncope, weakness, light-headedness, numbness and headaches.   Hematological: Negative for adenopathy. Does not bruise/bleed easily.   Psychiatric/Behavioral: Negative for confusion, hallucinations, self-injury, sleep disturbance and suicidal ideas. The patient is not nervous/anxious.      Objective:     Vital Signs (Most Recent):  Temp: 98 °F (36.7 °C) (04/11/17 2058)  Pulse: 81 (04/11/17 2058)  Resp: 14 (04/11/17 2058)  BP: 119/86 (04/11/17 2058)  SpO2: (!) 94 % (04/11/17 2058) Vital Signs (24h Range):  Temp:  [97.4 °F (36.3 °C)-98 °F (36.7 °C)] 98 °F (36.7 °C)  Pulse:  [70-87] 81  Resp:  [14-23] 14  SpO2:  [92 %-98 %] 94 %  BP: ()/(66-86) 119/86     Weight: 68.5 kg (151 lb 0.2 oz)  Body mass index is 27.62  kg/(m^2).    Intake/Output Summary (Last 24 hours) at 04/11/17 2137  Last data filed at 04/11/17 1300   Gross per 24 hour   Intake              960 ml   Output             4400 ml   Net            -3440 ml      Physical Exam   Constitutional: She is oriented to person, place, and time. She appears well-developed and well-nourished. She is cooperative. She is easily aroused.  Non-toxic appearance. No distress.   HENT:   Head: Normocephalic and atraumatic. Head is without right periorbital erythema and without left periorbital erythema.   Right Ear: Hearing, tympanic membrane, external ear and ear canal normal. No swelling.   Left Ear: Hearing, tympanic membrane, external ear and ear canal normal. No swelling.   Nose: Nose normal. No nasal deformity.   Mouth/Throat: Uvula is midline, oropharynx is clear and moist and mucous membranes are normal. No oropharyngeal exudate.   Eyes: Conjunctivae, EOM and lids are normal. Pupils are equal, round, and reactive to light. Right eye exhibits no discharge and no exudate. Left eye exhibits no discharge and no exudate. Right conjunctiva is not injected. Left conjunctiva is not injected. No scleral icterus.   Neck: Normal range of motion. Neck supple. No tracheal deviation present. No thyromegaly present.   Cardiovascular: Normal rate, regular rhythm, S1 normal, S2 normal, normal heart sounds and intact distal pulses.  Exam reveals no gallop and no friction rub.    No murmur heard.  Pulmonary/Chest: Effort normal and breath sounds normal. No accessory muscle usage or stridor. No tachypnea. No respiratory distress. She has no wheezes. She has no rales. She exhibits no tenderness.   Abdominal: Soft. Normal appearance and bowel sounds are normal. She exhibits no distension. There is no tenderness. There is no rebound and no guarding.   Musculoskeletal: Normal range of motion. She exhibits no edema or tenderness.   Neurological: She is alert, oriented to person, place, and time and  easily aroused. No cranial nerve deficit. Coordination normal.   Skin: Skin is warm and dry. No lesion and no rash noted. She is not diaphoretic. No cyanosis or erythema. No pallor. Nails show no clubbing.   Psychiatric: She has a normal mood and affect. Her speech is normal and behavior is normal. Judgment and thought content normal.   Nursing note and vitals reviewed.      Significant Labs:   BMP:   Recent Labs  Lab 04/11/17  0652   *      K 4.2   CL 98   CO2 29   BUN 14   CREATININE 0.8   CALCIUM 8.5*   MG 1.8     CBC: No results for input(s): WBC, HGB, HCT, PLT in the last 48 hours.    Significant Imaging: I have reviewed all pertinent imaging results/findings within the past 24 hours.    Assessment/Plan:      * Acute on chronic systolic (congestive) heart failure  Patient euvolemic on examination today. No crackles on examination or edema. Net negative of 2 liters today.   1. Transition to oral furosemide 40 mg BID.  2. Continue lisinopril.      HTN (hypertension)  Well controlled.  1. Continue lisinopril.      Diabetes mellitus  Well controlled.  1. Monitor glucose levels.  2. LDSSI as needed.      Asthma  Stable and without acute exacerbation.  1. Continue albuterol ipratropium.  2. Continue fluticasone-vilanterol.      CAD (coronary artery disease)  Stable and asymptomatic.  1. Continue aspirin.      Ischemic cardiomyopathy  Stable.  1. Continue aspirin.  2. Continue atorvastatin.  3. Continue lisinopril.  4. Cocaine cessation.       Substance-induced psychotic disorder with hallucinations  As above.      Moderate tricuspid regurgitation  Stable.  1. Outpatient management.      Hypoalbuminemia  Secondary to poor oral intake.  1. Encouraged increased oral intake.      Abdominal pain  Now resolved. No acute abnormalities on diagnostic work up.  1. Monitor.      Cocaine abuse  Chronic in nature. No intention for cessation at this time.  1. Encouraged cocaine cessation.       Atypical chest  pain  Resolved. Likely secondary to cocaine use prior to admission.  1. Monitor.   2. Nitroglycerin as needed.    Normocytic anemia  Stable and asymptomatic.  1. No indication for RBC transfusion.  2. Monitor HgB and HCT.      Hypocalcemia  Pseudo-hypocalcemia secondary to low albumin.  1. Monitor.      Psychosis  Likely secondary to cocaine abuse. Now resolved.  1. Continue trazodone.  2. Outpatient follow up.      VTE Risk Mitigation         Ordered     enoxaparin injection 40 mg  Daily     Route:  Subcutaneous        04/08/17 1922     Medium Risk of VTE  Once      04/07/17 0018     Place LEXIS hose  Until discontinued      04/07/17 0018          Andreina Lombardi MD  Department of Hospital Medicine   [facility]

## 2017-04-12 NOTE — PLAN OF CARE
arranged Taxi with Service cab to  pt for 2:00 today.  Pt will return home with her friend.  No additional services indicated.

## 2017-04-12 NOTE — PROGRESS NOTES
Patient blood pressure 88/60(68). Dr. Harjit Lomabrdi notified. New orders received. Will continue to monitor.

## 2017-04-12 NOTE — SUBJECTIVE & OBJECTIVE
"Interval History: "I feel a little dizzy". No acute overnight events. This morning blood pressure low at 88/60 mmHg however after it was rechecked Bp 101/61 mmHg.     Review of Systems   Constitutional: Negative for chills, fatigue, fever and unexpected weight change.   HENT: Negative for ear pain, facial swelling, hearing loss, mouth sores, nosebleeds, rhinorrhea, sinus pressure, sore throat, tinnitus, trouble swallowing and voice change.    Eyes: Negative for photophobia, pain, redness and visual disturbance.   Respiratory: Negative for cough, chest tightness, shortness of breath and wheezing.    Cardiovascular: Negative for chest pain, palpitations and leg swelling.   Gastrointestinal: Negative for abdominal pain, blood in stool, constipation, diarrhea, nausea and vomiting.   Endocrine: Negative for cold intolerance, heat intolerance, polydipsia, polyphagia and polyuria.   Genitourinary: Negative for decreased urine volume, dysuria, flank pain, frequency, hematuria, menstrual problem, urgency, vaginal bleeding, vaginal discharge and vaginal pain.   Musculoskeletal: Negative for arthralgias, back pain, joint swelling, myalgias and neck pain.   Skin: Negative for rash.   Allergic/Immunologic: Negative for environmental allergies, food allergies and immunocompromised state.   Neurological: Negative for dizziness, seizures, syncope, weakness, light-headedness, numbness and headaches.   Hematological: Negative for adenopathy. Does not bruise/bleed easily.   Psychiatric/Behavioral: Negative for confusion, hallucinations, self-injury, sleep disturbance and suicidal ideas. The patient is not nervous/anxious.      Objective:     Vital Signs (Most Recent):  Temp: 98 °F (36.7 °C) (04/11/17 2058)  Pulse: 74 (04/12/17 0514)  Resp: 19 (04/12/17 0514)  BP: (!) 107/56 (04/12/17 0514)  SpO2: 95 % (04/12/17 0514) Vital Signs (24h Range):  Temp:  [97.4 °F (36.3 °C)-98 °F (36.7 °C)] 98 °F (36.7 °C)  Pulse:  [70-87] 74  Resp:  " [14-23] 19  SpO2:  [92 %-98 %] 95 %  BP: ()/(56-86) 107/56     Weight: 68.5 kg (151 lb 0.2 oz)  Body mass index is 27.62 kg/(m^2).    Intake/Output Summary (Last 24 hours) at 04/12/17 0536  Last data filed at 04/11/17 2245   Gross per 24 hour   Intake             1200 ml   Output             4500 ml   Net            -3300 ml      Physical Exam   Constitutional: She is oriented to person, place, and time. She appears well-developed and well-nourished. She is cooperative. She is easily aroused.  Non-toxic appearance. No distress.   HENT:   Head: Normocephalic and atraumatic. Head is without right periorbital erythema and without left periorbital erythema.   Right Ear: Hearing, tympanic membrane, external ear and ear canal normal. No swelling.   Left Ear: Hearing, tympanic membrane, external ear and ear canal normal. No swelling.   Nose: Nose normal. No nasal deformity.   Mouth/Throat: Uvula is midline, oropharynx is clear and moist and mucous membranes are normal. No oropharyngeal exudate.   Eyes: Conjunctivae, EOM and lids are normal. Pupils are equal, round, and reactive to light. Right eye exhibits no discharge and no exudate. Left eye exhibits no discharge and no exudate. Right conjunctiva is not injected. Left conjunctiva is not injected. No scleral icterus.   Neck: Normal range of motion. Neck supple. No tracheal deviation present. No thyromegaly present.   Cardiovascular: Normal rate, regular rhythm, S1 normal, S2 normal, normal heart sounds and intact distal pulses.  Exam reveals no gallop and no friction rub.    No murmur heard.  Pulmonary/Chest: Effort normal and breath sounds normal. No accessory muscle usage or stridor. No tachypnea. No respiratory distress. She has no wheezes. She has no rales. She exhibits no tenderness.   Abdominal: Soft. Normal appearance and bowel sounds are normal. She exhibits no distension. There is no tenderness. There is no rebound and no guarding.   Musculoskeletal: Normal  range of motion. She exhibits no edema or tenderness.   Neurological: She is alert, oriented to person, place, and time and easily aroused. No cranial nerve deficit. Coordination normal.   Skin: Skin is warm and dry. No lesion and no rash noted. She is not diaphoretic. No cyanosis or erythema. No pallor. Nails show no clubbing.   Psychiatric: She has a normal mood and affect. Her speech is normal and behavior is normal. Judgment and thought content normal.   Nursing note and vitals reviewed.      Significant Labs: All pertinent labs within the past 24 hours have been reviewed.    Significant Imaging: I have reviewed all pertinent imaging results/findings within the past 24 hours.

## 2017-04-25 ENCOUNTER — OUTPATIENT CASE MANAGEMENT (OUTPATIENT)
Dept: ADMINISTRATIVE | Facility: OTHER | Age: 57
End: 2017-04-25

## 2017-04-25 NOTE — PROGRESS NOTES
Called patient on all noted phone contacts without success. No answer or no voicemail set up. Will try again at later time.

## 2017-04-27 ENCOUNTER — OUTPATIENT CASE MANAGEMENT (OUTPATIENT)
Dept: ADMINISTRATIVE | Facility: OTHER | Age: 57
End: 2017-04-27

## 2017-04-27 NOTE — LETTER
April 27, 2017    Polly Kwan  [address] [address]             [facility]  [address] [place] [address] Dear Mrs. Kwan:    Your physician referred you to Outpatient Case Management Services. We have made several attempts to contact you but have not been able to reach you.Please feel free to contact me if you have any questions regarding this service. I can be reached @ [phone] Mon- Friday between the hours of 7:30-4:00.  [facility] also has 24/7 On-call program-where a nurse can be reached for anytime assistance @  [phone].     Sincerely,  Audrey Torres RN  Outpatient    [facility]    Audrey Torres RN

## 2017-04-27 NOTE — PROGRESS NOTES
Second attempt for initial screening for OPCM services. Called primary number [phone]- no answer no voicemail option. Called Mobile number - no answer- indicated no voicemail set up on line. Unalble to leave message.    Letter sent. Will re-attempt next week.

## 2017-05-01 ENCOUNTER — OUTPATIENT CASE MANAGEMENT (OUTPATIENT)
Dept: ADMINISTRATIVE | Facility: OTHER | Age: 57
End: 2017-05-01

## 2017-05-21 ENCOUNTER — HOSPITAL ENCOUNTER (INPATIENT)
Facility: HOSPITAL | Age: 57
LOS: 5 days | Discharge: HOME | DRG: 292 | End: 2017-05-26
Attending: EMERGENCY MEDICINE | Admitting: EMERGENCY MEDICINE
Payer: MEDICARE

## 2017-05-21 DIAGNOSIS — I25.5 ISCHEMIC CARDIOMYOPATHY: ICD-10-CM

## 2017-05-21 DIAGNOSIS — R07.9 CHEST PAIN: ICD-10-CM

## 2017-05-21 DIAGNOSIS — R07.9 CHEST PAIN IN ADULT: ICD-10-CM

## 2017-05-21 DIAGNOSIS — I50.9 ACUTE EXACERBATION OF CHF (CONGESTIVE HEART FAILURE): ICD-10-CM

## 2017-05-21 DIAGNOSIS — I50.9 ACUTE ON CHRONIC CONGESTIVE HEART FAILURE, UNSPECIFIED CONGESTIVE HEART FAILURE TYPE: ICD-10-CM

## 2017-05-21 DIAGNOSIS — E78.9 ABNORMAL CHOLESTEROL TEST: ICD-10-CM

## 2017-05-21 DIAGNOSIS — I50.23 ACUTE ON CHRONIC SYSTOLIC CONGESTIVE HEART FAILURE: Primary | ICD-10-CM

## 2017-05-21 DIAGNOSIS — F17.200 SMOKING ADDICTION: ICD-10-CM

## 2017-05-21 DIAGNOSIS — I25.10 CAD (CORONARY ARTERY DISEASE): ICD-10-CM

## 2017-05-21 DIAGNOSIS — I50.23 ACUTE ON CHRONIC SYSTOLIC CHF (CONGESTIVE HEART FAILURE): ICD-10-CM

## 2017-05-21 LAB
ALBUMIN SERPL BCP-MCNC: 3.4 G/DL
ALP SERPL-CCNC: 88 U/L
ALT SERPL W/O P-5'-P-CCNC: 11 U/L
ANION GAP SERPL CALC-SCNC: 13 MMOL/L
AST SERPL-CCNC: 22 U/L
BASOPHILS # BLD AUTO: 0.01 K/UL
BASOPHILS NFR BLD: 0.2 %
BILIRUB SERPL-MCNC: 2.6 MG/DL
BNP SERPL-MCNC: 1977 PG/ML
BUN SERPL-MCNC: 11 MG/DL
CALCIUM SERPL-MCNC: 8.8 MG/DL
CHLORIDE SERPL-SCNC: 102 MMOL/L
CO2 SERPL-SCNC: 24 MMOL/L
CREAT SERPL-MCNC: 0.8 MG/DL
DIFFERENTIAL METHOD: ABNORMAL
EOSINOPHIL # BLD AUTO: 0 K/UL
EOSINOPHIL NFR BLD: 0.5 %
ERYTHROCYTE [DISTWIDTH] IN BLOOD BY AUTOMATED COUNT: 19.2 %
EST. GFR  (AFRICAN AMERICAN): >60 ML/MIN/1.73 M^2
EST. GFR  (NON AFRICAN AMERICAN): >60 ML/MIN/1.73 M^2
GLUCOSE SERPL-MCNC: 106 MG/DL
HCT VFR BLD AUTO: 38.6 %
HGB BLD-MCNC: 13 G/DL
LYMPHOCYTES # BLD AUTO: 2 K/UL
LYMPHOCYTES NFR BLD: 47.6 %
MCH RBC QN AUTO: 27.5 PG
MCHC RBC AUTO-ENTMCNC: 33.7 %
MCV RBC AUTO: 82 FL
MONOCYTES # BLD AUTO: 0.3 K/UL
MONOCYTES NFR BLD: 7.5 %
NEUTROPHILS # BLD AUTO: 1.9 K/UL
NEUTROPHILS NFR BLD: 44 %
PLATELET # BLD AUTO: 273 K/UL
PMV BLD AUTO: 9.8 FL
POTASSIUM SERPL-SCNC: 3.3 MMOL/L
PROT SERPL-MCNC: 7.5 G/DL
RBC # BLD AUTO: 4.72 M/UL
SODIUM SERPL-SCNC: 139 MMOL/L
TROPONIN I SERPL DL<=0.01 NG/ML-MCNC: 0.02 NG/ML
WBC # BLD AUTO: 4.29 K/UL

## 2017-05-21 PROCEDURE — 96376 TX/PRO/DX INJ SAME DRUG ADON: CPT

## 2017-05-21 PROCEDURE — 93010 ELECTROCARDIOGRAM REPORT: CPT | Mod: ,,, | Performed by: INTERNAL MEDICINE

## 2017-05-21 PROCEDURE — 82962 GLUCOSE BLOOD TEST: CPT

## 2017-05-21 PROCEDURE — 25000003 PHARM REV CODE 250: Performed by: STUDENT IN AN ORGANIZED HEALTH CARE EDUCATION/TRAINING PROGRAM

## 2017-05-21 PROCEDURE — 63600175 PHARM REV CODE 636 W HCPCS: Performed by: STUDENT IN AN ORGANIZED HEALTH CARE EDUCATION/TRAINING PROGRAM

## 2017-05-21 PROCEDURE — 93283 PRGRMG EVAL IMPLANTABLE DFB: CPT | Mod: PBBFAC | Performed by: INTERNAL MEDICINE

## 2017-05-21 PROCEDURE — 93005 ELECTROCARDIOGRAM TRACING: CPT

## 2017-05-21 PROCEDURE — 96375 TX/PRO/DX INJ NEW DRUG ADDON: CPT

## 2017-05-21 PROCEDURE — 85025 COMPLETE CBC W/AUTO DIFF WBC: CPT

## 2017-05-21 PROCEDURE — 93283 PRGRMG EVAL IMPLANTABLE DFB: CPT

## 2017-05-21 PROCEDURE — 80053 COMPREHEN METABOLIC PANEL: CPT

## 2017-05-21 PROCEDURE — 63600175 PHARM REV CODE 636 W HCPCS: Performed by: EMERGENCY MEDICINE

## 2017-05-21 PROCEDURE — 84484 ASSAY OF TROPONIN QUANT: CPT

## 2017-05-21 PROCEDURE — 99285 EMERGENCY DEPT VISIT HI MDM: CPT | Mod: ,,, | Performed by: EMERGENCY MEDICINE

## 2017-05-21 PROCEDURE — 99285 EMERGENCY DEPT VISIT HI MDM: CPT | Mod: 25

## 2017-05-21 PROCEDURE — 94640 AIRWAY INHALATION TREATMENT: CPT

## 2017-05-21 PROCEDURE — 83880 ASSAY OF NATRIURETIC PEPTIDE: CPT

## 2017-05-21 PROCEDURE — 12000002 HC ACUTE/MED SURGE SEMI-PRIVATE ROOM

## 2017-05-21 PROCEDURE — 96374 THER/PROPH/DIAG INJ IV PUSH: CPT

## 2017-05-21 PROCEDURE — 25000242 PHARM REV CODE 250 ALT 637 W/ HCPCS: Performed by: EMERGENCY MEDICINE

## 2017-05-21 RX ORDER — IPRATROPIUM BROMIDE AND ALBUTEROL SULFATE 2.5; .5 MG/3ML; MG/3ML
3 SOLUTION RESPIRATORY (INHALATION)
Status: COMPLETED | OUTPATIENT
Start: 2017-05-21 | End: 2017-05-21

## 2017-05-21 RX ORDER — POTASSIUM CHLORIDE 20 MEQ/15ML
20 SOLUTION ORAL
Status: COMPLETED | OUTPATIENT
Start: 2017-05-21 | End: 2017-05-21

## 2017-05-21 RX ORDER — FUROSEMIDE 10 MG/ML
40 INJECTION INTRAMUSCULAR; INTRAVENOUS
Status: COMPLETED | OUTPATIENT
Start: 2017-05-21 | End: 2017-05-21

## 2017-05-21 RX ORDER — ONDANSETRON 2 MG/ML
8 INJECTION INTRAMUSCULAR; INTRAVENOUS ONCE
Status: COMPLETED | OUTPATIENT
Start: 2017-05-21 | End: 2017-05-21

## 2017-05-21 RX ADMIN — ONDANSETRON 8 MG: 2 INJECTION INTRAMUSCULAR; INTRAVENOUS at 11:05

## 2017-05-21 RX ADMIN — IPRATROPIUM BROMIDE AND ALBUTEROL SULFATE 3 ML: .5; 3 SOLUTION RESPIRATORY (INHALATION) at 11:05

## 2017-05-21 RX ADMIN — FUROSEMIDE 40 MG: 10 INJECTION, SOLUTION INTRAVENOUS at 11:05

## 2017-05-21 RX ADMIN — POTASSIUM CHLORIDE 20 MEQ: 20 SOLUTION ORAL at 11:05

## 2017-05-21 RX ADMIN — ALUMINUM HYDROXIDE, MAGNESIUM HYDROXIDE, AND SIMETHICONE 50 ML: 200; 200; 20 SUSPENSION ORAL at 09:05

## 2017-05-21 RX ADMIN — POTASSIUM CHLORIDE 20 MEQ: 20 SOLUTION ORAL at 09:05

## 2017-05-22 PROBLEM — F32.A DEPRESSION: Status: ACTIVE | Noted: 2017-05-22

## 2017-05-22 LAB
ALBUMIN SERPL BCP-MCNC: 3.3 G/DL
ALP SERPL-CCNC: 88 U/L
ALT SERPL W/O P-5'-P-CCNC: 11 U/L
AMPHET+METHAMPHET UR QL: NEGATIVE
ANION GAP SERPL CALC-SCNC: 10 MMOL/L
AST SERPL-CCNC: 16 U/L
BARBITURATES UR QL SCN>200 NG/ML: NEGATIVE
BASOPHILS # BLD AUTO: 0.01 K/UL
BASOPHILS NFR BLD: 0.2 %
BENZODIAZ UR QL SCN>200 NG/ML: NEGATIVE
BILIRUB SERPL-MCNC: 2.3 MG/DL
BUN SERPL-MCNC: 11 MG/DL
BZE UR QL SCN: NORMAL
CALCIUM SERPL-MCNC: 8.5 MG/DL
CANNABINOIDS UR QL SCN: NEGATIVE
CHLORIDE SERPL-SCNC: 101 MMOL/L
CO2 SERPL-SCNC: 28 MMOL/L
CREAT SERPL-MCNC: 1 MG/DL
CREAT UR-MCNC: 32 MG/DL
DIFFERENTIAL METHOD: ABNORMAL
EOSINOPHIL # BLD AUTO: 0 K/UL
EOSINOPHIL NFR BLD: 0.2 %
ERYTHROCYTE [DISTWIDTH] IN BLOOD BY AUTOMATED COUNT: 18.7 %
EST. GFR  (AFRICAN AMERICAN): >60 ML/MIN/1.73 M^2
EST. GFR  (NON AFRICAN AMERICAN): >60 ML/MIN/1.73 M^2
ETHANOL UR-MCNC: <10 MG/DL
GLUCOSE SERPL-MCNC: 110 MG/DL
HCT VFR BLD AUTO: 35 %
HGB BLD-MCNC: 11.7 G/DL
LYMPHOCYTES # BLD AUTO: 2.3 K/UL
LYMPHOCYTES NFR BLD: 47.1 %
MAGNESIUM SERPL-MCNC: 1.9 MG/DL
MCH RBC QN AUTO: 27.4 PG
MCHC RBC AUTO-ENTMCNC: 33.4 %
MCV RBC AUTO: 82 FL
METHADONE UR QL SCN>300 NG/ML: NEGATIVE
MONOCYTES # BLD AUTO: 0.4 K/UL
MONOCYTES NFR BLD: 7.3 %
NEUTROPHILS # BLD AUTO: 2.2 K/UL
NEUTROPHILS NFR BLD: 45 %
OPIATES UR QL SCN: NEGATIVE
PCP UR QL SCN>25 NG/ML: NEGATIVE
PLATELET # BLD AUTO: 272 K/UL
PMV BLD AUTO: 10.1 FL
POCT GLUCOSE: 118 MG/DL (ref 70–110)
POCT GLUCOSE: 120 MG/DL (ref 70–110)
POCT GLUCOSE: 123 MG/DL (ref 70–110)
POCT GLUCOSE: 126 MG/DL (ref 70–110)
POTASSIUM SERPL-SCNC: 3 MMOL/L
PROT SERPL-MCNC: 6.9 G/DL
RBC # BLD AUTO: 4.27 M/UL
SODIUM SERPL-SCNC: 139 MMOL/L
TOXICOLOGY INFORMATION: NORMAL
WBC # BLD AUTO: 4.93 K/UL

## 2017-05-22 PROCEDURE — 80053 COMPREHEN METABOLIC PANEL: CPT

## 2017-05-22 PROCEDURE — 20600001 HC STEP DOWN PRIVATE ROOM

## 2017-05-22 PROCEDURE — 25000003 PHARM REV CODE 250: Performed by: HOSPITALIST

## 2017-05-22 PROCEDURE — 63600175 PHARM REV CODE 636 W HCPCS: Performed by: INTERNAL MEDICINE

## 2017-05-22 PROCEDURE — 25000003 PHARM REV CODE 250: Performed by: INTERNAL MEDICINE

## 2017-05-22 PROCEDURE — 94640 AIRWAY INHALATION TREATMENT: CPT

## 2017-05-22 PROCEDURE — 63600175 PHARM REV CODE 636 W HCPCS: Performed by: HOSPITALIST

## 2017-05-22 PROCEDURE — 27000221 HC OXYGEN, UP TO 24 HOURS

## 2017-05-22 PROCEDURE — 25000242 PHARM REV CODE 250 ALT 637 W/ HCPCS: Performed by: INTERNAL MEDICINE

## 2017-05-22 PROCEDURE — 94761 N-INVAS EAR/PLS OXIMETRY MLT: CPT

## 2017-05-22 PROCEDURE — 80307 DRUG TEST PRSMV CHEM ANLYZR: CPT

## 2017-05-22 PROCEDURE — 85025 COMPLETE CBC W/AUTO DIFF WBC: CPT

## 2017-05-22 PROCEDURE — 83735 ASSAY OF MAGNESIUM: CPT

## 2017-05-22 PROCEDURE — 36415 COLL VENOUS BLD VENIPUNCTURE: CPT

## 2017-05-22 RX ORDER — FUROSEMIDE 10 MG/ML
80 INJECTION INTRAMUSCULAR; INTRAVENOUS 2 TIMES DAILY
Status: DISCONTINUED | OUTPATIENT
Start: 2017-05-22 | End: 2017-05-24

## 2017-05-22 RX ORDER — POLYETHYLENE GLYCOL 3350 17 G/17G
17 POWDER, FOR SOLUTION ORAL DAILY
Status: DISCONTINUED | OUTPATIENT
Start: 2017-05-22 | End: 2017-05-26 | Stop reason: HOSPADM

## 2017-05-22 RX ORDER — IBUPROFEN 200 MG
1 TABLET ORAL DAILY
Status: DISCONTINUED | OUTPATIENT
Start: 2017-05-22 | End: 2017-05-26 | Stop reason: HOSPADM

## 2017-05-22 RX ORDER — AMOXICILLIN 250 MG
2 CAPSULE ORAL DAILY
Status: DISCONTINUED | OUTPATIENT
Start: 2017-05-22 | End: 2017-05-26 | Stop reason: HOSPADM

## 2017-05-22 RX ORDER — LISINOPRIL 5 MG/1
5 TABLET ORAL DAILY
Status: DISCONTINUED | OUTPATIENT
Start: 2017-05-22 | End: 2017-05-26 | Stop reason: HOSPADM

## 2017-05-22 RX ORDER — FUROSEMIDE 10 MG/ML
80 INJECTION INTRAMUSCULAR; INTRAVENOUS 3 TIMES DAILY
Status: DISCONTINUED | OUTPATIENT
Start: 2017-05-22 | End: 2017-05-22

## 2017-05-22 RX ORDER — NITROGLYCERIN 0.4 MG/1
0.4 TABLET SUBLINGUAL EVERY 5 MIN PRN
Status: DISCONTINUED | OUTPATIENT
Start: 2017-05-22 | End: 2017-05-26 | Stop reason: HOSPADM

## 2017-05-22 RX ORDER — ATORVASTATIN CALCIUM 20 MG/1
40 TABLET, FILM COATED ORAL DAILY
Status: DISCONTINUED | OUTPATIENT
Start: 2017-05-22 | End: 2017-05-26 | Stop reason: HOSPADM

## 2017-05-22 RX ORDER — IBUPROFEN 200 MG
24 TABLET ORAL
Status: DISCONTINUED | OUTPATIENT
Start: 2017-05-22 | End: 2017-05-26 | Stop reason: HOSPADM

## 2017-05-22 RX ORDER — POTASSIUM CHLORIDE 20 MEQ/1
40 TABLET, EXTENDED RELEASE ORAL DAILY
Status: DISCONTINUED | OUTPATIENT
Start: 2017-05-22 | End: 2017-05-26 | Stop reason: HOSPADM

## 2017-05-22 RX ORDER — GLUCAGON 1 MG
1 KIT INJECTION
Status: DISCONTINUED | OUTPATIENT
Start: 2017-05-22 | End: 2017-05-26 | Stop reason: HOSPADM

## 2017-05-22 RX ORDER — ONDANSETRON 2 MG/ML
4 INJECTION INTRAMUSCULAR; INTRAVENOUS
Status: COMPLETED | OUTPATIENT
Start: 2017-05-22 | End: 2017-05-22

## 2017-05-22 RX ORDER — TRAZODONE HYDROCHLORIDE 50 MG/1
150 TABLET ORAL NIGHTLY
Status: DISCONTINUED | OUTPATIENT
Start: 2017-05-22 | End: 2017-05-22

## 2017-05-22 RX ORDER — SODIUM CHLORIDE 0.9 % (FLUSH) 0.9 %
3 SYRINGE (ML) INJECTION EVERY 8 HOURS
Status: DISCONTINUED | OUTPATIENT
Start: 2017-05-22 | End: 2017-05-26 | Stop reason: HOSPADM

## 2017-05-22 RX ORDER — FUROSEMIDE 10 MG/ML
40 INJECTION INTRAMUSCULAR; INTRAVENOUS ONCE
Status: COMPLETED | OUTPATIENT
Start: 2017-05-22 | End: 2017-05-22

## 2017-05-22 RX ORDER — INSULIN ASPART 100 [IU]/ML
0-5 INJECTION, SOLUTION INTRAVENOUS; SUBCUTANEOUS
Status: DISCONTINUED | OUTPATIENT
Start: 2017-05-22 | End: 2017-05-26 | Stop reason: HOSPADM

## 2017-05-22 RX ORDER — POTASSIUM CHLORIDE 20 MEQ/1
20 TABLET, EXTENDED RELEASE ORAL ONCE
Status: COMPLETED | OUTPATIENT
Start: 2017-05-22 | End: 2017-05-22

## 2017-05-22 RX ORDER — TRAZODONE HYDROCHLORIDE 50 MG/1
150 TABLET ORAL NIGHTLY
Status: DISCONTINUED | OUTPATIENT
Start: 2017-05-22 | End: 2017-05-26 | Stop reason: HOSPADM

## 2017-05-22 RX ORDER — HEPARIN SODIUM 5000 [USP'U]/ML
5000 INJECTION, SOLUTION INTRAVENOUS; SUBCUTANEOUS EVERY 8 HOURS
Status: DISCONTINUED | OUTPATIENT
Start: 2017-05-22 | End: 2017-05-26 | Stop reason: HOSPADM

## 2017-05-22 RX ORDER — MAGNESIUM SULFATE HEPTAHYDRATE 40 MG/ML
2 INJECTION, SOLUTION INTRAVENOUS ONCE
Status: COMPLETED | OUTPATIENT
Start: 2017-05-22 | End: 2017-05-22

## 2017-05-22 RX ORDER — ASPIRIN 81 MG/1
81 TABLET ORAL DAILY
Status: DISCONTINUED | OUTPATIENT
Start: 2017-05-22 | End: 2017-05-26 | Stop reason: HOSPADM

## 2017-05-22 RX ORDER — FUROSEMIDE 10 MG/ML
40 INJECTION INTRAMUSCULAR; INTRAVENOUS 2 TIMES DAILY
Status: DISCONTINUED | OUTPATIENT
Start: 2017-05-22 | End: 2017-05-22

## 2017-05-22 RX ORDER — BISACODYL 5 MG
5 TABLET, DELAYED RELEASE (ENTERIC COATED) ORAL DAILY PRN
Status: DISCONTINUED | OUTPATIENT
Start: 2017-05-22 | End: 2017-05-26 | Stop reason: HOSPADM

## 2017-05-22 RX ORDER — IBUPROFEN 200 MG
16 TABLET ORAL
Status: DISCONTINUED | OUTPATIENT
Start: 2017-05-22 | End: 2017-05-26 | Stop reason: HOSPADM

## 2017-05-22 RX ORDER — ALBUTEROL SULFATE 0.83 MG/ML
2.5 SOLUTION RESPIRATORY (INHALATION) EVERY 4 HOURS
Status: DISCONTINUED | OUTPATIENT
Start: 2017-05-22 | End: 2017-05-26 | Stop reason: HOSPADM

## 2017-05-22 RX ADMIN — LISINOPRIL 5 MG: 5 TABLET ORAL at 09:05

## 2017-05-22 RX ADMIN — ONDANSETRON 4 MG: 2 INJECTION INTRAMUSCULAR; INTRAVENOUS at 12:05

## 2017-05-22 RX ADMIN — NICOTINE 1 PATCH: 21 PATCH, EXTENDED RELEASE TRANSDERMAL at 09:05

## 2017-05-22 RX ADMIN — POTASSIUM CHLORIDE 40 MEQ: 1500 TABLET, EXTENDED RELEASE ORAL at 09:05

## 2017-05-22 RX ADMIN — STANDARDIZED SENNA CONCENTRATE AND DOCUSATE SODIUM 2 TABLET: 8.6; 5 TABLET, FILM COATED ORAL at 09:05

## 2017-05-22 RX ADMIN — BECLOMETHASONE DIPROPIONATE 2 PUFF: 40 AEROSOL, METERED RESPIRATORY (INHALATION) at 10:05

## 2017-05-22 RX ADMIN — ALBUTEROL SULFATE 2.5 MG: 2.5 SOLUTION RESPIRATORY (INHALATION) at 08:05

## 2017-05-22 RX ADMIN — ALBUTEROL SULFATE 2.5 MG: 2.5 SOLUTION RESPIRATORY (INHALATION) at 07:05

## 2017-05-22 RX ADMIN — ASPIRIN 81 MG: 81 TABLET, COATED ORAL at 09:05

## 2017-05-22 RX ADMIN — HEPARIN SODIUM 5000 UNITS: 5000 INJECTION, SOLUTION INTRAVENOUS; SUBCUTANEOUS at 10:05

## 2017-05-22 RX ADMIN — POTASSIUM CHLORIDE 20 MEQ: 1500 TABLET, EXTENDED RELEASE ORAL at 09:05

## 2017-05-22 RX ADMIN — FUROSEMIDE 40 MG: 10 INJECTION, SOLUTION INTRAVENOUS at 06:05

## 2017-05-22 RX ADMIN — HEPARIN SODIUM 5000 UNITS: 5000 INJECTION, SOLUTION INTRAVENOUS; SUBCUTANEOUS at 02:05

## 2017-05-22 RX ADMIN — SODIUM CHLORIDE, PRESERVATIVE FREE 3 ML: 5 INJECTION INTRAVENOUS at 02:05

## 2017-05-22 RX ADMIN — SODIUM CHLORIDE, PRESERVATIVE FREE 3 ML: 5 INJECTION INTRAVENOUS at 05:05

## 2017-05-22 RX ADMIN — FUROSEMIDE 40 MG: 10 INJECTION, SOLUTION INTRAVENOUS at 09:05

## 2017-05-22 RX ADMIN — ALBUTEROL SULFATE 2.5 MG: 2.5 SOLUTION RESPIRATORY (INHALATION) at 03:05

## 2017-05-22 RX ADMIN — ALBUTEROL SULFATE 2.5 MG: 2.5 SOLUTION RESPIRATORY (INHALATION) at 12:05

## 2017-05-22 RX ADMIN — TRAZODONE HYDROCHLORIDE 150 MG: 50 TABLET ORAL at 08:05

## 2017-05-22 RX ADMIN — ATORVASTATIN CALCIUM 40 MG: 20 TABLET, FILM COATED ORAL at 09:05

## 2017-05-22 RX ADMIN — HEPARIN SODIUM 5000 UNITS: 5000 INJECTION, SOLUTION INTRAVENOUS; SUBCUTANEOUS at 05:05

## 2017-05-22 RX ADMIN — SODIUM CHLORIDE, PRESERVATIVE FREE 3 ML: 5 INJECTION INTRAVENOUS at 10:05

## 2017-05-22 RX ADMIN — TRAZODONE HYDROCHLORIDE 150 MG: 50 TABLET ORAL at 01:05

## 2017-05-22 RX ADMIN — MAGNESIUM SULFATE HEPTAHYDRATE 2 G: 40 INJECTION, SOLUTION INTRAVENOUS at 09:05

## 2017-05-22 RX ADMIN — FUROSEMIDE 40 MG: 10 INJECTION, SOLUTION INTRAVENOUS at 05:05

## 2017-05-22 RX ADMIN — POLYETHYLENE GLYCOL 3350 17 G: 17 POWDER, FOR SOLUTION ORAL at 09:05

## 2017-05-22 RX ADMIN — ALBUTEROL SULFATE 2.5 MG: 2.5 SOLUTION RESPIRATORY (INHALATION) at 11:05

## 2017-05-22 RX ADMIN — FUROSEMIDE 80 MG: 10 INJECTION, SOLUTION INTRAVENOUS at 08:05

## 2017-05-22 NOTE — ASSESSMENT & PLAN NOTE
- More so in the setting of pacemker wandering in skin pocket.   - More so in the setting of acute decompensation and ascites.   - However, trend troponin

## 2017-05-22 NOTE — SUBJECTIVE & OBJECTIVE
Past Medical History:   Diagnosis Date    Asthma     Bipolar 1 disorder     Cardiomyopathy     Ischemic Cardiomyopathy with EF 25-30% by echo 5/17/16    Chronic systolic (congestive) heart failure     COPD (chronic obstructive pulmonary disease)     emphysema    Coronary artery disease     ROCHELLE 2006 and 2013    Depression     Diabetes mellitus     H/O echocardiogram 05/17/2016    EF 25-30%. LV diastolic dysfxn. Severe LAE. mod MR. BRAMBILA 86.    Hypertension     ICD (implantable cardioverter-defibrillator) in place 07/09/2013    MI (myocardial infarction)     x 3    PTSD (post-traumatic stress disorder)     Schizophrenia     Seizures        Past Surgical History:   Procedure Laterality Date    CARDIAC CATHETERIZATION      CARDIAC DEFIBRILLATOR PLACEMENT      [place]  2006    [place]    CORONARY STENT PLACEMENT      INSERT / REPLACE / REMOVE PACEMAKER  2013     [facility]       Review of patient's allergies indicates:  No Known Allergies    No current facility-administered medications on file prior to encounter.      Current Outpatient Prescriptions on File Prior to Encounter   Medication Sig    albuterol (PROVENTIL) 2.5 mg /3 mL (0.083 %) nebulizer solution Take 3 mLs (2.5 mg total) by nebulization every 6 (six) hours as needed for Wheezing. Rescue    aspirin (ECOTRIN) 81 MG EC tablet Take 1 tablet (81 mg total) by mouth once daily.    atorvastatin (LIPITOR) 40 MG tablet Take 1 tablet (40 mg total) by mouth once daily.    fluticasone furoate 100 mcg/actuation DsDv Inhale 100 mcg into the lungs once daily. Controller    furosemide (LASIX) 40 MG tablet Take 0.5 tablets (20 mg total) by mouth once daily.    glimepiride (AMARYL) 2 MG tablet Take 1 tablet (2 mg total) by mouth before breakfast.    lidocaine (LIDODERM) 5 % Place 1 patch onto the skin once daily. Remove & Discard patch within 12 hours or as directed by MD    lisinopril (PRINIVIL,ZESTRIL) 5 MG tablet Take 1  tablet (5 mg total) by mouth once daily.    nitroGLYCERIN (NITROSTAT) 0.4 MG SL tablet Place 1 tablet (0.4 mg total) under the tongue every 5 (five) minutes as needed for Chest pain.    trazodone (DESYREL) 150 MG tablet Take 1 tablet (150 mg total) by mouth every evening.    walker Misc Use walker daily PRN     Family History     None        Social History Main Topics    Smoking status: Current Every Day Smoker     Packs/day: 0.50     Years: 20.00     Last attempt to quit: 3/6/2013    Smokeless tobacco: Not on file      Comment: pt stated she quit    Alcohol use No    Drug use:      Types: Cocaine      Comment: once on wednesday    Sexual activity: No     Review of Systems   Constitution: Positive for decreased appetite, weakness, malaise/fatigue and weight gain. Negative for chills, diaphoresis, fever and night sweats.   HENT: Negative.    Cardiovascular: Positive for chest pain, claudication, dyspnea on exertion, orthopnea and paroxysmal nocturnal dyspnea. Negative for cyanosis, irregular heartbeat, leg swelling, near-syncope, palpitations and syncope.   Respiratory: Positive for shortness of breath. Negative for cough, hemoptysis, sleep disturbances due to breathing, snoring, sputum production and wheezing.    Endocrine: Negative.    Hematologic/Lymphatic: Negative.    Musculoskeletal: Negative.    Genitourinary: Negative.  Negative for bladder incontinence, decreased libido, dysuria, flank pain, frequency, genital sores, hematuria, hesitancy, incomplete emptying, menorrhagia, missed menses, nocturia, non-menstrual bleeding, pelvic pain and urgency.   Psychiatric/Behavioral: Positive for depression, hypervigilance and substance abuse. Negative for altered mental status, hallucinations, memory loss, suicidal ideas and thoughts of violence. The patient does not have insomnia and is not nervous/anxious.    Allergic/Immunologic: Negative.      Objective:     Vital Signs (Most Recent):  Temp: 98 °F (36.7 °C)  (05/21/17 2006)  Pulse: 75 (05/22/17 0016)  Resp: (!) 22 (05/21/17 2346)  BP: 109/86 (05/22/17 0046)  SpO2: 100 % (05/22/17 0046) Vital Signs (24h Range):  Temp:  [98 °F (36.7 °C)] 98 °F (36.7 °C)  Pulse:  [69-76] 75  Resp:  [16-24] 22  SpO2:  [97 %-100 %] 100 %  BP: (101-135)/(60-86) 109/86     Weight: 66.1 kg (145 lb 11.6 oz)  Body mass index is 26.65 kg/m².    SpO2: 100 %  O2 Device (Oxygen Therapy): room air    No intake or output data in the 24 hours ending 05/22/17 0105    Lines/Drains/Airways     Peripheral Intravenous Line                 Peripheral IV - Single Lumen 05/21/17 2030 Left Forearm less than 1 day                Physical Exam   Constitutional: She is oriented to person, place, and time. She appears well-developed and well-nourished.   HENT:   Head: Normocephalic and atraumatic.   Mouth/Throat: Oropharynx is clear and moist.   Eyes: Conjunctivae and EOM are normal. Pupils are equal, round, and reactive to light.   Neck: Normal range of motion. Neck supple. JVD present. No tracheal deviation present. No thyromegaly present.   Cardiovascular: Normal rate, regular rhythm, normal heart sounds and intact distal pulses.  Exam reveals no gallop and no friction rub.    No murmur heard.  Pulmonary/Chest: Effort normal and breath sounds normal. No stridor.   Abdominal: Soft. Bowel sounds are normal. She exhibits distension. She exhibits no mass. There is no tenderness. There is no rebound and no guarding.   Musculoskeletal: Normal range of motion.   Lymphadenopathy:     She has no cervical adenopathy.   Neurological: She is alert and oriented to person, place, and time. She has normal reflexes. She displays normal reflexes. No cranial nerve deficit. She exhibits normal muscle tone. Coordination normal.   Skin: Skin is warm and dry.   Psychiatric: She has a normal mood and affect. Her behavior is normal. Judgment and thought content normal.   Nursing note and vitals reviewed.      Significant Labs:   CMP    Recent Labs  Lab 05/21/17 2039      K 3.3*      CO2 24      BUN 11   CREATININE 0.8   CALCIUM 8.8   PROT 7.5   ALBUMIN 3.4*   BILITOT 2.6*   ALKPHOS 88   AST 22   ALT 11   ANIONGAP 13   ESTGFRAFRICA >60.0   EGFRNONAA >60.0   , CBC   Recent Labs  Lab 05/21/17 2039   WBC 4.29   HGB 13.0   HCT 38.6       and INR No results for input(s): INR, PROTIME in the last 48 hours.    Significant Imaging: X-Ray: CXR: X-Ray Chest 1 View (CXR):   Results for orders placed or performed during the hospital encounter of 05/21/17   X-Ray Chest 1 View    Narrative    Chest AP portable    Indication:Chest pain    Comparison:4/9/2017    Findings: Left chest wall pacer again noted. The cardiomediastinal silhouette is enlarged, similar to the previous exam.  There is slight obscuration of the costophrenic angles, may reflect tracer effusions.  The trachea is midline.  The lungs are symmetrically expanded bilaterally with mildly prominent central hilar interstitial attenuation suggesting congestive change. No large focal consolidation seen.  There is no pneumothorax.  The osseous structures are remarkable for degenerative changes of the spine and shoulders.    Impression    Cardiomegaly with mild interstitial edema, no large focal consolidation.  There may be trace effusions versus atelectasis.        Electronically signed by: ABNER DAIGLE MD  Date:     05/21/17  Time:    22:11

## 2017-05-22 NOTE — PLAN OF CARE
05/22/17 0859   Discharge Assessment   Assessment Type Discharge Planning Assessment   Assessment information obtained from? Medical Record   Expected Length of Stay (days) 2   Prior to hospitilization cognitive status: Alert/Oriented   Prior to hospitalization functional status: Independent   Current cognitive status: Alert/Oriented   Current Functional Status: Independent   Arrived From home or self-care   Lives With significant other   Able to Return to Prior Arrangements yes   Is patient able to care for self after discharge? Yes   How many people do you have in your home that can help with your care after discharge? 1   Who are your caregiver(s) and their phone number(s)? LA George  [phone]   Patient's perception of discharge disposition home or selfcare   Readmission Within The Last 30 Days no previous admission in last 30 days   Patient currently being followed by outpatient case management? No   Patient currently receives home health services? No   Does the patient currently use HME? Yes   Patient currently receives private duty nursing? No   Patient currently receives any other outside agency services? No   Equipment Currently Used at Home cane, straight;rollator   Do you have any problems affording any of your prescribed medications? No   Is the patient taking medications as prescribed? yes   Do you have any financial concerns preventing you from receiving the healthcare you need? No   Does the patient have transportation to healthcare appointments? Yes   Transportation Available family or friend will provide   On Dialysis? No   Does the patient receive services at the [facility]mad[facility] Clinic? No   Are there any open cases? No   Discharge Plan A Home with family   Admitted with CHF. Known to this CM from previous admit. Lives with SO. Independent in her ADLs. Plan is to DC home. No DC needs anticipated.

## 2017-05-22 NOTE — ASSESSMENT & PLAN NOTE
- Continue ASA, Statin and ACE-I   - BB on hold as low BP.   - Consider optimizing medical therapy upon discharge.

## 2017-05-22 NOTE — ED NOTES
Patient identifiers verified and correct for Polly Kwan.    LOC: The patient is awake, alert and aware of environment with an appropriate affect, the patient is oriented x 3 and speaking appropriately.  APPEARANCE: Patient resting comfortably and in no acute distress, patient is clean and well groomed, patient's clothing is properly fastened.  SKIN: The skin is warm and dry, color consistent with ethnicity, patient has normal skin turgor and moist mucus membranes, skin intact, no breakdown or bruising noted.  MUSCULOSKELETAL: Patient moving all extremities spontaneously, no obvious swelling or deformities noted.  RESPIRATORY: Airway is open and patent, respirations are spontaneous, patient has an increased effort and rate of 22, no accessory muscle use noted, bilateral breath sounds slightly diminishing.  CARDIAC: Patient has a normal rate and regular rhythm, periphreal edema noted to bilateral ankles, capillary refill < 3 seconds.  ABDOMEN: Hard and non tender to palpation, distention noted, normoactive bowel sounds present in all four quadrants.  NEUROLOGIC: Pupils equal bilaterally, eyes open spontaneously, behavior appropriate to situation, follows commands, facial expression symmetrical, bilateral hand grasp equal and even, purposeful motor response noted, normal sensation in all extremities when touched with a finger.

## 2017-05-22 NOTE — ED PROVIDER NOTES
"Encounter Date: 5/21/2017       History     Chief Complaint   Patient presents with    Chest Pain     Pt arrives by EMS for chest pain. after chest pain her defib discharged once. pt relapsed on cocaine wednesday after 30 years clean as per pt     Review of patient's allergies indicates:  No Known Allergies  HPI   56 yo F with PMHx CAD s/p 2 ROCHELLE 2006 and 2013 and ICD with HFrEF (last EF 30% 05/17/16), DM, cocaine abuse, COPD, and HTN presents with 1 day of severe chest pain described as mid-sternal "burning" and associated with multiple episodes of emesis today.  She notes that she has had feeling of abdominal fullness for a few days and takes furosemide for additional fluid causing abdominal fullness.  She describes feeling full and having difficulty keeping anything down starting mid-morning.  She had progression of the discomfort to her chest which was associated with nausea and SOB.  She took a breathing treatment from her neighbor which made her feel somewhat better.  She also tried half a Tums which she was unable to keep down and chest discomfort continued.  Pain does not radiate anywhere, is not worse with exertion.  She does state that burning pain in the chest is worse with lying down.  Denies fever/chills, LE edema, LE pain, radiation of pain to back/neck/jaw/shoulders.  She states that she took her aspirin today.  Also notes one episode of ICD firing.    Past Medical History:   Diagnosis Date    Asthma     Bipolar 1 disorder     Cardiomyopathy     Ischemic Cardiomyopathy with EF 25-30% by echo 5/17/16    Chronic systolic (congestive) heart failure     COPD (chronic obstructive pulmonary disease)     emphysema    Coronary artery disease     ROCHELLE 2006 and 2013    Depression     Diabetes mellitus     H/O echocardiogram 05/17/2016    EF 25-30%. LV diastolic dysfxn. Severe LAE. mod MR. PASP 86.    Hypertension     ICD (implantable cardioverter-defibrillator) in place 07/09/2013    MI (myocardial " infarction)     x 3    PTSD (post-traumatic stress disorder)     Schizophrenia     Seizures      Past Surgical History:   Procedure Laterality Date    CARDIAC CATHETERIZATION      CARDIAC DEFIBRILLATOR PLACEMENT      [place]    CORONARY STENT PLACEMENT      INSERT / REPLACE / REMOVE PAC[facility]AKER  [facility]     Family History   Problem Relation Age of Onset    Adopted: Yes     Social History   Substance Use Topics    Smoking status: Current Every Day Smoker     Packs/day: 0.50     Years: 20.00     Last attempt to quit: 3/6/2013    Smokeless tobacco: Not on file      Comment: pt stated she quit    Alcohol use No     Review of Systems   Constitutional: Positive for diaphoresis. Negative for chills and fever.   HENT: Negative for sore throat.    Respiratory: Positive for shortness of breath. Negative for cough.    Cardiovascular: Positive for chest pain. Negative for palpitations and leg swelling.   Gastrointestinal: Positive for abdominal distention and abdominal pain. Negative for constipation, diarrhea, nausea and vomiting.   Genitourinary: Negative for dysuria, frequency and hematuria.   Musculoskeletal: Negative for arthralgias and myalgias.   Skin: Negative for rash and wound.   Neurological: Negative for weakness and numbness.   Hematological: Negative for adenopathy. Does not bruise/bleed easily.   Psychiatric/Behavioral: Negative for agitation and confusion.   All other systems reviewed and are negative.      Physical Exam     Initial Vitals [05/21/17 2006]   BP Pulse Resp Temp SpO2   112/60 70 16 98 °F (36.7 °C) 98 %     Physical Exam  General:  Well-developed, nad   HEENT:  NCAT, PERRL, EOMI.  Poor dentition with oropharyngeal membranes non-erythematous/without exudate  Neck:  Supple, no lad, no JVD  Resp:  Symmetric expansion, no increased wob, CTAB  CVS:  RRR, no m/r/g.  Patient cites chest wall tenderness, tearful during palpation.  Trace LE edema.   Peripheral pulses 2+ (radial, dorsalis pedis)  GI:  Abd distended, diffusely tender to palpation, +BS, tympanitic to percussion  MSk:  No joint swelling, no joint tenderness  Skin:  No rashes, no wounds  Neuro:  CNs intact, symmetric.  Strength 4/5 throughout.  Psych:  Tearful intermittently.  AAOx3.      ED Course   Procedures  Labs Reviewed   CBC W/ AUTO DIFFERENTIAL - Abnormal; Notable for the following:        Result Value    RDW 19.2 (*)     All other components within normal limits   COMPREHENSIVE METABOLIC PANEL - Abnormal; Notable for the following:     Potassium 3.3 (*)     Albumin 3.4 (*)     Total Bilirubin 2.6 (*)     All other components within normal limits   B-TYPE NATRIURETIC PEPTIDE - Abnormal; Notable for the following:     BNP 1,977 (*)     All other components within normal limits    Narrative:     ADD-ON BNP #[id number] PER NIKKI MARY MD 21:53  05/21/2017    TROPONIN I   B-TYPE NATRIURETIC PEPTIDE   PHOSPHORUS   HEMOGLOBIN A1C   POCT GLUCOSE MONITORING CONTINUOUS     EKG Readings: (Independently Interpreted)   Sinus, no acute st elevation,        X-Rays:   Independently Interpreted Readings:   Other Readings:  Cxr:  Cardiomegaly, no consolidation, mild congestive changes    Medical Decision Making:   Initial Assessment:   58 yo F with extensive past cardiac history as detailed above with discharge of ICD this am as well as burning mid-sternal CP associated with nausea/vomiting and worse with lying down.  Also has SOB.  BNP elevated with concern for edema on CXR.  Differential Diagnosis:   Acute on chronic HFrEF, ACS, PNA, GERD/Esophagitis  ED Management:  10:42 PM BNP elevated to 1977.  CXR showing moderate interstitial edema with question of edema vs atelectasis at bases.  Discussed patient with hospital medicine and ED Cardiology fellow.  Cardiology consulted, will admit to IM C for possible CHF exacerbation with ICD discharge.              Attending Attestation:   Physician Attestation  Statement for Resident:  As the supervising MD   Physician Attestation Statement: I have personally seen and examined this patient.   I agree with the above history. -:   As the supervising MD I agree with the above PE.    As the supervising MD I agree with the above treatment, course, plan, and disposition.   -: Imp:  Cp, recurrent cocaine use, defib discharge today - r/o acs, arrhythmia, anemia, electrolyte disorder, chf, pna, other.  Ekg, cxr, labs aicd interrogation, cards consult, probable admit.                      ED Course     Clinical Impression:   The primary encounter diagnosis was Acute on chronic systolic congestive heart failure. Diagnoses of Chest pain and Acute on chronic congestive heart failure, unspecified congestive heart failure type were also pertinent to this visit.          Hema Riley MD  05/22/17 0067

## 2017-05-22 NOTE — ASSESSMENT & PLAN NOTE
- Normal BS, Last BM this AM  - Suspect acute decompesated heart failure related issue  -Improving.

## 2017-05-22 NOTE — H&P
[facility]  Cardiology  History and Physical     Patient Name: Polly Kwan  MRN: [id number]  Admission Date: 5/21/2017  Code Status: Full Code   Attending Provider: Mauricio Angulo MD   Primary Care Physician: Iza Kwan MD  Principal Problem:Acute exacerbation of CHF (congestive heart failure)    Patient information was obtained from patient and ER records.     Subjective:     Chief Complaint:  Chest Pain     HPI:  Ms. Kwan is a 57 RADHA lady with  PMH of ICM with LVEF 20%, s/p DC ICD 2013 (medtronic), CAD s/p PCI x2 (2006 and 2013), HTN, DM2, polysubstance abuse (tobacco and cocaine), and schizophrenia who presents after ICD discharge earlier tonight.     Patient is alert and oriented and converse coherently.     She reports progressive VIEYRA over the past week, now occurring with minimal exertion, as well as 4-pillow orthopnea and nightly PND. She also reports abdominal fullness/bloating with nausea and an episode of emesis today. She experienced chest pain today as well, described as sharp, left-sided localized around her ICD generator and left breast, worse with deep inspiration and palpation, worse sense ICD discharge. She has a history of cocaine abuse and reports last use this past Wednesday at a party.    She also reported that her pacemaker device is mobile inside the skin pocket and gives her pain while moving her left arm.     At the time of my interview,she denied CP, SOB or palpitation. However, she complained of nausea after KCl pills were given.        Past Medical History:   Diagnosis Date    Asthma     Bipolar 1 disorder     Cardiomyopathy     Ischemic Cardiomyopathy with EF 25-30% by echo 5/17/16    Chronic systolic (congestive) heart failure     COPD (chronic obstructive pulmonary disease)     emphysema    Coronary artery disease     ROCHELLE 2006 and 2013    Depression     Diabetes mellitus     H/O echocardiogram 05/17/2016    EF 25-30%. LV diastolic dysfxn. Severe LAE.  mod MR. BRAMBILA 86.    Hypertension     ICD (implantable cardioverter-defibrillator) in place 07/09/2013    MI (myocardial infarction)     x 3    PTSD (post-traumatic stress disorder)     Schizophrenia     Seizures        Past Surgical History:   Procedure Laterality Date    CARDIAC CATHETERIZATION      CARDIAC DEFIBRILLATOR PLACEMENT      [place]  2006    [place]    CORONARY STENT PLACEMENT      INSERT / REPLACE / REMOVE PACEMAKER  2013     ICD [facility]       Review of patient's allergies indicates:  No Known Allergies    No current facility-administered medications on file prior to encounter.      Current Outpatient Prescriptions on File Prior to Encounter   Medication Sig    albuterol (PROVENTIL) 2.5 mg /3 mL (0.083 %) nebulizer solution Take 3 mLs (2.5 mg total) by nebulization every 6 (six) hours as needed for Wheezing. Rescue    aspirin (ECOTRIN) 81 MG EC tablet Take 1 tablet (81 mg total) by mouth once daily.    atorvastatin (LIPITOR) 40 MG tablet Take 1 tablet (40 mg total) by mouth once daily.    fluticasone furoate 100 mcg/actuation DsDv Inhale 100 mcg into the lungs once daily. Controller    furosemide (LASIX) 40 MG tablet Take 0.5 tablets (20 mg total) by mouth once daily.    glimepiride (AMARYL) 2 MG tablet Take 1 tablet (2 mg total) by mouth before breakfast.    lidocaine (LIDODERM) 5 % Place 1 patch onto the skin once daily. Remove & Discard patch within 12 hours or as directed by MD    lisinopril (PRINIVIL,ZESTRIL) 5 MG tablet Take 1 tablet (5 mg total) by mouth once daily.    nitroGLYCERIN (NITROSTAT) 0.4 MG SL tablet Place 1 tablet (0.4 mg total) under the tongue every 5 (five) minutes as needed for Chest pain.    trazodone (DESYREL) 150 MG tablet Take 1 tablet (150 mg total) by mouth every evening.    walker Misc Use walker daily PRN     Family History     None        Social History Main Topics    Smoking status: Current Every Day Smoker     Packs/day: 0.50      Years: 20.00     Last attempt to quit: 3/6/2013    Smokeless tobacco: Not on file      Comment: pt stated she quit    Alcohol use No    Drug use:      Types: Cocaine      Comment: once on wednesday    Sexual activity: No     Review of Systems   Constitution: Positive for decreased appetite, weakness, malaise/fatigue and weight gain. Negative for chills, diaphoresis, fever and night sweats.   HENT: Negative.    Cardiovascular: Positive for chest pain, claudication, dyspnea on exertion, orthopnea and paroxysmal nocturnal dyspnea. Negative for cyanosis, irregular heartbeat, leg swelling, near-syncope, palpitations and syncope.   Respiratory: Positive for shortness of breath. Negative for cough, hemoptysis, sleep disturbances due to breathing, snoring, sputum production and wheezing.    Endocrine: Negative.    Hematologic/Lymphatic: Negative.    Musculoskeletal: Negative.    Genitourinary: Negative.  Negative for bladder incontinence, decreased libido, dysuria, flank pain, frequency, genital sores, hematuria, hesitancy, incomplete emptying, menorrhagia, missed menses, nocturia, non-menstrual bleeding, pelvic pain and urgency.   Psychiatric/Behavioral: Positive for depression, hypervigilance and substance abuse. Negative for altered mental status, hallucinations, memory loss, suicidal ideas and thoughts of violence. The patient does not have insomnia and is not nervous/anxious.    Allergic/Immunologic: Negative.      Objective:     Vital Signs (Most Recent):  Temp: 98 °F (36.7 °C) (05/21/17 2006)  Pulse: 75 (05/22/17 0016)  Resp: (!) 22 (05/21/17 2346)  BP: 109/86 (05/22/17 0046)  SpO2: 100 % (05/22/17 0046) Vital Signs (24h Range):  Temp:  [98 °F (36.7 °C)] 98 °F (36.7 °C)  Pulse:  [69-76] 75  Resp:  [16-24] 22  SpO2:  [97 %-100 %] 100 %  BP: (101-135)/(60-86) 109/86     Weight: 66.1 kg (145 lb 11.6 oz)  Body mass index is 26.65 kg/m².    SpO2: 100 %  O2 Device (Oxygen Therapy): room air    No intake or output  data in the 24 hours ending 05/22/17 0105    Lines/Drains/Airways     Peripheral Intravenous Line                 Peripheral IV - Single Lumen 05/21/17 2030 Left Forearm less than 1 day                Physical Exam   Constitutional: She is oriented to person, place, and time. She appears well-developed and well-nourished.   HENT:   Head: Normocephalic and atraumatic.   Mouth/Throat: Oropharynx is clear and moist.   Eyes: Conjunctivae and EOM are normal. Pupils are equal, round, and reactive to light.   Neck: Normal range of motion. Neck supple. JVD present. No tracheal deviation present. No thyromegaly present.   Cardiovascular: Normal rate, regular rhythm, normal heart sounds and intact distal pulses.  Exam reveals no gallop and no friction rub.    No murmur heard.  Pulmonary/Chest: Effort normal and breath sounds normal. No stridor.   Abdominal: Soft. Bowel sounds are normal. She exhibits distension. She exhibits no mass. There is no tenderness. There is no rebound and no guarding.   Musculoskeletal: Normal range of motion.   Lymphadenopathy:     She has no cervical adenopathy.   Neurological: She is alert and oriented to person, place, and time. She has normal reflexes. She displays normal reflexes. No cranial nerve deficit. She exhibits normal muscle tone. Coordination normal.   Skin: Skin is warm and dry.   Psychiatric: She has a normal mood and affect. Her behavior is normal. Judgment and thought content normal.   Nursing note and vitals reviewed.      Significant Labs:   CMP   Recent Labs  Lab 05/21/17 2039      K 3.3*      CO2 24      BUN 11   CREATININE 0.8   CALCIUM 8.8   PROT 7.5   ALBUMIN 3.4*   BILITOT 2.6*   ALKPHOS 88   AST 22   ALT 11   ANIONGAP 13   ESTGFRAFRICA >60.0   EGFRNONAA >60.0   , CBC   Recent Labs  Lab 05/21/17 2039   WBC 4.29   HGB 13.0   HCT 38.6       and INR No results for input(s): INR, PROTIME in the last 48 hours.    Significant Imaging: X-Ray: CXR: X-Ray  Chest 1 View (CXR):   Results for orders placed or performed during the hospital encounter of 05/21/17   X-Ray Chest 1 View    Narrative    Chest AP portable    Indication:Chest pain    Comparison:4/9/2017    Findings: Left chest wall pacer again noted. The cardiomediastinal silhouette is enlarged, similar to the previous exam.  There is slight obscuration of the costophrenic angles, may reflect tracer effusions.  The trachea is midline.  The lungs are symmetrically expanded bilaterally with mildly prominent central hilar interstitial attenuation suggesting congestive change. No large focal consolidation seen.  There is no pneumothorax.  The osseous structures are remarkable for degenerative changes of the spine and shoulders.    Impression    Cardiomegaly with mild interstitial edema, no large focal consolidation.  There may be trace effusions versus atelectasis.        Electronically signed by: ABNER DAIGLE MD  Date:     05/21/17  Time:    22:11      Assessment and Plan:     Depression    - Currently non suicidal non homicidal.  - Continue home dose of Trazodone.           Chest pain    - More so in the setting of pacemker wandering in skin pocket.   - More so in the setting of acute decompensation and ascites.   - However, trend troponin        Abdominal pain    - Normal BS, Last BM this AM  - Suspect acute decompesated heart failure related issue  -Improving.         CAD (coronary artery disease)    - Continue ASA, Statin and ACE-I   - BB on hold as low BP.   - Consider optimizing medical therapy upon discharge.         Smoking addiction    - Bed side education   - Nicotine patch.         Diabetes mellitus    - Hold oral hypoglycemic  - Start Insulin sliding scale.         HTN (hypertension)    - Currently stable; can not add any antihypertensive due to low EF.         Schizophrenia    - Thoughts coherent at the moment.   - Continue with Trazodone 150 mg daily         * Acute exacerbation of CHF (congestive  heart failure)    - In the setting of ICMP and low EF at 20%  - JVD, abd distension + elevated BNP  - Stare on intermittent IV LASIX 40 BID  - Strict I/O, daily weight, low salt diet, fluid restriction to 1.5 Litre/24 HRs.   - Not a candidate for advanced therapies.               VTE Risk Mitigation         Ordered     heparin (porcine) injection 5,000 Units  Every 8 hours     Route:  Subcutaneous        05/22/17 0017     Medium Risk of VTE  Once      05/22/17 0017          Hansa Rivera MD  Cardiology   [facility]

## 2017-05-22 NOTE — HPI
Ms. Kwan is a 57 RADHA lady with PMH of ICM with LVEF 20%, s/p DC ICD 2013 (medtronic), CAD s/p PCI x2 (2006 and 2013), HTN, DM2, polysubstance abuse (tobacco and cocaine), and schizophrenia who presents after ICD discharge earlier tonight.     Patient is alert and oriented and converse coherently.     She reports progressive VIEYRA over the past week, now occurring with minimal exertion, as well as 4-pillow orthopnea and nightly PND. She also reports abdominal fullness/bloating with nausea and an episode of emesis today. She experienced chest pain today as well, described as sharp, left-sided localized around her ICD generator and left breast, worse with deep inspiration and palpation, worse sense ICD discharge. She has a history of cocaine abuse and reports last use this past Wednesday at a party.    She also reported that her pacemaker device is mobile inside the skin pocket and gives her pain while moving her left arm.     At the time of my interview,she denied CP, SOB or palpitation. However, she complained of nausea after KCl pills were given.

## 2017-05-22 NOTE — ASSESSMENT & PLAN NOTE
- In the setting of ICMP and low EF at 20%  - JVD, abd distension + elevated BNP  - Stare on intermittent IV LASIX 40 BID  - Strict I/O, daily weight, low salt diet, fluid restriction to 1.5 Litre/24 HRs.   - Not a candidate for advanced therapies.

## 2017-05-22 NOTE — PROGRESS NOTES
Discussed patient status and history in team huddle. Patient with history of non compliance with pysch meds and follow up care. Presenting with positive UDS for cocaine abuse. RN attempted to assess patient for CHF education, but patient unable to participate (unable to focus, unable to verbalize understanding). RN left educational packet in patient's room. Patient is not a candidate for enrollment for [facility] HF Program this admission.    Removed from hf list.

## 2017-05-22 NOTE — ED TRIAGE NOTES
Polly Kwan, a 57 y.o. female presents to the ED complaining of chest pressure that started 2 hours ago that pt describes as extreme pressure. Then at 7pm pt states her defibrillator fired once. Pt also complaining of SOB, nausea and vomiting. Pt denies diarrhea and fever. Pt received 324mg ASA and three nitro sprays in route with EMS      Chief Complaint   Patient presents with    Chest Pain     Pt arrives by EMS for chest pain. after chest pain her defib discharged once. pt relapsed on cocaine wednesday after 30 years clean as per pt     Review of patient's allergies indicates:  No Known Allergies  Past Medical History:   Diagnosis Date    Asthma     Bipolar 1 disorder     Cardiomyopathy     Ischemic Cardiomyopathy with EF 25-30% by echo 5/17/16    Chronic systolic (congestive) heart failure     COPD (chronic obstructive pulmonary disease)     emphysema    Coronary artery disease     ROCHELLE 2006 and 2013    Depression     Diabetes mellitus     H/O echocardiogram 05/17/2016    EF 25-30%. LV diastolic dysfxn. Severe LAE. mod MRCynthia PASP 86.    Hypertension     ICD (implantable cardioverter-defibrillator) in place 07/09/2013    MI (myocardial infarction)     x 3    PTSD (post-traumatic stress disorder)     Schizophrenia     Seizures

## 2017-05-22 NOTE — CONSULTS
Cardiology Consult Note    Requesting Provider: Mauricio Angulo MD  Admission Date:  5/21/2017  Hospital Day:  0    Reason for Consult: ICD discharge    HPI:   Polly Kwan is a 57 y.o. female with a history significant for ICM with LVEF 20%, s/p DC ICD 2013 (medtronic), CAD s/p PCI in 2006 and 2013, HTN, DM2, polysubstance abuse (tobacco and cocaine), and schizophrenia who presents after ICD discharge earlier tonight. She reports progressive VIEYRA over the past week, now occurring with minimal exertion, as well as 4-pillow orthopnea and nightly PND. She also reports abdominal fullness/bloating with nausea and an episode of emesis today. She experienced chest pain today as well, described as sharp, left-sided localized around her ICD generator and left breast, worse with deep inspiration and palpation, worse sense ICD discharge. She has a history of cocaine abuse and reports last use this past Wednesday at a party.    ROS:    Constitution: Positive for fatigue. Negative for fever or chills. Negative for weight loss or gain.   HENT: Negative for sore throat or headaches. Negative for rhinorrhea.  Eyes: Negative for blurred or double vision.   Cardiovascular: Positive for chest pain, dyspnea on exertion, orthopnea, PND, leg swelling. Negative for irregular heartbeat, near-syncope/syncope.   Pulmonary: Positive for SOB. Negative for cough.   Gastrointestinal: Negative for abdominal pain. Negative for nausea/ vomiting. Negative for diarrhea.   : Negative for dysuria.   Neurological: Negative for focal weakness or sensory changes  Psychiatric/Behavioral: Negative for anxiety or depression.  Skin: Negative for rash or unusual lesions. Negative for jaundice.  Musculoskeletal: Negative for joint pain or swelling. Negative for muscle weakness.    PMH:     Past Medical History:   Diagnosis Date    Asthma     Bipolar 1 disorder     Cardiomyopathy     Ischemic Cardiomyopathy with EF 25-30% by echo 5/17/16    Chronic  systolic (congestive) heart failure     COPD (chronic obstructive pulmonary disease)     emphysema    Coronary artery disease     ROCHELLE 2006 and 2013    Depression     Diabetes mellitus     H/O echocardiogram 05/17/2016    EF 25-30%. LV diastolic dysfxn. Severe LAE. mod MR. BRAMBILA 86.    Hypertension     ICD (implantable cardioverter-defibrillator) in place 07/09/2013    MI (myocardial infarction)     x 3    PTSD (post-traumatic stress disorder)     Schizophrenia     Seizures      Past Surgical History:   Procedure Laterality Date    CARDIAC CATHETERIZATION      CARDIAC DEFIBRILLATOR PLACEMENT      [place]  2006    [place]    CORONARY STENT PLACEMENT      INSERT / REPLACE / REMOVE PACEMAKER  2013     ICD [facility]        Social History:     Social History   Substance Use Topics    Smoking status: Current Every Day Smoker     Packs/day: 0.50     Years: 20.00     Last attempt to quit: 3/6/2013    Smokeless tobacco: Not on file      Comment: pt stated she quit    Alcohol use No        Family History:     Family History   Problem Relation Age of Onset    Adopted: Yes       Allergies:     Review of patient's allergies indicates:  No Known Allergies     Medications:     No current facility-administered medications on file prior to encounter.      Current Outpatient Prescriptions on File Prior to Encounter   Medication Sig Dispense Refill    albuterol (PROVENTIL) 2.5 mg /3 mL (0.083 %) nebulizer solution Take 3 mLs (2.5 mg total) by nebulization every 6 (six) hours as needed for Wheezing. Rescue 1 Box 0    aspirin (ECOTRIN) 81 MG EC tablet Take 1 tablet (81 mg total) by mouth once daily. 90 tablet 3    atorvastatin (LIPITOR) 40 MG tablet Take 1 tablet (40 mg total) by mouth once daily. 30 tablet 6    fluticasone furoate 100 mcg/actuation DsDv Inhale 100 mcg into the lungs once daily. Controller      furosemide (LASIX) 40 MG tablet Take 0.5 tablets (20 mg total) by mouth once daily.  30 tablet 0    glimepiride (AMARYL) 2 MG tablet Take 1 tablet (2 mg total) by mouth before breakfast. 60 tablet 6    lidocaine (LIDODERM) 5 % Place 1 patch onto the skin once daily. Remove & Discard patch within 12 hours or as directed by MD 10 patch 0    lisinopril (PRINIVIL,ZESTRIL) 5 MG tablet Take 1 tablet (5 mg total) by mouth once daily. 30 tablet 0    nitroGLYCERIN (NITROSTAT) 0.4 MG SL tablet Place 1 tablet (0.4 mg total) under the tongue every 5 (five) minutes as needed for Chest pain. 30 tablet 4    trazodone (DESYREL) 150 MG tablet Take 1 tablet (150 mg total) by mouth every evening. 30 tablet 0    walker Misc Use walker daily PRN 1 each 0             Physical Exam:     Vitals:  Temp:  [98 °F (36.7 °C)]   Pulse:  [69-73]   Resp:  [16-24]   BP: (112-135)/(60-81)   SpO2:  [97 %-100 %]  I/O's:  No intake or output data in the 24 hours ending 05/21/17 2317   General: NAD, conversant  HEENT: Mild scleral icterus, EOMI, poor dentition  Neck: JVD to midneck, +HJR  Chest: CP reproducible with deep palpation of left chest.  CV: RRR, normal S1/S2, 2/6 HSM  Pulm: Bibasilar rales  GI: Abdomen mildly distended, soft, diffuse mild TTP, +hepatomegaly  Extremities: trace LE edema, warm with palpable pulses  Skin: No ecchymosis, erythema, or ulcers  Psych: AOx3, appropriate affect  Neuro: No focal deficits    Labs:       Recent Labs  Lab 05/21/17 2039      K 3.3*      CO2 24   BUN 11   CREATININE 0.8   ANIONGAP 13       Recent Labs  Lab 05/21/17 2039   AST 22   ALT 11   ALKPHOS 88   BILITOT 2.6*   ALBUMIN 3.4*       Recent Labs  Lab 05/21/17 2039   CALCIUM 8.8     Lab Results   Component Value Date    BNP 1,977 (H) 05/21/2017     (H) 04/10/2017     (H) 04/09/2017     Lab Results   Component Value Date    CHOL 230 (H) 11/18/2014    HDL 75 11/18/2014    TRIG 98 11/18/2014     Lab Results   Component Value Date    HGBA1C 6.2 04/07/2017     Lab Results   Component Value Date    TSH 2.215  05/25/2016      Recent Labs  Lab 05/21/17 2039   WBC 4.29   HGB 13.0   HCT 38.6      GRAN 44.0  1.9     No results for input(s): PTT, INR in the last 168 hours.  No results for input(s): PHART, IBI6PNH, PO2ART, NUV8QOU, Z6NBVMLC in the last 168 hours.  No results for input(s): LACTATE in the last 168 hours.    Recent Labs  Lab 05/21/17 2039   TROPONINI 0.018         Pertinent Imaging/Tests:  Echo 4/7/17:   1 - Eccentric hypertrophy.     2 - Severely depressed left ventricular systolic function (EF 20-25%).     3 - Left ventricular diastolic dysfunction.     4 - Biatrial enlargement.     5 - Right ventricular enlargement with moderately depressed systolic function.     6 - Pulmonary hypertension. The estimated PA systolic pressure is 84 mmHg.     7 - Moderate to severe mitral regurgitation.     8 - Moderate tricuspid regurgitation.     9 - Increased central venous pressure.     EKG: NSR with PVCs, L axis deviation, remote lateral infarct - no significant change from prior    CXR: Cardiomegaly with mild interstitial edema, no large focal consolidation.  There may be trace effusions versus atelectasis.    ICD interrogation:  Medtronic DC ICD, Mode AAI <=> DDD.  Today at 19:13 pt had an episode of SVT with a  (182 bpm) that fell into the programmed VF zone (>176 bpm). ATP was unsuccessful x2 resulting in 19.7 J shock with successful cardioversion.    Assessment & Recommendations:    57 y.o. female with a history significant for ICM with LVEF 20%, s/p DC ICD 2013 (medtronic), CAD s/p PCI in 2006 and 2013, HTN, DM2, polysubstance abuse (tobacco and cocaine), and schizophrenia who presents with inappropriate ICD discharge (SVT), non-cardiac CP, and acute decompensated heart failure with hepatic congestion, all in the setting of recent cocaine use.    - admit to IM-C, telemetry  - diuresis with IV Lasix, start with 40 mg IV BID, titrate with goal net negative 1-2 L daily  - strict I/Os, daily weights, 2 gm  Na/fluid restricted diet  - check urine drug screen  - given history of cocaine use, avoid B-blockers  - titrate Lisinopril as able  - cont ASA and Lipitor  - arrange cardiology f/u at discharge (previously seen by Dr. Buck, last visit was a year ago)    Dante Calzada MD  Cardiology Fellow, PGY-6

## 2017-05-23 PROBLEM — R10.9 ABDOMINAL PAIN: Chronic | Status: ACTIVE | Noted: 2017-04-07

## 2017-05-23 LAB
ALBUMIN SERPL BCP-MCNC: 2.9 G/DL
ALP SERPL-CCNC: 76 U/L
ALT SERPL W/O P-5'-P-CCNC: 7 U/L
ANION GAP SERPL CALC-SCNC: 7 MMOL/L
AST SERPL-CCNC: 13 U/L
BASOPHILS # BLD AUTO: 0.01 K/UL
BASOPHILS NFR BLD: 0.3 %
BILIRUB SERPL-MCNC: 1.7 MG/DL
BUN SERPL-MCNC: 11 MG/DL
CALCIUM SERPL-MCNC: 8.4 MG/DL
CHLORIDE SERPL-SCNC: 101 MMOL/L
CO2 SERPL-SCNC: 28 MMOL/L
CREAT SERPL-MCNC: 1.1 MG/DL
DIFFERENTIAL METHOD: ABNORMAL
EOSINOPHIL # BLD AUTO: 0 K/UL
EOSINOPHIL NFR BLD: 0.8 %
ERYTHROCYTE [DISTWIDTH] IN BLOOD BY AUTOMATED COUNT: 18.6 %
EST. GFR  (AFRICAN AMERICAN): >60 ML/MIN/1.73 M^2
EST. GFR  (NON AFRICAN AMERICAN): 55.9 ML/MIN/1.73 M^2
GLUCOSE SERPL-MCNC: 132 MG/DL
HCT VFR BLD AUTO: 32.9 %
HGB BLD-MCNC: 11 G/DL
LYMPHOCYTES # BLD AUTO: 1.8 K/UL
LYMPHOCYTES NFR BLD: 46.3 %
MAGNESIUM SERPL-MCNC: 1.9 MG/DL
MCH RBC QN AUTO: 27.4 PG
MCHC RBC AUTO-ENTMCNC: 33.4 %
MCV RBC AUTO: 82 FL
MONOCYTES # BLD AUTO: 0.2 K/UL
MONOCYTES NFR BLD: 6.1 %
NEUTROPHILS # BLD AUTO: 1.8 K/UL
NEUTROPHILS NFR BLD: 46.2 %
PLATELET # BLD AUTO: 231 K/UL
PMV BLD AUTO: 9.9 FL
POCT GLUCOSE: 101 MG/DL (ref 70–110)
POCT GLUCOSE: 175 MG/DL (ref 70–110)
POCT GLUCOSE: 99 MG/DL (ref 70–110)
POTASSIUM SERPL-SCNC: 3.5 MMOL/L
PROT SERPL-MCNC: 6.1 G/DL
RBC # BLD AUTO: 4.02 M/UL
SODIUM SERPL-SCNC: 136 MMOL/L
WBC # BLD AUTO: 3.8 K/UL

## 2017-05-23 PROCEDURE — 99406 BEHAV CHNG SMOKING 3-10 MIN: CPT

## 2017-05-23 PROCEDURE — 94640 AIRWAY INHALATION TREATMENT: CPT

## 2017-05-23 PROCEDURE — 20600001 HC STEP DOWN PRIVATE ROOM

## 2017-05-23 PROCEDURE — 85025 COMPLETE CBC W/AUTO DIFF WBC: CPT

## 2017-05-23 PROCEDURE — 25000003 PHARM REV CODE 250: Performed by: HOSPITALIST

## 2017-05-23 PROCEDURE — 27000221 HC OXYGEN, UP TO 24 HOURS

## 2017-05-23 PROCEDURE — 63600175 PHARM REV CODE 636 W HCPCS: Performed by: HOSPITALIST

## 2017-05-23 PROCEDURE — 94761 N-INVAS EAR/PLS OXIMETRY MLT: CPT

## 2017-05-23 PROCEDURE — 36415 COLL VENOUS BLD VENIPUNCTURE: CPT

## 2017-05-23 PROCEDURE — 25000003 PHARM REV CODE 250: Performed by: INTERNAL MEDICINE

## 2017-05-23 PROCEDURE — 83735 ASSAY OF MAGNESIUM: CPT

## 2017-05-23 PROCEDURE — 99233 SBSQ HOSP IP/OBS HIGH 50: CPT | Mod: ,,, | Performed by: INTERNAL MEDICINE

## 2017-05-23 PROCEDURE — 25000242 PHARM REV CODE 250 ALT 637 W/ HCPCS: Performed by: INTERNAL MEDICINE

## 2017-05-23 PROCEDURE — 99900035 HC TECH TIME PER 15 MIN (STAT)

## 2017-05-23 PROCEDURE — 80053 COMPREHEN METABOLIC PANEL: CPT

## 2017-05-23 RX ORDER — ACETAMINOPHEN 500 MG
500 TABLET ORAL EVERY 6 HOURS PRN
Status: DISCONTINUED | OUTPATIENT
Start: 2017-05-23 | End: 2017-05-26 | Stop reason: HOSPADM

## 2017-05-23 RX ORDER — PANTOPRAZOLE SODIUM 40 MG/1
40 TABLET, DELAYED RELEASE ORAL DAILY
Status: DISCONTINUED | OUTPATIENT
Start: 2017-05-23 | End: 2017-05-26 | Stop reason: HOSPADM

## 2017-05-23 RX ADMIN — HEPARIN SODIUM 5000 UNITS: 5000 INJECTION, SOLUTION INTRAVENOUS; SUBCUTANEOUS at 01:05

## 2017-05-23 RX ADMIN — SODIUM CHLORIDE, PRESERVATIVE FREE 3 ML: 5 INJECTION INTRAVENOUS at 06:05

## 2017-05-23 RX ADMIN — ACETAMINOPHEN 500 MG: 500 TABLET ORAL at 01:05

## 2017-05-23 RX ADMIN — ALBUTEROL SULFATE 2.5 MG: 2.5 SOLUTION RESPIRATORY (INHALATION) at 11:05

## 2017-05-23 RX ADMIN — POLYETHYLENE GLYCOL 3350 17 G: 17 POWDER, FOR SOLUTION ORAL at 08:05

## 2017-05-23 RX ADMIN — SODIUM CHLORIDE, PRESERVATIVE FREE 3 ML: 5 INJECTION INTRAVENOUS at 05:05

## 2017-05-23 RX ADMIN — ALBUTEROL SULFATE 2.5 MG: 2.5 SOLUTION RESPIRATORY (INHALATION) at 03:05

## 2017-05-23 RX ADMIN — HEPARIN SODIUM 5000 UNITS: 5000 INJECTION, SOLUTION INTRAVENOUS; SUBCUTANEOUS at 09:05

## 2017-05-23 RX ADMIN — ASPIRIN 81 MG: 81 TABLET, COATED ORAL at 08:05

## 2017-05-23 RX ADMIN — SODIUM CHLORIDE, PRESERVATIVE FREE 3 ML: 5 INJECTION INTRAVENOUS at 09:05

## 2017-05-23 RX ADMIN — HEPARIN SODIUM 5000 UNITS: 5000 INJECTION, SOLUTION INTRAVENOUS; SUBCUTANEOUS at 06:05

## 2017-05-23 RX ADMIN — PANTOPRAZOLE SODIUM 40 MG: 40 TABLET, DELAYED RELEASE ORAL at 01:05

## 2017-05-23 RX ADMIN — BECLOMETHASONE DIPROPIONATE 2 PUFF: 40 AEROSOL, METERED RESPIRATORY (INHALATION) at 08:05

## 2017-05-23 RX ADMIN — ALBUTEROL SULFATE 2.5 MG: 2.5 SOLUTION RESPIRATORY (INHALATION) at 08:05

## 2017-05-23 RX ADMIN — ATORVASTATIN CALCIUM 40 MG: 20 TABLET, FILM COATED ORAL at 08:05

## 2017-05-23 RX ADMIN — POTASSIUM CHLORIDE 40 MEQ: 1500 TABLET, EXTENDED RELEASE ORAL at 08:05

## 2017-05-23 RX ADMIN — FUROSEMIDE 80 MG: 10 INJECTION, SOLUTION INTRAVENOUS at 08:05

## 2017-05-23 RX ADMIN — ALBUTEROL SULFATE 2.5 MG: 2.5 SOLUTION RESPIRATORY (INHALATION) at 07:05

## 2017-05-23 RX ADMIN — LISINOPRIL 5 MG: 5 TABLET ORAL at 08:05

## 2017-05-23 RX ADMIN — TRAZODONE HYDROCHLORIDE 150 MG: 50 TABLET ORAL at 09:05

## 2017-05-23 RX ADMIN — FUROSEMIDE 80 MG: 10 INJECTION, SOLUTION INTRAVENOUS at 05:05

## 2017-05-23 RX ADMIN — BECLOMETHASONE DIPROPIONATE 2 PUFF: 40 AEROSOL, METERED RESPIRATORY (INHALATION) at 09:05

## 2017-05-23 RX ADMIN — NICOTINE 1 PATCH: 21 PATCH, EXTENDED RELEASE TRANSDERMAL at 08:05

## 2017-05-23 RX ADMIN — STANDARDIZED SENNA CONCENTRATE AND DOCUSATE SODIUM 2 TABLET: 8.6; 5 TABLET, FILM COATED ORAL at 08:05

## 2017-05-23 RX ADMIN — BISACODYL 5 MG: 5 TABLET, COATED ORAL at 03:05

## 2017-05-23 NOTE — NURSING
Spoke with Dr. Rivera about pt complaining of chest pain, that was relieved when MD came to beside. No orders given at this time will continue to monitor.

## 2017-05-23 NOTE — PROGRESS NOTES
"[facility] Hospital Medicine  Progress Note    Patient Name: Polly Kwan  MRN: [id number]  Patient Class: IP- Inpatient   Admission Date: 5/21/2017  Length of Stay: 2 days  Attending Physician: Andreina Lombardi MD  Primary Care Provider: Iza Kwan MD    [place] [facility] Medicine Team: [facility] Andreina Lombardi MD    Subjective:     Principal Problem:Acute exacerbation of CHF (congestive heart failure)    HPI:  Ms. Kwan is a 57 RADHA lady with  PMH of ICM with LVEF 20%, s/p DC ICD 2013 (medtronic), CAD s/p PCI x2 (2006 and 2013), HTN, DM2, polysubstance abuse (tobacco and cocaine), and schizophrenia who presents after ICD discharge earlier tonight.      Patient is alert and oriented and converse coherently.      She reports progressive VIEYRA over the past week, now occurring with minimal exertion, as well as 4-pillow orthopnea and nightly PND. She also reports abdominal fullness/bloating with nausea and an episode of emesis today. She experienced chest pain today as well, described as sharp, left-sided localized around her ICD generator and left breast, worse with deep inspiration and palpation, worse sense ICD discharge. She has a history of cocaine abuse and reports last use this past Wednesday at a party.     She also reported that her pacemaker device is mobile inside the skin pocket and gives her pain while moving her left arm.      At the time of my interview,she denied CP, SOB or palpitation. However, she complained of nausea after KCl pills were given.     Hospital Course:  Patient admitted to Hospital Medicine Team C for diuresis as she is not a candidate for advanced options. On hospital day 2 the patient complained of continued abdominal pain. She had no chest pain or SOB.    Interval History: "Fine". No acute overnight events.    Review of Systems   Constitutional: Negative for chills, fatigue, fever and unexpected weight change.   HENT: Negative for ear pain, facial swelling, " hearing loss, mouth sores, nosebleeds, rhinorrhea, sinus pressure, sore throat, tinnitus, trouble swallowing and voice change.    Eyes: Negative for photophobia, pain, redness and visual disturbance.   Respiratory: Negative for cough, chest tightness, shortness of breath and wheezing.    Cardiovascular: Negative for chest pain, palpitations and leg swelling.   Gastrointestinal: Positive for abdominal pain. Negative for blood in stool, constipation, diarrhea, nausea and vomiting.   Endocrine: Negative for cold intolerance, heat intolerance, polydipsia, polyphagia and polyuria.   Genitourinary: Negative for decreased urine volume, dysuria, flank pain, frequency, hematuria, menstrual problem, urgency, vaginal bleeding, vaginal discharge and vaginal pain.   Musculoskeletal: Negative for arthralgias, back pain, joint swelling, myalgias and neck pain.   Skin: Negative for rash.   Allergic/Immunologic: Negative for environmental allergies, food allergies and immunocompromised state.   Neurological: Negative for dizziness, seizures, syncope, weakness, light-headedness, numbness and headaches.   Hematological: Negative for adenopathy. Does not bruise/bleed easily.   Psychiatric/Behavioral: Negative for confusion, hallucinations, self-injury, sleep disturbance and suicidal ideas. The patient is not nervous/anxious.      Objective:     Vital Signs (Most Recent):  Temp: 98.2 °F (36.8 °C) (05/23/17 0715)  Pulse: 76 (05/23/17 0832)  Resp: 16 (05/23/17 0832)  BP: (!) 109/59 (05/23/17 0715)  SpO2: 98 % (05/23/17 0816) Vital Signs (24h Range):  Temp:  [97.1 °F (36.2 °C)-98.6 °F (37 °C)] 98.2 °F (36.8 °C)  Pulse:  [62-77] 76  Resp:  [13-18] 16  SpO2:  [95 %-99 %] 98 %  BP: ()/(57-75) 109/59     Weight: 65.6 kg (144 lb 10 oz)  Body mass index is 26.45 kg/m².    Intake/Output Summary (Last 24 hours) at 05/23/17 0907  Last data filed at 05/23/17 0400   Gross per 24 hour   Intake              660 ml   Output             2400 ml    Net            -1740 ml      Physical Exam   Constitutional: She is oriented to person, place, and time. She appears well-developed and well-nourished. She is cooperative. She is easily aroused.  Non-toxic appearance. No distress.   HENT:   Head: Normocephalic and atraumatic. Head is without right periorbital erythema and without left periorbital erythema.   Right Ear: Hearing, tympanic membrane, external ear and ear canal normal. No swelling.   Left Ear: Hearing, tympanic membrane, external ear and ear canal normal. No swelling.   Nose: Nose normal. No nasal deformity.   Mouth/Throat: Uvula is midline, oropharynx is clear and moist and mucous membranes are normal. No oropharyngeal exudate.   Eyes: Conjunctivae, EOM and lids are normal. Pupils are equal, round, and reactive to light. Right eye exhibits no discharge and no exudate. Left eye exhibits no discharge and no exudate. Right conjunctiva is not injected. Left conjunctiva is not injected. No scleral icterus.   Neck: Normal range of motion. Neck supple. Hepatojugular reflux and JVD present. No tracheal deviation present. No thyromegaly present.   Cardiovascular: Normal rate, regular rhythm, S1 normal, S2 normal, normal heart sounds and intact distal pulses.  Exam reveals no gallop and no friction rub.    No murmur heard.  Pulmonary/Chest: Effort normal. No accessory muscle usage or stridor. No tachypnea. No respiratory distress. She has no wheezes. She has rales in the right middle field, the right lower field, the left middle field and the left lower field. She exhibits no tenderness.   Abdominal: Soft. Normal appearance and bowel sounds are normal. She exhibits no distension. There is no tenderness. There is no rebound and no guarding.   Musculoskeletal: Normal range of motion. She exhibits no edema or tenderness.   Neurological: She is alert, oriented to person, place, and time and easily aroused. No cranial nerve deficit. Coordination normal.   Skin: Skin  is warm and dry. No lesion and no rash noted. She is not diaphoretic. No cyanosis or erythema. No pallor. Nails show no clubbing.   Psychiatric: She has a normal mood and affect. Her speech is normal and behavior is normal. Judgment and thought content normal.   Nursing note and vitals reviewed.      Significant Labs:   BMP:   Recent Labs  Lab 05/23/17  0650   *      K 3.5      CO2 28   BUN 11   CREATININE 1.1   CALCIUM 8.4*   MG 1.9     CBC:   Recent Labs  Lab 05/21/17  2039 05/22/17  0427 05/23/17  0650   WBC 4.29 4.93 3.80*   HGB 13.0 11.7* 11.0*   HCT 38.6 35.0* 32.9*    272 231     Cardiac Markers:   Recent Labs  Lab 05/21/17  2135   BNP 1,977*       Significant Imaging: I have reviewed all pertinent imaging results/findings within the past 24 hours.    Assessment/Plan:      * Acute exacerbation of CHF (congestive heart failure)    Significant signs of acute decompensation. Net negative 800 ml in 24 hours.   1. Furosemide 80 mg IV BID.  2. Fluid restriction: 1,500 ml  3. Strict I&O. Weight daily.        Chest pain    Likely secondary to above. Now resolved.  1. Monitor.          CAD (coronary artery disease)    Stable and without anginal symptoms.  1. Aspirin 81 mg daily.  2. Atorvastatin 40 mg daily.  3.Antihypertensive therapy.          HTN (hypertension)    As per the 2014 Evidence-Based Guideline for the Management of High Blood Pressure in Adults from the Eight National Joint Committee (JNC-8), the patients goal BP should be <140/90. Currently well controlled.   1. Lisinopril 5 mg daily.                Abdominal pain    Likely secondary to extra volume (i.e. acute on chronic CHF). Currently stable and no acute findings on examination.   1. Diuresis as above.          Depression    Chronic and stable. No suicidal ideation.   1. Trazodone nightly.          Cocaine abuse    Chronic. Patient uninterested in cessation. Likely to lead to readmissions.  1. Monitor for signs of  withdrawal.          Smoking addiction    Patient uninterested in cessation.   1. Nicotine patch.          Diabetes mellitus    Well controlled on diabetic diet.  1. Glucose levels AC and HS.  2. Low dose correction SSI as needed.          Schizophrenia    Stable.   1. Trazodone as above.            VTE Risk Mitigation         Ordered     heparin (porcine) injection 5,000 Units  Every 8 hours     Route:  Subcutaneous        05/22/17 0017     Medium Risk of VTE  Once      05/22/17 0017          Andreina Lombardi MD  Department of Hospital Medicine   [facility]

## 2017-05-23 NOTE — ASSESSMENT & PLAN NOTE
As per the 2014 Evidence-Based Guideline for the Management of High Blood Pressure in Adults from the Eight National Joint Committee (JNC-8), the patients goal BP should be <140/90. Currently well controlled.   1. Lisinopril 5 mg daily.

## 2017-05-23 NOTE — PLAN OF CARE
Problem: Patient Care Overview  Goal: Plan of Care Review  Outcome: Ongoing (interventions implemented as appropriate)  Pt had no significant event overnight. Pt remained free of falls throughout the night pt told to call if needed assistance. Pt BG was  126 which required no coverage. Pt received 80mg IVP Lasix to help pull off fluid.

## 2017-05-23 NOTE — ASSESSMENT & PLAN NOTE
Well controlled on diabetic diet.  1. Glucose levels AC and HS.  2. Low dose correction SSI as needed.

## 2017-05-23 NOTE — ASSESSMENT & PLAN NOTE
Likely secondary to extra volume (i.e. acute on chronic CHF). Currently stable and no acute findings on examination.   1. Diuresis as above.

## 2017-05-23 NOTE — HOSPITAL COURSE
Patient admitted to Hospital Medicine Team C for diuresis as she is not a candidate for advanced options. On hospital day 2 the patient complained of continued abdominal pain. She had no chest pain or SOB. However on hospital day 3 the patient developed retrosternal chest pain with associated SOB. He pain was mildly improved with sublingual nitroglycerin. On hospital day 4 she continued to complained of generalized pain. She was still mildly SOB. On hospital day 5 she complained of difficulty catching her breath due to abdominal distention. She was euvolemic and hemodynamically stable for discharge.

## 2017-05-23 NOTE — ASSESSMENT & PLAN NOTE
Chronic. Patient uninterested in cessation. Likely to lead to readmissions.  1. Monitor for signs of withdrawal.

## 2017-05-23 NOTE — ASSESSMENT & PLAN NOTE
Stable and without anginal symptoms.  1. Aspirin 81 mg daily.  2. Atorvastatin 40 mg daily.  3.Antihypertensive therapy.

## 2017-05-23 NOTE — SUBJECTIVE & OBJECTIVE
"Interval History: "Fine". No acute overnight events.    Review of Systems   Constitutional: Negative for chills, fatigue, fever and unexpected weight change.   HENT: Negative for ear pain, facial swelling, hearing loss, mouth sores, nosebleeds, rhinorrhea, sinus pressure, sore throat, tinnitus, trouble swallowing and voice change.    Eyes: Negative for photophobia, pain, redness and visual disturbance.   Respiratory: Negative for cough, chest tightness, shortness of breath and wheezing.    Cardiovascular: Negative for chest pain, palpitations and leg swelling.   Gastrointestinal: Positive for abdominal pain. Negative for blood in stool, constipation, diarrhea, nausea and vomiting.   Endocrine: Negative for cold intolerance, heat intolerance, polydipsia, polyphagia and polyuria.   Genitourinary: Negative for decreased urine volume, dysuria, flank pain, frequency, hematuria, menstrual problem, urgency, vaginal bleeding, vaginal discharge and vaginal pain.   Musculoskeletal: Negative for arthralgias, back pain, joint swelling, myalgias and neck pain.   Skin: Negative for rash.   Allergic/Immunologic: Negative for environmental allergies, food allergies and immunocompromised state.   Neurological: Negative for dizziness, seizures, syncope, weakness, light-headedness, numbness and headaches.   Hematological: Negative for adenopathy. Does not bruise/bleed easily.   Psychiatric/Behavioral: Negative for confusion, hallucinations, self-injury, sleep disturbance and suicidal ideas. The patient is not nervous/anxious.      Objective:     Vital Signs (Most Recent):  Temp: 98.2 °F (36.8 °C) (05/23/17 0715)  Pulse: 76 (05/23/17 0832)  Resp: 16 (05/23/17 0832)  BP: (!) 109/59 (05/23/17 0715)  SpO2: 98 % (05/23/17 0816) Vital Signs (24h Range):  Temp:  [97.1 °F (36.2 °C)-98.6 °F (37 °C)] 98.2 °F (36.8 °C)  Pulse:  [62-77] 76  Resp:  [13-18] 16  SpO2:  [95 %-99 %] 98 %  BP: ()/(57-75) 109/59     Weight: 65.6 kg (144 lb 10 " oz)  Body mass index is 26.45 kg/m².    Intake/Output Summary (Last 24 hours) at 05/23/17 0907  Last data filed at 05/23/17 0400   Gross per 24 hour   Intake              660 ml   Output             2400 ml   Net            -1740 ml      Physical Exam   Constitutional: She is oriented to person, place, and time. She appears well-developed and well-nourished. She is cooperative. She is easily aroused.  Non-toxic appearance. No distress.   HENT:   Head: Normocephalic and atraumatic. Head is without right periorbital erythema and without left periorbital erythema.   Right Ear: Hearing, tympanic membrane, external ear and ear canal normal. No swelling.   Left Ear: Hearing, tympanic membrane, external ear and ear canal normal. No swelling.   Nose: Nose normal. No nasal deformity.   Mouth/Throat: Uvula is midline, oropharynx is clear and moist and mucous membranes are normal. No oropharyngeal exudate.   Eyes: Conjunctivae, EOM and lids are normal. Pupils are equal, round, and reactive to light. Right eye exhibits no discharge and no exudate. Left eye exhibits no discharge and no exudate. Right conjunctiva is not injected. Left conjunctiva is not injected. No scleral icterus.   Neck: Normal range of motion. Neck supple. Hepatojugular reflux and JVD present. No tracheal deviation present. No thyromegaly present.   Cardiovascular: Normal rate, regular rhythm, S1 normal, S2 normal, normal heart sounds and intact distal pulses.  Exam reveals no gallop and no friction rub.    No murmur heard.  Pulmonary/Chest: Effort normal. No accessory muscle usage or stridor. No tachypnea. No respiratory distress. She has no wheezes. She has rales in the right middle field, the right lower field, the left middle field and the left lower field. She exhibits no tenderness.   Abdominal: Soft. Normal appearance and bowel sounds are normal. She exhibits no distension. There is no tenderness. There is no rebound and no guarding.   Musculoskeletal:  Normal range of motion. She exhibits no edema or tenderness.   Neurological: She is alert, oriented to person, place, and time and easily aroused. No cranial nerve deficit. Coordination normal.   Skin: Skin is warm and dry. No lesion and no rash noted. She is not diaphoretic. No cyanosis or erythema. No pallor. Nails show no clubbing.   Psychiatric: She has a normal mood and affect. Her speech is normal and behavior is normal. Judgment and thought content normal.   Nursing note and vitals reviewed.      Significant Labs:   BMP:   Recent Labs  Lab 05/23/17  0650   *      K 3.5      CO2 28   BUN 11   CREATININE 1.1   CALCIUM 8.4*   MG 1.9     CBC:   Recent Labs  Lab 05/21/17  2039 05/22/17  0427 05/23/17  0650   WBC 4.29 4.93 3.80*   HGB 13.0 11.7* 11.0*   HCT 38.6 35.0* 32.9*    272 231     Cardiac Markers:   Recent Labs  Lab 05/21/17  2135   BNP 1,977*       Significant Imaging: I have reviewed all pertinent imaging results/findings within the past 24 hours.

## 2017-05-23 NOTE — PROGRESS NOTES
Patient Consulted by CTTS:     The following was discussed by the Tobacco Treatment Specialist:  ? Relevance of Quitting  ? Risk to Health  ? Long Term Risk  ? Risk for Others  ? Rewards of Quitting  ? Motivation Intervention to Quit    Pt states that she is ready to quit smoking.  Referral made to ambulatory smoking cessation clinic.

## 2017-05-24 LAB
ALBUMIN SERPL BCP-MCNC: 3.1 G/DL
ALP SERPL-CCNC: 83 U/L
ALT SERPL W/O P-5'-P-CCNC: 8 U/L
ANION GAP SERPL CALC-SCNC: 8 MMOL/L
AST SERPL-CCNC: 14 U/L
BASOPHILS # BLD AUTO: 0 K/UL
BASOPHILS NFR BLD: 0 %
BILIRUB SERPL-MCNC: 1.6 MG/DL
BUN SERPL-MCNC: 11 MG/DL
CALCIUM SERPL-MCNC: 8.5 MG/DL
CHLORIDE SERPL-SCNC: 100 MMOL/L
CO2 SERPL-SCNC: 28 MMOL/L
CREAT SERPL-MCNC: 1 MG/DL
DIFFERENTIAL METHOD: ABNORMAL
EOSINOPHIL # BLD AUTO: 0 K/UL
EOSINOPHIL NFR BLD: 0.9 %
ERYTHROCYTE [DISTWIDTH] IN BLOOD BY AUTOMATED COUNT: 18.7 %
EST. GFR  (AFRICAN AMERICAN): >60 ML/MIN/1.73 M^2
EST. GFR  (NON AFRICAN AMERICAN): >60 ML/MIN/1.73 M^2
GLUCOSE SERPL-MCNC: 88 MG/DL
HCT VFR BLD AUTO: 34 %
HGB BLD-MCNC: 11.4 G/DL
LYMPHOCYTES # BLD AUTO: 2 K/UL
LYMPHOCYTES NFR BLD: 45.7 %
MAGNESIUM SERPL-MCNC: 1.7 MG/DL
MCH RBC QN AUTO: 27.5 PG
MCHC RBC AUTO-ENTMCNC: 33.5 %
MCV RBC AUTO: 82 FL
MONOCYTES # BLD AUTO: 0.3 K/UL
MONOCYTES NFR BLD: 7.2 %
NEUTROPHILS # BLD AUTO: 2 K/UL
NEUTROPHILS NFR BLD: 46 %
PLATELET # BLD AUTO: 263 K/UL
PMV BLD AUTO: 10.3 FL
POCT GLUCOSE: 123 MG/DL (ref 70–110)
POCT GLUCOSE: 126 MG/DL (ref 70–110)
POCT GLUCOSE: 133 MG/DL (ref 70–110)
POCT GLUCOSE: 133 MG/DL (ref 70–110)
POTASSIUM SERPL-SCNC: 3.8 MMOL/L
PROT SERPL-MCNC: 6.6 G/DL
RBC # BLD AUTO: 4.15 M/UL
SODIUM SERPL-SCNC: 136 MMOL/L
TROPONIN I SERPL DL<=0.01 NG/ML-MCNC: 0.01 NG/ML
WBC # BLD AUTO: 4.29 K/UL

## 2017-05-24 PROCEDURE — 80053 COMPREHEN METABOLIC PANEL: CPT

## 2017-05-24 PROCEDURE — 94640 AIRWAY INHALATION TREATMENT: CPT

## 2017-05-24 PROCEDURE — 63600175 PHARM REV CODE 636 W HCPCS: Performed by: HOSPITALIST

## 2017-05-24 PROCEDURE — 94761 N-INVAS EAR/PLS OXIMETRY MLT: CPT

## 2017-05-24 PROCEDURE — 63600175 PHARM REV CODE 636 W HCPCS: Performed by: STUDENT IN AN ORGANIZED HEALTH CARE EDUCATION/TRAINING PROGRAM

## 2017-05-24 PROCEDURE — 99222 1ST HOSP IP/OBS MODERATE 55: CPT | Mod: GC,,, | Performed by: INTERNAL MEDICINE

## 2017-05-24 PROCEDURE — 20600001 HC STEP DOWN PRIVATE ROOM

## 2017-05-24 PROCEDURE — 85025 COMPLETE CBC W/AUTO DIFF WBC: CPT

## 2017-05-24 PROCEDURE — 25000242 PHARM REV CODE 250 ALT 637 W/ HCPCS: Performed by: INTERNAL MEDICINE

## 2017-05-24 PROCEDURE — 84484 ASSAY OF TROPONIN QUANT: CPT

## 2017-05-24 PROCEDURE — 27000221 HC OXYGEN, UP TO 24 HOURS

## 2017-05-24 PROCEDURE — 25000003 PHARM REV CODE 250: Performed by: INTERNAL MEDICINE

## 2017-05-24 PROCEDURE — 36415 COLL VENOUS BLD VENIPUNCTURE: CPT

## 2017-05-24 PROCEDURE — 25000003 PHARM REV CODE 250: Performed by: HOSPITALIST

## 2017-05-24 PROCEDURE — 93005 ELECTROCARDIOGRAM TRACING: CPT

## 2017-05-24 PROCEDURE — 83735 ASSAY OF MAGNESIUM: CPT

## 2017-05-24 PROCEDURE — 93010 ELECTROCARDIOGRAM REPORT: CPT | Mod: ,,, | Performed by: INTERNAL MEDICINE

## 2017-05-24 PROCEDURE — 99233 SBSQ HOSP IP/OBS HIGH 50: CPT | Mod: ,,, | Performed by: INTERNAL MEDICINE

## 2017-05-24 RX ORDER — GLYCERIN 1 G/1
1 SUPPOSITORY RECTAL ONCE
Status: COMPLETED | OUTPATIENT
Start: 2017-05-24 | End: 2017-05-24

## 2017-05-24 RX ORDER — LIDOCAINE 50 MG/G
1 PATCH TOPICAL
Status: DISCONTINUED | OUTPATIENT
Start: 2017-05-24 | End: 2017-05-26 | Stop reason: HOSPADM

## 2017-05-24 RX ORDER — ONDANSETRON 4 MG/1
4 TABLET, ORALLY DISINTEGRATING ORAL EVERY 8 HOURS PRN
Status: DISCONTINUED | OUTPATIENT
Start: 2017-05-24 | End: 2017-05-26 | Stop reason: HOSPADM

## 2017-05-24 RX ADMIN — LISINOPRIL 5 MG: 5 TABLET ORAL at 09:05

## 2017-05-24 RX ADMIN — ONDANSETRON 4 MG: 4 TABLET, ORALLY DISINTEGRATING ORAL at 03:05

## 2017-05-24 RX ADMIN — BECLOMETHASONE DIPROPIONATE 2 PUFF: 40 AEROSOL, METERED RESPIRATORY (INHALATION) at 09:05

## 2017-05-24 RX ADMIN — TRAZODONE HYDROCHLORIDE 150 MG: 50 TABLET ORAL at 09:05

## 2017-05-24 RX ADMIN — NITROGLYCERIN 0.4 MG: 0.4 TABLET SUBLINGUAL at 08:05

## 2017-05-24 RX ADMIN — PANTOPRAZOLE SODIUM 40 MG: 40 TABLET, DELAYED RELEASE ORAL at 09:05

## 2017-05-24 RX ADMIN — ALBUTEROL SULFATE 2.5 MG: 2.5 SOLUTION RESPIRATORY (INHALATION) at 08:05

## 2017-05-24 RX ADMIN — POTASSIUM CHLORIDE 40 MEQ: 1500 TABLET, EXTENDED RELEASE ORAL at 09:05

## 2017-05-24 RX ADMIN — SODIUM CHLORIDE, PRESERVATIVE FREE 3 ML: 5 INJECTION INTRAVENOUS at 09:05

## 2017-05-24 RX ADMIN — HEPARIN SODIUM 5000 UNITS: 5000 INJECTION, SOLUTION INTRAVENOUS; SUBCUTANEOUS at 09:05

## 2017-05-24 RX ADMIN — HEPARIN SODIUM 5000 UNITS: 5000 INJECTION, SOLUTION INTRAVENOUS; SUBCUTANEOUS at 05:05

## 2017-05-24 RX ADMIN — STANDARDIZED SENNA CONCENTRATE AND DOCUSATE SODIUM 2 TABLET: 8.6; 5 TABLET, FILM COATED ORAL at 09:05

## 2017-05-24 RX ADMIN — FUROSEMIDE 10 MG/HR: 10 INJECTION, SOLUTION INTRAMUSCULAR; INTRAVENOUS at 05:05

## 2017-05-24 RX ADMIN — ATORVASTATIN CALCIUM 40 MG: 20 TABLET, FILM COATED ORAL at 09:05

## 2017-05-24 RX ADMIN — HEPARIN SODIUM 5000 UNITS: 5000 INJECTION, SOLUTION INTRAVENOUS; SUBCUTANEOUS at 03:05

## 2017-05-24 RX ADMIN — GLYCERIN 1 SUPPOSITORY: 2.1 SUPPOSITORY RECTAL at 04:05

## 2017-05-24 RX ADMIN — FUROSEMIDE 80 MG: 10 INJECTION, SOLUTION INTRAVENOUS at 09:05

## 2017-05-24 RX ADMIN — ALBUTEROL SULFATE 2.5 MG: 2.5 SOLUTION RESPIRATORY (INHALATION) at 04:05

## 2017-05-24 RX ADMIN — NICOTINE 1 PATCH: 21 PATCH, EXTENDED RELEASE TRANSDERMAL at 09:05

## 2017-05-24 RX ADMIN — ASPIRIN 81 MG: 81 TABLET, COATED ORAL at 09:05

## 2017-05-24 RX ADMIN — BECLOMETHASONE DIPROPIONATE 2 PUFF: 40 AEROSOL, METERED RESPIRATORY (INHALATION) at 02:05

## 2017-05-24 RX ADMIN — ACETAMINOPHEN 500 MG: 500 TABLET ORAL at 11:05

## 2017-05-24 RX ADMIN — POLYETHYLENE GLYCOL 3350 17 G: 17 POWDER, FOR SOLUTION ORAL at 09:05

## 2017-05-24 RX ADMIN — BISACODYL 5 MG: 5 TABLET, COATED ORAL at 11:05

## 2017-05-24 RX ADMIN — LIDOCAINE 1 PATCH: 50 PATCH TOPICAL at 03:05

## 2017-05-24 RX ADMIN — ALBUTEROL SULFATE 2.5 MG: 2.5 SOLUTION RESPIRATORY (INHALATION) at 12:05

## 2017-05-24 NOTE — ASSESSMENT & PLAN NOTE
Likely secondary to extra volume (i.e. acute on chronic CHF). Currently stable and no acute findings on examination. No elevation in LFT's.  1. Diuresis as above.

## 2017-05-24 NOTE — SUBJECTIVE & OBJECTIVE
"Interval History: I hurt all over". This morning with chest pain and associated SOB.     Review of Systems   Constitutional: Negative for chills, fatigue, fever and unexpected weight change.   HENT: Negative for ear pain, facial swelling, hearing loss, mouth sores, nosebleeds, rhinorrhea, sinus pressure, sore throat, tinnitus, trouble swallowing and voice change.    Eyes: Negative for photophobia, pain, redness and visual disturbance.   Respiratory: Positive for shortness of breath. Negative for cough, chest tightness and wheezing.    Cardiovascular: Positive for chest pain. Negative for palpitations and leg swelling.   Gastrointestinal: Positive for abdominal pain. Negative for blood in stool, constipation, diarrhea, nausea and vomiting.   Endocrine: Negative for cold intolerance, heat intolerance, polydipsia, polyphagia and polyuria.   Genitourinary: Negative for decreased urine volume, dysuria, flank pain, frequency, hematuria, menstrual problem, urgency, vaginal bleeding, vaginal discharge and vaginal pain.   Musculoskeletal: Negative for arthralgias, back pain, joint swelling, myalgias and neck pain.   Skin: Negative for rash.   Allergic/Immunologic: Negative for environmental allergies, food allergies and immunocompromised state.   Neurological: Negative for dizziness, seizures, syncope, weakness, light-headedness, numbness and headaches.   Hematological: Negative for adenopathy. Does not bruise/bleed easily.   Psychiatric/Behavioral: Negative for confusion, hallucinations, self-injury, sleep disturbance and suicidal ideas. The patient is not nervous/anxious.      Objective:     Vital Signs (Most Recent):  Temp: 97.7 °F (36.5 °C) (05/24/17 0747)  Pulse: 74 (05/24/17 0801)  Resp: (!) 22 (05/24/17 0801)  BP: (!) 94/52 (05/24/17 0836)  SpO2: 97 % (05/24/17 0801) Vital Signs (24h Range):  Temp:  [96.7 °F (35.9 °C)-98.9 °F (37.2 °C)] 97.7 °F (36.5 °C)  Pulse:  [62-78] 74  Resp:  [16-22] 22  SpO2:  [96 %-100 %] 97 " %  BP: ()/(51-69) 94/52     Weight: 66.6 kg (146 lb 13.2 oz)  Body mass index is 26.85 kg/m².    Intake/Output Summary (Last 24 hours) at 05/24/17 1017  Last data filed at 05/24/17 0900   Gross per 24 hour   Intake             1450 ml   Output             2150 ml   Net             -700 ml      Physical Exam   Constitutional: She is oriented to person, place, and time. She appears well-developed and well-nourished. She is cooperative. She is easily aroused.  Non-toxic appearance. No distress.   HENT:   Head: Normocephalic and atraumatic. Head is without right periorbital erythema and without left periorbital erythema.   Right Ear: Hearing, tympanic membrane, external ear and ear canal normal. No swelling.   Left Ear: Hearing, tympanic membrane, external ear and ear canal normal. No swelling.   Nose: Nose normal. No nasal deformity.   Mouth/Throat: Uvula is midline, oropharynx is clear and moist and mucous membranes are normal. No oropharyngeal exudate.   Eyes: Conjunctivae, EOM and lids are normal. Pupils are equal, round, and reactive to light. Right eye exhibits no discharge and no exudate. Left eye exhibits no discharge and no exudate. Right conjunctiva is not injected. Left conjunctiva is not injected. No scleral icterus.   Neck: Normal range of motion. Neck supple. Hepatojugular reflux and JVD present. No tracheal deviation present. No thyromegaly present.   Cardiovascular: Normal rate, regular rhythm, S1 normal, S2 normal, normal heart sounds and intact distal pulses.  Exam reveals no gallop and no friction rub.    No murmur heard.  Pulmonary/Chest: Effort normal. No accessory muscle usage or stridor. No tachypnea. No respiratory distress. She has no wheezes. She has rales in the right middle field, the right lower field, the left middle field and the left lower field. She exhibits no tenderness.   Abdominal: Soft. Normal appearance and bowel sounds are normal. She exhibits distension, fluid wave and  ascites. There is generalized tenderness. There is no rebound and no guarding.   Musculoskeletal: Normal range of motion. She exhibits no edema or tenderness.   Neurological: She is alert, oriented to person, place, and time and easily aroused. No cranial nerve deficit. Coordination normal.   Skin: Skin is warm and dry. No lesion and no rash noted. She is not diaphoretic. No cyanosis or erythema. No pallor. Nails show no clubbing.   Psychiatric: She has a normal mood and affect. Her speech is normal and behavior is normal. Judgment and thought content normal.   Nursing note and vitals reviewed.      Significant Labs:   BMP:   Recent Labs  Lab 05/24/17  0453   GLU 88      K 3.8      CO2 28   BUN 11   CREATININE 1.0   CALCIUM 8.5*   MG 1.7     CBC:   Recent Labs  Lab 05/23/17  0650 05/24/17  0453   WBC 3.80* 4.29   HGB 11.0* 11.4*   HCT 32.9* 34.0*    263     Cardiac Markers: No results for input(s): CKMB, MYOGLOBIN, BNP, TROPISTAT in the last 48 hours.    Significant Imaging: I have reviewed all pertinent imaging results/findings within the past 24 hours.

## 2017-05-24 NOTE — PROGRESS NOTES
"Patient complaining of abdominal fullness, and the possible need to have a BM.  Last BM 5/22. Administered bisacodyl per MAR orders.  Notified Dr. Lombardi.  Dr. Lombardi states that the abdominal fullness is likely due to "fluid buildup", and we will continue to diurese. No new orders received at this time.  Will continue to monitor.    "

## 2017-05-24 NOTE — PROGRESS NOTES
05/24/17 1403   Vital Signs   Pulse 70   Heart Rate Source SpO2   Resp 12   SpO2 99 %   Flow (L/min) 2   O2 Device (Oxygen Therapy) nasal cannula   BP (!) 106/51   MAP (mmHg) 72   BP Location Left arm   BP Method Automatic   Patient Position Lying   pT c/o SOB while talking with increase in effort. pT also c/o abdominal, chest, and left back pain. 2 stool softeners & 1 laxative per order. Harjit Lombardi MD updated. Ordered lidocaine patch & suppository. WIll continue to assess pT & implement orders

## 2017-05-24 NOTE — PROGRESS NOTES
05/24/17 0747 05/24/17 0801   Vital Signs   Resp 18 (!) 22   SpO2 97 % 97 %   Flow (L/min) 2 2   O2 Device (Oxygen Therapy) nasal cannula nasal cannula   pT c/o SOB. Valeria RT called and Resp tx implemented at bedside. pT c/o CP post coughing. Harjit Lombardi MD notified. STAT 12 lead ekg ordered. Ordered give prn NTG. Orders implemented

## 2017-05-24 NOTE — CONSULTS
[facility]  Cardiology Consult Note    Attending Physician: Andreina Lombardi MD  Reason for Consult: acute decompensated Heart failure     HPI:   Polly Kwan is a 57 y.o. female with a history significant for ICM with LVEF 20%, s/p DC ICD 2013 (medtronic), CAD s/p PCI in 2006 and 2013, HTN, DM2, polysubstance abuse (tobacco and cocaine), and schizophrenia who presents after ICD discharge earlier tonight 05/22/2017. She reports progressive VIEYRA over the past week, now occurring with minimal exertion, as well as 4-pillow orthopnea and nightly PND. She also reports abdominal fullness/bloating with nausea and an episode of emesis today. She experienced chest pain today as well, described as sharp, left-sided localized around her ICD generator and left breast, worse with deep inspiration and palpation, worse sense ICD discharge. She has a history of cocaine abuse and reports last use this past Wednesday at a party.    ROS   Constitutional: denies unintentional weight loss, night sweats, fever or chills  Eyes: denies visual changes  ENT: denies nasal congestion, ear pain or sore throat  Respiratory: +cough and dyspnea on exertion and pleurisy  Cardiovascular: + chest pain and dyspnea on exertion. + orthopnea and PND denies chest pressure/discomfort, pressure/discomfort, palpitations, lower extremity edema.    Gastrointestinal: +  abdominal pain and nausea. Denies vomiting or change in bowel habits  Genitourinary: denies hematuria or dysuria  Musculoskeletal: denies arthralgias or myalgias  Neurological: denies seizures or tremors  Behavioral/Psych: denies auditory or visual hallucinations, SI, HI     PMH:     Past Medical History:   Diagnosis Date    Asthma     Bipolar 1 disorder     Cardiomyopathy     Ischemic Cardiomyopathy with EF 25-30% by echo 5/17/16    Chronic systolic (congestive) heart failure     COPD (chronic obstructive pulmonary disease)     emphysema    Coronary artery disease     ROCHELLE  2006 and 2013    Depression     Diabetes mellitus     H/O echocardiogram 05/17/2016    EF 25-30%. LV diastolic dysfxn. Severe LAE. mod MR. PASP 86.    Hypertension     ICD (implantable cardioverter-defibrillator) in place 07/09/2013    MI (myocardial infarction)     x 3    PTSD (post-traumatic stress disorder)     Schizophrenia     Seizures      Past Surgical History:   Procedure Laterality Date    CARDIAC CATHETERIZATION      CARDIAC DEFIBRILLATOR PLACEMENT      [place]  2006    [place]    CORONARY STENT PLACEMENT      INSERT / REPLACE / REMOVE PACEMAKER  2013     ICD [facility]        Allergies:     Review of patient's allergies indicates:  No Known Allergies     Medications:     No current facility-administered medications on file prior to encounter.      Current Outpatient Prescriptions on File Prior to Encounter   Medication Sig Dispense Refill    albuterol (PROVENTIL) 2.5 mg /3 mL (0.083 %) nebulizer solution Take 3 mLs (2.5 mg total) by nebulization every 6 (six) hours as needed for Wheezing. Rescue 1 Box 0    aspirin (ECOTRIN) 81 MG EC tablet Take 1 tablet (81 mg total) by mouth once daily. 90 tablet 3    atorvastatin (LIPITOR) 40 MG tablet Take 1 tablet (40 mg total) by mouth once daily. 30 tablet 6    fluticasone furoate 100 mcg/actuation DsDv Inhale 100 mcg into the lungs once daily. Controller      furosemide (LASIX) 40 MG tablet Take 0.5 tablets (20 mg total) by mouth once daily. 30 tablet 0    glimepiride (AMARYL) 2 MG tablet Take 1 tablet (2 mg total) by mouth before breakfast. 60 tablet 6    lidocaine (LIDODERM) 5 % Place 1 patch onto the skin once daily. Remove & Discard patch within 12 hours or as directed by MD 10 patch 0    lisinopril (PRINIVIL,ZESTRIL) 5 MG tablet Take 1 tablet (5 mg total) by mouth once daily. 30 tablet 0    nitroGLYCERIN (NITROSTAT) 0.4 MG SL tablet Place 1 tablet (0.4 mg total) under the tongue every 5 (five) minutes as needed for  Chest pain. 30 tablet 4    trazodone (DESYREL) 150 MG tablet Take 1 tablet (150 mg total) by mouth every evening. 30 tablet 0    walker Misc Use walker daily PRN 1 each 0        Social History:     Social History   Substance Use Topics    Smoking status: Current Every Day Smoker     Packs/day: 0.50     Years: 20.00     Last attempt to quit: 3/6/2013    Smokeless tobacco: Not on file      Comment: pt stated she quit    Alcohol use No        Family History:     Family History   Problem Relation Age of Onset    Adopted: Yes        Physical Exam:     Vitals:  Temp:  [96.7 °F (35.9 °C)-98.9 °F (37.2 °C)]   Pulse:  [62-78]   Resp:  [12-22]   BP: ()/(51-69)   SpO2:  [96 %-100 %]   I/O's:    Intake/Output Summary (Last 24 hours) at 05/24/17 1518  Last data filed at 05/24/17 0900   Gross per 24 hour   Intake             1190 ml   Output             1150 ml   Net               40 ml        Physical Exam  General: Well developed, oriented to person, place, and time. Not in distress  Neck:  JVD present.   Cardiovascular: Normal rate, regular rhythm, S1 normal, S2 normal, normal heart sounds and intact distal pulses.  Exam reveals no gallop and no friction rub. No murmur heard.  Pulmonary/Chest: Effort normal. No respiratory distress. She has no wheezes. She has rales in the right middle field, the right lower field, the left middle field and the left lower field. She exhibits no tenderness.   Abdominal: Soft. Normal appearance and bowel sounds are normal. She exhibits distension, fluid wave and ascites. There is generalized tenderness. There is no rebound and no guarding.    skin: Warm and dry, No bruising, rash, ulceration, or jaundice   Neurological: No obvious focal motor or sensory defects, normal muscle tone.     Labs:     Recent Results (from the past 336 hour(s))   CBC auto differential    Collection Time: 05/24/17  4:53 AM   Result Value Ref Range    WBC 4.29 3.90 - 12.70 K/uL    Hemoglobin 11.4 (L) 12.0 -  16.0 g/dL    Hematocrit 34.0 (L) 37.0 - 48.5 %    Platelets 263 150 - 350 K/uL   CBC auto differential    Collection Time: 17  6:50 AM   Result Value Ref Range    WBC 3.80 (L) 3.90 - 12.70 K/uL    Hemoglobin 11.0 (L) 12.0 - 16.0 g/dL    Hematocrit 32.9 (L) 37.0 - 48.5 %    Platelets 231 150 - 350 K/uL   CBC auto differential    Collection Time: 17  4:27 AM   Result Value Ref Range    WBC 4.93 3.90 - 12.70 K/uL    Hemoglobin 11.7 (L) 12.0 - 16.0 g/dL    Hematocrit 35.0 (L) 37.0 - 48.5 %    Platelets 272 150 - 350 K/uL       No results found for this or any previous visit (from the past 336 hour(s)).    Lab Results   Component Value Date    CHOL 230 (H) 2014    HDL 75 2014    LDLCALC 135.4 2014    TRIG 98 2014         Recent Labs  Lab 179 17  0835   TROPONINI 0.018 0.012       Lab Results   Component Value Date    HGBA1C 6.2 2017       Estimated Creatinine Clearance: 55.6 mL/min (based on Cr of 1).    Imaging:    Echo: 2017  1 - Eccentric hypertrophy.     2 - Severely depressed left ventricular systolic function (EF 20-25%).     3 - Left ventricular diastolic dysfunction.     4 - Biatrial enlargement.     5 - Right ventricular enlargement with moderately depressed systolic function.     6 - Pulmonary hypertension. The estimated PA systolic pressure is 84 mmHg.     7 - Moderate to severe mitral regurgitation.     8 - Moderate tricuspid regurgitation.     9 - Increased central venous pressure.     EK2017 at 08:26 am  Unusual P axis and short [place], probable junctional rhythm with occasional Premature ventricular complexes  Right ventricular hypertrophy    Assessment & Recommendations:     ADHF  - pt. Is volume overloaded.   - recommend to start lasix gtt 10 mg/h (ordered)   - stop Lasix IV pushes   - Echo : EF 20 with diastolic dysfunction   - strict I/Os, daily weights, 2 gm Na/fluid restricted diet  - given history of cocaine use, avoid  B-blockers    Chest pain:  - it's reducible.   - Troponin and EKG wnl   - less likely to be cardiac     Thank you for this consult. Further recommendations are pending the attending addendum. Please do not hesitate to page with questions.     Signed:  Kaur Luciano MD  [id number]

## 2017-05-24 NOTE — PLAN OF CARE
Problem: Patient Care Overview  Goal: Plan of Care Review  Plan of care was reviewed with the pt and family, pt and family verbalized understanding. VSS, side rails up x3, oriented to room and to call bell, asked to call nurse for anything. Neuro checks performed q4h, no neuro changes noted throughout shift. Pt denied pain throughout shift. Checked on pt at least every 2 hours throughout shift. Pt remained free from falls/injuries or skin breakdown throughout shift. RN will continue to monitor pt.

## 2017-05-24 NOTE — PROGRESS NOTES
"[facility] Hospital Medicine  Progress Note    Patient Name: Polly Kwan  MRN: [id number]  Patient Class: IP- Inpatient   Admission Date: 5/21/2017  Length of Stay: 3 days  Attending Physician: Andreina Lombardi MD  Primary Care Provider: Iza Kwan MD    [facility] Medicine Team: [facility] Andreina Lombardi MD    Subjective:     Principal Problem:Acute exacerbation of CHF (congestive heart failure)    HPI:  Ms. Kwan is a 57 RADHA lady with  PMH of ICM with LVEF 20%, s/p DC ICD 2013 (medtronic), CAD s/p PCI x2 (2006 and 2013), HTN, DM2, polysubstance abuse (tobacco and cocaine), and schizophrenia who presents after ICD discharge earlier tonight.      Patient is alert and oriented and converse coherently.      She reports progressive VIEYRA over the past week, now occurring with minimal exertion, as well as 4-pillow orthopnea and nightly PND. She also reports abdominal fullness/bloating with nausea and an episode of emesis today. She experienced chest pain today as well, described as sharp, left-sided localized around her ICD generator and left breast, worse with deep inspiration and palpation, worse sense ICD discharge. She has a history of cocaine abuse and reports last use this past Wednesday at a party.     She also reported that her pacemaker device is mobile inside the skin pocket and gives her pain while moving her left arm.      At the time of my interview,she denied CP, SOB or palpitation. However, she complained of nausea after KCl pills were given.     Hospital Course:  Patient admitted to Hospital Medicine Team C for diuresis as she is not a candidate for advanced options. On hospital day 2 the patient complained of continued abdominal pain. She had no chest pain or SOB. However on hospital day 3 the patient developed retrosternal chest pain with associated SOB. He pain was mildly improved with sublingual nitroglycerin.     Interval History: I hurt all over". This morning with " chest pain and associated SOB.     Review of Systems   Constitutional: Negative for chills, fatigue, fever and unexpected weight change.   HENT: Negative for ear pain, facial swelling, hearing loss, mouth sores, nosebleeds, rhinorrhea, sinus pressure, sore throat, tinnitus, trouble swallowing and voice change.    Eyes: Negative for photophobia, pain, redness and visual disturbance.   Respiratory: Positive for shortness of breath. Negative for cough, chest tightness and wheezing.    Cardiovascular: Positive for chest pain. Negative for palpitations and leg swelling.   Gastrointestinal: Positive for abdominal pain. Negative for blood in stool, constipation, diarrhea, nausea and vomiting.   Endocrine: Negative for cold intolerance, heat intolerance, polydipsia, polyphagia and polyuria.   Genitourinary: Negative for decreased urine volume, dysuria, flank pain, frequency, hematuria, menstrual problem, urgency, vaginal bleeding, vaginal discharge and vaginal pain.   Musculoskeletal: Negative for arthralgias, back pain, joint swelling, myalgias and neck pain.   Skin: Negative for rash.   Allergic/Immunologic: Negative for environmental allergies, food allergies and immunocompromised state.   Neurological: Negative for dizziness, seizures, syncope, weakness, light-headedness, numbness and headaches.   Hematological: Negative for adenopathy. Does not bruise/bleed easily.   Psychiatric/Behavioral: Negative for confusion, hallucinations, self-injury, sleep disturbance and suicidal ideas. The patient is not nervous/anxious.      Objective:     Vital Signs (Most Recent):  Temp: 97.7 °F (36.5 °C) (05/24/17 0747)  Pulse: 74 (05/24/17 0801)  Resp: (!) 22 (05/24/17 0801)  BP: (!) 94/52 (05/24/17 0836)  SpO2: 97 % (05/24/17 0801) Vital Signs (24h Range):  Temp:  [96.7 °F (35.9 °C)-98.9 °F (37.2 °C)] 97.7 °F (36.5 °C)  Pulse:  [62-78] 74  Resp:  [16-22] 22  SpO2:  [96 %-100 %] 97 %  BP: ()/(51-69) 94/52     Weight: 66.6 kg (146  lb 13.2 oz)  Body mass index is 26.85 kg/m².    Intake/Output Summary (Last 24 hours) at 05/24/17 1017  Last data filed at 05/24/17 0900   Gross per 24 hour   Intake             1450 ml   Output             2150 ml   Net             -700 ml      Physical Exam   Constitutional: She is oriented to person, place, and time. She appears well-developed and well-nourished. She is cooperative. She is easily aroused.  Non-toxic appearance. No distress.   HENT:   Head: Normocephalic and atraumatic. Head is without right periorbital erythema and without left periorbital erythema.   Right Ear: Hearing, tympanic membrane, external ear and ear canal normal. No swelling.   Left Ear: Hearing, tympanic membrane, external ear and ear canal normal. No swelling.   Nose: Nose normal. No nasal deformity.   Mouth/Throat: Uvula is midline, oropharynx is clear and moist and mucous membranes are normal. No oropharyngeal exudate.   Eyes: Conjunctivae, EOM and lids are normal. Pupils are equal, round, and reactive to light. Right eye exhibits no discharge and no exudate. Left eye exhibits no discharge and no exudate. Right conjunctiva is not injected. Left conjunctiva is not injected. No scleral icterus.   Neck: Normal range of motion. Neck supple. Hepatojugular reflux and JVD present. No tracheal deviation present. No thyromegaly present.   Cardiovascular: Normal rate, regular rhythm, S1 normal, S2 normal, normal heart sounds and intact distal pulses.  Exam reveals no gallop and no friction rub.    No murmur heard.  Pulmonary/Chest: Effort normal. No accessory muscle usage or stridor. No tachypnea. No respiratory distress. She has no wheezes. She has rales in the right middle field, the right lower field, the left middle field and the left lower field. She exhibits no tenderness.   Abdominal: Soft. Normal appearance and bowel sounds are normal. She exhibits distension, fluid wave and ascites. There is generalized tenderness. There is no  rebound and no guarding.   Musculoskeletal: Normal range of motion. She exhibits no edema or tenderness.   Neurological: She is alert, oriented to person, place, and time and easily aroused. No cranial nerve deficit. Coordination normal.   Skin: Skin is warm and dry. No lesion and no rash noted. She is not diaphoretic. No cyanosis or erythema. No pallor. Nails show no clubbing.   Psychiatric: She has a normal mood and affect. Her speech is normal and behavior is normal. Judgment and thought content normal.   Nursing note and vitals reviewed.      Significant Labs:   BMP:   Recent Labs  Lab 05/24/17  0453   GLU 88      K 3.8      CO2 28   BUN 11   CREATININE 1.0   CALCIUM 8.5*   MG 1.7     CBC:   Recent Labs  Lab 05/23/17  0650 05/24/17  0453   WBC 3.80* 4.29   HGB 11.0* 11.4*   HCT 32.9* 34.0*    263     Cardiac Markers: No results for input(s): CKMB, MYOGLOBIN, BNP, TROPISTAT in the last 48 hours.    Significant Imaging: I have reviewed all pertinent imaging results/findings within the past 24 hours.    Assessment/Plan:      * Acute exacerbation of CHF (congestive heart failure)    Significant signs of acute decompensation. Net negative 860 ml with a 2lbs weight gain in 24 hours.   1. Furosemide 80 mg IV BID.  2. Fluid restriction: 1,200 ml  3. Strict I&O. Weight daily.        Chest pain    Likely secondary to above. New episode of chest pain and SOB today. ECG without ST or T wave abnormalities consistent with ACS.  1. Troponin every 6 hours x 2.  2. Sublingual nitroglycerin as needed.   3. Cardiology co-management service evaluation.          CAD (coronary artery disease)    Stable and without anginal symptoms.  1. Aspirin 81 mg daily.  2. Atorvastatin 40 mg daily.  3. Antihypertensive therapy.          HTN (hypertension)    As per the 2014 Evidence-Based Guideline for the Management of High Blood Pressure in Adults from the Eight National Joint Committee (JNC-8), the patients goal BP should be  <140/90. Currently well controlled.   1. Lisinopril 5 mg daily.                Abdominal pain    Likely secondary to extra volume (i.e. acute on chronic CHF). Currently stable and no acute findings on examination. No elevation in LFT's.  1. Diuresis as above.          Depression    Chronic and stable. No suicidal ideation.   1. Trazodone nightly.          Cocaine abuse    Chronic. Patient uninterested in cessation. Likely to lead to readmissions.  1. Monitor for signs of withdrawal.          Smoking addiction    Patient uninterested in cessation.   1. Nicotine patch.          Diabetes mellitus    Well controlled on diabetic diet.  1. Glucose levels AC and HS.  2. Low dose correction SSI as needed.          Schizophrenia    Stable.   1. Trazodone as above.            VTE Risk Mitigation         Ordered     heparin (porcine) injection 5,000 Units  Every 8 hours     Route:  Subcutaneous        05/22/17 0017     Medium Risk of VTE  Once      05/22/17 0017          Andreina Lombardi MD  Department of Hospital Medicine   [facility]

## 2017-05-24 NOTE — ASSESSMENT & PLAN NOTE
Significant signs of acute decompensation. Net negative 860 ml with a 2lbs weight gain in 24 hours.   1. Furosemide 80 mg IV BID.  2. Fluid restriction: 1,500 ml  3. Strict I&O. Weight daily.

## 2017-05-24 NOTE — ASSESSMENT & PLAN NOTE
Likely secondary to above. New episode of chest pain and SOB today. ECG without ST or T wave abnormalities consistent with ACS.  1. Troponin every 6 hours x 2.  2. Sublingual nitroglycerin as needed.   3. Cardiology co-management service evaluation.

## 2017-05-25 LAB
ALBUMIN SERPL BCP-MCNC: 3.4 G/DL
ALP SERPL-CCNC: 88 U/L
ALT SERPL W/O P-5'-P-CCNC: 8 U/L
ANION GAP SERPL CALC-SCNC: 9 MMOL/L
AST SERPL-CCNC: 17 U/L
BASOPHILS # BLD AUTO: 0 K/UL
BASOPHILS NFR BLD: 0 %
BILIRUB SERPL-MCNC: 1.7 MG/DL
BUN SERPL-MCNC: 10 MG/DL
CALCIUM SERPL-MCNC: 9.3 MG/DL
CHLORIDE SERPL-SCNC: 100 MMOL/L
CO2 SERPL-SCNC: 29 MMOL/L
CREAT SERPL-MCNC: 1 MG/DL
DIFFERENTIAL METHOD: ABNORMAL
EOSINOPHIL # BLD AUTO: 0 K/UL
EOSINOPHIL NFR BLD: 0.7 %
ERYTHROCYTE [DISTWIDTH] IN BLOOD BY AUTOMATED COUNT: 18.8 %
EST. GFR  (AFRICAN AMERICAN): >60 ML/MIN/1.73 M^2
EST. GFR  (NON AFRICAN AMERICAN): >60 ML/MIN/1.73 M^2
GLUCOSE SERPL-MCNC: 99 MG/DL
HCT VFR BLD AUTO: 35.5 %
HGB BLD-MCNC: 12 G/DL
LYMPHOCYTES # BLD AUTO: 1.9 K/UL
LYMPHOCYTES NFR BLD: 43.8 %
MAGNESIUM SERPL-MCNC: 2.1 MG/DL
MAGNESIUM SERPL-MCNC: 2.2 MG/DL
MCH RBC QN AUTO: 27.8 PG
MCHC RBC AUTO-ENTMCNC: 33.8 %
MCV RBC AUTO: 82 FL
MONOCYTES # BLD AUTO: 0.3 K/UL
MONOCYTES NFR BLD: 7.6 %
NEUTROPHILS # BLD AUTO: 2 K/UL
NEUTROPHILS NFR BLD: 47.7 %
PLATELET # BLD AUTO: 270 K/UL
PMV BLD AUTO: 9.7 FL
POCT GLUCOSE: 107 MG/DL (ref 70–110)
POCT GLUCOSE: 136 MG/DL (ref 70–110)
POCT GLUCOSE: 147 MG/DL (ref 70–110)
POCT GLUCOSE: 156 MG/DL (ref 70–110)
POCT GLUCOSE: 247 MG/DL (ref 70–110)
POTASSIUM SERPL-SCNC: 4.3 MMOL/L
POTASSIUM SERPL-SCNC: 4.7 MMOL/L
PROT SERPL-MCNC: 7 G/DL
RBC # BLD AUTO: 4.32 M/UL
SODIUM SERPL-SCNC: 138 MMOL/L
WBC # BLD AUTO: 4.22 K/UL

## 2017-05-25 PROCEDURE — 85025 COMPLETE CBC W/AUTO DIFF WBC: CPT

## 2017-05-25 PROCEDURE — 99232 SBSQ HOSP IP/OBS MODERATE 35: CPT | Mod: GC,,, | Performed by: INTERNAL MEDICINE

## 2017-05-25 PROCEDURE — 99233 SBSQ HOSP IP/OBS HIGH 50: CPT | Mod: ,,, | Performed by: INTERNAL MEDICINE

## 2017-05-25 PROCEDURE — 36415 COLL VENOUS BLD VENIPUNCTURE: CPT

## 2017-05-25 PROCEDURE — 25000003 PHARM REV CODE 250: Performed by: HOSPITALIST

## 2017-05-25 PROCEDURE — 83735 ASSAY OF MAGNESIUM: CPT

## 2017-05-25 PROCEDURE — 94640 AIRWAY INHALATION TREATMENT: CPT

## 2017-05-25 PROCEDURE — 63600175 PHARM REV CODE 636 W HCPCS: Performed by: INTERNAL MEDICINE

## 2017-05-25 PROCEDURE — 84132 ASSAY OF SERUM POTASSIUM: CPT

## 2017-05-25 PROCEDURE — 25000242 PHARM REV CODE 250 ALT 637 W/ HCPCS: Performed by: INTERNAL MEDICINE

## 2017-05-25 PROCEDURE — 20600001 HC STEP DOWN PRIVATE ROOM

## 2017-05-25 PROCEDURE — 25000003 PHARM REV CODE 250: Performed by: INTERNAL MEDICINE

## 2017-05-25 PROCEDURE — 80053 COMPREHEN METABOLIC PANEL: CPT

## 2017-05-25 PROCEDURE — 83735 ASSAY OF MAGNESIUM: CPT | Mod: 91

## 2017-05-25 PROCEDURE — 27000221 HC OXYGEN, UP TO 24 HOURS

## 2017-05-25 RX ORDER — FUROSEMIDE 10 MG/ML
80 INJECTION INTRAMUSCULAR; INTRAVENOUS 2 TIMES DAILY
Status: DISCONTINUED | OUTPATIENT
Start: 2017-05-25 | End: 2017-05-26

## 2017-05-25 RX ADMIN — NICOTINE 1 PATCH: 21 PATCH, EXTENDED RELEASE TRANSDERMAL at 08:05

## 2017-05-25 RX ADMIN — TRAZODONE HYDROCHLORIDE 150 MG: 50 TABLET ORAL at 10:05

## 2017-05-25 RX ADMIN — HEPARIN SODIUM 5000 UNITS: 5000 INJECTION, SOLUTION INTRAVENOUS; SUBCUTANEOUS at 01:05

## 2017-05-25 RX ADMIN — ALBUTEROL SULFATE 2.5 MG: 2.5 SOLUTION RESPIRATORY (INHALATION) at 12:05

## 2017-05-25 RX ADMIN — INSULIN ASPART 2 UNITS: 100 INJECTION, SOLUTION INTRAVENOUS; SUBCUTANEOUS at 04:05

## 2017-05-25 RX ADMIN — BECLOMETHASONE DIPROPIONATE 2 PUFF: 40 AEROSOL, METERED RESPIRATORY (INHALATION) at 10:05

## 2017-05-25 RX ADMIN — SODIUM CHLORIDE, PRESERVATIVE FREE 3 ML: 5 INJECTION INTRAVENOUS at 05:05

## 2017-05-25 RX ADMIN — ALBUTEROL SULFATE 2.5 MG: 2.5 SOLUTION RESPIRATORY (INHALATION) at 03:05

## 2017-05-25 RX ADMIN — ASPIRIN 81 MG: 81 TABLET, COATED ORAL at 08:05

## 2017-05-25 RX ADMIN — ALBUTEROL SULFATE 2.5 MG: 2.5 SOLUTION RESPIRATORY (INHALATION) at 11:05

## 2017-05-25 RX ADMIN — HEPARIN SODIUM 5000 UNITS: 5000 INJECTION, SOLUTION INTRAVENOUS; SUBCUTANEOUS at 05:05

## 2017-05-25 RX ADMIN — LISINOPRIL 5 MG: 5 TABLET ORAL at 08:05

## 2017-05-25 RX ADMIN — HEPARIN SODIUM 5000 UNITS: 5000 INJECTION, SOLUTION INTRAVENOUS; SUBCUTANEOUS at 10:05

## 2017-05-25 RX ADMIN — ATORVASTATIN CALCIUM 40 MG: 20 TABLET, FILM COATED ORAL at 08:05

## 2017-05-25 RX ADMIN — FUROSEMIDE 80 MG: 10 INJECTION, SOLUTION INTRAVENOUS at 05:05

## 2017-05-25 RX ADMIN — ALBUTEROL SULFATE 2.5 MG: 2.5 SOLUTION RESPIRATORY (INHALATION) at 07:05

## 2017-05-25 RX ADMIN — SODIUM CHLORIDE, PRESERVATIVE FREE 3 ML: 5 INJECTION INTRAVENOUS at 10:05

## 2017-05-25 RX ADMIN — STANDARDIZED SENNA CONCENTRATE AND DOCUSATE SODIUM 2 TABLET: 8.6; 5 TABLET, FILM COATED ORAL at 08:05

## 2017-05-25 RX ADMIN — ACETAMINOPHEN 500 MG: 500 TABLET ORAL at 03:05

## 2017-05-25 RX ADMIN — ACETAMINOPHEN 500 MG: 500 TABLET ORAL at 08:05

## 2017-05-25 RX ADMIN — PANTOPRAZOLE SODIUM 40 MG: 40 TABLET, DELAYED RELEASE ORAL at 08:05

## 2017-05-25 RX ADMIN — BECLOMETHASONE DIPROPIONATE 2 PUFF: 40 AEROSOL, METERED RESPIRATORY (INHALATION) at 08:05

## 2017-05-25 RX ADMIN — POLYETHYLENE GLYCOL 3350 17 G: 17 POWDER, FOR SOLUTION ORAL at 08:05

## 2017-05-25 RX ADMIN — LIDOCAINE 1 PATCH: 50 PATCH TOPICAL at 08:05

## 2017-05-25 RX ADMIN — POTASSIUM CHLORIDE 40 MEQ: 1500 TABLET, EXTENDED RELEASE ORAL at 08:05

## 2017-05-25 RX ADMIN — ACETAMINOPHEN 500 MG: 500 TABLET ORAL at 10:05

## 2017-05-25 NOTE — PROGRESS NOTES
05/25/17 1417   Vital Signs   BP (!) 95/53   MAP (mmHg) 72   BP Location Left arm   BP Method Automatic   Patient Position Lying   pT 17 beat run VT. Asymptomatic. Harjit Purdy MD notified. Stated will place BMP orders

## 2017-05-25 NOTE — PROGRESS NOTES
Progress Note      Patient Name: Polly Kwan  YOB: 1960    Admit Date: 5/21/2017                     LOS: 4    SUBJECTIVE:     Reason for Admission:  Acute exacerbation of CHF (congestive heart failure)    This is Polly Kwan     -- Over the night, she diuresed very well  -- net 5.4 L in last 24h and net - 7.6 L since admission   -- reports mild improvement in her breathing     OBJECTIVE:     Vital Signs (Most Recent):    Temp: 98.3 °F (36.8 °C) (05/25/17 0830)  Pulse: 71 (05/25/17 0830)  Resp: 12 (05/25/17 0830)  BP: (!) 100/59 (05/25/17 0830)  SpO2: 95 % (05/25/17 0830) Vital Signs Range (Last 24H):  Temp:  [97.8 °F (36.6 °C)-98.3 °F (36.8 °C)]   Pulse:  [63-82]   Resp:  [12-20]   BP: ()/(51-64)   SpO2:  [93 %-100 %]        I & O (Last 24H):  Intake/Output Summary (Last 24 hours) at 05/25/17 0856  Last data filed at 05/25/17 0830   Gross per 24 hour   Intake             1225 ml   Output             6525 ml   Net            -5300 ml    Body mass index is 25.44 kg/m².     Physical Exam:  General: Well developed, oriented to person, place, and time. Not in distress  Neck:  JVD present.   Cardiovascular: Normal rate, regular rhythm, S1 normal, S2 normal, normal heart sounds and intact distal pulses.  Exam reveals no gallop and no friction rub. No murmur heard.  Pulmonary/Chest:decrease breath sound b/l. No respiratory distress. She has no wheezes. She has rales in the right middle field, the right lower field, the left middle field and the left lower field. She exhibits no tenderness.   Abdominal: Soft. Normal appearance and bowel sounds are normal. Mild distension. There is generalized tenderness. There is no rebound and no guarding.     Diagnostic Results:    Labs:    Complete Blood Count Coagulation Profile       Recent Labs  Lab 05/23/17  0650 05/24/17  0453 05/25/17  0437   WBC 3.80* 4.29 4.22   HGB 11.0* 11.4* 12.0   HCT 32.9* 34.0* 35.5*    263 270   MCV 82 82 82   RDW 18.6* 18.7*  18.8*    No results for input(s): INR, APTT in the last 168 hours.    Invalid input(s): PT     Basic Metabolic Panel  Liver Function Test     Recent Labs  Lab 05/23/17  0650 05/24/17  0453 05/25/17  0437    136 138   K 3.5 3.8 4.7    100 100   CO2 28 28 29   BUN 11 11 10   CREATININE 1.1 1.0 1.0   CALCIUM 8.4* 8.5* 9.3   MG 1.9 1.7 2.1      Recent Labs  Lab 05/23/17  0650 05/24/17  0453 05/25/17  0437   PROT 6.1 6.6 7.0   BILITOT 1.7* 1.6* 1.7*   ALKPHOS 76 83 88   ALT 7* 8* 8*   AST 13 14 17        POCT Glucose: HbA1c:      Recent Labs  Lab 05/23/17  1705 05/23/17  2125 05/24/17  0756 05/24/17  1149 05/24/17  1750 05/24/17  2118   POCTGLUCOSE 99 175* 133* 133* 123* 136*    Hemoglobin A1C   Date Value Ref Range Status   04/07/2017 6.2 4.5 - 6.2 % Final     Comment:     According to ADA guidelines, hemoglobin A1C <7.0% represents  optimal control in non-pregnant diabetic patients.  Different  metrics may apply to specific populations.   Standards of Medical Care in Diabetes - 2016.  For the purpose of screening for the presence of diabetes:  <5.7%     Consistent with the absence of diabetes  5.7-6.4%  Consistent with increasing risk for diabetes   (prediabetes)  >or=6.5%  Consistent with diabetes  Currently no consensus exists for use of hemoglobin A1C  for diagnosis of diabetes for children.     05/25/2016 5.8 4.5 - 6.2 % Final   01/05/2015 6.3 (H) 4.5 - 6.2 % Final        Estimated Creatinine Clearance: 54.2 mL/min (based on Cr of 1).    Imaging:   CXR:   Cardiomegaly with mild interstitial edema, no large focal consolidation.  There may be trace effusions versus atelectasis.    In-Hospital Medication:  Scheduled Meds:   albuterol sulfate  2.5 mg Nebulization Q4H    aspirin  81 mg Oral Daily    atorvastatin  40 mg Oral Daily    beclomethasone  2 puff Inhalation BID    heparin (porcine)  5,000 Units Subcutaneous Q8H    lidocaine  1 patch Transdermal Q24H    lisinopril  5 mg Oral Daily    nicotine   1 patch Transdermal Daily    pantoprazole  40 mg Oral Daily    polyethylene glycol  17 g Oral Daily    potassium chloride  40 mEq Oral Daily    senna-docusate 8.6-50 mg  2 tablet Oral Daily    sodium chloride 0.9%  3 mL Intravenous Q8H    trazodone  150 mg Oral QHS     Continuous Infusions:   furosemide (LASIX) 1 mg/mL infusion (non-titrating) 10 mg/hr (05/24/17 1723)     PRN Meds:.acetaminophen, bisacodyl, dextrose 50%, dextrose 50%, glucagon (human recombinant), glucose, glucose, insulin aspart, nitroGLYCERIN, ondansetron    ASSESSMENT/PLAN:     Polly Kwan is a 57 y.o. female with a history significant for ICM with LVEF 20%, s/p DC ICD 2013 (medtronic), CAD s/p PCI in 2006 and 2013, HTN, DM2, polysubstance abuse (tobacco and cocaine), and schizophrenia who presents admitted as case of decompensated heart failure     ADHF  - pt. diuresed very well  - net 5.4 L in last 24h and net - 7.6 L since admission    - stop lasix gtt 10 mg/h   - start lasix 80 mg IV BID for 2-3 days then switch to PO   - consider increasing Lisinopril to 10 mg if BP can tolerate   - Echo : EF 20 with diastolic dysfunction   - strict I/Os, daily weights, 2 gm Na/fluid restricted diet  - given history of cocaine use, avoid B-blockers       Thank you for this consult. Will sign off. Further recommendations are pending the attending addendum. Please do not hesitate to page with questions.      Signed:  Kaur Luciano MD  [id number]

## 2017-05-26 VITALS
WEIGHT: 138.69 LBS | DIASTOLIC BLOOD PRESSURE: 56 MMHG | SYSTOLIC BLOOD PRESSURE: 104 MMHG | HEIGHT: 62 IN | BODY MASS INDEX: 25.52 KG/M2 | HEART RATE: 67 BPM | RESPIRATION RATE: 17 BRPM | TEMPERATURE: 99 F | OXYGEN SATURATION: 97 %

## 2017-05-26 PROBLEM — R07.9 CHEST PAIN: Status: RESOLVED | Noted: 2017-05-21 | Resolved: 2017-05-26

## 2017-05-26 PROBLEM — R10.9 ABDOMINAL PAIN: Status: RESOLVED | Noted: 2017-04-07 | Resolved: 2017-05-26

## 2017-05-26 LAB
ALBUMIN SERPL BCP-MCNC: 3 G/DL
ALP SERPL-CCNC: 87 U/L
ALT SERPL W/O P-5'-P-CCNC: 9 U/L
ANION GAP SERPL CALC-SCNC: 7 MMOL/L
AST SERPL-CCNC: 14 U/L
BASOPHILS # BLD AUTO: 0.01 K/UL
BASOPHILS NFR BLD: 0.2 %
BILIRUB SERPL-MCNC: 1.4 MG/DL
BUN SERPL-MCNC: 17 MG/DL
BURR CELLS BLD QL SMEAR: ABNORMAL
CALCIUM SERPL-MCNC: 9 MG/DL
CHLORIDE SERPL-SCNC: 97 MMOL/L
CO2 SERPL-SCNC: 31 MMOL/L
CREAT SERPL-MCNC: 1.1 MG/DL
DACRYOCYTES BLD QL SMEAR: ABNORMAL
DIFFERENTIAL METHOD: ABNORMAL
EOSINOPHIL # BLD AUTO: 0.1 K/UL
EOSINOPHIL NFR BLD: 1.1 %
ERYTHROCYTE [DISTWIDTH] IN BLOOD BY AUTOMATED COUNT: 18.9 %
EST. GFR  (AFRICAN AMERICAN): >60 ML/MIN/1.73 M^2
EST. GFR  (NON AFRICAN AMERICAN): 55.9 ML/MIN/1.73 M^2
GLUCOSE SERPL-MCNC: 125 MG/DL
HCT VFR BLD AUTO: 36.6 %
HGB BLD-MCNC: 12 G/DL
LYMPHOCYTES # BLD AUTO: 2.1 K/UL
LYMPHOCYTES NFR BLD: 46.1 %
MAGNESIUM SERPL-MCNC: 2 MG/DL
MCH RBC QN AUTO: 27.3 PG
MCHC RBC AUTO-ENTMCNC: 32.8 %
MCV RBC AUTO: 83 FL
MONOCYTES # BLD AUTO: 0.4 K/UL
MONOCYTES NFR BLD: 7.7 %
NEUTROPHILS # BLD AUTO: 2 K/UL
NEUTROPHILS NFR BLD: 44.9 %
OVALOCYTES BLD QL SMEAR: ABNORMAL
PLATELET # BLD AUTO: 267 K/UL
PLATELET BLD QL SMEAR: ABNORMAL
PMV BLD AUTO: 10.1 FL
POCT GLUCOSE: 127 MG/DL (ref 70–110)
POTASSIUM SERPL-SCNC: 4.5 MMOL/L
PROT SERPL-MCNC: 6.7 G/DL
RBC # BLD AUTO: 4.4 M/UL
SCHISTOCYTES BLD QL SMEAR: PRESENT
SODIUM SERPL-SCNC: 135 MMOL/L
WBC # BLD AUTO: 4.56 K/UL

## 2017-05-26 PROCEDURE — 25000003 PHARM REV CODE 250: Performed by: HOSPITALIST

## 2017-05-26 PROCEDURE — 94640 AIRWAY INHALATION TREATMENT: CPT

## 2017-05-26 PROCEDURE — 25000003 PHARM REV CODE 250: Performed by: INTERNAL MEDICINE

## 2017-05-26 PROCEDURE — 80053 COMPREHEN METABOLIC PANEL: CPT

## 2017-05-26 PROCEDURE — 36415 COLL VENOUS BLD VENIPUNCTURE: CPT

## 2017-05-26 PROCEDURE — 99239 HOSP IP/OBS DSCHRG MGMT >30: CPT | Mod: ,,, | Performed by: INTERNAL MEDICINE

## 2017-05-26 PROCEDURE — 85025 COMPLETE CBC W/AUTO DIFF WBC: CPT

## 2017-05-26 PROCEDURE — 25000242 PHARM REV CODE 250 ALT 637 W/ HCPCS: Performed by: INTERNAL MEDICINE

## 2017-05-26 PROCEDURE — 83735 ASSAY OF MAGNESIUM: CPT

## 2017-05-26 PROCEDURE — 27000221 HC OXYGEN, UP TO 24 HOURS

## 2017-05-26 RX ORDER — LISINOPRIL 5 MG/1
5 TABLET ORAL DAILY
Qty: 30 TABLET | Refills: 0 | Status: SHIPPED | OUTPATIENT
Start: 2017-05-26 | End: 2017-05-30 | Stop reason: SDUPTHER

## 2017-05-26 RX ORDER — FUROSEMIDE 80 MG/1
80 TABLET ORAL 2 TIMES DAILY
Status: DISCONTINUED | OUTPATIENT
Start: 2017-05-26 | End: 2017-05-26 | Stop reason: HOSPADM

## 2017-05-26 RX ORDER — BISACODYL 5 MG
5 TABLET, DELAYED RELEASE (ENTERIC COATED) ORAL DAILY PRN
Refills: 0 | COMMUNITY
Start: 2017-05-26 | End: 2017-05-26

## 2017-05-26 RX ORDER — FUROSEMIDE 80 MG/1
80 TABLET ORAL 2 TIMES DAILY
Qty: 60 TABLET | Refills: 0 | Status: SHIPPED | OUTPATIENT
Start: 2017-05-26 | End: 2017-05-26

## 2017-05-26 RX ORDER — PSEUDOEPHEDRINE/ACETAMINOPHEN 30MG-500MG
100 TABLET ORAL ONCE
Status: COMPLETED | OUTPATIENT
Start: 2017-05-26 | End: 2017-05-26

## 2017-05-26 RX ORDER — ASPIRIN 81 MG/1
81 TABLET ORAL DAILY
Qty: 30 TABLET | Refills: 0 | Status: SHIPPED | OUTPATIENT
Start: 2017-05-26 | End: 2017-05-30 | Stop reason: SDUPTHER

## 2017-05-26 RX ORDER — ATORVASTATIN CALCIUM 40 MG/1
40 TABLET, FILM COATED ORAL DAILY
Qty: 30 TABLET | Refills: 0 | Status: SHIPPED | OUTPATIENT
Start: 2017-05-26 | End: 2017-05-30 | Stop reason: SDUPTHER

## 2017-05-26 RX ORDER — SYRING-NEEDL,DISP,INSUL,0.3 ML 29 G X1/2"
300 SYRINGE, EMPTY DISPOSABLE MISCELLANEOUS ONCE
Status: COMPLETED | OUTPATIENT
Start: 2017-05-26 | End: 2017-05-26

## 2017-05-26 RX ORDER — FUROSEMIDE 80 MG/1
80 TABLET ORAL 2 TIMES DAILY
Qty: 60 TABLET | Refills: 0 | Status: SHIPPED | OUTPATIENT
Start: 2017-05-26 | End: 2017-05-30 | Stop reason: SDUPTHER

## 2017-05-26 RX ORDER — BISACODYL 5 MG
5 TABLET, DELAYED RELEASE (ENTERIC COATED) ORAL DAILY PRN
Refills: 0 | COMMUNITY
Start: 2017-05-26 | End: 2017-06-25

## 2017-05-26 RX ADMIN — MAGESIUM CITRATE 300 ML: 1.75 LIQUID ORAL at 03:05

## 2017-05-26 RX ADMIN — FUROSEMIDE 80 MG: 80 TABLET ORAL at 05:05

## 2017-05-26 RX ADMIN — HEPARIN SODIUM 5000 UNITS: 5000 INJECTION, SOLUTION INTRAVENOUS; SUBCUTANEOUS at 06:05

## 2017-05-26 RX ADMIN — SODIUM CHLORIDE, PRESERVATIVE FREE 3 ML: 5 INJECTION INTRAVENOUS at 06:05

## 2017-05-26 RX ADMIN — STANDARDIZED SENNA CONCENTRATE AND DOCUSATE SODIUM 2 TABLET: 8.6; 5 TABLET, FILM COATED ORAL at 08:05

## 2017-05-26 RX ADMIN — ATORVASTATIN CALCIUM 40 MG: 20 TABLET, FILM COATED ORAL at 08:05

## 2017-05-26 RX ADMIN — Medication 100 ML: at 03:05

## 2017-05-26 RX ADMIN — ALBUTEROL SULFATE 2.5 MG: 2.5 SOLUTION RESPIRATORY (INHALATION) at 03:05

## 2017-05-26 RX ADMIN — ACETAMINOPHEN 500 MG: 500 TABLET ORAL at 01:05

## 2017-05-26 RX ADMIN — PANTOPRAZOLE SODIUM 40 MG: 40 TABLET, DELAYED RELEASE ORAL at 08:05

## 2017-05-26 RX ADMIN — HEPARIN SODIUM 5000 UNITS: 5000 INJECTION, SOLUTION INTRAVENOUS; SUBCUTANEOUS at 01:05

## 2017-05-26 RX ADMIN — NICOTINE 1 PATCH: 21 PATCH, EXTENDED RELEASE TRANSDERMAL at 08:05

## 2017-05-26 RX ADMIN — LIDOCAINE 1 PATCH: 50 PATCH TOPICAL at 05:05

## 2017-05-26 RX ADMIN — ALBUTEROL SULFATE 2.5 MG: 2.5 SOLUTION RESPIRATORY (INHALATION) at 04:05

## 2017-05-26 RX ADMIN — POTASSIUM CHLORIDE 40 MEQ: 1500 TABLET, EXTENDED RELEASE ORAL at 08:05

## 2017-05-26 RX ADMIN — SODIUM CHLORIDE, PRESERVATIVE FREE 3 ML: 5 INJECTION INTRAVENOUS at 01:05

## 2017-05-26 RX ADMIN — ALBUTEROL SULFATE 2.5 MG: 2.5 SOLUTION RESPIRATORY (INHALATION) at 12:05

## 2017-05-26 RX ADMIN — BECLOMETHASONE DIPROPIONATE 2 PUFF: 40 AEROSOL, METERED RESPIRATORY (INHALATION) at 08:05

## 2017-05-26 RX ADMIN — POLYETHYLENE GLYCOL 3350 17 G: 17 POWDER, FOR SOLUTION ORAL at 08:05

## 2017-05-26 RX ADMIN — ASPIRIN 81 MG: 81 TABLET, COATED ORAL at 08:05

## 2017-05-26 RX ADMIN — SODIUM CHLORIDE 500 ML: 0.9 INJECTION, SOLUTION INTRAVENOUS at 03:05

## 2017-05-26 RX ADMIN — ONDANSETRON 4 MG: 4 TABLET, ORALLY DISINTEGRATING ORAL at 03:05

## 2017-05-26 RX ADMIN — ALBUTEROL SULFATE 2.5 MG: 2.5 SOLUTION RESPIRATORY (INHALATION) at 07:05

## 2017-05-26 NOTE — ASSESSMENT & PLAN NOTE
Likely secondary to above. New episode of chest pain and SOB today. ECG without ST or T wave abnormalities to consider ACS. Currently resolved.  1. Monitor.

## 2017-05-26 NOTE — PROGRESS NOTES
[facility] Hospital Medicine  Progress Note    Patient Name: Polly Kwan  MRN: [id number]  Patient Class: IP- Inpatient   Admission Date: 5/21/2017  Length of Stay: 4 days  Attending Physician: Andreina Lombardi MD  Primary Care Provider: Iza Kwan MD    [facility] Medicine Team: [facility] HOSP MED C Andreina Lombardi MD    Subjective:     Principal Problem:Acute exacerbation of CHF (congestive heart failure)    HPI:  Ms. Kwan is a 57 RADHA lady with  PMH of ICM with LVEF 20%, s/p DC ICD 2013 (medtronic), CAD s/p PCI x2 (2006 and 2013), HTN, DM2, polysubstance abuse (tobacco and cocaine), and schizophrenia who presents after ICD discharge earlier tonight.      Patient is alert and oriented and converse coherently.      She reports progressive VIEYRA over the past week, now occurring with minimal exertion, as well as 4-pillow orthopnea and nightly PND. She also reports abdominal fullness/bloating with nausea and an episode of emesis today. She experienced chest pain today as well, described as sharp, left-sided localized around her ICD generator and left breast, worse with deep inspiration and palpation, worse sense ICD discharge. She has a history of cocaine abuse and reports last use this past Wednesday at a party.     She also reported that her pacemaker device is mobile inside the skin pocket and gives her pain while moving her left arm.      At the time of my interview,she denied CP, SOB or palpitation. However, she complained of nausea after KCl pills were given.     Hospital Course:  Patient admitted to Hospital Medicine Team C for diuresis as she is not a candidate for advanced options. On hospital day 2 the patient complained of continued abdominal pain. She had no chest pain or SOB. However on hospital day 3 the patient developed retrosternal chest pain with associated SOB. He pain was mildly improved with sublingual nitroglycerin. On hospital day 4 she continued to complained of  "generalized pain. She was still mildly SOB.    Interval History: "I'm still hurting". No acute overnight events. Diuresed 5 liters in 24 hours.    Review of Systems   Constitutional: Negative for chills, fatigue, fever and unexpected weight change.   HENT: Negative for ear pain, facial swelling, hearing loss, mouth sores, nosebleeds, rhinorrhea, sinus pressure, sore throat, tinnitus, trouble swallowing and voice change.    Eyes: Negative for photophobia, pain, redness and visual disturbance.   Respiratory: Positive for shortness of breath. Negative for cough, chest tightness and wheezing.    Cardiovascular: Positive for chest pain. Negative for palpitations and leg swelling.   Gastrointestinal: Positive for abdominal pain. Negative for blood in stool, constipation, diarrhea, nausea and vomiting.   Endocrine: Negative for cold intolerance, heat intolerance, polydipsia, polyphagia and polyuria.   Genitourinary: Negative for decreased urine volume, dysuria, flank pain, frequency, hematuria, menstrual problem, urgency, vaginal bleeding, vaginal discharge and vaginal pain.   Musculoskeletal: Negative for arthralgias, back pain, joint swelling, myalgias and neck pain.   Skin: Negative for rash.   Allergic/Immunologic: Negative for environmental allergies, food allergies and immunocompromised state.   Neurological: Negative for dizziness, seizures, syncope, weakness, light-headedness, numbness and headaches.   Hematological: Negative for adenopathy. Does not bruise/bleed easily.   Psychiatric/Behavioral: Negative for confusion, hallucinations, self-injury, sleep disturbance and suicidal ideas. The patient is not nervous/anxious.      Objective:     Vital Signs (Most Recent):  Temp: 97.5 °F (36.4 °C) (05/25/17 1542)  Pulse: 69 (05/25/17 1933)  Resp: 16 (05/25/17 1933)  BP: (!) 91/53 (05/25/17 1542)  SpO2: 99 % (05/25/17 1933) Vital Signs (24h Range):  Temp:  [97.3 °F (36.3 °C)-98.3 °F (36.8 °C)] 97.5 °F (36.4 °C)  Pulse:  " [61-82] 69  Resp:  [12-18] 16  SpO2:  [95 %-100 %] 99 %  BP: ()/(53-64) 91/53     Weight: 63.1 kg (139 lb 1.8 oz)  Body mass index is 25.44 kg/m².    Intake/Output Summary (Last 24 hours) at 05/25/17 1951  Last data filed at 05/25/17 1500   Gross per 24 hour   Intake             1305 ml   Output             4875 ml   Net            -3570 ml      Physical Exam   Constitutional: She is oriented to person, place, and time. She appears well-developed and well-nourished. She is cooperative. She is easily aroused.  Non-toxic appearance. No distress.   HENT:   Head: Normocephalic and atraumatic. Head is without right periorbital erythema and without left periorbital erythema.   Right Ear: Hearing, tympanic membrane, external ear and ear canal normal. No swelling.   Left Ear: Hearing, tympanic membrane, external ear and ear canal normal. No swelling.   Nose: Nose normal. No nasal deformity.   Mouth/Throat: Uvula is midline, oropharynx is clear and moist and mucous membranes are normal. No oropharyngeal exudate.   Eyes: Conjunctivae, EOM and lids are normal. Pupils are equal, round, and reactive to light. Right eye exhibits no discharge and no exudate. Left eye exhibits no discharge and no exudate. Right conjunctiva is not injected. Left conjunctiva is not injected. No scleral icterus.   Neck: Normal range of motion. Neck supple. Hepatojugular reflux and JVD present. No tracheal deviation present. No thyromegaly present.   Cardiovascular: Normal rate, regular rhythm, S1 normal, S2 normal, normal heart sounds and intact distal pulses.  Exam reveals no gallop and no friction rub.    No murmur heard.  Pulmonary/Chest: Effort normal. No accessory muscle usage or stridor. No tachypnea. No respiratory distress. She has no wheezes. She has rales in the right lower field and the left lower field. She exhibits no tenderness.   Abdominal: Soft. Normal appearance and bowel sounds are normal. She exhibits distension, fluid wave  and ascites. There is generalized tenderness. There is no rebound and no guarding.   Musculoskeletal: Normal range of motion. She exhibits no edema or tenderness.   Neurological: She is alert, oriented to person, place, and time and easily aroused. No cranial nerve deficit. Coordination normal.   Skin: Skin is warm and dry. No lesion and no rash noted. She is not diaphoretic. No cyanosis or erythema. No pallor. Nails show no clubbing.   Psychiatric: She has a normal mood and affect. Her speech is normal and behavior is normal. Judgment and thought content normal.   Nursing note and vitals reviewed.      Significant Labs:   BMP:   Recent Labs  Lab 05/25/17  0437 05/25/17  1435   GLU 99  --      --    K 4.7 4.3     --    CO2 29  --    BUN 10  --    CREATININE 1.0  --    CALCIUM 9.3  --    MG 2.1 2.2     CBC:   Recent Labs  Lab 05/24/17  0453 05/25/17  0437   WBC 4.29 4.22   HGB 11.4* 12.0   HCT 34.0* 35.5*    270       Significant Imaging: I have reviewed all pertinent imaging results/findings within the past 24 hours.    Assessment/Plan:      * Acute exacerbation of CHF (congestive heart failure)    Still with signs of acute HF however much improved. Net negative 5 liters in past 24 hours with furosemide drip initiated by Cardiology service.   1. Furosemide 80 mg IV BID.  2. Fluid restriction: 1,500 ml  3. Strict I&O. Weight daily.        Chest pain    Likely secondary to above. New episode of chest pain and SOB today. ECG without ST or T wave abnormalities to consider ACS. Currently resolved.  1. Monitor.        CAD (coronary artery disease)    Stable and without anginal symptoms.  1. Aspirin 81 mg daily.  2. Atorvastatin 40 mg daily.  3. Antihypertensive therapy.          Essential hypertension    As per the 2014 Evidence-Based Guideline for the Management of High Blood Pressure in Adults from the Eight National Joint Committee (JNC-8), the patients goal BP should be <140/90. Currently well  controlled.   1. Lisinopril 5 mg daily.                Abdominal pain    Likely secondary to extra volume (i.e. acute on chronic CHF). Currently stable and no acute findings on examination. No elevation in LFT's.  1. Diuresis as above.          Depression    Chronic and stable. No suicidal ideation.   1. Trazodone nightly.          Cocaine abuse    Chronic. Patient uninterested in cessation. Likely to lead to readmissions.  1. Monitor for signs of withdrawal.          Smoking addiction    Patient uninterested in cessation.   1. Nicotine patch.          Diabetes mellitus    Well controlled on diabetic diet.  1. Glucose levels AC and HS.  2. Low dose correction SSI as needed.          Schizophrenia    Stable.   1. Trazodone as above.            VTE Risk Mitigation         Ordered     heparin (porcine) injection 5,000 Units  Every 8 hours     Route:  Subcutaneous        05/22/17 0017     Medium Risk of VTE  Once      05/22/17 0017          Andreina Lombardi MD  Department of Hospital Medicine   [facility]

## 2017-05-26 NOTE — PROGRESS NOTES
[facility] Hospital Medicine  Progress Note    Patient Name: Polly Kwan  MRN: [id number]  Patient Class: IP- Inpatient   Admission Date: 5/21/2017  Length of Stay: 5 days  Attending Physician: Andreina Lombardi MD  Primary Care Provider: Iza Kwan MD    [facility] Medicine Team: [facility] HOSP MED C Andreina Lombardi MD    Subjective:     Principal Problem:Acute exacerbation of CHF (congestive heart failure)    HPI:  Ms. Kwan is a 57 RADHA lady with  PMH of ICM with LVEF 20%, s/p DC ICD 2013 (medtronic), CAD s/p PCI x2 (2006 and 2013), HTN, DM2, polysubstance abuse (tobacco and cocaine), and schizophrenia who presents after ICD discharge earlier tonight.      Patient is alert and oriented and converse coherently.      She reports progressive VIEYRA over the past week, now occurring with minimal exertion, as well as 4-pillow orthopnea and nightly PND. She also reports abdominal fullness/bloating with nausea and an episode of emesis today. She experienced chest pain today as well, described as sharp, left-sided localized around her ICD generator and left breast, worse with deep inspiration and palpation, worse sense ICD discharge. She has a history of cocaine abuse and reports last use this past Wednesday at a party.     She also reported that her pacemaker device is mobile inside the skin pocket and gives her pain while moving her left arm.      At the time of my interview,she denied CP, SOB or palpitation. However, she complained of nausea after KCl pills were given.     Hospital Course:  Patient admitted to Hospital Medicine Team C for diuresis as she is not a candidate for advanced options. On hospital day 2 the patient complained of continued abdominal pain. She had no chest pain or SOB. However on hospital day 3 the patient developed retrosternal chest pain with associated SOB. He pain was mildly improved with sublingual nitroglycerin. On hospital day 4 she continued to complained of  "generalized pain. She was still mildly SOB. On hospital day 5 she complained of difficulty catching her breath due to abdominal distention. She was euvolemic and hemodynamically stable for discharge.     Interval History: "I can't catch my breath. I haven't had a bowel movement". No acute overnight events.     Review of Systems   Constitutional: Negative for chills, fatigue, fever and unexpected weight change.   HENT: Negative for ear pain, facial swelling, hearing loss, mouth sores, nosebleeds, rhinorrhea, sinus pressure, sore throat, tinnitus, trouble swallowing and voice change.    Eyes: Negative for photophobia, pain, redness and visual disturbance.   Respiratory: Positive for shortness of breath. Negative for cough, chest tightness and wheezing.    Cardiovascular: Negative for chest pain, palpitations and leg swelling.   Gastrointestinal: Positive for abdominal distention and constipation. Negative for abdominal pain, blood in stool, diarrhea, nausea and vomiting.   Endocrine: Negative for cold intolerance, heat intolerance, polydipsia, polyphagia and polyuria.   Genitourinary: Negative for decreased urine volume, dysuria, flank pain, frequency, hematuria, menstrual problem, urgency, vaginal bleeding, vaginal discharge and vaginal pain.   Musculoskeletal: Negative for arthralgias, back pain, joint swelling, myalgias and neck pain.   Skin: Negative for rash.   Allergic/Immunologic: Negative for environmental allergies, food allergies and immunocompromised state.   Neurological: Negative for dizziness, seizures, syncope, weakness, light-headedness, numbness and headaches.   Hematological: Negative for adenopathy. Does not bruise/bleed easily.   Psychiatric/Behavioral: Negative for confusion, hallucinations, self-injury, sleep disturbance and suicidal ideas. The patient is not nervous/anxious.      Objective:     Vital Signs (Most Recent):  Temp: 97.9 °F (36.6 °C) (05/26/17 1135)  Pulse: 64 (05/26/17 1135)  Resp: " 18 (05/26/17 1135)  BP: 98/61 (05/26/17 1135)  SpO2: 97 % (05/26/17 1135) Vital Signs (24h Range):  Temp:  [97.3 °F (36.3 °C)-98.6 °F (37 °C)] 97.9 °F (36.6 °C)  Pulse:  [60-80] 64  Resp:  [12-18] 18  SpO2:  [95 %-100 %] 97 %  BP: (83-98)/(51-61) 98/61     Weight: 62.9 kg (138 lb 10.7 oz)  Body mass index is 25.36 kg/m².    Intake/Output Summary (Last 24 hours) at 05/26/17 1159  Last data filed at 05/26/17 0400   Gross per 24 hour   Intake              985 ml   Output             2400 ml   Net            -1415 ml      Physical Exam   Constitutional: She is oriented to person, place, and time. She appears well-developed and well-nourished. She is cooperative. She is easily aroused.  Non-toxic appearance. No distress.   HENT:   Head: Normocephalic and atraumatic. Head is without right periorbital erythema and without left periorbital erythema.   Right Ear: Hearing, tympanic membrane, external ear and ear canal normal. No swelling.   Left Ear: Hearing, tympanic membrane, external ear and ear canal normal. No swelling.   Nose: Nose normal. No nasal deformity.   Mouth/Throat: Uvula is midline, oropharynx is clear and moist and mucous membranes are normal. No oropharyngeal exudate.   Eyes: Conjunctivae, EOM and lids are normal. Pupils are equal, round, and reactive to light. Right eye exhibits no discharge and no exudate. Left eye exhibits no discharge and no exudate. Right conjunctiva is not injected. Left conjunctiva is not injected. No scleral icterus.   Neck: Normal range of motion. Neck supple. No hepatojugular reflux and no JVD present. No tracheal deviation present. No thyromegaly present.   Cardiovascular: Normal rate, regular rhythm, S1 normal, S2 normal, normal heart sounds and intact distal pulses.  Exam reveals no gallop and no friction rub.    No murmur heard.  Pulmonary/Chest: Effort normal. No accessory muscle usage or stridor. No tachypnea. No respiratory distress. She has no wheezes. She has no rales. She  exhibits no tenderness.   Abdominal: Soft. Normal appearance and bowel sounds are normal. She exhibits distension. She exhibits no fluid wave and no ascites. There is no tenderness. There is no rebound and no guarding.   Musculoskeletal: Normal range of motion. She exhibits no edema or tenderness.   Neurological: She is alert, oriented to person, place, and time and easily aroused. No cranial nerve deficit. Coordination normal.   Skin: Skin is warm and dry. No lesion and no rash noted. She is not diaphoretic. No cyanosis or erythema. No pallor. Nails show no clubbing.   Psychiatric: She has a normal mood and affect. Her speech is normal and behavior is normal. Judgment and thought content normal.   Nursing note and vitals reviewed.      Significant Labs:   BMP:   Recent Labs  Lab 05/26/17  0526   *   *   K 4.5   CL 97   CO2 31*   BUN 17   CREATININE 1.1   CALCIUM 9.0   MG 2.0     CBC:   Recent Labs  Lab 05/25/17  0437 05/26/17  0526   WBC 4.22 4.56   HGB 12.0 12.0   HCT 35.5* 36.6*    267       Significant Imaging: I have reviewed all pertinent imaging results/findings within the past 24 hours.    Assessment/Plan:      * Acute exacerbation of CHF (congestive heart failure)    Patient euvolemic on examination today. She has lost 8 lbs since admission.  1. Furosemide 80 mg BID by mouth.  2. Fluid restriction: 1,500 ml  3. Strict I&O. Weight daily.        Chest pain    Likely secondary to above. ECG without ST or T wave abnormalities to consider ACS. Currently resolved.  1. Monitor.        CAD (coronary artery disease)    Stable and without anginal symptoms.  1. Aspirin 81 mg daily.  2. Atorvastatin 40 mg daily.  3. Antihypertensive therapy.          Essential hypertension    As per the 2014 Evidence-Based Guideline for the Management of High Blood Pressure in Adults from the Eight National Joint Committee (JNC-8), the patients goal BP should be <140/90. Currently well controlled.   1. Lisinopril 5  mg daily.                Abdominal pain    Likely secondary to extra volume (i.e. acute on chronic CHF). Currently stable and no acute findings on examination. No elevation in LFT's. Suspect constipation also contributing.  1. Diuresis as above.  2. Brown bomb enema.          Depression    Chronic and stable. No suicidal ideation.   1. Trazodone nightly.          Cocaine abuse    Chronic. Patient uninterested in cessation. Likely to lead to readmissions. Long discussion on need to stop to prevent readmissions.   1. Monitor for signs of withdrawal.          Smoking addiction    Patient uninterested in cessation.   1. Nicotine patch.          Diabetes mellitus    Well controlled on diabetic diet.  1. Glucose levels AC and HS.  2. Low dose correction SSI as needed.          Schizophrenia    Stable.   1. Trazodone as above.            VTE Risk Mitigation         Ordered     heparin (porcine) injection 5,000 Units  Every 8 hours     Route:  Subcutaneous        05/22/17 0017     Medium Risk of VTE  Once      05/22/17 0017          Andreina Lombardi MD  Department of Hospital Medicine   [facility]

## 2017-05-26 NOTE — PROGRESS NOTES
05/26/17 0815   Vital Signs   BP (!) 83/54   MAP (mmHg) 64   BP Location Right arm   BP Method Automatic   Patient Position Lying   Harjit Lombardi MD notified. Ordered hold 0900 Lisonpril & Lasix PO tablets. Orders implemented

## 2017-05-26 NOTE — SUBJECTIVE & OBJECTIVE
"Interval History: "I'm still hurting". No acute overnight events. Diuresed 5 liters in 24 hours.    Review of Systems   Constitutional: Negative for chills, fatigue, fever and unexpected weight change.   HENT: Negative for ear pain, facial swelling, hearing loss, mouth sores, nosebleeds, rhinorrhea, sinus pressure, sore throat, tinnitus, trouble swallowing and voice change.    Eyes: Negative for photophobia, pain, redness and visual disturbance.   Respiratory: Positive for shortness of breath. Negative for cough, chest tightness and wheezing.    Cardiovascular: Positive for chest pain. Negative for palpitations and leg swelling.   Gastrointestinal: Positive for abdominal pain. Negative for blood in stool, constipation, diarrhea, nausea and vomiting.   Endocrine: Negative for cold intolerance, heat intolerance, polydipsia, polyphagia and polyuria.   Genitourinary: Negative for decreased urine volume, dysuria, flank pain, frequency, hematuria, menstrual problem, urgency, vaginal bleeding, vaginal discharge and vaginal pain.   Musculoskeletal: Negative for arthralgias, back pain, joint swelling, myalgias and neck pain.   Skin: Negative for rash.   Allergic/Immunologic: Negative for environmental allergies, food allergies and immunocompromised state.   Neurological: Negative for dizziness, seizures, syncope, weakness, light-headedness, numbness and headaches.   Hematological: Negative for adenopathy. Does not bruise/bleed easily.   Psychiatric/Behavioral: Negative for confusion, hallucinations, self-injury, sleep disturbance and suicidal ideas. The patient is not nervous/anxious.      Objective:     Vital Signs (Most Recent):  Temp: 97.5 °F (36.4 °C) (05/25/17 1542)  Pulse: 69 (05/25/17 1933)  Resp: 16 (05/25/17 1933)  BP: (!) 91/53 (05/25/17 1542)  SpO2: 99 % (05/25/17 1933) Vital Signs (24h Range):  Temp:  [97.3 °F (36.3 °C)-98.3 °F (36.8 °C)] 97.5 °F (36.4 °C)  Pulse:  [61-82] 69  Resp:  [12-18] 16  SpO2:  [95 %-100 " %] 99 %  BP: ()/(53-64) 91/53     Weight: 63.1 kg (139 lb 1.8 oz)  Body mass index is 25.44 kg/m².    Intake/Output Summary (Last 24 hours) at 05/25/17 1951  Last data filed at 05/25/17 1500   Gross per 24 hour   Intake             1305 ml   Output             4875 ml   Net            -3570 ml      Physical Exam   Constitutional: She is oriented to person, place, and time. She appears well-developed and well-nourished. She is cooperative. She is easily aroused.  Non-toxic appearance. No distress.   HENT:   Head: Normocephalic and atraumatic. Head is without right periorbital erythema and without left periorbital erythema.   Right Ear: Hearing, tympanic membrane, external ear and ear canal normal. No swelling.   Left Ear: Hearing, tympanic membrane, external ear and ear canal normal. No swelling.   Nose: Nose normal. No nasal deformity.   Mouth/Throat: Uvula is midline, oropharynx is clear and moist and mucous membranes are normal. No oropharyngeal exudate.   Eyes: Conjunctivae, EOM and lids are normal. Pupils are equal, round, and reactive to light. Right eye exhibits no discharge and no exudate. Left eye exhibits no discharge and no exudate. Right conjunctiva is not injected. Left conjunctiva is not injected. No scleral icterus.   Neck: Normal range of motion. Neck supple. Hepatojugular reflux and JVD present. No tracheal deviation present. No thyromegaly present.   Cardiovascular: Normal rate, regular rhythm, S1 normal, S2 normal, normal heart sounds and intact distal pulses.  Exam reveals no gallop and no friction rub.    No murmur heard.  Pulmonary/Chest: Effort normal. No accessory muscle usage or stridor. No tachypnea. No respiratory distress. She has no wheezes. She has rales in the right lower field and the left lower field. She exhibits no tenderness.   Abdominal: Soft. Normal appearance and bowel sounds are normal. She exhibits distension, fluid wave and ascites. There is generalized tenderness.  There is no rebound and no guarding.   Musculoskeletal: Normal range of motion. She exhibits no edema or tenderness.   Neurological: She is alert, oriented to person, place, and time and easily aroused. No cranial nerve deficit. Coordination normal.   Skin: Skin is warm and dry. No lesion and no rash noted. She is not diaphoretic. No cyanosis or erythema. No pallor. Nails show no clubbing.   Psychiatric: She has a normal mood and affect. Her speech is normal and behavior is normal. Judgment and thought content normal.   Nursing note and vitals reviewed.      Significant Labs:   BMP:   Recent Labs  Lab 05/25/17  0437 05/25/17  1435   GLU 99  --      --    K 4.7 4.3     --    CO2 29  --    BUN 10  --    CREATININE 1.0  --    CALCIUM 9.3  --    MG 2.1 2.2     CBC:   Recent Labs  Lab 05/24/17  0453 05/25/17  0437   WBC 4.29 4.22   HGB 11.4* 12.0   HCT 34.0* 35.5*    270       Significant Imaging: I have reviewed all pertinent imaging results/findings within the past 24 hours.

## 2017-05-26 NOTE — PLAN OF CARE
Problem: Patient Care Overview  Goal: Plan of Care Review  Outcome: Revised  Treatment plan implemented. VSS. VT reported to MD. Electrolytes WNL. Patient stable alert and oriented. IVs intact; flushed & saline locked. Lasix gtt d/c per order. Strict I&O's implemented. Pain tx with prn pain medication. Discussed treatment plan. Reviewed medications and side effects, appointments, and answered questions with patient and family. Will continue to assess pT & implement orders.

## 2017-05-26 NOTE — ASSESSMENT & PLAN NOTE
Stable and without anginal symptoms.  1. Aspirin 81 mg daily.  2. Atorvastatin 40 mg daily.  3. Antihypertensive therapy.

## 2017-05-26 NOTE — ASSESSMENT & PLAN NOTE
Patient euvolemic on examination today. She has lost 8 lbs since admission.  1. Furosemide 80 mg BID by mouth.  2. Fluid restriction: 1,500 ml  3. Strict I&O. Weight daily.

## 2017-05-26 NOTE — PROGRESS NOTES
Harjit Lombardi MD notified pT would like medications sent to [facility] on [address]. Stated medications sent. pT updated.

## 2017-05-26 NOTE — PLAN OF CARE
Problem: Patient Care Overview  Goal: Plan of Care Review  Outcome: Revised  Patient is ready for discharge. Treatment plan implemented. Ex-WNL VS reported to MD. Enema given per order; pT stating relief met. Patient stable alert and oriented. IVs removed. Telemetry box removed. Headache tx with prn tylenol. Discussed discharge plan with Polly Kwan at bedside. Reviewed medications and side effects, appointments, and answered questions with patient and family. CANDI Magaña set up transportation for pT. Will continue to assess pT  & implement orders.

## 2017-05-26 NOTE — ASSESSMENT & PLAN NOTE
Still with signs of acute HF however much improved. Net negative 5 liters in past 24 hours with furosemide drip initiated by Cardiology service.   1. Furosemide 80 mg IV BID.  2. Fluid restriction: 1,500 ml  3. Strict I&O. Weight daily.

## 2017-05-26 NOTE — ASSESSMENT & PLAN NOTE
Likely secondary to extra volume (i.e. acute on chronic CHF). Currently stable and no acute findings on examination. No elevation in LFT's. Suspect constipation also contributing.  1. Diuresis as above.  2. Brown bomb enema.

## 2017-05-26 NOTE — PLAN OF CARE
Problem: Patient Care Overview  Goal: Plan of Care Review  Outcome: Ongoing (interventions implemented as appropriate)  No significant events noted throughout shift. Free of falls/trauma/inury. VSS. Denies any CP, SOB, palpitations, or dizziness. General skin remains intact with no new breakdown. Turning/repositioning independently in bed. Lasix IVP per MD orders, tolerating well. No c/o pain or any other discomforts this shift. Plan of care reviewed and updated, verbalizes understanding. No acute distress noted. Will continue to monitor.

## 2017-05-26 NOTE — ASSESSMENT & PLAN NOTE
Likely secondary to above. ECG without ST or T wave abnormalities to consider ACS. Currently resolved.  1. Monitor.

## 2017-05-26 NOTE — PROGRESS NOTES
Pt BP 86/58 manually. Notified Dr. Rivera. States he is ok with it at this time as pt has been runining low 90's   /50's. Pt asymptomatic with no complaints at this time. Will continue to monitor pt.

## 2017-05-26 NOTE — ASSESSMENT & PLAN NOTE
Chronic. Patient uninterested in cessation. Likely to lead to readmissions. Long discussion on need to stop to prevent readmissions.   1. Monitor for signs of withdrawal.

## 2017-05-26 NOTE — SUBJECTIVE & OBJECTIVE
"Interval History: "I can't catch my breath. I haven't had a bowel movement". No acute overnight events.     Review of Systems   Constitutional: Negative for chills, fatigue, fever and unexpected weight change.   HENT: Negative for ear pain, facial swelling, hearing loss, mouth sores, nosebleeds, rhinorrhea, sinus pressure, sore throat, tinnitus, trouble swallowing and voice change.    Eyes: Negative for photophobia, pain, redness and visual disturbance.   Respiratory: Positive for shortness of breath. Negative for cough, chest tightness and wheezing.    Cardiovascular: Negative for chest pain, palpitations and leg swelling.   Gastrointestinal: Positive for abdominal distention and constipation. Negative for abdominal pain, blood in stool, diarrhea, nausea and vomiting.   Endocrine: Negative for cold intolerance, heat intolerance, polydipsia, polyphagia and polyuria.   Genitourinary: Negative for decreased urine volume, dysuria, flank pain, frequency, hematuria, menstrual problem, urgency, vaginal bleeding, vaginal discharge and vaginal pain.   Musculoskeletal: Negative for arthralgias, back pain, joint swelling, myalgias and neck pain.   Skin: Negative for rash.   Allergic/Immunologic: Negative for environmental allergies, food allergies and immunocompromised state.   Neurological: Negative for dizziness, seizures, syncope, weakness, light-headedness, numbness and headaches.   Hematological: Negative for adenopathy. Does not bruise/bleed easily.   Psychiatric/Behavioral: Negative for confusion, hallucinations, self-injury, sleep disturbance and suicidal ideas. The patient is not nervous/anxious.      Objective:     Vital Signs (Most Recent):  Temp: 97.9 °F (36.6 °C) (05/26/17 1135)  Pulse: 64 (05/26/17 1135)  Resp: 18 (05/26/17 1135)  BP: 98/61 (05/26/17 1135)  SpO2: 97 % (05/26/17 1135) Vital Signs (24h Range):  Temp:  [97.3 °F (36.3 °C)-98.6 °F (37 °C)] 97.9 °F (36.6 °C)  Pulse:  [60-80] 64  Resp:  [12-18] " 18  SpO2:  [95 %-100 %] 97 %  BP: (83-98)/(51-61) 98/61     Weight: 62.9 kg (138 lb 10.7 oz)  Body mass index is 25.36 kg/m².    Intake/Output Summary (Last 24 hours) at 05/26/17 1159  Last data filed at 05/26/17 0400   Gross per 24 hour   Intake              985 ml   Output             2400 ml   Net            -1415 ml      Physical Exam   Constitutional: She is oriented to person, place, and time. She appears well-developed and well-nourished. She is cooperative. She is easily aroused.  Non-toxic appearance. No distress.   HENT:   Head: Normocephalic and atraumatic. Head is without right periorbital erythema and without left periorbital erythema.   Right Ear: Hearing, tympanic membrane, external ear and ear canal normal. No swelling.   Left Ear: Hearing, tympanic membrane, external ear and ear canal normal. No swelling.   Nose: Nose normal. No nasal deformity.   Mouth/Throat: Uvula is midline, oropharynx is clear and moist and mucous membranes are normal. No oropharyngeal exudate.   Eyes: Conjunctivae, EOM and lids are normal. Pupils are equal, round, and reactive to light. Right eye exhibits no discharge and no exudate. Left eye exhibits no discharge and no exudate. Right conjunctiva is not injected. Left conjunctiva is not injected. No scleral icterus.   Neck: Normal range of motion. Neck supple. No hepatojugular reflux and no JVD present. No tracheal deviation present. No thyromegaly present.   Cardiovascular: Normal rate, regular rhythm, S1 normal, S2 normal, normal heart sounds and intact distal pulses.  Exam reveals no gallop and no friction rub.    No murmur heard.  Pulmonary/Chest: Effort normal. No accessory muscle usage or stridor. No tachypnea. No respiratory distress. She has no wheezes. She has no rales. She exhibits no tenderness.   Abdominal: Soft. Normal appearance and bowel sounds are normal. She exhibits distension. She exhibits no fluid wave and no ascites. There is no tenderness. There is no  rebound and no guarding.   Musculoskeletal: Normal range of motion. She exhibits no edema or tenderness.   Neurological: She is alert, oriented to person, place, and time and easily aroused. No cranial nerve deficit. Coordination normal.   Skin: Skin is warm and dry. No lesion and no rash noted. She is not diaphoretic. No cyanosis or erythema. No pallor. Nails show no clubbing.   Psychiatric: She has a normal mood and affect. Her speech is normal and behavior is normal. Judgment and thought content normal.   Nursing note and vitals reviewed.      Significant Labs:   BMP:   Recent Labs  Lab 05/26/17  0526   *   *   K 4.5   CL 97   CO2 31*   BUN 17   CREATININE 1.1   CALCIUM 9.0   MG 2.0     CBC:   Recent Labs  Lab 05/25/17  0437 05/26/17  0526   WBC 4.22 4.56   HGB 12.0 12.0   HCT 35.5* 36.6*    267       Significant Imaging: I have reviewed all pertinent imaging results/findings within the past 24 hours.

## 2017-05-26 NOTE — PLAN OF CARE
SW consulted for transportation. Pt has Medicaid transportation benefits. SW requested ride with [facility] ([phone]) to pt's residential address on [place]. Nurses' station number and pt mobile number provided for follow up. Medicaid reports they have until 8:30PM to assign a .     Confirmation #[id number]    Pt provided w/ number to [employer] transport line so she can utilize it for doctors' appointments.    Kinga Meng LMSW, CCM  On Call SW

## 2017-05-27 NOTE — DISCHARGE SUMMARY
[facility] Hospital Medicine  Discharge Summary      Patient Name: Polly Kwan  MRN: [id number]  Admission Date: 5/21/2017  Hospital Length of Stay: 5 days  Discharge Date and Time: 5/26/2017  6:28 PM  Attending Physician: No att. providers found   Discharging Provider: Andreina Lombardi MD  Primary Care Provider: Iza Kwan MD    [facility] Medicine Team: [facility] HOSP MED C Andreina Lombardi MD    HPI: Ms. Kwan is a 57 RADHA lady with  PMH of ICM with LVEF 20%, s/p DC ICD 2013 (medtronic), CAD s/p PCI x2 (2006 and 2013), HTN, DM2, polysubstance abuse (tobacco and cocaine), and schizophrenia who presents after ICD discharge earlier tonight.      Patient is alert and oriented and converse coherently.      She reports progressive VIEYRA over the past week, now occurring with minimal exertion, as well as 4-pillow orthopnea and nightly PND. She also reports abdominal fullness/bloating with nausea and an episode of emesis today. She experienced chest pain today as well, described as sharp, left-sided localized around her ICD generator and left breast, worse with deep inspiration and palpation, worse sense ICD discharge. She has a history of cocaine abuse and reports last use this past Wednesday at a party.     She also reported that her pacemaker device is mobile inside the skin pocket and gives her pain while moving her left arm.      At the time of my interview,she denied CP, SOB or palpitation. However, she complained of nausea after KCl pills were given.     * No surgery found *      Indwelling Lines/Drains at time of discharge:   Lines/Drains/Airways          No matching active lines, drains, or airways        Hospital Course: Patient admitted to Hospital Medicine Team C for diuresis as she is not a candidate for advanced options. On hospital day 2 the patient complained of continued abdominal pain. She had no chest pain or SOB. However on hospital day 3 the patient developed retrosternal chest  pain with associated SOB. He pain was mildly improved with sublingual nitroglycerin. On hospital day 4 she continued to complained of generalized pain. She was still mildly SOB. On hospital day 5 she complained of difficulty catching her breath due to abdominal distention. She was euvolemic and hemodynamically stable for discharge.     Consults:   Consults         Status Ordering Provider     Inpatient consult to Cardiology  Once     Provider:  (Not yet assigned)    Completed ESME COSME     Inpatient consult to Cardiology  Once     Provider:  (Not yet assigned)    Completed ANNITA ZIEGLER          Significant Diagnostic Studies: Labs:   BMP:   Recent Labs  Lab 05/25/17  0437 05/25/17  1435 05/26/17  0526   GLU 99  --  125*     --  135*   K 4.7 4.3 4.5     --  97   CO2 29  --  31*   BUN 10  --  17   CREATININE 1.0  --  1.1   CALCIUM 9.3  --  9.0   MG 2.1 2.2 2.0    and CBC   Recent Labs  Lab 05/25/17  0437 05/26/17  0526   WBC 4.22 4.56   HGB 12.0 12.0   HCT 35.5* 36.6*    267       Pending Diagnostic Studies:     None        Final Active Diagnoses:    Diagnosis Date Noted POA    PRINCIPAL PROBLEM:  Acute exacerbation of CHF (congestive heart failure) [I50.9] 02/25/2014 Yes    Chest pain [R07.9] 05/21/2017 Yes    CAD (coronary artery disease) [I25.10] 03/06/2014 Yes    Essential hypertension [I10] 02/25/2014 Yes    Abdominal pain [R10.9] 04/07/2017 Yes    Depression [F32.9] 05/22/2017 Yes    Cocaine abuse [F14.10] 04/07/2017 Yes    Diabetes mellitus [E11.9] 02/25/2014 Yes    Smoking addiction [F17.200] 02/25/2014 Yes    Schizophrenia [F20.9] 02/25/2014 Yes      Problems Resolved During this Admission:    Diagnosis Date Noted Date Resolved POA      Discharged Condition: fair    Disposition: Home or Self Care    Follow Up:  Follow-up Information     [facility] Z[facility] - Cardiology. Schedule an appointment as soon as possible for a visit in 2 weeks.    Specialty:   Cardiology  Contact information:  Candie Zamarripa  [facility] [address] [phone]  Additional information:  3rd floor           SANDY Montaño On 6/6/2017.    Specialty:  Internal Medicine  Why:  @ 8:00  Contact information:  Candie Zamarripa  [facility] [address]  [phone]                 Patient Instructions:     Ambulatory referral to Smoking Cessation Program   Referral Priority: Routine Referral Type: Consultation   Referral Reason: Specialty Services Required    Requested Specialty: CTTS    Number of Visits Requested: 1        Medications:  Reconciled Home Medications:   Discharge Medication List as of 5/26/2017  5:20 PM      CONTINUE these medications which have CHANGED    Details   aspirin (ECOTRIN) 81 MG EC tablet Take 1 tablet (81 mg total) by mouth once daily., Starting Fri 5/26/2017, Until Sat 5/26/2018, Print      atorvastatin (LIPITOR) 40 MG tablet Take 1 tablet (40 mg total) by mouth once daily., Starting Fri 5/26/2017, Print      bisacodyl (DULCOLAX) 5 mg EC tablet Take 1 tablet (5 mg total) by mouth daily as needed for Constipation., Starting Fri 5/26/2017, Until Sun 6/25/2017, OTC      furosemide (LASIX) 80 MG tablet Take 1 tablet (80 mg total) by mouth 2 (two) times daily., Starting Fri 5/26/2017, Until Sat 5/26/2018, Print      lisinopril (PRINIVIL,ZESTRIL) 5 MG tablet Take 1 tablet (5 mg total) by mouth once daily., Starting Fri 5/26/2017, Until Sat 5/26/2018, Print         CONTINUE these medications which have NOT CHANGED    Details   albuterol (PROVENTIL) 2.5 mg /3 mL (0.083 %) nebulizer solution Take 3 mLs (2.5 mg total) by nebulization every 6 (six) hours as needed for Wheezing. Rescue, Starting 4/11/2017, Until Fri 4/21/17, Normal      fluticasone furoate 100 mcg/actuation DsDv Inhale 100 mcg into the lungs once daily. Controller, Until Discontinued, Historical Med      glimepiride (AMARYL) 2 MG tablet Take 1 tablet (2 mg total) by mouth before  breakfast., Starting 10/7/2014, Until Discontinued, Normal      lidocaine (LIDODERM) 5 % Place 1 patch onto the skin once daily. Remove & Discard patch within 12 hours or as directed by MD, Starting 4/11/2017, Until Discontinued, Normal      nitroGLYCERIN (NITROSTAT) 0.4 MG SL tablet Place 1 tablet (0.4 mg total) under the tongue every 5 (five) minutes as needed for Chest pain., Starting 6/8/2016, Until Discontinued, Normal      trazodone (DESYREL) 150 MG tablet Take 1 tablet (150 mg total) by mouth every evening., Starting 4/11/2017, Until Discontinued, Normal      walker Misc Use walker daily PRN, Print           Time spent on the discharge of patient: 40 minutes    Andreina Lombardi MD  Department of Hospital Medicine  [facility]

## 2017-05-30 ENCOUNTER — TELEPHONE (OUTPATIENT)
Dept: CARDIOLOGY | Facility: CLINIC | Age: 57
End: 2017-05-30

## 2017-05-30 DIAGNOSIS — I25.5 ISCHEMIC CARDIOMYOPATHY: ICD-10-CM

## 2017-05-30 DIAGNOSIS — I25.10 CORONARY ARTERY DISEASE INVOLVING NATIVE CORONARY ARTERY OF NATIVE HEART WITHOUT ANGINA PECTORIS: ICD-10-CM

## 2017-05-30 DIAGNOSIS — E78.9 ABNORMAL CHOLESTEROL TEST: ICD-10-CM

## 2017-05-30 RX ORDER — FUROSEMIDE 80 MG/1
80 TABLET ORAL 2 TIMES DAILY
Qty: 60 TABLET | Refills: 6 | Status: SHIPPED | OUTPATIENT
Start: 2017-05-30 | End: 2018-01-02 | Stop reason: SDUPTHER

## 2017-05-30 RX ORDER — ATORVASTATIN CALCIUM 40 MG/1
40 TABLET, FILM COATED ORAL DAILY
Qty: 30 TABLET | Refills: 6 | Status: SHIPPED | OUTPATIENT
Start: 2017-05-30 | End: 2017-11-16 | Stop reason: SDUPTHER

## 2017-05-30 RX ORDER — LISINOPRIL 5 MG/1
5 TABLET ORAL DAILY
Qty: 30 TABLET | Refills: 6 | Status: SHIPPED | OUTPATIENT
Start: 2017-05-30 | End: 2017-11-16 | Stop reason: SDUPTHER

## 2017-05-30 RX ORDER — ASPIRIN 81 MG/1
81 TABLET ORAL DAILY
Qty: 30 TABLET | Refills: 6 | Status: SHIPPED | OUTPATIENT
Start: 2017-05-30 | End: 2017-12-12 | Stop reason: SDUPTHER

## 2017-05-30 RX ORDER — NITROGLYCERIN 0.4 MG/1
0.4 TABLET SUBLINGUAL EVERY 5 MIN PRN
Qty: 30 TABLET | Refills: 6 | Status: SHIPPED | OUTPATIENT
Start: 2017-05-30 | End: 2017-11-16 | Stop reason: SDUPTHER

## 2017-05-30 NOTE — TELEPHONE ENCOUNTER
----- Message from Rosemary Holly sent at 5/30/2017  2:33 PM CDT -----  Contact: pt   Pt called because she is just getting out of the hospital and states she is out of all of her medications.  She said that they were supposed to send her medications to the pharmacy but she said the pharmacy does not have them. She is out of the medications.  It looks like some of them were supposed to be refilled but they are marked as print so they probably did not go through. She is a pt of Dr. Buck, LOV 5/23/2016.  She wants them sent to [facility]/pharmacy #[id number] - [place], [place] - [address].  Pt can be reached @ [phone] with any questions.  Thanks!

## 2017-06-05 ENCOUNTER — OFFICE VISIT (OUTPATIENT)
Dept: CARDIOLOGY | Facility: CLINIC | Age: 57
End: 2017-06-05
Payer: MEDICARE

## 2017-06-05 VITALS
DIASTOLIC BLOOD PRESSURE: 60 MMHG | HEART RATE: 78 BPM | HEIGHT: 62 IN | WEIGHT: 145.06 LBS | BODY MASS INDEX: 26.69 KG/M2 | SYSTOLIC BLOOD PRESSURE: 100 MMHG

## 2017-06-05 DIAGNOSIS — I34.0 MITRAL VALVE INSUFFICIENCY, UNSPECIFIED ETIOLOGY: ICD-10-CM

## 2017-06-05 DIAGNOSIS — I27.20 PULMONARY HYPERTENSION: ICD-10-CM

## 2017-06-05 DIAGNOSIS — F17.200 SMOKING ADDICTION: ICD-10-CM

## 2017-06-05 DIAGNOSIS — Z95.810 ICD (IMPLANTABLE CARDIOVERTER-DEFIBRILLATOR) IN PLACE: Chronic | ICD-10-CM

## 2017-06-05 DIAGNOSIS — E11.9 TYPE 2 DIABETES MELLITUS WITHOUT COMPLICATION, UNSPECIFIED LONG TERM INSULIN USE STATUS: Chronic | ICD-10-CM

## 2017-06-05 DIAGNOSIS — J44.9 CHRONIC OBSTRUCTIVE PULMONARY DISEASE, UNSPECIFIED COPD TYPE: Chronic | ICD-10-CM

## 2017-06-05 DIAGNOSIS — I50.42 CHRONIC COMBINED SYSTOLIC AND DIASTOLIC CHF, NYHA CLASS 4: Primary | ICD-10-CM

## 2017-06-05 DIAGNOSIS — I25.10 CORONARY ARTERY DISEASE INVOLVING NATIVE CORONARY ARTERY OF NATIVE HEART WITHOUT ANGINA PECTORIS: Chronic | ICD-10-CM

## 2017-06-05 DIAGNOSIS — I25.5 ISCHEMIC CARDIOMYOPATHY: ICD-10-CM

## 2017-06-05 DIAGNOSIS — I10 ESSENTIAL HYPERTENSION: Chronic | ICD-10-CM

## 2017-06-05 DIAGNOSIS — I51.7 BIATRIAL ENLARGEMENT: ICD-10-CM

## 2017-06-05 DIAGNOSIS — I07.1 MODERATE TRICUSPID REGURGITATION: ICD-10-CM

## 2017-06-05 DIAGNOSIS — F14.10 COCAINE ABUSE: ICD-10-CM

## 2017-06-05 PROBLEM — I50.9 ACUTE ON CHRONIC CONGESTIVE HEART FAILURE: Status: RESOLVED | Noted: 2017-04-06 | Resolved: 2017-06-05

## 2017-06-05 PROCEDURE — 99214 OFFICE O/P EST MOD 30 MIN: CPT | Mod: PBBFAC | Performed by: NURSE PRACTITIONER

## 2017-06-05 PROCEDURE — 99999 PR PBB SHADOW E&M-EST. PATIENT-LVL IV: CPT | Mod: PBBFAC,,, | Performed by: NURSE PRACTITIONER

## 2017-06-05 PROCEDURE — 99215 OFFICE O/P EST HI 40 MIN: CPT | Mod: S$PBB,,, | Performed by: NURSE PRACTITIONER

## 2017-06-05 RX ORDER — SPIRONOLACTONE 25 MG/1
12.5 TABLET ORAL DAILY
Qty: 45 TABLET | Refills: 3 | Status: SHIPPED | OUTPATIENT
Start: 2017-06-05 | End: 2017-06-14 | Stop reason: SDUPTHER

## 2017-06-05 NOTE — PATIENT INSTRUCTIONS
1. Restrict salt intake to no more than 2,000 mg in one day  2. Restrict all fluid to no more than 1.5 L in a day or 6 glasses (8 ounces each or 1 cup)  3. Weigh yourself daily. Notify us if you gain 3 pounds in a day or 5 pounds in a week.  4. Start taking spironalactone 12.5mg daily  5. Please have your blood drawn in 1 month fasting  6. Return to clinic in 1 month  Left- or Right- Side Congestive Heart Failure    The heart is a large muscle. It is a pump that circulates blood throughout the body. Blood carries oxygen to all of the organs, including the brain, muscles, and skin. After your body takes the oxygen out of the blood, the blood returns to the heart. The right side of the heart collects the blood from the body and pumps it to the lungs. In the lungs, it gets fresh oxygen and gives up carbon dioxide. The oxygen-rich blood from the lungs then returns to the left side of the heart, where it is pumped back out to the rest of your body, starting the process all over.  Congestive heart failure (CHF) occurs when the heart muscle is weakened. This affects the pumping action of the heart. Heart failure can affect the right side of the heart or the left side. But heart failure may affect not only the right side of the heart or only the left side. Although it may have started on one side, it can and often eventually does affect both sides.  Right-side heart failure  When the right side of the heart is weakened, it cant handle the blood it is getting from the rest of the body. This blood returns to the heart through veins. When too much pressure builds up in the veins, fluid leaks out into the tissues. Gravity then causes that fluid to move to those parts of the body that are the lowest. So one of the first symptoms of right-side CHF can include swelling in the feet and ankles. If the condition gets worse, the swelling can even go up past the knees. Sometimes it gets so severe, the liver can get congested as  well.  Left-side heart failure  When the left side of the heart is weakened, it cant handle the blood it gets from the lungs. Pressure then builds up in the veins of the lungs, causing fluid to leak into the lung tissues. This may cause CHF and pulmonary edema. This causes you to feel short of breath, weak, or dizzy. These symptoms are often worse with exertion, such as when climbing stairs or walking up hills. Lying with your head flat is uncomfortable and can make your breathing worse. This may make sleeping difficult. You may need to use extra pillows to elevate your upper body to sleep well. The same is true when just resting during the daytime.  There are many causes of heart failure including:  · Coronary artery disease  · Past heart attack (also known as acute myocardial infarction, or AMI)  · High blood pressure  · Damaged heart valve  · Diabetes  · Obesity  · Cigarette smoking  · Alcohol abuse  Heart failure is a chronic condition. There is no cure. The purpose of medical treatment is to improve the pumping action of the heart. The main way to do this is to remove excess water from the body. A number of medicines can help reach this goal, improve symptoms, and prevent the heart from becoming weaker. Sometimes, heart failure can become so severe that a device is placed in the heart to help with pumping. Another major goal is to better treat the causes of heart failure, such as diabetes and high blood pressure, by making changes in your lifestyle and maximizing medical control when needed.  Home care  Follow these guidelines when caring for yourself at home:  · Check your weight every day. This is very important because a sudden increase in weight gain could mean worsening heart failure. Keep these things in mind:  ¨ Use the same scale every day.  ¨ Weigh yourself at the same time every day.  ¨ Make sure the scale is on a hard floor surface, not on a rug or carpet.  ¨ Keep a record of your weight every day  so your healthcare provider can see it. If you are not given a log sheet for this, keep a separate journal for this purpose.   · Cut back on the amount of salt (sodium) you eat. Follow your healthcare provider's recommendation on how much salt or sodium you should have each day.  ¨ Avoid high-salt foods. These include olives, pickles, smoked meats, salted potato chips, and most prepared foods.  ¨ Don't add salt to your food at the table. Use only small amounts of salt when cooking.  ¨ Read the labels carefully on food packages to learn how much salt or sodium is in each serving in the package. Remember, a can or package of food may contain more than 1 serving. So if you eat all the food in the package, you may be getting more salt than you think.  · Follow your healthcare provider's recommendations about how much fluid you should have. Be aware that some foods, such as soup, pudding, and juicy fruits like oranges or melons, contain liquid. You'll need to count the liquid in those foods as part of your daily fluid intake. Your provider can help you with this.  · Stop smoking.  · Cut back on how much alcohol you drink.  · Lose weight if you are overweight. The excess weight adds a lot of stress on the workload of the heart.  · Stay active. Talk with your provider about an exercise program that is safe for your heart.  · Keep your feet elevated to reduce swelling. Ask your provider about support hose as a preventive treatment for daytime leg swelling.  Besides taking your medicine as instructed, an important part of treatment is lifestyle changes. These include diet, physical activity, stopping smoking, and weight control.  Improve your diet by including more fresh foods, cutting back on how much sugar and saturated fat you eat, and eating fewer processed foods and less salt.  Follow-up care  Follow up with your healthcare provider, or as advised.  Make sure to keep any appointments that were made for you. These can  help better control your congestive heart failure. You will need to follow up with your provider on a routine basis to make sure your heart failure is well managed.  If an X-ray, ECG, or other tests were done, you will be told of any new findings that may affect your care.  Call 911  Call 911 if you:  · Become severely short of breath  · Feel lightheaded, or feel like you might pass out or faint  · Have chest pain or discomfort that is different than usual, the medicines your doctor told you to use for this don't help, or the pain lasts longer than 10 to 15 minutes  · You suddenly develop a rapid heart rate  When to seek medical advice  The following may be signs that your heart failure is getting worse. Call your healthcare provider right away if any of these happen:  · Sudden weight gain. This means 3 or more pounds in one day, or 5 or more pounds in 1 week.  · Trouble breathing not related to being active  · New or increased swelling of your legs or ankles  · Swelling or pain in your abdomen  · Breathing trouble at night. This means waking up short of breath or needing more pillows to breathe.  · Frequent coughing that doesnt go away  · Feeling much more tired than usual  Date Last Reviewed: 1/4/2016  © 0447-3882 [employer]. [address] [place] [address], [place] [address]. All rights reserved. This information is not intended as a substitute for professional medical care. Always follow your healthcare professional's instructions.        Low-Salt Diet  This diet removes foods that are high in salt. It also limits the amount of salt you use when cooking. It is most often used for people with high blood pressure, edema (fluid retention), and kidney, liver, or heart disease.  Table salt contains the mineral sodium. Your body needs sodium to work normally. But too much sodium can make your health problems worse. Your healthcare provider is recommending a low-salt (also called low-sodium) diet for you. Your  total daily allowance of salt is 1,500 to 2,300 milligrams (mg). It is less than 1 teaspoon of table salt. This means you can have only about 500 to 700 mg of sodium at each meal. People with certain health problems should limit salt intake to the lower end of the recommended range.    When you cook, dont add much salt. If you can cook without using salt, even better. Dont add salt to your food at the table.  When shopping, read food labels. Salt is often called sodium on the label. Choose foods that are salt-free, low salt, or very low salt. Note that foods with reduced salt may not lower your salt intake enough.    Beans, potatoes, and pasta  Ok: Dry beans, split peas, lentils, potatoes, rice, macaroni, pasta, spaghetti without added salt  Avoid: Potato chips, tortilla chips, and similar products  Breads and cereals  Ok: Low-sodium breads, rolls, cereals, and cakes; low-salt crackers, matzo crackers  Avoid: Salted crackers, pretzels, popcorn, Slovak toast, pancakes, muffins  Dairy  Ok: Milk, chocolate milk, hot chocolate mix, low-salt cheeses, and yogurt  Avoid: Processed cheese and cheese spreads; Roquefort, Camembert, and cottage cheese; buttermilk, instant breakfast drink  Desserts  Ok: Ice cream, frozen yogurt, juice bars, gelatin, cookies and pies, sugar, honey, jelly, hard candy  Avoid: Most pies, cakes and cookies prepared or processed with salt; instant pudding  Drinks  Ok: Tea, coffee, fizzy (carbonated) drinks, juices  Avoid: Flavored coffees, electrolyte replacement drinks, sports drinks  Meats  Ok: All fresh meat, fish, poultry, low-salt tuna, eggs, egg substitute  Avoid: Smoked, pickled, brine-cured, or salted meats and fish. This includes justin, chipped beef, corned beef, hot dogs, deli meats, ham, kosher meats, salt pork, sausage, canned tuna, salted codfish, smoked salmon, herring, sardines, or anchovies.  Seasonings and spices  Ok: Most seasonings are okay. Good substitutes for salt include:  fresh herb blends, hot sauce, lemon, garlic, lin, vinegar, dry mustard, parsley, cilantro, horseradish, tomato paste, regular margarine, mayonnaise, unsalted butter, cream cheese, vegetable oil, cream, low-salt salad dressing and gravy.  Avoid: Regular ketchup, relishes, pickles, soy sauce, teriyaki sauce, Worcestershire sauce, BBQ sauce, tartar sauce, meat tenderizer, chili sauce, regular gravy, regular salad dressing, salted butter  Soups  Ok: Low-salt soups and broths made with allowed foods  Avoid: Bouillon cubes, soups with smoked or salted meats, regular soup and broth  Vegetables  Ok: Most vegetables are okay; also low-salt tomato and vegetable juices  Avoid: Sauerkraut and other brine-soaked vegetables; pickles and other pickled vegetables; tomato juice, olives  Date Last Reviewed: 8/1/2016 © 2000-2016 [employer]. [address] [place] [address]. All rights reserved. This information is not intended as a substitute for professional medical care. Always follow your healthcare professional's instructions.        Using Herbs and Spices    Herbs and spices add flavor to cooking without adding fat or sodium. That's why they're great for healthy cooking. Try these tips to help create tasty, healthy meals.  How much to use?  If a recipe calls for: 1 Tablespoon of fresh herbs You can use: 1 teaspoon of dried herbs Or: 1/4 teaspoon of powdered herbs   Tips for Getting the Most of Herbs and Spices  · Use kitchen emerita or a sharp knife to cut leaves of fresh herbs. Cutting the leaves finely will release the most flavor.  · When using whole spices, don't grind them until you need them. Crushing the berries and seeds with a mortar and pestle or grating whole nutmeg or cinnamon sticks just before adding them to your recipe will guarantee the most flavor.  · Dried herbs pack more flavor for the same quantity than fresh herbs, and powdered herbs are more potent than dried flakes. If you are  using powdered herbs in a recipe that calls for fresh, you'll want to decrease the amount you add.  · When adding fresh or dried spices and herbs to cold recipes, such as dips or salad dressings, allow the food to sit in the refrigerator for at least a couple of hours before serving so the flavors can blend.  · Add fresh spices and herbs to hot dishes as close to serving time as possible for the most flavorful results. Dried herbs and spices should be added early in the cooking process to prevent a powdery taste.  · The longer dried herbs and spices sit in your cupboard without being used, the more flavor they lose. Whole spices last longer than ground or powdered spices. Store dried herbs and spices in a cool, dry, dark place. Keep powdered herbs and spices for no longer than a year.  Date Last Reviewed: 6/1/2015  © 2755-0599 [employer]. [address] [place] [address]. All rights reserved. This information is not intended as a substitute for professional medical care. Always follow your healthcare professional's instructions.        Low-Salt Choices  Eating salt (sodium) can make your body retain too much water. Excess water makes your heart work harder. Canned, packaged, and frozen foods are easy to prepare, but they are often high in sodium. Here are some ideas for low-salt foods you can easily prepare yourself.    For breakfast  · Fruit or 100% fruit juice  · Whole-wheat bread or an English muffin. Compare sodium content on labels.  · Low-fat milk or yogurt  · Unsalted eggs  · Shredded wheat  · Corn tortillas  · Unsalted steamed rice  · Regular (not instant) hot cereal, made without salt  Stay away from:  · Sausage, justin, and ham  · Flour tortillas  · Packaged muffins, pancakes, and biscuits  · Instant hot cereals  · Cottage cheese  For lunch and dinner  · Fresh fish, chicken, turkey, or meat--baked, broiled, or roasted without salt  · Dry beans, cooked without salt  · Tofu, stir-fried  without salt  · Unsalted fresh fruit and vegetables, or frozen or canned fruit and vegetables with no added salt  Stay away from:  · Lunch or deli meat that is cured or smoked  · Cheese  · Tomato juice and catsup  · Canned vegetables, soups, and fish not labeled as no-salt-added or reduced sodium  · Packaged gravies and sauces  · Olives, pickles, and relish  · Bottled salad dressings  For snacks and desserts  · Yogurt  · Unsalted, air popped popcorn  · Unsalted nuts or seeds  Stay away from:  · Pies and cakes  · Packaged dessert mixes  · Pizza  · Canned and packaged puddings  · Pretzels, chips, crackers, and nuts--unless the label says unsalted  Date Last Reviewed: 6/17/2015  © 7920-1355 [employer]. [address] [place] [address]. All rights reserved. This information is not intended as a substitute for professional medical care. Always follow your healthcare professional's instructions.        Tips for Using Less Salt    Most people with heart problems need to eat less salt (sodium). Reducing the amount of salt you eat may help control your blood pressure. The higher your blood pressure, the greater your risk for heart disease, stroke, blindness, and kidney problems.  At the store  · Make low-salt choices by reading labels carefully. Look for the total amount of sodium per serving.  · Use more fresh food. Buy more fruits and vegetables. Select lean meats, fish, and poultry.  · Use fewer frozen, canned, and packaged foods which often contain a lot of sodium.  · Use plain frozen vegetables without sauces or toppings. These products are often low- or no-sodium.  · Opt for reduced-sodium or no-salt-added versions of canned vegetables and soups.  In the kitchen  · Don't add salt to food when you're cooking. Season with flavorings such as onion, garlic, pepper, salt-free herbal blends, and lemon or lime juice.  · Use a cookbook containing low-salt recipes. It can give you ideas for tasty meals  that are healthy for your heart.  · Sprinkle salt-free herbal blends on vegetables and meat.  · Drain and rinse canned foods, such as canned beans and vegetables, before cooking or eating.  Eating out  · Tell the  you're on a low-salt diet. Ask questions about the menu.  · Order fish, chicken, and meat broiled, baked, poached, or grilled without salt, butter, or breading.  · Use lemon, pepper, and salt-free herb mixes to add flavor.  · Choose plain steamed rice, boiled noodles, and baked or boiled potatoes. Top potatoes with chives and a little sour cream.     Beware! Salt goes by many other names. Limit foods with these words listed as ingredients: salt, sodium, soy sauce, baking soda, baking powder, MSG, monosodium, Na (the chemical symbol for sodium). Some antacids are also high in salt.   Date Last Reviewed: 6/19/2015  © 4880-4345 [employer]. [address] [place] [address], [place] [address]. All rights reserved. This information is not intended as a substitute for professional medical care. Always follow your healthcare professional's instructions.

## 2017-06-05 NOTE — PROGRESS NOTES
Ms. Kwan is a patient of Dr. Buck and was last seen in [facility] Visit 5/23/16.    Subjective:   Patient ID:  Polly Kwan is a 57 y.o. female who presents for follow-up of Breathing Problem    HPI: Ms. Kwan is a 57 y.o.  with a PMH of ICM with LVEF 20%, HFrEF NYHA class IV, s/p ICD 2013 (medtronic), CAD s/p PCI x2 (2006 and 2013), MI x3 (2006, 2011, 2013), HTN, DM2, polysubstance abuse (tobacco and cocaine), COPD, and schizophrenia who presents following up after hospitalization for ICD discharge 5/21-5/26/17.  At the time of admission, she had reported progressive VIEYRA over the past week, orthopnea, and nightly PND.  She had abdominal fullness, nausea, and vomiting.   She was also recently admitted for 4/6-4/12/17 for acute on chronic systolic heart failure. She was not discharged on the HF program.  Not weighing self daily.  Does not understand low salt diet based on dietary recall.  Patient denies chest pain with exertion or at rest, palpitations, dizziness, syncope, edema, or claudication.  Continues to have SOB, VIEYRA, orthopnea (4 pillows), and PND.   Taking GDMT for coronary artery disease: low dose ASA and high intensity statin therapy. Hyperlipidemia treated with Atorvastatin 40mg. No recent lipid profile.  Hypertension is treated with lisinopril 5mg.  HFrEF is treated with an ACEI and loop diuretic (lasix 80mg BID).  She has severe COPD sx and is not on a beta blocker for the potential of antagonizing B2.  She is using the nicotine patch to help her quit smoking 1/2 PPD of cigarettes and has smoked for >20 years. She has a history of cocaine abuse and reports last use 5/17/17.     4/7/17 Echo  CONCLUSIONS     1 - Eccentric hypertrophy.     2 - Severely depressed left ventricular systolic function (EF 20-25%).     3 - Left ventricular diastolic dysfunction.     4 - Biatrial enlargement.     5 - Right ventricular enlargement with moderately depressed systolic function.     6 -  Pulmonary hypertension. The estimated PA systolic pressure is 84 mmHg.     7 - Moderate to severe mitral regurgitation.     8 - Moderate tricuspid regurgitation.     9 - Increased central venous pressure    Review of Systems   Constitution: Negative for decreased appetite, diaphoresis, weakness, malaise/fatigue, weight gain and weight loss.   HENT: Negative for headaches.    Eyes: Negative for visual disturbance.   Cardiovascular: Positive for dyspnea on exertion, orthopnea and paroxysmal nocturnal dyspnea. Negative for chest pain, claudication, irregular heartbeat, leg swelling, near-syncope, palpitations and syncope.        Denies chest pressure   Respiratory: Positive for shortness of breath. Negative for cough, hemoptysis, sleep disturbances due to breathing and snoring.    Endocrine: Negative for cold intolerance and heat intolerance.   Hematologic/Lymphatic: Negative for bleeding problem. Does not bruise/bleed easily.   Musculoskeletal: Negative for myalgias.   Gastrointestinal: Negative for bloating, abdominal pain, anorexia, change in bowel habit, constipation, diarrhea, nausea and vomiting.   Neurological: Negative for difficulty with concentration, disturbances in coordination, excessive daytime sleepiness, dizziness, light-headedness, loss of balance and numbness.   Psychiatric/Behavioral: The patient does not have insomnia.      Allergies and current medications updated and reviewed:  Review of patient's allergies indicates:  No Known Allergies  Current Outpatient Prescriptions   Medication Sig    aspirin (ECOTRIN) 81 MG EC tablet Take 1 tablet (81 mg total) by mouth once daily.    atorvastatin (LIPITOR) 40 MG tablet Take 1 tablet (40 mg total) by mouth once daily.    bisacodyl (DULCOLAX) 5 mg EC tablet Take 1 tablet (5 mg total) by mouth daily as needed for Constipation.    furosemide (LASIX) 80 MG tablet Take 1 tablet (80 mg total) by mouth 2 (two) times daily.    lidocaine (LIDODERM) 5 % Place 1  "patch onto the skin once daily. Remove & Discard patch within 12 hours or as directed by MD    lisinopril (PRINIVIL,ZESTRIL) 5 MG tablet Take 1 tablet (5 mg total) by mouth once daily.    walker Misc Use walker daily PRN    albuterol (PROVENTIL) 2.5 mg /3 mL (0.083 %) nebulizer solution Take 3 mLs (2.5 mg total) by nebulization every 6 (six) hours as needed for Wheezing. Rescue    fluticasone furoate 100 mcg/actuation DsDv Inhale 100 mcg into the lungs once daily. Controller    glimepiride (AMARYL) 2 MG tablet Take 1 tablet (2 mg total) by mouth before breakfast.    nitroGLYCERIN (NITROSTAT) 0.4 MG SL tablet Place 1 tablet (0.4 mg total) under the tongue every 5 (five) minutes as needed for Chest pain.    spironolactone (ALDACTONE) 25 MG tablet Take 0.5 tablets (12.5 mg total) by mouth once daily.    trazodone (DESYREL) 150 MG tablet Take 1 tablet (150 mg total) by mouth every evening.     No current facility-administered medications for this visit.        Objective:     Right Arm BP - Sittin/70 (17 08)  Left Arm BP - Sittin/60 (17 08)    /60   Pulse 78   Ht 5' 2" (1.575 m)   Wt 65.8 kg (145 lb 1 oz)   BMI 26.53 kg/m²     Physical Exam   Constitutional: She is oriented to person, place, and time. Vital signs are normal. She appears well-developed and well-nourished. She appears lethargic. She is active and cooperative. She is easily aroused. No distress.   HENT:   Head: Normocephalic and atraumatic.   Missing many teeth   Eyes: Conjunctivae and lids are normal. No scleral icterus.   Neck: Neck supple. JVD present. Normal carotid pulses and no hepatojugular reflux present. Carotid bruit is not present.   Cardiovascular: Normal rate, regular rhythm, S1 normal, S2 normal and intact distal pulses.  PMI is not displaced.  Exam reveals no gallop and no friction rub.    No murmur heard.  Pulses:       Carotid pulses are 2+ on the right side, and 2+ on the left side.       " Radial pulses are 2+ on the right side, and 2+ on the left side.        Dorsalis pedis pulses are 1+ on the right side, and 1+ on the left side.        Posterior tibial pulses are 0 on the right side, and 0 on the left side.   Pulmonary/Chest: Effort normal. No respiratory distress. She has decreased breath sounds in the right upper field, the right middle field, the right lower field, the left upper field, the left middle field and the left lower field. She has wheezes (expiratory). She has no rhonchi. She has no rales. She exhibits no tenderness.   POX 97% on room air   Abdominal: Soft. Normal appearance and bowel sounds are normal. She exhibits no distension, no fluid wave, no abdominal bruit, no ascites and no pulsatile midline mass. There is no splenomegaly. There is tenderness (Suspect RUQ tenderness r/t right sided heart failure causing increased pressure in liver.) in the right upper quadrant.   Musculoskeletal: She exhibits no edema.   Neurological: She is oriented to person, place, and time and easily aroused. She appears lethargic. Gait normal.   Skin: Skin is warm, dry and intact. No rash noted. She is not diaphoretic. Nails show no clubbing.   Psychiatric: She has a normal mood and affect. Her behavior is normal. Judgment and thought content normal. Her speech is delayed. Cognition and memory are normal.   Nursing note and vitals reviewed.      Chemistry        Component Value Date/Time     (L) 05/26/2017 0526    K 4.5 05/26/2017 0526    CL 97 05/26/2017 0526    CO2 31 (H) 05/26/2017 0526    BUN 17 05/26/2017 0526    CREATININE 1.1 05/26/2017 0526     (H) 05/26/2017 0526        Component Value Date/Time    CALCIUM 9.0 05/26/2017 0526    ALKPHOS 87 05/26/2017 0526    AST 14 05/26/2017 0526    ALT 9 (L) 05/26/2017 0526    BILITOT 1.4 (H) 05/26/2017 0526        Lab Results   Component Value Date    HGBA1C 6.2 04/07/2017       Recent Labs  Lab 11/18/14  1215  05/25/16  0950  04/09/17  1008  04/10/17  0641  05/21/17  2135  05/26/17  0526   WHITE BLOOD CELL COUNT 5.69  < >  --   < >  --   --   < >  --   < > 4.56   HEMOGLOBIN 13.3  < >  --   < >  --   --   < >  --   < > 12.0   HEMATOCRIT 38.2  < >  --   < >  --   --   < >  --   < > 36.6 L   MCV 84  < >  --   < >  --   --   < >  --   < > 83   PLATELETS 282  < >  --   < >  --   --   < >  --   < > 267   BNP  --   < >  --   < > 548 H 893 H  --  1,977 H  --   --    TSH  --   < > 2.215  --   --   --   --   --   --   --    CHOLESTEROL 230 H  --   --   --   --   --   --   --   --   --    HDL 75  --   --   --   --   --   --   --   --   --    LDL CHOLESTEROL 135.4  --   --   --   --   --   --   --   --   --    TRIGLYCERIDES 98  --   --   --   --   --   --   --   --   --    HDL/CHOLESTEROL RATIO 32.6  --   --   --   --   --   --   --   --   --    < > = values in this interval not displayed.      Recent Labs  Lab 05/16/16  2326 07/25/16  1947 04/06/17  2107   INR 1.1 1.2 1.3 H      Test(s) Reviewed  I have reviewed the following in detail:  [] Stress test   [] Angiography   [x] Echocardiogram   [x] Labs   [] Other:       Assessment:     1. Chronic combined systolic and diastolic CHF, NYHA class 4  Continues to have SOB at rest and VIEYRA. Mild to moderate JVD. Can not definitively assess for weight gain. There is a 7 pound increase in weight from discharge to today. Taking an ACEI but can not taking a BB d/t underlying lung disease. Not taking either entresto or spironolactone. Goal is to control blood pressure and maintain euvolemia.    2. Coronary artery disease involving native coronary artery of native heart without angina pectoris  GDMT: low dose ASA and moderate intensity statin therapy   3. Ischemic cardiomyopathy     4. Essential hypertension  At goal, <130/80   5. Mitral valve insufficiency, unspecified etiology     6. Moderate tricuspid regurgitation  May account for JVD noted on exam   7. Type 2 diabetes mellitus without complication, unspecified long term  insulin use status  At goal per USPTF guidelines for age <65 years. A1C <6.5   8. Chronic obstructive pulmonary disease, unspecified COPD type  Has not established care with a new pulmonologist since relocating from [place]   9. ICD (implantable cardioverter-defibrillator) in place  ICD reprogrammed in the hospital. Not followed by the arrhythmia clinic out patient.    10. Pulmonary hypertension  Elevated PA pressure on echo   11. Biatrial enlargement        Plan:     Chronic combined systolic and diastolic CHF, NYHA class 4  Comments:  Daily weights. 2gm sodium diet. 1.5L fluid restriction. Add spironolactone 12.5mg. Discussed low salt diet and importance of exercise.   Orders:  -     spironolactone (ALDACTONE) 25 MG tablet; Take 0.5 tablets (12.5 mg total) by mouth once daily.  Dispense: 45 tablet; Refill: 3  -     Comprehensive metabolic panel; Standing    Coronary artery disease involving native coronary artery of native heart without angina pectoris  Comments:  Continue GDMT  Orders:  -     Lipid panel; Future; Expected date: 06/05/2017    Ischemic cardiomyopathy  -     Cancel: Ambulatory Referral to Electrophysiology    Essential hypertension  Comments:  No changes    Mitral valve insufficiency, unspecified etiology    Moderate tricuspid regurgitation    Type 2 diabetes mellitus without complication, unspecified long term insulin use status  Comments:  Follow up with PCP    Chronic obstructive pulmonary disease, unspecified COPD type  Comments:  Instructed to establish care with a new pulmonologist. Pulmonary referral entered.   Orders:  -     Ambulatory referral to Pulmonology    ICD (implantable cardioverter-defibrillator) in place  Comments:  Instructed to make an appointment with arrhythmia clinic for follow up  Orders:  -     Cancel: Ambulatory Referral to Electrophysiology    Pulmonary hypertension    Biatrial enlargement    Smoking addiction  Comments:  Encouraged to continue use of the nicotine  patches and work towards complete smoking cessation. Discussed negative impact on her already weakened heart.     Cocaine abuse  Comments:  Encouraged to avoid use given the negative impact on her cardiovascular system.      Return in about 4 weeks (around 7/3/2017). Plan to titrate spironolactone to 25mg daily if tolerates 12.5mg.  Consider titrating up the ACEI dose to maximum tolerated or maximum therapeutic dose.

## 2017-06-14 ENCOUNTER — OFFICE VISIT (OUTPATIENT)
Dept: INTERNAL MEDICINE | Facility: CLINIC | Age: 57
End: 2017-06-14
Payer: MEDICARE

## 2017-06-14 VITALS
SYSTOLIC BLOOD PRESSURE: 100 MMHG | HEIGHT: 63 IN | HEART RATE: 85 BPM | WEIGHT: 146.06 LBS | BODY MASS INDEX: 25.88 KG/M2 | OXYGEN SATURATION: 99 % | DIASTOLIC BLOOD PRESSURE: 60 MMHG

## 2017-06-14 DIAGNOSIS — I50.42 CHRONIC COMBINED SYSTOLIC AND DIASTOLIC CHF, NYHA CLASS 4: ICD-10-CM

## 2017-06-14 DIAGNOSIS — I10 ESSENTIAL HYPERTENSION: ICD-10-CM

## 2017-06-14 DIAGNOSIS — Z95.810 ICD (IMPLANTABLE CARDIOVERTER-DEFIBRILLATOR) IN PLACE: ICD-10-CM

## 2017-06-14 DIAGNOSIS — J44.9 CHRONIC OBSTRUCTIVE PULMONARY DISEASE, UNSPECIFIED COPD TYPE: ICD-10-CM

## 2017-06-14 DIAGNOSIS — F25.0 SCHIZOAFFECTIVE DISORDER, BIPOLAR TYPE: ICD-10-CM

## 2017-06-14 DIAGNOSIS — I25.10 CORONARY ARTERY DISEASE INVOLVING NATIVE CORONARY ARTERY OF NATIVE HEART WITHOUT ANGINA PECTORIS: ICD-10-CM

## 2017-06-14 DIAGNOSIS — F20.9 SCHIZOPHRENIA, UNSPECIFIED TYPE: ICD-10-CM

## 2017-06-14 DIAGNOSIS — E11.9 TYPE 2 DIABETES MELLITUS WITHOUT COMPLICATION, UNSPECIFIED LONG TERM INSULIN USE STATUS: Primary | ICD-10-CM

## 2017-06-14 DIAGNOSIS — I25.5 ISCHEMIC CARDIOMYOPATHY: ICD-10-CM

## 2017-06-14 DIAGNOSIS — F17.200 SMOKING ADDICTION: ICD-10-CM

## 2017-06-14 PROCEDURE — 4010F ACE/ARB THERAPY RXD/TAKEN: CPT | Mod: ,,, | Performed by: FAMILY MEDICINE

## 2017-06-14 PROCEDURE — 99213 OFFICE O/P EST LOW 20 MIN: CPT | Mod: PBBFAC | Performed by: FAMILY MEDICINE

## 2017-06-14 PROCEDURE — 99999 PR PBB SHADOW E&M-EST. PATIENT-LVL III: CPT | Mod: PBBFAC,,, | Performed by: FAMILY MEDICINE

## 2017-06-14 PROCEDURE — 99214 OFFICE O/P EST MOD 30 MIN: CPT | Mod: S$PBB,,, | Performed by: FAMILY MEDICINE

## 2017-06-14 PROCEDURE — 3044F HG A1C LEVEL LT 7.0%: CPT | Mod: ,,, | Performed by: FAMILY MEDICINE

## 2017-06-14 RX ORDER — SPIRONOLACTONE 25 MG/1
12.5 TABLET ORAL DAILY
Qty: 45 TABLET | Refills: 3 | OUTPATIENT
Start: 2017-06-14 | End: 2018-06-14

## 2017-06-14 RX ORDER — ALBUTEROL SULFATE 90 UG/1
2 AEROSOL, METERED RESPIRATORY (INHALATION) EVERY 6 HOURS PRN
Qty: 18 G | Refills: 0 | Status: SHIPPED | OUTPATIENT
Start: 2017-06-14 | End: 2017-11-16 | Stop reason: SDUPTHER

## 2017-06-14 RX ORDER — ALBUTEROL SULFATE 90 UG/1
2 AEROSOL, METERED RESPIRATORY (INHALATION) EVERY 6 HOURS PRN
COMMUNITY
End: 2017-06-14 | Stop reason: SDUPTHER

## 2017-06-14 RX ORDER — SPIRONOLACTONE 25 MG/1
12.5 TABLET ORAL DAILY
Qty: 45 TABLET | Refills: 3 | Status: SHIPPED | OUTPATIENT
Start: 2017-06-14 | End: 2017-11-16 | Stop reason: SDUPTHER

## 2017-06-14 RX ORDER — TRAZODONE HYDROCHLORIDE 150 MG/1
150 TABLET ORAL NIGHTLY
Qty: 90 TABLET | Refills: 1 | OUTPATIENT
Start: 2017-06-14

## 2017-06-14 RX ORDER — TRAZODONE HYDROCHLORIDE 150 MG/1
150 TABLET ORAL NIGHTLY
Qty: 90 TABLET | Refills: 1 | Status: SHIPPED | OUTPATIENT
Start: 2017-06-14 | End: 2017-11-16 | Stop reason: SDUPTHER

## 2017-06-14 RX ORDER — ALBUTEROL SULFATE 90 UG/1
2 AEROSOL, METERED RESPIRATORY (INHALATION) EVERY 6 HOURS PRN
Qty: 18 G | Refills: 0 | OUTPATIENT
Start: 2017-06-14

## 2017-06-14 NOTE — TELEPHONE ENCOUNTER
----- Message from Robyn Fitch sent at 6/14/2017 11:07 AM CDT -----  Dr Kwan wants to see Ms Matias in 6 week nothing coming up to me.  Please schedule

## 2017-06-14 NOTE — TELEPHONE ENCOUNTER
Scheduled appointment    Patient would like the prescriptions filled today sent to the [facility] on file

## 2017-06-14 NOTE — PROGRESS NOTES
"Subjective:       Patient ID: Polly Kwan is a 57 y.o. female.    Chief Complaint: Annual Exam    HPI 58 y/o female former smoker (says she quit since discharge) with schizophrenia, DM2, HTN, CHF combined diastolic and systolic, CAD, ICM, ICD placed in 2013, COPD, cocaine use (last was a month ago) is here for hospital follow up, she was at [facility] from 5/21-5/26 after her ICD discharged, she was diuresed and her psychiatric meds were weaned off, she reports she was risperdol, trazodone and haldol.  She reports she has not started the spironolactone as is was too expensive and she could not afford  DM2: checking 2 times a day, low 100, [id number], has been off meds for over 6 months, recent a1c 6.2, has lipids and cmp set for 7/5  She reports she has been trying to eat better and limit the salt, she reports she feels tired all the time, she is not sleeping well at night, she reports she would like to get back on her trazodone and is willing to set up with psychiatry here, denies f/n/v/d/constipation, says she has occasional cp relieved with her nitro, none since discharge, reports she feels sob all the time, she says she has been out of her ventolin.    Review of Systems   Constitutional: Negative for activity change, appetite change, fatigue and fever.   Respiratory: Positive for shortness of breath. Negative for cough.    Cardiovascular: Positive for chest pain. Negative for palpitations and leg swelling.   Gastrointestinal: Negative for constipation, diarrhea, nausea and vomiting.   Genitourinary: Negative for difficulty urinating and dysuria.   Skin: Negative for rash.   Neurological: Negative for dizziness and light-headedness.   Psychiatric/Behavioral: Positive for sleep disturbance.       Objective:      /60   Pulse 85   Ht 5' 2.5" (1.588 m)   Wt 66.3 kg (146 lb 0.9 oz)   SpO2 99%   BMI 26.29 kg/m²   Physical Exam   Constitutional: No distress.   HENT:   Head: Normocephalic and atraumatic.   Mouth/Throat: " No oropharyngeal exudate.   Poor dentition   Neck: Normal range of motion. Neck supple. No thyromegaly present.   Cardiovascular: Normal rate, regular rhythm and normal heart sounds.    Pulmonary/Chest: Effort normal and breath sounds normal. No respiratory distress.   Abdominal: Soft. Bowel sounds are normal. She exhibits no distension. There is no tenderness.   Musculoskeletal: She exhibits no edema.   Lymphadenopathy:     She has no cervical adenopathy.   Neurological: She is alert.   Skin: Skin is warm and dry.   Psychiatric: She has a normal mood and affect.   Nursing note and vitals reviewed.      Assessment:       1. Type 2 diabetes mellitus without complication, unspecified long term insulin use status    2. Schizoaffective disorder, bipolar type    3. Coronary artery disease involving native coronary artery of native heart without angina pectoris    4. Chronic obstructive pulmonary disease, unspecified COPD type    5. Chronic combined systolic and diastolic CHF, NYHA class 4    6. Essential hypertension    7. ICD (implantable cardioverter-defibrillator) in place    8. Ischemic cardiomyopathy    9. Schizophrenia, unspecified type    10. Smoking addiction    11. Chronic combined systolic and diastolic CHF, NYHA class 4        Plan:   Polly was seen today for annual exam.    Diagnoses and all orders for this visit:    Type 2 diabetes mellitus without complication, unspecified long term insulin use status  -     TSH; Future    Schizoaffective disorder, bipolar type  -     Ambulatory Referral to Psychiatry  -     trazodone (DESYREL) 150 MG tablet; Take 1 tablet (150 mg total) by mouth every evening.    Coronary artery disease involving native coronary artery of native heart without angina pectoris    Chronic obstructive pulmonary disease, unspecified COPD type  -     Complete PFT w/ bronchodilator; Future  -     albuterol (VENTOLIN HFA) 90 mcg/actuation inhaler; Inhale 2 puffs into the lungs every 6 (six) hours  as needed for Wheezing. Rescue    Chronic combined systolic and diastolic CHF, NYHA class 4  -     spironolactone (ALDACTONE) 25 MG tablet; Take 0.5 tablets (12.5 mg total) by mouth once daily.    Essential hypertension  -     spironolactone (ALDACTONE) 25 MG tablet; Take 0.5 tablets (12.5 mg total) by mouth once daily.    ICD (implantable cardioverter-defibrillator) in place  -     spironolactone (ALDACTONE) 25 MG tablet; Take 0.5 tablets (12.5 mg total) by mouth once daily.    Ischemic cardiomyopathy  -     spironolactone (ALDACTONE) 25 MG tablet; Take 0.5 tablets (12.5 mg total) by mouth once daily.    Schizophrenia, unspecified type    Smoking addiction    Chronic combined systolic and diastolic CHF, NYHA class 4  Comments:  Daily weights. 2gm sodium diet. 1.5L fluid restriction. Add spironolactone 12.5mg. Discussed low salt diet and importance of exercise.   Orders:  -     spironolactone (ALDACTONE) 25 MG tablet; Take 0.5 tablets (12.5 mg total) by mouth once daily.

## 2017-07-05 ENCOUNTER — HOSPITAL ENCOUNTER (OUTPATIENT)
Dept: PULMONOLOGY | Facility: CLINIC | Age: 57
Discharge: HOME | End: 2017-07-05
Payer: MEDICARE

## 2017-07-05 ENCOUNTER — OFFICE VISIT (OUTPATIENT)
Dept: CARDIOLOGY | Facility: CLINIC | Age: 57
End: 2017-07-05
Payer: MEDICARE

## 2017-07-05 ENCOUNTER — OFFICE VISIT (OUTPATIENT)
Dept: PULMONOLOGY | Facility: CLINIC | Age: 57
End: 2017-07-05
Payer: MEDICARE

## 2017-07-05 VITALS
WEIGHT: 145 LBS | DIASTOLIC BLOOD PRESSURE: 48 MMHG | SYSTOLIC BLOOD PRESSURE: 106 MMHG | BODY MASS INDEX: 26.68 KG/M2 | RESPIRATION RATE: 16 BRPM | OXYGEN SATURATION: 98 % | HEART RATE: 73 BPM | HEIGHT: 62 IN

## 2017-07-05 VITALS
BODY MASS INDEX: 24.5 KG/M2 | DIASTOLIC BLOOD PRESSURE: 50 MMHG | HEIGHT: 63 IN | SYSTOLIC BLOOD PRESSURE: 99 MMHG | HEART RATE: 71 BPM | OXYGEN SATURATION: 98 % | WEIGHT: 138.25 LBS

## 2017-07-05 DIAGNOSIS — J44.9 CHRONIC OBSTRUCTIVE PULMONARY DISEASE, UNSPECIFIED COPD TYPE: ICD-10-CM

## 2017-07-05 DIAGNOSIS — I34.0 MITRAL VALVE INSUFFICIENCY, UNSPECIFIED ETIOLOGY: ICD-10-CM

## 2017-07-05 DIAGNOSIS — I25.10 CORONARY ARTERY DISEASE INVOLVING NATIVE CORONARY ARTERY OF NATIVE HEART WITHOUT ANGINA PECTORIS: Chronic | ICD-10-CM

## 2017-07-05 DIAGNOSIS — J44.9 CHRONIC OBSTRUCTIVE PULMONARY DISEASE, UNSPECIFIED COPD TYPE: Chronic | ICD-10-CM

## 2017-07-05 DIAGNOSIS — Z95.810 ICD (IMPLANTABLE CARDIOVERTER-DEFIBRILLATOR) IN PLACE: ICD-10-CM

## 2017-07-05 DIAGNOSIS — F20.9 SCHIZOPHRENIA, UNSPECIFIED TYPE: ICD-10-CM

## 2017-07-05 DIAGNOSIS — I10 ESSENTIAL HYPERTENSION: Chronic | ICD-10-CM

## 2017-07-05 DIAGNOSIS — R29.818 SUSPECTED SLEEP APNEA: ICD-10-CM

## 2017-07-05 DIAGNOSIS — I27.20 PULMONARY HYPERTENSION: ICD-10-CM

## 2017-07-05 DIAGNOSIS — I25.5 ISCHEMIC CARDIOMYOPATHY: ICD-10-CM

## 2017-07-05 DIAGNOSIS — I50.42 CHRONIC COMBINED SYSTOLIC AND DIASTOLIC CHF, NYHA CLASS 4: Primary | Chronic | ICD-10-CM

## 2017-07-05 DIAGNOSIS — I50.42 CHRONIC COMBINED SYSTOLIC AND DIASTOLIC CHF, NYHA CLASS 4: ICD-10-CM

## 2017-07-05 DIAGNOSIS — F17.200 TOBACCO USE DISORDER: ICD-10-CM

## 2017-07-05 DIAGNOSIS — E11.9 TYPE 2 DIABETES MELLITUS WITHOUT COMPLICATION, UNSPECIFIED LONG TERM INSULIN USE STATUS: ICD-10-CM

## 2017-07-05 DIAGNOSIS — J44.9 CHRONIC OBSTRUCTIVE PULMONARY DISEASE, UNSPECIFIED COPD TYPE: Primary | ICD-10-CM

## 2017-07-05 DIAGNOSIS — I07.1 MODERATE TRICUSPID REGURGITATION: ICD-10-CM

## 2017-07-05 DIAGNOSIS — R06.09 DYSPNEA ON EXERTION: ICD-10-CM

## 2017-07-05 LAB
POST FEV1 FVC: 0.72
POST FEV1: 1.62
POST FVC: 2.26
PRE FEV1 FVC: 73
PRE FEV1: 1.39
PRE FVC: 1.91
PREDICTED FEV1 FVC: 81
PREDICTED FEV1: 2.48
PREDICTED FVC: 3.07

## 2017-07-05 PROCEDURE — 99214 OFFICE O/P EST MOD 30 MIN: CPT | Mod: PBBFAC,27 | Performed by: NURSE PRACTITIONER

## 2017-07-05 PROCEDURE — 99999 PR PBB SHADOW E&M-EST. PATIENT-LVL IV: CPT | Mod: PBBFAC,,, | Performed by: NURSE PRACTITIONER

## 2017-07-05 PROCEDURE — 4010F ACE/ARB THERAPY RXD/TAKEN: CPT | Mod: ,,, | Performed by: NURSE PRACTITIONER

## 2017-07-05 PROCEDURE — 94729 DIFFUSING CAPACITY: CPT | Mod: 26,S$PBB,, | Performed by: INTERNAL MEDICINE

## 2017-07-05 PROCEDURE — 99204 OFFICE O/P NEW MOD 45 MIN: CPT | Mod: 25,S$PBB,, | Performed by: INTERNAL MEDICINE

## 2017-07-05 PROCEDURE — 3044F HG A1C LEVEL LT 7.0%: CPT | Mod: ,,, | Performed by: NURSE PRACTITIONER

## 2017-07-05 PROCEDURE — 99214 OFFICE O/P EST MOD 30 MIN: CPT | Mod: S$PBB,,, | Performed by: NURSE PRACTITIONER

## 2017-07-05 PROCEDURE — 99999 PR PBB SHADOW E&M-EST. PATIENT-LVL III: CPT | Mod: PBBFAC,,, | Performed by: INTERNAL MEDICINE

## 2017-07-05 PROCEDURE — 94060 EVALUATION OF WHEEZING: CPT | Mod: 26,S$PBB,, | Performed by: INTERNAL MEDICINE

## 2017-07-05 NOTE — PROGRESS NOTES
Subjective:       Patient ID: Polly Kwan is a 57 y.o. female.    Chief Complaint: Shortness of Breath and COPD    HPI Ms. Kwan is a 57-year-old smoker, who is referred for evaluation of suspected   chronic obstructive lung disease.  She is here alone and is unable to give a   detailed history regarding her past and current medical problems.  Review of the   electronic medical record indicates that she has had past problems with   combined right and left heart failure, pulmonary hypertension, schizophrenia,   bipolar disorder, and abuse of tobacco and cocaine.    Ms. Kwan has the history of chronic obstructive lung disease, but there are no   past studies to indicate the severity of her impairment.  She reports a regular   cough with a minimal amount of nonpurulent sputum.  She describes being out of   breath with modest activities around her home.  She sleeps on 3-4 pillows to   avoid shortness of breath.  She is currently using a Ventolin inhaler or   albuterol aerosols from her friend's nebulizer.  She does not recall being on   maintenance medications for treatment of COPD in the past.    Unfortunately, Ms. Kwan smokes as much as 3 packs of cigarettes per day and has   smoked for 30 years.  She currently states she is smoking less ____.  Her   family history is largely unknown.  She does not apparently have any important   past occupational exposures.    DATA:  I have reviewed the images from chest x-rays from April and May of this   year.  The cardiac silhouette is markedly enlarged.  There is a defibrillator in   place with the battery on the left.  There do not appear to be any areas of   consolidation within the lungs and no significant pleural effusions.    Finalizing this dictation was delayed by the recent disruption in transcription services.    For expediency, it is being signed without review.      CB/IN  dd: 07/16/2017 09:25:43 (CDT)  td: 07/16/2017 23:49:24 (CDT)  Doc ID   #[id number]  Job ID  #[id number]    CC:       Review of Systems   Constitutional: Negative for fever and fatigue.   HENT: Negative for postnasal drip, sinus pressure, voice change and congestion.    Respiratory: Positive for cough, shortness of breath and dyspnea on extertion. Negative for sputum production and wheezing.    Cardiovascular: Positive for chest pain and leg swelling.   Genitourinary: Negative for difficulty urinating.   Musculoskeletal: Positive for back pain. Negative for arthralgias.   Skin: Negative for rash.   Gastrointestinal: Negative for abdominal pain and acid reflux.   Neurological: Negative for dizziness and weakness.   Hematological: Negative for adenopathy.   Psychiatric/Behavioral: The patient is nervous/anxious.        Objective:      Physical Exam   Constitutional: She appears well-developed and well-nourished. She appears distressed.   Chronically ill appearing, here in wheelchair.   HENT:   Head: Normocephalic.   Nose: Nose normal.   Mouth/Throat: No oropharyngeal exudate.   Neck: Normal range of motion. No JVD present. No tracheal deviation present. No thyromegaly present.   Cardiovascular: Normal rate, regular rhythm and normal heart sounds.    No murmur heard.  Pulmonary/Chest: Symmetric chest wall expansion. No stridor. She has no wheezes. She has no rhonchi. She has no rales. She exhibits no tenderness.   Abdominal: Soft. There is no tenderness.   Musculoskeletal: She exhibits edema (mild LE edema).   Lymphadenopathy:     She has no cervical adenopathy.   Neurological: Gait abnormal.   Restless affect, with repetative facial movements suggestive of tardive dyskinesia   Skin: Skin is warm and dry. No rash noted. No erythema. Nails show no clubbing.   Vitals reviewed.    Personal Diagnostic Review    No flowsheet data found.      Assessment:       1. Chronic obstructive pulmonary disease, unspecified COPD type    2. Dyspnea on exertion    3. Tobacco use disorder    4. Chronic combined systolic and diastolic  CHF, NYHA class 4    5. Schizophrenia, unspecified type    6. Pulmonary hypertension        Outpatient Encounter Prescriptions as of 7/5/2017   Medication Sig Dispense Refill    albuterol (VENTOLIN HFA) 90 mcg/actuation inhaler Inhale 2 puffs into the lungs every 6 (six) hours as needed for Wheezing. Rescue 18 g 0    aspirin (ECOTRIN) 81 MG EC tablet Take 1 tablet (81 mg total) by mouth once daily. 30 tablet 6    atorvastatin (LIPITOR) 40 MG tablet Take 1 tablet (40 mg total) by mouth once daily. 30 tablet 6    fluticasone furoate 100 mcg/actuation DsDv Inhale 100 mcg into the lungs once daily. Controller      furosemide (LASIX) 80 MG tablet Take 1 tablet (80 mg total) by mouth 2 (two) times daily. 60 tablet 6    lidocaine (LIDODERM) 5 % Place 1 patch onto the skin once daily. Remove & Discard patch within 12 hours or as directed by MD 10 patch 0    lisinopril (PRINIVIL,ZESTRIL) 5 MG tablet Take 1 tablet (5 mg total) by mouth once daily. 30 tablet 6    nitroGLYCERIN (NITROSTAT) 0.4 MG SL tablet Place 1 tablet (0.4 mg total) under the tongue every 5 (five) minutes as needed for Chest pain. 30 tablet 6    spironolactone (ALDACTONE) 25 MG tablet Take 0.5 tablets (12.5 mg total) by mouth once daily. 45 tablet 3    trazodone (DESYREL) 150 MG tablet Take 1 tablet (150 mg total) by mouth every evening. 90 tablet 1     No facility-administered encounter medications on file as of 7/5/2017.      Orders Placed This Encounter   Procedures    CAPILLARY BLOOD GAS - PULM     Standing Status:   Future     Number of Occurrences:   1     Standing Expiration Date:   7/5/2018    Spirometry with/without bronchodilator     Standing Status:   Future     Number of Occurrences:   1     Standing Expiration Date:   7/5/2018    DLCO-Carbon Monoxide Diffusing Capacity     Standing Status:   Future     Number of Occurrences:   1     Standing Expiration Date:   7/5/2018     Plan:     Pre/Post Spirometry, DLCO, and CBG.  If above  studies confirm significant obstruction (or at least show ventilatory   impairment), then arrange for a trial with a nebulizer with DuoNeb.  Return visit here as needed.    NOTE:  I doubt that she can manage regular maintenance respiratory   medication use in view of her chronic mental illness.

## 2017-07-05 NOTE — PATIENT INSTRUCTIONS
Pre/Post Spirometry, DLCO, and CBG.  If above studies confirm significant obstruction (or at least show ventilatory   impairment), then arrange for a trial with a nebulizer with DuoNeb.  Return visit here as needed.

## 2017-07-05 NOTE — PATIENT INSTRUCTIONS
1. Daily weights and keep log for next visit.  2. Call for weight gain of 2 pounds in a day or 5 pounds in a week.  3. Continue current medications and return to  Cardiology in 6 months  4. Restrict salt to no more than 2,000mg of sodium a day  5. Restrict fluids to no more than 2 liters a day  Discharge Instructions: Limiting Fluids  Your doctor has prescribed fluid restriction for you. This means you need to limit the amount of fluids that you drink. If your kidneys are healthy, they balance the amount of fluid that enters and leaves your body. If your body cannot maintain this fluid balance because of illness or injury, you may need to limit the amount you drink.  This sheet can help you take in the right amount of fluid each day.  Measuring fluids  · Drink the amount of fluid recommended by your doctor. Dont drink more or less.  · Write down the liquid limits your doctor recommended. Amounts are usually given in a certain number of ml or cc (these two units are equivalent). Here are some measurements to help you:  ¨ 1 cup = 8 oz = 240 cc = 240 ml  ¨ 4 cups = 32 oz = 1,000 ml (or cc) = 1 liter  ¨ 6½ cups = 50 oz = 1,500 ml (or cc) = 1.5 liters  ¨ 8-1/3 cups = 67 oz = 2,000 ml (or  cc) = 2 liters  ¨ 1 can of soda = 1½ cups = 12 oz = 360 ml (or cc)   ¨ 2 cups = 500 ml (or cc)   · Measure the amount of water you will drink during the day and put it in a container. Drink water from that container only.  Limiting fluids  · Drink only when you are thirsty.  · Try the following tips to help you feel less thirsty:  ¨ Rinse your mouth with water; dont swallow.  ¨ Rinse your mouth with cool mouthwash.  ¨ Chew sugar-free gum or hard candy.  ¨ Suck on a few ice chips.  ¨ Try sucking on a lemon wedge to moisten your mouth.  ¨ Freeze grapes, berries, or small bits of fruit and let them thaw slowly in your mouth.  · Drink from a cup or a small glass.  · Divide your fluids up during the day; divide them between meals and  "snacks.  · Take your medicine with your liquids at meal time.  · Eat only a limited amount of the following liquid foods": soups, frozen juice bars, gravy, ices, sauces, milk shakes, pudding, sherbet, custard, ice cream, ice cubes, or any other food that becomes liquid when it stays at room temperature.  · Avoid salty foods. Remember, some foods may not taste salty but may still contain a high amount of salt (also called sodium). Read food labels to find the amount of sodium. A food is low sodium if it contains 140 mg or less of sodium per serving.  · If you have diabetes, control your blood sugar level to help control your thirst.  Follow-up  Make a follow-up appointment as directed by our staff.     When to call your doctor  Call your doctor right away if you have any of the following:  · Dizziness and lightheadedness  · Fainting  · Chest pain  · Shortness of breath  · Weight gain of more than 2 pounds in 24 hours or more than 5 pounds in 1 week  · Swelling in the hands, feet, or ankles  · Confusion         Date Last Reviewed: 6/8/2015  © 5514-5583 [employer]. [address] [place] [address], [place] [address]. All rights reserved. This information is not intended as a substitute for professional medical care. Always follow your healthcare professional's instructions.        "

## 2017-07-05 NOTE — PROGRESS NOTES
Ms. Kwan is a patient of Dr. Buck and was last seen in [facility] 6/5/2017.    Subjective:   Patient ID:  Polly Kwan is a 57 y.o. female who presents for follow-up of Chronic combined systolic and diastolic CHF, NYHA class 4 (4 weeks fu)    HPI: Ms. Kwan is a 57 y.o.  with a PMH of ICM with LVEF 20%, HFrEF NYHA class IV, s/p ICD 2013 (medtronic), CAD s/p PCI x2 (2006 and 2013), MI x3 (2006, 2011, 2013), HTN, DM2, polysubstance abuse (tobacco and cocaine), COPD, and schizophrenia.  She is in clinic for routine follow up after starting spironolactone 12.5mg.  Reports no change in degree of SOB and VIEYRA.  Reports daily weights but did not bring a log.  Patient denies chest pain with exertion or at rest, palpitations, dizziness, syncope, or claudication.  Continues to have SOB, VIEYRA, orthopnea (5 pillows), and PND.  Taking GDMT for coronary artery disease: low dose ASA and high intensity statin therapy. Hyperlipidemia treated with Atorvastatin 40 mg.   Reports chest tightness for the last 2 days that is located along the bra strap area and typically occurs at rest.  Dizziness with bending over and standing up.   No recent lipid profile and she missed her lab draw appointment scheduled at last visit.  Hypertension is treated with lisinopril 5 mg.  HFrEF is treated with an ACEI and loop diuretic (lasix 80mg BID).  She has severe COPD sx and is not on a beta blocker for the potential of antagonizing B2.      Review of Systems   Constitution: Negative for decreased appetite, diaphoresis, weakness, malaise/fatigue, weight gain and weight loss.   HENT: Negative for headaches.    Eyes: Negative for visual disturbance.   Cardiovascular: Positive for dyspnea on exertion, leg swelling, orthopnea and paroxysmal nocturnal dyspnea. Negative for chest pain, claudication, irregular heartbeat, near-syncope, palpitations and syncope.        Denies chest pressure   Respiratory: Positive for shortness of breath and  sleep disturbances due to breathing. Negative for cough, hemoptysis and snoring.    Endocrine: Negative for cold intolerance and heat intolerance.   Hematologic/Lymphatic: Negative for bleeding problem. Does not bruise/bleed easily.   Musculoskeletal: Negative for myalgias.   Gastrointestinal: Negative for bloating, abdominal pain, anorexia, change in bowel habit, constipation, diarrhea, nausea and vomiting.   Neurological: Negative for difficulty with concentration, disturbances in coordination, excessive daytime sleepiness, dizziness, light-headedness, loss of balance and numbness.   Psychiatric/Behavioral: The patient does not have insomnia.      Allergies and current medications updated and reviewed:  Review of patient's allergies indicates:  No Known Allergies  Current Outpatient Prescriptions   Medication Sig    albuterol (VENTOLIN HFA) 90 mcg/actuation inhaler Inhale 2 puffs into the lungs every 6 (six) hours as needed for Wheezing. Rescue    aspirin (ECOTRIN) 81 MG EC tablet Take 1 tablet (81 mg total) by mouth once daily.    atorvastatin (LIPITOR) 40 MG tablet Take 1 tablet (40 mg total) by mouth once daily.    furosemide (LASIX) 80 MG tablet Take 1 tablet (80 mg total) by mouth 2 (two) times daily.    lisinopril (PRINIVIL,ZESTRIL) 5 MG tablet Take 1 tablet (5 mg total) by mouth once daily.    nitroGLYCERIN (NITROSTAT) 0.4 MG SL tablet Place 1 tablet (0.4 mg total) under the tongue every 5 (five) minutes as needed for Chest pain.    spironolactone (ALDACTONE) 25 MG tablet Take 0.5 tablets (12.5 mg total) by mouth once daily.    trazodone (DESYREL) 150 MG tablet Take 1 tablet (150 mg total) by mouth every evening.    fluticasone furoate 100 mcg/actuation DsDv Inhale 100 mcg into the lungs once daily. Controller    lidocaine (LIDODERM) 5 % Place 1 patch onto the skin once daily. Remove & Discard patch within 12 hours or as directed by MD     No current facility-administered medications for this  "visit.        Objective:     Right Arm BP - Sittin/56 (17 1250)  Left Arm BP - Sittin/50 (17 1250)    BP (!) 99/50 (BP Location: Left arm, Patient Position: Sitting, BP Method: Automatic)   Pulse 71   Ht 5' 2.5" (1.588 m)   Wt 62.7 kg (138 lb 3.7 oz)   SpO2 98%   BMI 24.88 kg/m²     Physical Exam   Constitutional: She is oriented to person, place, and time. Vital signs are normal. She appears well-developed and well-nourished. She appears lethargic. She is active. No distress.   Patient too weak to get onto exam table.    HENT:   Head: Normocephalic and atraumatic.   Mouth/Throat: Abnormal dentition (Missing most of her teeth).   Eyes: Conjunctivae and lids are normal. No scleral icterus.   Neck: Neck supple. Normal carotid pulses, no hepatojugular reflux and no JVD present. Carotid bruit is not present.   Cardiovascular: Normal rate, regular rhythm, S1 normal, S2 normal and intact distal pulses.  PMI is not displaced.  Exam reveals no gallop and no friction rub.    No murmur heard.  Pulses:       Carotid pulses are 2+ on the right side, and 2+ on the left side.       Radial pulses are 2+ on the right side, and 2+ on the left side.        Dorsalis pedis pulses are 2+ on the right side, and 2+ on the left side.        Posterior tibial pulses are 1+ on the right side, and 1+ on the left side.   Pulmonary/Chest: Accessory muscle usage present. No respiratory distress. She has decreased breath sounds (Difficult to get patient to take a deep breath) in the right lower field and the left lower field. She has no wheezes. She has no rhonchi. She has no rales. She exhibits no tenderness.   RR 28   Abdominal: Soft. Normal appearance and bowel sounds are normal. She exhibits no distension, no fluid wave, no abdominal bruit, no ascites and no pulsatile midline mass. There is no hepatosplenomegaly. There is no tenderness.   Musculoskeletal: She exhibits no edema.   Neurological: She is oriented to " person, place, and time. She appears lethargic.   Skin: Skin is warm, dry and intact. No rash noted. She is not diaphoretic. Nails show no clubbing.   Psychiatric: She has a normal mood and affect. Judgment and thought content normal. Her speech is delayed. She is slowed. Cognition and memory are normal.   Nursing note and vitals reviewed.      Chemistry        Component Value Date/Time     (L) 05/26/2017 0526    K 4.5 05/26/2017 0526    CL 97 05/26/2017 0526    CO2 31 (H) 05/26/2017 0526    BUN 17 05/26/2017 0526    CREATININE 1.1 05/26/2017 0526     (H) 05/26/2017 0526        Component Value Date/Time    CALCIUM 9.0 05/26/2017 0526    ALKPHOS 87 05/26/2017 0526    AST 14 05/26/2017 0526    ALT 9 (L) 05/26/2017 0526    BILITOT 1.4 (H) 05/26/2017 0526    ESTGFRAFRICA >60.0 05/26/2017 0526    EGFRNONAA 55.9 (A) 05/26/2017 0526        Lab Results   Component Value Date    HGBA1C 6.2 04/07/2017       Recent Labs  Lab 11/18/14  1215  05/25/16  0950  04/09/17  1008 04/10/17  0641  05/21/17  2135  05/26/17  0526   WHITE BLOOD CELL COUNT 5.69  < >  --   < >  --   --   < >  --   < > 4.56   HEMOGLOBIN 13.3  < >  --   < >  --   --   < >  --   < > 12.0   HEMATOCRIT 38.2  < >  --   < >  --   --   < >  --   < > 36.6 L   MCV 84  < >  --   < >  --   --   < >  --   < > 83   PLATELETS 282  < >  --   < >  --   --   < >  --   < > 267   BNP  --   < >  --   < > 548 H 893 H  --  1,977 H  --   --    TSH  --   < > 2.215  --   --   --   --   --   --   --    CHOLESTEROL 230 H  --   --   --   --   --   --   --   --   --    HDL 75  --   --   --   --   --   --   --   --   --    LDL CHOLESTEROL 135.4  --   --   --   --   --   --   --   --   --    TRIGLYCERIDES 98  --   --   --   --   --   --   --   --   --    HDL/CHOLESTEROL RATIO 32.6  --   --   --   --   --   --   --   --   --    < > = values in this interval not displayed.      Recent Labs  Lab 05/16/16  8996 07/25/16  1947 04/06/17  2107   INR 1.1 1.2 1.3 H      Test(s)  Reviewed  I have reviewed the following in detail:  [] Stress test   [] Angiography   [x] Echocardiogram   [x] Labs   [] Other:       Assessment:     1. Chronic combined systolic and diastolic CHF, NYHA class 4  Euvolemic. No objective findings of heart failure. SOB unlikely to be a cardiac etiology   2. Coronary artery disease involving native coronary artery of native heart without angina pectoris  Asymptomatic. Taking GDMT: low dose ASA and high intensity statin therapy   3. Ischemic cardiomyopathy  Taking GDMT: beta blocker, ARB, loop diuretic, and spironolactone.    4. Essential hypertension  Well controlled   5. Moderate tricuspid regurgitation     6. Mitral valve insufficiency, unspecified etiology     7. Pulmonary hypertension     8. Chronic obstructive pulmonary disease, unspecified COPD type  POX today 98% on RA. Seen by pulmonary today   9. Type 2 diabetes mellitus without complication, unspecified long term insulin use status  Well controlled with an A1C of 6.2   10. ICD (implantable cardioverter-defibrillator) in place     11. Suspected sleep apnea  Chronic fatigue, orthopnea, and PND.      Plan:     Chronic combined systolic and diastolic CHF, NYHA class 4  Comments:  Continue GDMT, salt restriction of 2gm/day, and fluid restriction of 1.5 liters.    Coronary artery disease involving native coronary artery of native heart without angina pectoris  Comments:  Continue GDMT    Ischemic cardiomyopathy    Essential hypertension  Comments:  No changes to current regimen    Moderate tricuspid regurgitation    Mitral valve insufficiency, unspecified etiology    Pulmonary hypertension    Chronic obstructive pulmonary disease, unspecified COPD type  Comments:  Keep pulmonary follow up and testing appointments    Type 2 diabetes mellitus without complication, unspecified long term insulin use status    ICD (implantable cardioverter-defibrillator) in place    Suspected sleep apnea  Comments:  Consider sleep study  pending completion of workup with pulmonary    Return in about 6 months (around 1/5/2018).  Would not refer to sleep clinic. Would not be compliant with CPAP. Valeria Buck

## 2017-07-05 NOTE — LETTER
July 5, 2017      Monique Garcia NP  [address]  [facility] [address]           [facility]  [address]  [facility] [address]  Phone: [phone]          Patient: Polly Kwan   MR Number: [id number]   YOB: 1960   Date of Visit: 7/5/2017       Dear Monique Garcia:    Thank you for referring Polly Kwan to me for evaluation. Attached you will find relevant portions of my assessment and plan of care.    If you have questions, please do not hesitate to call me. I look forward to following Polly Kwan along with you.    Sincerely,    SAMUEL Conway MD    Enclosure  CC:  No Recipients    If you would like to receive this communication electronically, please contact [email or web address] or [phone] to request more information on [employer] Link access.    For providers and/or their staff who would like to refer a patient to [facility], please contact us through our one-stop-shop provider referral line, Denzel Pérez, at [phone].    If you feel you have received this communication in error or would no longer like to receive these types of communications, please e-mail [email or web address]

## 2017-07-28 DIAGNOSIS — Z12.11 COLON CANCER SCREENING: ICD-10-CM

## 2017-08-31 NOTE — PLAN OF CARE
Plan was to proceed immediately with bronchoscopy after colonoscopy under MAC. Apparently, there was a colonoscopy complication so probably need to postpone bronchoscopy. Plan is to get CT scan but in light of perforation, which could be worsened by coughing induced from bronchoscopy, safest to postpone procedure. I will plan to discuss with patient repeat imaging vs bronchoscopy.     Eden Clinton MD  [phone]   Problem: Patient Care Overview  Goal: Plan of Care Review  Outcome: Ongoing (interventions implemented as appropriate)  No significant events noted throughout shift. Free of falls/trauma/inury. VSS. Denies any CP, SOB, palpitations, or dizziness. General skin remains intact with no new breakdown. Turning/repositioning independently in bed. Lasix IVP per MD orders, tolerating well. No c/o pain or any other discomforts this shift. Plan of care reviewed and updated, verbalizes understanding. No acute distress noted. Will continue to monitor.

## 2017-11-16 ENCOUNTER — OFFICE VISIT (OUTPATIENT)
Dept: INTERNAL MEDICINE | Facility: CLINIC | Age: 57
End: 2017-11-16
Payer: MEDICARE

## 2017-11-16 VITALS
BODY MASS INDEX: 23.79 KG/M2 | WEIGHT: 134.25 LBS | OXYGEN SATURATION: 98 % | SYSTOLIC BLOOD PRESSURE: 126 MMHG | HEIGHT: 63 IN | DIASTOLIC BLOOD PRESSURE: 62 MMHG | HEART RATE: 70 BPM

## 2017-11-16 DIAGNOSIS — I10 ESSENTIAL HYPERTENSION: ICD-10-CM

## 2017-11-16 DIAGNOSIS — Z01.00 DIABETIC EYE EXAM: ICD-10-CM

## 2017-11-16 DIAGNOSIS — E11.9 TYPE 2 DIABETES MELLITUS WITHOUT COMPLICATION, UNSPECIFIED LONG TERM INSULIN USE STATUS: ICD-10-CM

## 2017-11-16 DIAGNOSIS — E78.9 ABNORMAL CHOLESTEROL TEST: ICD-10-CM

## 2017-11-16 DIAGNOSIS — J44.9 CHRONIC OBSTRUCTIVE PULMONARY DISEASE, UNSPECIFIED COPD TYPE: ICD-10-CM

## 2017-11-16 DIAGNOSIS — I25.10 CORONARY ARTERY DISEASE INVOLVING NATIVE CORONARY ARTERY OF NATIVE HEART WITHOUT ANGINA PECTORIS: ICD-10-CM

## 2017-11-16 DIAGNOSIS — K21.9 GASTROESOPHAGEAL REFLUX DISEASE, ESOPHAGITIS PRESENCE NOT SPECIFIED: ICD-10-CM

## 2017-11-16 DIAGNOSIS — Z13.6 ENCOUNTER FOR LIPID SCREENING FOR CARDIOVASCULAR DISEASE: ICD-10-CM

## 2017-11-16 DIAGNOSIS — Z13.220 ENCOUNTER FOR LIPID SCREENING FOR CARDIOVASCULAR DISEASE: ICD-10-CM

## 2017-11-16 DIAGNOSIS — R63.0 DECREASED APPETITE: ICD-10-CM

## 2017-11-16 DIAGNOSIS — Z11.59 NEED FOR HEPATITIS C SCREENING TEST: ICD-10-CM

## 2017-11-16 DIAGNOSIS — F25.0 SCHIZOAFFECTIVE DISORDER, BIPOLAR TYPE: ICD-10-CM

## 2017-11-16 DIAGNOSIS — E11.40 TYPE 2 DIABETES MELLITUS WITH DIABETIC NEUROPATHY, WITHOUT LONG-TERM CURRENT USE OF INSULIN: Primary | ICD-10-CM

## 2017-11-16 DIAGNOSIS — Z95.810 ICD (IMPLANTABLE CARDIOVERTER-DEFIBRILLATOR) IN PLACE: ICD-10-CM

## 2017-11-16 DIAGNOSIS — E11.9 DIABETIC EYE EXAM: ICD-10-CM

## 2017-11-16 DIAGNOSIS — Z12.4 PAP SMEAR FOR CERVICAL CANCER SCREENING: ICD-10-CM

## 2017-11-16 DIAGNOSIS — D22.72 ATYPICAL NEVUS OF PLANTAR ASPECT OF FOOT, LEFT: ICD-10-CM

## 2017-11-16 DIAGNOSIS — I25.5 ISCHEMIC CARDIOMYOPATHY: ICD-10-CM

## 2017-11-16 DIAGNOSIS — I50.42 CHRONIC COMBINED SYSTOLIC AND DIASTOLIC CHF, NYHA CLASS 4: ICD-10-CM

## 2017-11-16 DIAGNOSIS — K08.9 POOR DENTITION: ICD-10-CM

## 2017-11-16 LAB
CREAT UR-MCNC: 5 MG/DL
MICROALBUMIN UR DL<=1MG/L-MCNC: 3 UG/ML
MICROALBUMIN/CREATININE RATIO: 60 UG/MG

## 2017-11-16 PROCEDURE — 99999 PR PBB SHADOW E&M-EST. PATIENT-LVL V: CPT | Mod: PBBFAC,,, | Performed by: FAMILY MEDICINE

## 2017-11-16 PROCEDURE — 99215 OFFICE O/P EST HI 40 MIN: CPT | Mod: PBBFAC,PN | Performed by: FAMILY MEDICINE

## 2017-11-16 PROCEDURE — 82570 ASSAY OF URINE CREATININE: CPT

## 2017-11-16 PROCEDURE — G0008 ADMIN INFLUENZA VIRUS VAC: HCPCS | Mod: PBBFAC,PN

## 2017-11-16 PROCEDURE — 99214 OFFICE O/P EST MOD 30 MIN: CPT | Mod: S$PBB,,, | Performed by: FAMILY MEDICINE

## 2017-11-16 RX ORDER — NITROGLYCERIN 0.4 MG/1
0.4 TABLET SUBLINGUAL EVERY 5 MIN PRN
Qty: 30 TABLET | Refills: 6 | Status: CANCELLED | OUTPATIENT
Start: 2017-11-16

## 2017-11-16 RX ORDER — NITROGLYCERIN 0.4 MG/1
0.4 TABLET SUBLINGUAL EVERY 5 MIN PRN
Qty: 30 TABLET | Refills: 0 | Status: SHIPPED | OUTPATIENT
Start: 2017-11-16 | End: 2018-01-02 | Stop reason: SDUPTHER

## 2017-11-16 RX ORDER — CYPROHEPTADINE HYDROCHLORIDE 4 MG/1
4 TABLET ORAL 2 TIMES DAILY PRN
Qty: 30 TABLET | Refills: 0 | Status: SHIPPED | OUTPATIENT
Start: 2017-11-16 | End: 2018-01-04 | Stop reason: SDUPTHER

## 2017-11-16 RX ORDER — ALBUTEROL SULFATE 90 UG/1
2 AEROSOL, METERED RESPIRATORY (INHALATION) EVERY 6 HOURS PRN
Qty: 18 G | Refills: 0 | Status: SHIPPED | OUTPATIENT
Start: 2017-11-16 | End: 2018-01-02 | Stop reason: SDUPTHER

## 2017-11-16 RX ORDER — SPIRONOLACTONE 25 MG/1
12.5 TABLET ORAL DAILY
Qty: 45 TABLET | Refills: 3 | Status: CANCELLED | OUTPATIENT
Start: 2017-11-16 | End: 2018-11-16

## 2017-11-16 RX ORDER — LISINOPRIL 5 MG/1
5 TABLET ORAL DAILY
Qty: 90 TABLET | Refills: 1 | Status: SHIPPED | OUTPATIENT
Start: 2017-11-16 | End: 2018-04-23 | Stop reason: SDUPTHER

## 2017-11-16 RX ORDER — SPIRONOLACTONE 25 MG/1
12.5 TABLET ORAL DAILY
Qty: 45 TABLET | Refills: 1 | Status: SHIPPED | OUTPATIENT
Start: 2017-11-16 | End: 2018-01-02 | Stop reason: SDUPTHER

## 2017-11-16 RX ORDER — FUROSEMIDE 80 MG/1
80 TABLET ORAL 2 TIMES DAILY
Qty: 60 TABLET | Refills: 6 | Status: CANCELLED | OUTPATIENT
Start: 2017-11-16 | End: 2018-11-16

## 2017-11-16 RX ORDER — ATORVASTATIN CALCIUM 40 MG/1
40 TABLET, FILM COATED ORAL DAILY
Qty: 90 TABLET | Refills: 1 | Status: SHIPPED | OUTPATIENT
Start: 2017-11-16 | End: 2018-07-26 | Stop reason: SDUPTHER

## 2017-11-16 RX ORDER — TRAZODONE HYDROCHLORIDE 150 MG/1
150 TABLET ORAL NIGHTLY
Qty: 90 TABLET | Refills: 1 | Status: SHIPPED | OUTPATIENT
Start: 2017-11-16 | End: 2018-05-30 | Stop reason: SDUPTHER

## 2017-11-16 NOTE — PROGRESS NOTES
After obtaining consent, and per orders of Dr. Kwan, injection of fluzone Lot [id number] 6/30/18 given in the RD by ALETA MIRANDA. Patient tolerated well and band aid applied. Patient instructed to remain in clinic for 15 minutes afterwards, and to report any adverse reaction to me immediately.

## 2017-11-21 ENCOUNTER — TELEPHONE (OUTPATIENT)
Dept: INTERNAL MEDICINE | Facility: CLINIC | Age: 57
End: 2017-11-21

## 2017-11-21 NOTE — TELEPHONE ENCOUNTER
Please let her know her urine shows some protein, the treatment for this is the medication we had just added, the lisinopril.  We can repeat the urine the future.

## 2017-11-21 NOTE — LETTER
November 22, 2017    Polly Kwan  [address] [facility] [address]             [facility] - Family Medicine/ Internal Medicine  [address] [facility] [address]  Phone: [phone]  Fax: [phone] Dear Polly Kwan,    Our office has been unsuccessful in our attempts to contact you via telephone. Here are the instructions based on your recent laboratory studies.     From Dr Kwan:  Please let her know her urine shows some protein, the treatment for this is the medication we had just added, the lisinopril.  We can repeat the urine the future.      You may reach our office at [phone] if you have any questions or concerns. Thanks you for you Prompt attention to this matter.    Andreina

## 2017-11-27 ENCOUNTER — LAB VISIT (OUTPATIENT)
Dept: LAB | Facility: HOSPITAL | Age: 57
End: 2017-11-27
Attending: FAMILY MEDICINE
Payer: MEDICARE

## 2017-11-27 ENCOUNTER — TELEPHONE (OUTPATIENT)
Dept: INTERNAL MEDICINE | Facility: CLINIC | Age: 57
End: 2017-11-27

## 2017-11-27 DIAGNOSIS — Z12.11 COLON CANCER SCREENING: ICD-10-CM

## 2017-11-27 LAB — HEMOCCULT STL QL IA: NEGATIVE

## 2017-11-27 PROCEDURE — 82274 ASSAY TEST FOR BLOOD FECAL: CPT

## 2017-11-27 NOTE — LETTER
November 28, 2017    Polly Kwan  [address] [facility] [address]             [facility] - Family Medicine/ Internal Medicine  [address] [facility] [address]  Phone: [phone]  Fax: [phone] Dear Ms. Polly Kwan:    Below are the results from your recent visit:    Resulted Orders   Fecal Immunochemical Test (iFOBT)   Result Value Ref Range    Fecal Immunochemical Test (iFOBT) Negative Negative    Narrative    [facility] Bulk Order     Your results are normal.  Please don't hesitate to call our office if you have any questions or concerns.      Sincerely,        Andreina Mata MA

## 2017-12-12 DIAGNOSIS — I25.5 ISCHEMIC CARDIOMYOPATHY: ICD-10-CM

## 2017-12-12 RX ORDER — ASPIRIN 81 MG/1
81 TABLET ORAL DAILY
Qty: 30 TABLET | Refills: 6 | Status: SHIPPED | OUTPATIENT
Start: 2017-12-12 | End: 2018-01-02 | Stop reason: SDUPTHER

## 2017-12-31 DIAGNOSIS — I25.10 CORONARY ARTERY DISEASE INVOLVING NATIVE CORONARY ARTERY OF NATIVE HEART WITHOUT ANGINA PECTORIS: ICD-10-CM

## 2017-12-31 RX ORDER — NITROGLYCERIN 0.4 MG/1
0.4 TABLET SUBLINGUAL EVERY 5 MIN PRN
Qty: 25 TABLET | Refills: 0 | Status: CANCELLED | OUTPATIENT
Start: 2017-12-31

## 2018-01-02 ENCOUNTER — HOSPITAL ENCOUNTER (EMERGENCY)
Facility: HOSPITAL | Age: 58
Discharge: HOME | End: 2018-01-02
Attending: EMERGENCY MEDICINE
Payer: MEDICARE

## 2018-01-02 ENCOUNTER — TELEPHONE (OUTPATIENT)
Dept: CARDIOLOGY | Facility: HOSPITAL | Age: 58
End: 2018-01-02

## 2018-01-02 ENCOUNTER — TELEPHONE (OUTPATIENT)
Dept: PODIATRY | Facility: CLINIC | Age: 58
End: 2018-01-02

## 2018-01-02 ENCOUNTER — OFFICE VISIT (OUTPATIENT)
Dept: CARDIOLOGY | Facility: CLINIC | Age: 58
End: 2018-01-02
Payer: MEDICARE

## 2018-01-02 VITALS
OXYGEN SATURATION: 100 % | RESPIRATION RATE: 16 BRPM | HEART RATE: 75 BPM | HEIGHT: 63 IN | SYSTOLIC BLOOD PRESSURE: 121 MMHG | TEMPERATURE: 98 F | BODY MASS INDEX: 24.63 KG/M2 | DIASTOLIC BLOOD PRESSURE: 59 MMHG | WEIGHT: 139 LBS

## 2018-01-02 VITALS
SYSTOLIC BLOOD PRESSURE: 84 MMHG | HEART RATE: 60 BPM | OXYGEN SATURATION: 99 % | RESPIRATION RATE: 24 BRPM | DIASTOLIC BLOOD PRESSURE: 60 MMHG

## 2018-01-02 DIAGNOSIS — I47.20 VENTRICULAR TACHYCARDIA: Primary | ICD-10-CM

## 2018-01-02 DIAGNOSIS — R06.02 SOB (SHORTNESS OF BREATH): ICD-10-CM

## 2018-01-02 DIAGNOSIS — I25.10 CORONARY ARTERY DISEASE INVOLVING NATIVE CORONARY ARTERY OF NATIVE HEART WITHOUT ANGINA PECTORIS: ICD-10-CM

## 2018-01-02 DIAGNOSIS — I47.20 VENTRICULAR TACHYCARDIA: ICD-10-CM

## 2018-01-02 DIAGNOSIS — I10 ESSENTIAL HYPERTENSION: ICD-10-CM

## 2018-01-02 DIAGNOSIS — I50.42 CHRONIC COMBINED SYSTOLIC AND DIASTOLIC CHF, NYHA CLASS 4: ICD-10-CM

## 2018-01-02 DIAGNOSIS — I50.9 ACUTE ON CHRONIC CONGESTIVE HEART FAILURE, UNSPECIFIED CONGESTIVE HEART FAILURE TYPE: Primary | ICD-10-CM

## 2018-01-02 DIAGNOSIS — I25.5 ISCHEMIC CARDIOMYOPATHY: ICD-10-CM

## 2018-01-02 DIAGNOSIS — I50.23 ACUTE ON CHRONIC SYSTOLIC CONGESTIVE HEART FAILURE: Primary | ICD-10-CM

## 2018-01-02 DIAGNOSIS — J44.9 CHRONIC OBSTRUCTIVE PULMONARY DISEASE, UNSPECIFIED COPD TYPE: ICD-10-CM

## 2018-01-02 DIAGNOSIS — Z95.810 ICD (IMPLANTABLE CARDIOVERTER-DEFIBRILLATOR) IN PLACE: ICD-10-CM

## 2018-01-02 PROBLEM — I48.0 PAROXYSMAL ATRIAL FIBRILLATION: Status: ACTIVE | Noted: 2018-01-02

## 2018-01-02 LAB
ALBUMIN SERPL BCP-MCNC: 3.3 G/DL
ALP SERPL-CCNC: 108 U/L
ALT SERPL W/O P-5'-P-CCNC: 21 U/L
ANION GAP SERPL CALC-SCNC: 7 MMOL/L
AST SERPL-CCNC: 25 U/L
BASOPHILS # BLD AUTO: 0.01 K/UL
BASOPHILS NFR BLD: 0.2 %
BILIRUB SERPL-MCNC: 1.1 MG/DL
BNP SERPL-MCNC: 1350 PG/ML
BUN SERPL-MCNC: 7 MG/DL
CALCIUM SERPL-MCNC: 8.8 MG/DL
CHLORIDE SERPL-SCNC: 105 MMOL/L
CO2 SERPL-SCNC: 29 MMOL/L
CREAT SERPL-MCNC: 0.8 MG/DL
DIFFERENTIAL METHOD: ABNORMAL
EOSINOPHIL # BLD AUTO: 0.1 K/UL
EOSINOPHIL NFR BLD: 1.1 %
ERYTHROCYTE [DISTWIDTH] IN BLOOD BY AUTOMATED COUNT: 16.4 %
EST. GFR  (AFRICAN AMERICAN): >60 ML/MIN/1.73 M^2
EST. GFR  (NON AFRICAN AMERICAN): >60 ML/MIN/1.73 M^2
GLUCOSE SERPL-MCNC: 84 MG/DL
HCT VFR BLD AUTO: 32.9 %
HGB BLD-MCNC: 10.9 G/DL
IMM GRANULOCYTES # BLD AUTO: 0.02 K/UL
IMM GRANULOCYTES NFR BLD AUTO: 0.4 %
LYMPHOCYTES # BLD AUTO: 1.8 K/UL
LYMPHOCYTES NFR BLD: 38.9 %
MAGNESIUM SERPL-MCNC: 1.8 MG/DL
MCH RBC QN AUTO: 28.1 PG
MCHC RBC AUTO-ENTMCNC: 33.1 G/DL
MCV RBC AUTO: 85 FL
MONOCYTES # BLD AUTO: 0.4 K/UL
MONOCYTES NFR BLD: 9.3 %
NEUTROPHILS # BLD AUTO: 2.4 K/UL
NEUTROPHILS NFR BLD: 50.1 %
NRBC BLD-RTO: 0 /100 WBC
PLATELET # BLD AUTO: 261 K/UL
PMV BLD AUTO: 10.7 FL
POTASSIUM SERPL-SCNC: 3.5 MMOL/L
PROT SERPL-MCNC: 7.2 G/DL
RBC # BLD AUTO: 3.88 M/UL
SODIUM SERPL-SCNC: 141 MMOL/L
TROPONIN I SERPL DL<=0.01 NG/ML-MCNC: 0.01 NG/ML
TSH SERPL DL<=0.005 MIU/L-ACNC: 0.78 UIU/ML
WBC # BLD AUTO: 4.71 K/UL

## 2018-01-02 PROCEDURE — 99212 OFFICE O/P EST SF 10 MIN: CPT | Mod: PBBFAC,25 | Performed by: NURSE PRACTITIONER

## 2018-01-02 PROCEDURE — 99215 OFFICE O/P EST HI 40 MIN: CPT | Mod: S$PBB,,, | Performed by: NURSE PRACTITIONER

## 2018-01-02 PROCEDURE — 93005 ELECTROCARDIOGRAM TRACING: CPT

## 2018-01-02 PROCEDURE — 93010 ELECTROCARDIOGRAM REPORT: CPT | Mod: ,,, | Performed by: INTERNAL MEDICINE

## 2018-01-02 PROCEDURE — 25000003 PHARM REV CODE 250: Performed by: INTERNAL MEDICINE

## 2018-01-02 PROCEDURE — 84484 ASSAY OF TROPONIN QUANT: CPT

## 2018-01-02 PROCEDURE — 83880 ASSAY OF NATRIURETIC PEPTIDE: CPT

## 2018-01-02 PROCEDURE — 80053 COMPREHEN METABOLIC PANEL: CPT

## 2018-01-02 PROCEDURE — 99999 PR PBB SHADOW E&M-EST. PATIENT-LVL II: CPT | Mod: PBBFAC,,, | Performed by: NURSE PRACTITIONER

## 2018-01-02 PROCEDURE — 83735 ASSAY OF MAGNESIUM: CPT

## 2018-01-02 PROCEDURE — 85025 COMPLETE CBC W/AUTO DIFF WBC: CPT

## 2018-01-02 PROCEDURE — 63600175 PHARM REV CODE 636 W HCPCS: Performed by: EMERGENCY MEDICINE

## 2018-01-02 PROCEDURE — 99285 EMERGENCY DEPT VISIT HI MDM: CPT | Mod: ,,, | Performed by: EMERGENCY MEDICINE

## 2018-01-02 PROCEDURE — 84443 ASSAY THYROID STIM HORMONE: CPT

## 2018-01-02 PROCEDURE — 96374 THER/PROPH/DIAG INJ IV PUSH: CPT

## 2018-01-02 PROCEDURE — 99284 EMERGENCY DEPT VISIT MOD MDM: CPT | Mod: 25,27

## 2018-01-02 RX ORDER — ALBUTEROL SULFATE 90 UG/1
2 AEROSOL, METERED RESPIRATORY (INHALATION) EVERY 6 HOURS PRN
Qty: 1 INHALER | Refills: 0 | Status: SHIPPED | OUTPATIENT
Start: 2018-01-02 | End: 2018-07-30 | Stop reason: SDUPTHER

## 2018-01-02 RX ORDER — SPIRONOLACTONE 25 MG/1
12.5 TABLET ORAL DAILY
Qty: 45 TABLET | Refills: 3 | Status: ON HOLD | OUTPATIENT
Start: 2018-01-02 | End: 2018-10-08 | Stop reason: SDUPTHER

## 2018-01-02 RX ORDER — FUROSEMIDE 40 MG/1
80 TABLET ORAL 2 TIMES DAILY
Qty: 120 TABLET | Refills: 0 | Status: ON HOLD | OUTPATIENT
Start: 2018-01-02 | End: 2018-10-08 | Stop reason: SDUPTHER

## 2018-01-02 RX ORDER — FUROSEMIDE 10 MG/ML
40 INJECTION INTRAMUSCULAR; INTRAVENOUS
Status: COMPLETED | OUTPATIENT
Start: 2018-01-02 | End: 2018-01-02

## 2018-01-02 RX ORDER — FUROSEMIDE 80 MG/1
80 TABLET ORAL 2 TIMES DAILY
Qty: 180 TABLET | Refills: 3 | Status: ON HOLD | OUTPATIENT
Start: 2018-01-02 | End: 2018-10-08 | Stop reason: SDUPTHER

## 2018-01-02 RX ORDER — ASPIRIN 81 MG/1
81 TABLET ORAL DAILY
Qty: 30 TABLET | Refills: 6 | Status: SHIPPED | OUTPATIENT
Start: 2018-01-02 | End: 2019-01-18 | Stop reason: SDUPTHER

## 2018-01-02 RX ORDER — NITROGLYCERIN 0.4 MG/1
0.4 TABLET SUBLINGUAL EVERY 5 MIN PRN
Qty: 100 TABLET | Refills: 11 | Status: SHIPPED | OUTPATIENT
Start: 2018-01-02 | End: 2019-03-31 | Stop reason: SDUPTHER

## 2018-01-02 RX ORDER — POTASSIUM CHLORIDE 20 MEQ/1
60 TABLET, EXTENDED RELEASE ORAL ONCE
Status: COMPLETED | OUTPATIENT
Start: 2018-01-02 | End: 2018-01-02

## 2018-01-02 RX ORDER — AMIODARONE HYDROCHLORIDE 400 MG/1
400 TABLET ORAL DAILY
Qty: 30 TABLET | Refills: 0 | Status: SHIPPED | OUTPATIENT
Start: 2018-01-02 | End: 2018-08-30 | Stop reason: SDUPTHER

## 2018-01-02 RX ORDER — LANOLIN ALCOHOL/MO/W.PET/CERES
400 CREAM (GRAM) TOPICAL ONCE
Status: COMPLETED | OUTPATIENT
Start: 2018-01-02 | End: 2018-01-02

## 2018-01-02 RX ADMIN — FUROSEMIDE 40 MG: 10 INJECTION, SOLUTION INTRAMUSCULAR; INTRAVENOUS at 02:01

## 2018-01-02 RX ADMIN — POTASSIUM CHLORIDE 60 MEQ: 1500 TABLET, EXTENDED RELEASE ORAL at 02:01

## 2018-01-02 RX ADMIN — MAGNESIUM OXIDE TAB 400 MG (241.3 MG ELEMENTAL MG) 400 MG: 400 (241.3 MG) TAB at 02:01

## 2018-01-02 NOTE — ASSESSMENT & PLAN NOTE
- Several episodes of Afib noted on device since interrogation on 5/2017  - Given CV=3 started on Eliquis  - No beta blocker given cocaine and COPD  - Added on TSH  - Repleated K and mag  - Currently in NSR

## 2018-01-02 NOTE — PATIENT INSTRUCTIONS
LOW-SALT DIET (2 grams/day)   This diet eliminates foods that are high in salt and restricts the amount of salt that you cook with. It is most often used for patients with high blood pressure, edema (fluid retention), kidney, liver, and heart disease.   Table salt contains the mineral sodium. The body needs a little bit of sodium to work normally. But too much sodium can make your health problems worse. Your total daily allowance of salt (sodium) is 2 grams. This equals 2000 milligrams (mg). This is about a teaspoon of salt. This means you can have only about 500 mg of sodium at each meal. Most individuals get excessive sodium from snacks. Look carefully for sodium levels at or around 250 mg per serving and do not eat more than 1 serving. Really low-sodium snacks contain less than 100 mg per serving.    When you cook, limit the salt you use. And if you can avoid using salt, even better. Do not add salt at the table. So, throw away the saltshaker!   When shopping, read the package labels. Salt is often called sodium on the label. Choose foods that are Salt-Free, Low Salt, or Very Low Salt. Note that foods with Reduced Salt may not lower your salt intake enough.     BEVERAGES OK: Tea, coffee, carbonated beverages, juices   AVOID: Flavored international coffees, electrolyte replacement drinks, sports beverages     BREAD & CEREALS OK: All regular bread, rolls, cereals, cakes; low-salt crackers, matzoh crackers   AVOID: Salted crackers, pretzels, popcorn; Irish toast, pancakes, muffins     FRUITS & DESSERTS OK: Ice cream, frozen yogurt, juice bars, gelatin (Jell-O), cookies and pies, sugar, honey, jelly, hard candy   AVOID: Most pies, cakes and cookies prepared or processed with salt, instant pudding     MEATS OK: All fresh meat, fish, poultry, low-salt tuna   AVOID: Smoked, pickled, brine-cured, or salted meats or fish. This includes justin, chipped beef, corned beef, hot dogs, luncheon meats, ham, kosher meats, salt  pork, sausage, canned tuna, salted codfish, smoked salmon, herring, sardines, or anchovies.     DAIRY OK: Milk, chocolate milk, hot chocolate mix; eggs, Low Salt cheeses, yogurt, egg substitute   AVOID: Processed cheese, cheese spreads, Roquefort, Camembert, and cottage cheese, buttermilk, instant breakfast drink     BEANS, POTATOES & PASTA OK: Dry beans, split peas, lentils, potatoes, rice, macaroni, noodles, spaghetti without added salt   AVOID: Potato chips, tortilla chips, and similar products     SOUPS OK: Low-salt soups and broths made with allowed foods   AVOID: Bouillon cubes, soups with smoked or salted meats, regular soup and broth     VEGETABLES OK: Most are okay; low-salt tomato and vegetable juices   AVOID: Sauerkraut and other brine-soaked vegetables, pickles and other pickled vegetables, tomato juice, olives     SEASONING & SPICES: OK: Most seasonings are okay. Good substitutes for salt include: fresh herb blends, Tabasco, lemon, garlic, lin, vinegar, dry mustard, parsley, cilantro, horseradish, tomato paste, regular margarine, mayonnaise, butter, cream cheese, vegetable oil, cream, low-salt salad dressing and gravy   AVOID: Regular ketchup, relishes, pickles, soy sauce, teriyaki sauce, Worcestershire sauce, BBQ sauce, tartar sauce, meat tenderizer, chili sauce, regular gravy, regular salad dressing   © 3858-1099 [facility], [address] [place] [address], [place] [address]. All rights reserved. This information is not intended as a substitute for professional medical care. Always follow your healthcare professional's instructions.

## 2018-01-02 NOTE — CONSULTS
[facility]  Cardiology  Consult Note    Patient Name: Polly Kwan  MRN: [id number]  Admission Date: 1/2/2018  Hospital Length of Stay: 0 days  Code Status: Prior   Attending Provider: No att. providers found   Consulting Provider: Grady Gomes MD  Primary Care Physician: Iza Kwan MD  Principal Problem:<principal problem not specified>    Patient information was obtained from patient and ER records.     Consults  Subjective:     Chief Complaint:  ICD Beeping     HPI:   58 y/o AAF with hx of ICM EF 20% s/p Medtronic dcICD placed in [place] in 2012, CAD s/p PCI 2006 and 2013 in [place], cociane abuse last used 1/1/18, schizophrenia, COPD, HLD, tob abuse sent from Cardiology clinic for her ICD beeping and some intermittent SOB. PT notes she got shocked 2 times by her device in DEC. No syncope at that time. Didn't seek attention. Did feel some palpitations. She also notes she has been out of her lasix for a few weeks. She has some more VIEYRA with ADL's. No orthopnea or PND. She denies Chest pain.    Past Medical History:   Diagnosis Date    Asthma     Bipolar 1 disorder     Cardiomyopathy     Ischemic Cardiomyopathy with EF 25-30% by echo 5/17/16    Chronic systolic (congestive) heart failure     COPD (chronic obstructive pulmonary disease)     emphysema    Coronary artery disease     ROCHELLE 2006 and 2013    Depression     Diabetes mellitus     H/O echocardiogram 05/17/2016    EF 25-30%. LV diastolic dysfxn. Severe LAE. mod MR. PASP 86.    Hypertension     ICD (implantable cardioverter-defibrillator) in place 07/09/2013    MI (myocardial infarction)     x 3    PTSD (post-traumatic stress disorder)     Schizophrenia     Seizures        Past Surgical History:   Procedure Laterality Date    CARDIAC CATHETERIZATION      CARDIAC DEFIBRILLATOR PLACEMENT      [place]ON[place]OPLASTY  2006    [place]    CORONARY STENT PLACEMENT      INSERT / REPLACE / REMOVE PACEMAKER  2013     ICD [place]       Review of patient's allergies indicates:  No Known Allergies    No current facility-administered medications on file prior to encounter.      Current Outpatient Prescriptions on File Prior to Encounter   Medication Sig    albuterol (VENTOLIN HFA) 90 mcg/actuation inhaler Inhale 2 puffs into the lungs every 6 (six) hours as needed for Wheezing or Shortness of Breath. Rescue    aspirin (ECOTRIN) 81 MG EC tablet Take 1 tablet (81 mg total) by mouth once daily.    atorvastatin (LIPITOR) 40 MG tablet Take 1 tablet (40 mg total) by mouth once daily.    cyproheptadine (PERIACTIN) 4 mg tablet Take 1 tablet (4 mg total) by mouth 2 (two) times daily as needed.    fluticasone furoate 100 mcg/actuation DsDv Inhale 100 mcg into the lungs once daily. Controller    furosemide (LASIX) 80 MG tablet Take 1 tablet (80 mg total) by mouth 2 (two) times daily.    lidocaine (LIDODERM) 5 % Place 1 patch onto the skin once daily. Remove & Discard patch within 12 hours or as directed by MD    lisinopril (PRINIVIL,ZESTRIL) 5 MG tablet Take 1 tablet (5 mg total) by mouth once daily.    nitroGLYCERIN (NITROSTAT) 0.4 MG SL tablet Place 1 tablet (0.4 mg total) under the tongue every 5 (five) minutes as needed for Chest pain.    ranitidine (ZANTAC) 75 MG tablet Take 1 tablet (75 mg total) by mouth 2 (two) times daily.    spironolactone (ALDACTONE) 25 MG tablet Take 0.5 tablets (12.5 mg total) by mouth once daily.    traZODone (DESYREL) 150 MG tablet Take 1 tablet (150 mg total) by mouth every evening.    [DISCONTINUED] albuterol (VENTOLIN HFA) 90 mcg/actuation inhaler Inhale 2 puffs into the lungs every 6 (six) hours as needed for Wheezing. Rescue    [DISCONTINUED] aspirin (ECOTRIN) 81 MG EC tablet TAKE 1 TABLET (81 MG TOTAL) BY MOUTH ONCE DAILY.    [DISCONTINUED] furosemide (LASIX) 80 MG tablet Take 1 tablet (80 mg total) by mouth 2 (two) times daily.    [DISCONTINUED] nitroGLYCERIN (NITROSTAT) 0.4 MG SL tablet  Place 1 tablet (0.4 mg total) under the tongue every 5 (five) minutes as needed for Chest pain.    [DISCONTINUED] spironolactone (ALDACTONE) 25 MG tablet Take 0.5 tablets (12.5 mg total) by mouth once daily.     Family History     None        Social History Main Topics    Smoking status: Current Some Day Smoker     Packs/day: 3.00     Years: 30.00    Smokeless tobacco: Never Used      Comment: 1-2  cigarettes a day when stressed    Alcohol use No    Drug use: Yes     Types: Cocaine      Comment: once on wednesday    Sexual activity: No     Review of Systems   Constitution: Negative for chills and fever.   HENT: Negative for hoarse voice and sore throat.    Eyes: Negative for pain and redness.   Cardiovascular: Positive for irregular heartbeat. Negative for chest pain, dyspnea on exertion, leg swelling and orthopnea.   Respiratory: Positive for cough and shortness of breath.    Endocrine: Negative for polydipsia and polyuria.   Hematologic/Lymphatic: Negative for bleeding problem. Does not bruise/bleed easily.   Skin: Negative for flushing and itching.   Musculoskeletal: Negative for joint pain and joint swelling.   Gastrointestinal: Negative for hematemesis, hematochezia and melena.   Genitourinary: Negative for dysuria and hematuria.   Neurological: Negative for light-headedness and loss of balance.     Objective:     Vital Signs (Most Recent):  Temp: 97.7 °F (36.5 °C) (01/02/18 0911)  Pulse: 75 (01/02/18 1430)  Resp: 16 (01/02/18 1430)  BP: (!) 121/59 (01/02/18 1430)  SpO2: 100 % (01/02/18 1430) Vital Signs (24h Range):  Temp:  [97.7 °F (36.5 °C)] 97.7 °F (36.5 °C)  Pulse:  [60-75] 75  Resp:  [16-24] 16  SpO2:  [99 %-100 %] 100 %  BP: ()/(52-60) 121/59     Weight: 63 kg (139 lb)  Body mass index is 25.02 kg/m².    SpO2: 100 %  O2 Device (Oxygen Therapy): room air    No intake or output data in the 24 hours ending 01/02/18 1511    Lines/Drains/Airways          No matching active lines, drains, or  airways          Physical Exam   Constitutional: She is oriented to person, place, and time. She appears well-developed and well-nourished.   HENT:   Head: Normocephalic and atraumatic.   Mouth/Throat: No oropharyngeal exudate.   Eyes: EOM are normal. Pupils are equal, round, and reactive to light.   Neck: Normal range of motion. Neck supple. JVD present.   Cardiovascular: Normal rate and regular rhythm.  Exam reveals no friction rub.    Murmur heard.  High-pitched blowing holosystolic murmur is present  at the apex  Pulmonary/Chest: Effort normal and breath sounds normal. No respiratory distress.   Abdominal: Soft. Bowel sounds are normal. She exhibits no distension.   Musculoskeletal: Normal range of motion. She exhibits no edema.   Neurological: She is alert and oriented to person, place, and time.   Skin: No rash noted.       Significant Labs: All pertinent lab results from the last 24 hours have been reviewed.    Significant Imaging: EKG: NSR with limb lead reversal    Assessment and Plan:     Paroxysmal atrial fibrillation    - Several episodes of Afib noted on device since interrogation on 5/2017  - Given CV=3 started on Eliquis  - No beta blocker given cocaine and COPD  - Added on TSH  - Repleated K and mag  - Currently in NSR          ICD (implantable cardioverter-defibrillator) in place    - Pt's ICD was beeping because she does not have a Home monitor or follow with an EP physician to regularly download her alarms  - She had 2 ICD discharges in Dec, 2 in Nov, and 1 in Oct since last interrogation  - Lead impendence and thresholds are stabel  - Only one discharge in Nov was appropriate for polymorphic VT. All other discharges were for either Afib or SVT.   - VT zone changed to 200bpm and VF zone changed to 222bpm to avoid shocks for SVT.  - Spoke to Dr. Slaughter and will set her up with him to follow in EP clinic. Can get her a home monitor at that time.  - Will start Eliquis 5mg BID and Amio 400mg QD x1  month then 200mg QD after that  - Will get PET scan as outpatient given polymorphic VT and hx of CAD. Pt denies having any chest pain, she has no ishcemic changes on her EKG and her troponin is negative today  - She does not appear grosslly overloaded on exam and sating well on room air. She was given 40mg IV lasix in ED and a new script for 80mg PO BID as she was out of her own  - She will need to f/u in 1-2 weeks in cardiology clinic to check renal function since she will be back on her lasix  - OK to d/c home            VTE Risk Mitigation     None          Thank you for your consult. I will sign off. Please contact us if you have any additional questions.    Grady Gomes MD  Cardiology   [facility]

## 2018-01-02 NOTE — ED NOTES
"Pt identifiers checked and accurate with wristband.   LOC: The patient is awake, alert and aware of environment with an appropriate affect, the patient is oriented x 3 and speaking appropriately.  APPEARANCE: Patient resting comfortably and in no acute distress, patient is clean and well groomed  SKIN: The skin is warm and dry, color consistent with ethnicity, patient has normal skin turgor and moist mucus membranes, skin intact, no breakdown or bruising noted.  MUSCULOSKELETAL: Patient moving all extremities well, no obvious swelling or deformities noted.   RESPIRATORY: Airway is open and patent, breath sounds clear throughout all lung fields; respirations are spontaneous, patient has a normal effort and rate, no accessory muscle use noted. Pt reports SOB x 1 week, resolved at this time. Pt reports productive cough with green sputum x 2 days.   CARDIAC: Patient has trace peripheral edema noted to bilateral lower extremities, capillary refill < 3 seconds. No complaints of chest pain     Pt refusing to change into gown at this time, stating "I'm not staying here".  "

## 2018-01-02 NOTE — PROVIDER PROGRESS NOTES - EMERGENCY DEPT.
Encounter Date: 1/2/2018    SCRIBE NOTE: Candido YEE, am scribing for, and in the presence of, Cande Bonner MD.      ED Physician Progress Notes         EKG - STEMI Decision  Initial Reading: No STEMI present.

## 2018-01-02 NOTE — TELEPHONE ENCOUNTER
----- Message from Heather Gr sent at 1/2/2018 10:26 AM CST -----  Contact: Pt  Pt says she is still in the ED to have a test did for her defibrillator and will be at the appt as soon as she is done with the testing    Pt can be reached at [phone] or [phone]    Thanks        Patient is in the hospital

## 2018-01-02 NOTE — ASSESSMENT & PLAN NOTE
- Pt's ICD was beeping because she does not have a Home monitor or follow with an EP physician to regularly download her alarms  - She had 2 ICD discharges in Dec, 2 in Nov, and 1 in Oct since last interrogation  - Lead impendence and thresholds are stabel  - Only one discharge in Nov was appropriate for polymorphic VT. All other discharges were for either Afib or SVT.   - VT zone changed to 200bpm and VF zone changed to 222bpm to avoid shocks for SVT.  - Spoke to Dr. Slaughter and will set her up with him to follow in EP clinic. Can get her a home monitor at that time.  - Will start Eliquis 5mg BID and Amio 400mg QD x1 month then 200mg QD after that  - Will get PET scan as outpatient given polymorphic VT and hx of CAD. Pt denies having any chest pain, she has no ishcemic changes on her EKG and her troponin is negative today  - She does not appear grosslly overloaded on exam and sating well on room air. She was given 40mg IV lasix in ED and a new script for 80mg PO BID as she was out of her own  - She will need to f/u in 1-2 weeks in cardiology clinic to check renal function since she will be back on her lasix  - OK to d/c home

## 2018-01-02 NOTE — ED NOTES
Pt reports SOB x 1 week. Pt reports finishing spironolactone and furosemide prescriptions before 12/25. Pt denies chest pain and SOB at this time. Pt reports productive cough with green sputum.

## 2018-01-02 NOTE — HPI
56 y/o AAF with hx of ICM EF 20% s/p Medtronic dcICD placed in [place] in 2012, CAD s/p PCI 2006 and 2013 in [place], cociane abuse last used 1/1/18, schizophrenia, COPD, HLD, tob abuse sent from Cardiology clinic for her ICD beeping and some intermittent SOB. PT notes she got shocked 2 times by her device in DEC. No syncope at that time. Didn't seek attention. Did feel some palpitations. She also notes she has been out of her lasix for a few weeks. She has some more VIEYRA with ADL's. No orthopnea or PND. She denies Chest pain.

## 2018-01-02 NOTE — PROGRESS NOTES
Ms. Kwan is a patient of Dr. Buck and was last seen in [facility] Cardiology Visit 7/5/17.    Subjective:   Patient ID:  Polly Kwan is a 57 y.o. female who presents for follow-up of No chief complaint on file.    Problems  ICM with LVEF 20%   HFrEF NYHA class IV, s/p ICD 2013 (medtronic)   Pulmonary hypertension  Moderate TR  Moderate to severe MR  CAD s/p PCI x2 (2006 and 2013) MI x3 (2006, 2011, 2013)   HTN   DM2   Polysubstance abuse (tobacco and cocaine)   COPD   90 pack year h/o smoking  Schizophrenia     HPI  Ms. Kwan is in clinic today for routine follow up.  Reports running out of her lasix, her rescue inhaler and controller inhaler.  She is very short of breath and having difficulty walking.  Reports running out of her lasix at the end of November.  Her neighbor gave her a couple of 20mg tablets yesterday and she took the second one this morning.  She is sleeping on 4 pillows because of back pain and SOB.  She is currently smoking 2 cigarrettes a day and trying to quit.  Reports defibrillator discharge right before dawit.  States that she was lightheaded and dizzy prior to the discharge and she recovered after lying down for a while.  Reports having high salt intake over the holidays including things like ham and potato salad.  Her breakfast this morning consisted of red beans with sausage and a biscuit.  Last echo 4/2017 revealed increased PA pressures (84mmHg) and an IVC of 15mmHg.  At that time, she also had significant TR and MR.      Review of Systems   Constitution: Positive for weight loss. Negative for decreased appetite, diaphoresis, weakness, malaise/fatigue and weight gain.   HENT: Positive for hoarse voice.    Eyes: Negative for visual disturbance.   Cardiovascular: Positive for dyspnea on exertion. Negative for chest pain, claudication, irregular heartbeat, leg swelling, near-syncope, orthopnea, palpitations, paroxysmal nocturnal dyspnea and syncope.        Denies chest pressure    Respiratory: Positive for shortness of breath. Negative for cough, hemoptysis, sleep disturbances due to breathing and snoring.    Endocrine: Negative for cold intolerance and heat intolerance.   Hematologic/Lymphatic: Negative for bleeding problem. Does not bruise/bleed easily.   Musculoskeletal: Negative for myalgias.   Gastrointestinal: Negative for bloating, abdominal pain, anorexia, change in bowel habit, constipation, diarrhea, nausea and vomiting.   Neurological: Negative for difficulty with concentration, disturbances in coordination, excessive daytime sleepiness, dizziness, headaches, light-headedness, loss of balance and numbness.   Psychiatric/Behavioral: The patient does not have insomnia.      Allergies and current medications updated and reviewed:  Review of patient's allergies indicates:  No Known Allergies  Current Outpatient Prescriptions   Medication Sig    albuterol (VENTOLIN HFA) 90 mcg/actuation inhaler Inhale 2 puffs into the lungs every 6 (six) hours as needed for Wheezing. Rescue    aspirin (ECOTRIN) 81 MG EC tablet TAKE 1 TABLET (81 MG TOTAL) BY MOUTH ONCE DAILY.    atorvastatin (LIPITOR) 40 MG tablet Take 1 tablet (40 mg total) by mouth once daily.    cyproheptadine (PERIACTIN) 4 mg tablet Take 1 tablet (4 mg total) by mouth 2 (two) times daily as needed.    fluticasone furoate 100 mcg/actuation DsDv Inhale 100 mcg into the lungs once daily. Controller    furosemide (LASIX) 80 MG tablet Take 1 tablet (80 mg total) by mouth 2 (two) times daily.    lidocaine (LIDODERM) 5 % Place 1 patch onto the skin once daily. Remove & Discard patch within 12 hours or as directed by MD    lisinopril (PRINIVIL,ZESTRIL) 5 MG tablet Take 1 tablet (5 mg total) by mouth once daily.    nitroGLYCERIN (NITROSTAT) 0.4 MG SL tablet Place 1 tablet (0.4 mg total) under the tongue every 5 (five) minutes as needed for Chest pain.    ranitidine (ZANTAC) 75 MG tablet Take 1 tablet (75 mg total) by mouth 2 (two)  times daily.    spironolactone (ALDACTONE) 25 MG tablet Take 0.5 tablets (12.5 mg total) by mouth once daily.    traZODone (DESYREL) 150 MG tablet Take 1 tablet (150 mg total) by mouth every evening.     No current facility-administered medications for this visit.      Objective:          BP (!) 84/60   Pulse 60   Resp (!) 24   SpO2 99%       Physical Exam   Constitutional: She is oriented to person, place, and time. Vital signs are normal. She appears well-developed and well-nourished. She is active. No distress.   HENT:   Head: Normocephalic and atraumatic.   Eyes: Conjunctivae and lids are normal. No scleral icterus.   Neck: Neck supple. Normal carotid pulses, no hepatojugular reflux and no JVD present. Carotid bruit is not present.   Cardiovascular: Normal rate, S1 normal, S2 normal and intact distal pulses.  An irregularly irregular rhythm present. PMI is not displaced.  Exam reveals no gallop and no friction rub.    No murmur heard.  Pulses:       Carotid pulses are 2+ on the right side, and 2+ on the left side.       Radial pulses are 2+ on the right side, and 2+ on the left side.        Dorsalis pedis pulses are 2+ on the right side, and 2+ on the left side.        Posterior tibial pulses are 1+ on the right side, and 1+ on the left side.   Pulmonary/Chest: Accessory muscle usage present. Tachypnea noted. No respiratory distress. She has decreased breath sounds in the right middle field, the right lower field, the left middle field and the left lower field. She has no wheezes. She has no rhonchi. She has no rales. She exhibits no tenderness.   Breathless with talking   Abdominal: Soft. Normal appearance and bowel sounds are normal. She exhibits no distension, no fluid wave, no abdominal bruit, no ascites and no pulsatile midline mass. There is no hepatosplenomegaly. There is no tenderness.   Musculoskeletal: She exhibits no edema.   Neurological: She is alert and oriented to person, place, and time.  Gait normal.   Skin: Skin is warm, dry and intact. No rash noted. She is not diaphoretic. Nails show no clubbing.   Psychiatric: She has a normal mood and affect. Her speech is normal and behavior is normal. Judgment and thought content normal. Cognition and memory are normal.   Nursing note and vitals reviewed.      Chemistry        Component Value Date/Time     (L) 05/26/2017 0526    K 4.5 05/26/2017 0526    CL 97 05/26/2017 0526    CO2 31 (H) 05/26/2017 0526    BUN 17 05/26/2017 0526    CREATININE 1.1 05/26/2017 0526     (H) 05/26/2017 0526        Component Value Date/Time    CALCIUM 9.0 05/26/2017 0526    ALKPHOS 87 05/26/2017 0526    AST 14 05/26/2017 0526    ALT 9 (L) 05/26/2017 0526    BILITOT 1.4 (H) 05/26/2017 0526    ESTGFRAFRICA >60.0 05/26/2017 0526    EGFRNONAA 55.9 (A) 05/26/2017 0526        Lab Results   Component Value Date    HGBA1C 6.2 04/07/2017         Recent Labs  Lab 05/25/16  0950  04/09/17  1008 04/10/17  0641  05/21/17  2135  05/26/17 0526   WHITE BLOOD CELL COUNT  --   < >  --   --   < >  --   < > 4.56   HEMOGLOBIN  --   < >  --   --   < >  --   < > 12.0   HEMATOCRIT  --   < >  --   --   < >  --   < > 36.6 L   MCV  --   < >  --   --   < >  --   < > 83   PLATELETS  --   < >  --   --   < >  --   < > 267   BNP  --   < > 548 H 893 H  --  1,977 H  --   --    TSH 2.215  --   --   --   --   --   --   --    < > = values in this interval not displayed.      Recent Labs  Lab 05/16/16 2326 07/25/16  1947 04/06/17  2107   INR 1.1 1.2 1.3 H        Test(s) Reviewed  I have reviewed the following in detail:  [] Stress test   [] Angiography   [x] Echocardiogram   [x] Labs   [] Other:         Assessment/Plan:     Chronic obstructive pulmonary disease, unspecified COPD type  -     albuterol (VENTOLIN HFA) 90 mcg/actuation inhaler; Inhale 2 puffs into the lungs every 6 (six) hours as needed for Wheezing or Shortness of Breath. Rescue  Dispense: 1 Inhaler; Refill: 0    Chronic combined systolic  and diastolic CHF, NYHA class 4  -     furosemide (LASIX) 80 MG tablet; Take 1 tablet (80 mg total) by mouth 2 (two) times daily.  Dispense: 180 tablet; Refill: 3  -     spironolactone (ALDACTONE) 25 MG tablet; Take 0.5 tablets (12.5 mg total) by mouth once daily.  Dispense: 45 tablet; Refill: 3    Essential hypertension  -     spironolactone (ALDACTONE) 25 MG tablet; Take 0.5 tablets (12.5 mg total) by mouth once daily.  Dispense: 45 tablet; Refill: 3    ICD (implantable cardioverter-defibrillator) in place    Ischemic cardiomyopathy  -     spironolactone (ALDACTONE) 25 MG tablet; Take 0.5 tablets (12.5 mg total) by mouth once daily.  Dispense: 45 tablet; Refill: 3  -     aspirin (ECOTRIN) 81 MG EC tablet; Take 1 tablet (81 mg total) by mouth once daily.  Dispense: 30 tablet; Refill: 6    Coronary artery disease involving native coronary artery of native heart without angina pectoris  -     nitroGLYCERIN (NITROSTAT) 0.4 MG SL tablet; Place 1 tablet (0.4 mg total) under the tongue every 5 (five) minutes as needed for Chest pain.  Dispense: 100 tablet; Refill: 11  -     aspirin (ECOTRIN) 81 MG EC tablet; Take 1 tablet (81 mg total) by mouth once daily.  Dispense: 30 tablet; Refill: 6    Other orders  -     Cancel: fluticasone furoate 100 mcg/actuation DsDv; Inhale 100 mcg into the lungs once daily. Controller  Dispense: 90 each; Refill: 0      Ms. Kwan is not stable to go home at this time.  It is unclear as to whether she is having a COPD or a CHF exacerbation or both.  She is extremely SOB for the last 2 weeks preventing ambulation more than a few feet at a time and her ICD discharged less than 2 weeks ago.  Her ICD is beeping as if it has a low battery as well. HR was irregular. Will defer ECG to ED.  Transfer care to ED.  Report called to cardiology fellow, Eliseo, and ED charge nurse, Irish.

## 2018-01-02 NOTE — ED PROVIDER NOTES
"Encounter Date: 1/2/2018    SCRIBE #1 NOTE: I, Kalpana Talamantes, am scribing for, and in the presence of, Dr. Phillips.       History     Chief Complaint   Patient presents with    Shortness of Breath     sent from cards clinic , ran out of meds since dec 2,      Cardiology clinic this morning because her defibrillator was reportedly "beeping like a telephone" and she became increasingly short of breath. She additionally endorses cough and losing her voice, but states that she feels fine at the current time. Denies chest pain. She has been out of her medications since December 2, 2017. PSHx includes cardiac defibrillator placement, coronary stent placement, cardiac catheterization, coronary angioplasty and inset/replace/remove pacemaker.      The history is provided by the patient and medical records.     Review of patient's allergies indicates:  No Known Allergies  Past Medical History:   Diagnosis Date    Asthma     Bipolar 1 disorder     Cardiomyopathy     Ischemic Cardiomyopathy with EF 25-30% by echo 5/17/16    Chronic systolic (congestive) heart failure     COPD (chronic obstructive pulmonary disease)     emphysema    Coronary artery disease     ROCHELLE 2006 and 2013    Depression     Diabetes mellitus     H/O echocardiogram 05/17/2016    EF 25-30%. LV diastolic dysfxn. Severe LAE. mod MR. PASP 86.    Hypertension     ICD (implantable cardioverter-defibrillator) in place 07/09/2013    MI (myocardial infarction)     x 3    PTSD (post-traumatic stress disorder)     Schizophrenia     Seizures      Past Surgical History:   Procedure Laterality Date    CARDIAC CATHETERIZATION      CARDIAC DEFIBRILLATOR PLACEMENT      [place]    CORONARY STENT PLACEMENT      INSERT / REPLACE / REMOVE PACEMAKER  2013     ICD [facility]     Family History   Problem Relation Age of Onset    Adopted: Yes     Social History   Substance Use Topics    Smoking status: Current Some Day Smoker "     Packs/day: 3.00     Years: 30.00    Smokeless tobacco: Never Used      Comment: 1-2  cigarettes a day when stressed    Alcohol use No     Review of Systems   Constitutional: Negative for chills, diaphoresis and fever.   HENT: Positive for voice change (lost voice). Negative for congestion.    Respiratory: Positive for cough and shortness of breath.    Cardiovascular: Negative for chest pain.   Gastrointestinal: Negative for abdominal pain, diarrhea, nausea and vomiting.   Genitourinary: Negative for difficulty urinating, dysuria and hematuria.   Musculoskeletal: Negative for back pain, neck pain and neck stiffness.   Skin: Negative for rash and wound.   Neurological: Negative for light-headedness and headaches.   Hematological: Does not bruise/bleed easily.       Physical Exam     Initial Vitals [01/02/18 0911]   BP Pulse Resp Temp SpO2   (!) 97/52 67 (!) 22 97.7 °F (36.5 °C) 99 %      MAP       67         Physical Exam    Nursing note and vitals reviewed.  Constitutional: She appears well-developed and well-nourished. She is not diaphoretic. No distress.   HENT:   Head: Normocephalic and atraumatic.   Eyes: EOM are normal. Pupils are equal, round, and reactive to light.   Neck: Normal range of motion. Neck supple.   Cardiovascular: Normal rate. An irregularly irregular rhythm present. Exam reveals no gallop and no friction rub.    No murmur heard.  Pulmonary/Chest: Breath sounds normal. No respiratory distress. She has no wheezes. She has no rhonchi. She has no rales.   Abdominal: Soft. She exhibits no distension. There is no tenderness. There is no rebound and no guarding.   Musculoskeletal: Normal range of motion. She exhibits no edema or tenderness.   Neurological: She is alert and oriented to person, place, and time. She has normal strength. No cranial nerve deficit or sensory deficit.   Skin: Skin is warm and dry. No rash noted. No erythema.   Psychiatric: She has a normal mood and affect. Her behavior  is normal. Judgment and thought content normal.         ED Course   Procedures  Labs Reviewed   B-TYPE NATRIURETIC PEPTIDE - Abnormal; Notable for the following:        Result Value    BNP 1,350 (*)     All other components within normal limits   CBC W/ AUTO DIFFERENTIAL - Abnormal; Notable for the following:     RBC 3.88 (*)     Hemoglobin 10.9 (*)     Hematocrit 32.9 (*)     RDW 16.4 (*)     All other components within normal limits   COMPREHENSIVE METABOLIC PANEL - Abnormal; Notable for the following:     Albumin 3.3 (*)     Total Bilirubin 1.1 (*)     Anion Gap 7 (*)     All other components within normal limits   TROPONIN I   MAGNESIUM     EKG Readings: (Independently Interpreted)   Rhythm: Normal Sinus Rhythm. Axis: Left Axis Deviation. Clinical Impression: with PVCs   T wave inversions laterally. No ST elevation or depression.       X-Rays:   Independently Interpreted Readings:   Chest X-Ray: Some CHF.     Medical Decision Making:   History:   Old Medical Records: I decided to obtain old medical records.  Initial Assessment:   Patient with increased shortness of breath. Send from Cardiology clinic with increasing shortness of breath and orthopnea, as well as defibrillator firing and defibrillator beeping.   Differential Diagnosis:   My initial differential diagnosis includes but is not limited to ACS, pneumonia, CHF, COPD, defibrillator malfunction.  Independently Interpreted Test(s):   I have ordered and independently interpreted X-rays - see prior notes.  I have ordered and independently interpreted EKG Reading(s) - see prior notes  Clinical Tests:   Lab Tests: Ordered and Reviewed  Radiological Study: Ordered and Reviewed  Medical Tests: Ordered and Reviewed  ED Management:  Patient seen by Cardiology and had defibrillator interrogated, which showed episodes of V-tach and SVT. Patient in mild congestive heart failure, but Cardiology feels she is stable to go home. They recommend starting her on amiodarone  and Eliquis, and they will follow closely as an outpatient.   Other:   I have discussed this case with another health care provider.            Scribe Attestation:   Scribe #1: I performed the above scribed service and the documentation accurately describes the services I performed. I attest to the accuracy of the note.            ED Course      Clinical Impression:   The primary encounter diagnosis was Acute on chronic congestive heart failure, unspecified congestive heart failure type. Diagnoses of SOB (shortness of breath) and Ventricular tachycardia were also pertinent to this visit.    Disposition:   Disposition: Discharged  Condition: Stable         I, Dr. Chan Phillips III, personally performed the services described in this documentation. All medical record entries made by the scribe were at my direction and in my presence.  I have reviewed the chart and agree that the record reflects my personal performance and is accurate and complete. Chan Phillips III, MD.  3:05 PM 01/02/2018                 Chan Phillips III, MD  01/02/18 7706

## 2018-01-03 ENCOUNTER — TELEPHONE (OUTPATIENT)
Dept: CARDIOLOGY | Facility: CLINIC | Age: 58
End: 2018-01-03

## 2018-01-04 DIAGNOSIS — R63.0 DECREASED APPETITE: ICD-10-CM

## 2018-01-04 RX ORDER — CYPROHEPTADINE HYDROCHLORIDE 4 MG/1
4 TABLET ORAL 2 TIMES DAILY PRN
Qty: 30 TABLET | Refills: 6 | Status: SHIPPED | OUTPATIENT
Start: 2018-01-04 | End: 2018-08-30 | Stop reason: SDUPTHER

## 2018-01-04 NOTE — TELEPHONE ENCOUNTER
----- Message from Angelita Humphreys sent at 1/4/2018  8:53 AM CST -----  Contact: pt   x  1st Request  _  2nd Request  _  3rd Request      Please refill the medication(s) listed below. Please call the patient when the prescription(s) is ready for  at the phone number [phone] .    Medication #1fluticasone furoate 100 mcg/actuation DsDv    Medication #2      Preferred Pharmacy:  [facility]/PHARMACY #[id number] - [place] [address] [address]

## 2018-01-16 ENCOUNTER — LAB VISIT (OUTPATIENT)
Dept: LAB | Facility: HOSPITAL | Age: 58
End: 2018-01-16
Payer: MEDICARE

## 2018-01-16 DIAGNOSIS — I50.23 ACUTE ON CHRONIC SYSTOLIC CONGESTIVE HEART FAILURE: ICD-10-CM

## 2018-01-16 LAB
ANION GAP SERPL CALC-SCNC: 9 MMOL/L
BUN SERPL-MCNC: 10 MG/DL
CALCIUM SERPL-MCNC: 9.1 MG/DL
CHLORIDE SERPL-SCNC: 98 MMOL/L
CO2 SERPL-SCNC: 33 MMOL/L
CREAT SERPL-MCNC: 0.9 MG/DL
EST. GFR  (AFRICAN AMERICAN): >60 ML/MIN/1.73 M^2
EST. GFR  (NON AFRICAN AMERICAN): >60 ML/MIN/1.73 M^2
GLUCOSE SERPL-MCNC: 84 MG/DL
POTASSIUM SERPL-SCNC: 3.5 MMOL/L
SODIUM SERPL-SCNC: 140 MMOL/L

## 2018-01-16 PROCEDURE — 36415 COLL VENOUS BLD VENIPUNCTURE: CPT

## 2018-01-16 PROCEDURE — 80048 BASIC METABOLIC PNL TOTAL CA: CPT

## 2018-01-18 ENCOUNTER — TELEPHONE (OUTPATIENT)
Dept: CARDIOLOGY | Facility: HOSPITAL | Age: 58
End: 2018-01-18

## 2018-01-25 ENCOUNTER — INITIAL CONSULT (OUTPATIENT)
Dept: DERMATOLOGY | Facility: CLINIC | Age: 58
End: 2018-01-25
Payer: MEDICARE

## 2018-01-25 ENCOUNTER — CLINICAL SUPPORT (OUTPATIENT)
Dept: CARDIOLOGY | Facility: CLINIC | Age: 58
End: 2018-01-25
Attending: INTERNAL MEDICINE
Payer: MEDICARE

## 2018-01-25 DIAGNOSIS — L85.3 XEROSIS CUTIS: ICD-10-CM

## 2018-01-25 DIAGNOSIS — L21.9 SEBORRHEIC DERMATITIS OF SCALP: ICD-10-CM

## 2018-01-25 DIAGNOSIS — D48.5 NEOPLASM OF UNCERTAIN BEHAVIOR OF SKIN: Primary | ICD-10-CM

## 2018-01-25 DIAGNOSIS — L28.0 LSC (LICHEN SIMPLEX CHRONICUS): ICD-10-CM

## 2018-01-25 DIAGNOSIS — I47.20 VENTRICULAR TACHYCARDIA: ICD-10-CM

## 2018-01-25 DIAGNOSIS — L29.9 PRURITUS: ICD-10-CM

## 2018-01-25 LAB — DIASTOLIC DYSFUNCTION: NO

## 2018-01-25 PROCEDURE — 99999 PR PBB SHADOW E&M-EST. PATIENT-LVL III: CPT | Mod: PBBFAC,,, | Performed by: PATHOLOGY

## 2018-01-25 PROCEDURE — 11100 PR BIOPSY OF SKIN LESION: CPT | Mod: PBBFAC | Performed by: PATHOLOGY

## 2018-01-25 PROCEDURE — 78492 MYOCRD IMG PET MLT RST&STRS: CPT | Mod: PBBFAC | Performed by: INTERNAL MEDICINE

## 2018-01-25 PROCEDURE — 88342 IMHCHEM/IMCYTCHM 1ST ANTB: CPT | Mod: 26,,, | Performed by: PATHOLOGY

## 2018-01-25 PROCEDURE — 99213 OFFICE O/P EST LOW 20 MIN: CPT | Mod: PBBFAC,25 | Performed by: PATHOLOGY

## 2018-01-25 PROCEDURE — 88305 TISSUE EXAM BY PATHOLOGIST: CPT | Performed by: PATHOLOGY

## 2018-01-25 PROCEDURE — 93016 CV STRESS TEST SUPVJ ONLY: CPT | Mod: S$PBB,,, | Performed by: INTERNAL MEDICINE

## 2018-01-25 PROCEDURE — 93018 CV STRESS TEST I&R ONLY: CPT | Mod: S$PBB,,, | Performed by: INTERNAL MEDICINE

## 2018-01-25 PROCEDURE — 11100 PR BIOPSY OF SKIN LESION: CPT | Mod: S$PBB,,, | Performed by: PATHOLOGY

## 2018-01-25 PROCEDURE — 99203 OFFICE O/P NEW LOW 30 MIN: CPT | Mod: 25,S$PBB,, | Performed by: PATHOLOGY

## 2018-01-25 RX ORDER — TRIAMCINOLONE ACETONIDE 1 MG/G
OINTMENT TOPICAL
Qty: 60 G | Refills: 3 | Status: SHIPPED | OUTPATIENT
Start: 2018-01-25

## 2018-01-25 RX ORDER — KETOCONAZOLE 20 MG/ML
SHAMPOO, SUSPENSION TOPICAL
Qty: 120 ML | Refills: 5 | Status: SHIPPED | OUTPATIENT
Start: 2018-01-25 | End: 2019-04-17

## 2018-01-25 RX ORDER — CLOBETASOL PROPIONATE 0.46 MG/ML
SOLUTION TOPICAL
Qty: 60 ML | Refills: 3 | Status: SHIPPED | OUTPATIENT
Start: 2018-01-25

## 2018-01-25 NOTE — PROGRESS NOTES
Subjective:       Patient ID:  Polly Kwan is a 57 y.o. female who presents for   Chief Complaint   Patient presents with    Spot     Left Foot    Hair/Scalp Problem     Scalp     Spot  - Initial  Affected locations: left foot  Duration: 40 years  Signs / symptoms: asymptomatic  Severity: mild  Timing: constant  Aggravated by: nothing  Relieving factors/Treatments tried: nothing    Hair/Scalp Problem  - Initial  Affected locations: scalp  Duration: 1 month  Signs / symptoms: itching, dryness and scaling  Severity: severe  Timing: constant  Aggravated by: nothing  Treatments tried: Vaseline, V05, Sulfur 8, and Suave.  Improvement on treatment: no relief      Pt also with diffuse dry skin and itching.    Review of Systems   Constitutional: Negative for fever, chills, weight loss, weight gain, fatigue, night sweats and malaise.   Skin: Positive for itching and dry skin. Negative for rash, daily sunscreen use and recent sunburn.   Hematologic/Lymphatic: Bruises/bleeds easily.        Objective:    Physical Exam   Constitutional: She appears well-developed and well-nourished.   Neurological: She is alert.   Skin:   Areas Examined (abnormalities noted in diagram):   Scalp / Hair Palpated and Inspected  Head / Face Inspection Performed  Neck Inspection Performed  Chest / Axilla Inspection Performed  Abdomen Inspection Performed  Genitals / Buttocks / Groin Inspection Performed  Back Inspection Performed  RUE Inspected  LUE Inspection Performed  RLE Inspected  LLE Inspection Performed  Nails and Digits Inspection Performed                       Diagram Legend     Erythematous scaling macule/papule c/w actinic keratosis       Vascular papule c/w angioma      Pigmented verrucoid papule/plaque c/w seborrheic keratosis      Yellow umbilicated papule c/w sebaceous hyperplasia      Irregularly shaped tan macule c/w lentigo     1-2 mm smooth white papules consistent with Milia      Movable subcutaneous cyst with punctum c/w  epidermal inclusion cyst      Subcutaneous movable cyst c/w pilar cyst      Firm pink to brown papule c/w dermatofibroma      Pedunculated fleshy papule(s) c/w skin tag(s)      Evenly pigmented macule c/w junctional nevus     Mildly variegated pigmented, slightly irregular-bordered macule c/w mildly atypical nevus      Flesh colored to evenly pigmented papule c/w intradermal nevus       Pink pearly papule/plaque c/w basal cell carcinoma      Erythematous hyperkeratotic cursted plaque c/w SCC      Surgical scar with no sign of skin cancer recurrence      Open and closed comedones      Inflammatory papules and pustules      Verrucoid papule consistent consistent with wart     Erythematous eczematous patches and plaques     Dystrophic onycholytic nail with subungual debris c/w onychomycosis     Umbilicated papule    Erythematous-base heme-crusted tan verrucoid plaque consistent with inflamed seborrheic keratosis     Erythematous Silvery Scaling Plaque c/w Psoriasis     See annotation      Assessment / Plan:   Rule Out - Biopsy 1: Atypical Nevus versus Other.        Pathology Orders:     Normal Orders This Visit    Tissue Specimen To Pathology, Dermatology     Questions:    Directional Terms:  Other(comment)    Left    Clinical information:  r/o atypical nevus    Specific Site:  sole        Neoplasm of uncertain behavior of skin  -     Tissue Specimen To Pathology, Dermatology    Shave biopsy procedure note:    Shave biopsy performed after verbal consent including risk of infection, scar, recurrence, need for additional treatment of site. Area prepped with alcohol, anesthetized with approximately 1.0cc of 1% lidocaine with epinephrine. Lesional tissue shaved with razor blade. Hemostasis achieved with application of aluminum chloride followed by hyfrecation. No complications. Dressing applied. Wound care explained.        Xerosis cutis - Good skin care regimen discussed including limiting to one bath or shower/day, using  lukewarm water with mild soap and moisturizing cream to skin 1 - 2x/day. Brochure was provided and reviewed with patient.      Pruritus - Good skin care regimen discussed including limiting to one bath or shower/day, using lukewarm water with mild soap and moisturizing cream to skin 1 - 2x/day. Brochure was provided and reviewed with patient.      Seborrheic dermatitis of scalp  -     ketoconazole (NIZORAL) 2 % shampoo; Wash hair with medicated shampoo at least 2x/week - let sit on scalp at least 5 minutes prior to rinsing  Dispense: 120 mL; Refill: 5  -     clobetasol (TEMOVATE) 0.05 % external solution; Use on scalp one - two times daily as needed for scaling or itching  Dispense: 60 mL; Refill: 3    LSC (lichen simplex chronicus)  -     triamcinolone acetonide 0.1% (KENALOG) 0.1 % ointment; AAA bid  Dispense: 60 g; Refill: 3  -     clobetasol (TEMOVATE) 0.05 % external solution; Use on scalp one - two times daily as needed for scaling or itching  Dispense: 60 mL; Refill: 3             Follow-up in about 3 months (around 4/25/2018) for f/u seb derm and LSC to scalp.

## 2018-01-25 NOTE — LETTER
January 25, 2018      Iza Kwan MD  [address]  [address]  [place] [address]           [facility] - Dermatology  [address]  [place] [address]  Phone: [phone]  Fax: [phone]          Patient: Polly Kwan   MR Number: [id number]   YOB: 1960   Date of Visit: 1/25/2018       Dear Dr. Iza Kwan:    Thank you for referring Polly Kwan to me for evaluation. Attached you will find relevant portions of my assessment and plan of care.    If you have questions, please do not hesitate to call me. I look forward to following Polly Kwan along with you.    Sincerely,    Janet Cevallos MD    Enclosure  CC:  No Recipients    If you would like to receive this communication electronically, please contact [email or web address] or [phone] to request more information on [employer] access.    For providers and/or their staff who would like to refer a patient to [facility], please contact us through our one-stop-shop provider referral line, [facility], at [phone].    If you feel you have received this communication in error or would no longer like to receive these types of communications, please e-mail [email or web address]

## 2018-01-25 NOTE — PATIENT INSTRUCTIONS
Shave Biopsy Wound Care    Your doctor has performed a shave biopsy today.  A band aid and vaseline ointment has been placed over the site.  This should remain in place for 24 hours.  It is recommended that you keep the area dry for the first 24 hours.  After 24 hours, you may remove the band aid and wash the area with warm soap and water and apply Vaseline jelly.  Many patients prefer to use Neosporin or Bacitracin ointment.  This is acceptable; however, know that you can develop an allergy to this medication even if you have used it safely for years.  It is important to keep the area moist.  Letting it dry out and get air slows healing time, and will worsen the scar.  Band aid is optional after first 24 hours.      If you notice increasing redness, tenderness, pain, or yellow drainage at the biopsy site, please notify your doctor.  These are signs of an infection.    If your biopsy site is bleeding, apply firm pressure for 15 minutes straight.  Repeat for another 15 minutes, if it is still bleeding.   If the surgical site continues to bleed, then please contact your doctor.      [facility] [place] [address]/ [phone] FAX/ [email or web address]

## 2018-01-26 DIAGNOSIS — Z13.5 DIABETIC RETINOPATHY SCREENING: ICD-10-CM

## 2018-01-29 DIAGNOSIS — Z95.810 ICD (IMPLANTABLE CARDIOVERTER-DEFIBRILLATOR) IN PLACE: Primary | ICD-10-CM

## 2018-01-29 DIAGNOSIS — I48.0 PAROXYSMAL ATRIAL FIBRILLATION: ICD-10-CM

## 2018-01-29 DIAGNOSIS — I25.5 ISCHEMIC CARDIOMYOPATHY: ICD-10-CM

## 2018-01-31 ENCOUNTER — HOSPITAL ENCOUNTER (OUTPATIENT)
Dept: CARDIOLOGY | Facility: CLINIC | Age: 58
Discharge: HOME | End: 2018-01-31
Payer: MEDICARE

## 2018-01-31 ENCOUNTER — CLINICAL SUPPORT (OUTPATIENT)
Dept: ELECTROPHYSIOLOGY | Facility: CLINIC | Age: 58
End: 2018-01-31
Attending: INTERNAL MEDICINE
Payer: MEDICARE

## 2018-01-31 ENCOUNTER — OFFICE VISIT (OUTPATIENT)
Dept: ELECTROPHYSIOLOGY | Facility: CLINIC | Age: 58
End: 2018-01-31
Payer: MEDICARE

## 2018-01-31 VITALS
SYSTOLIC BLOOD PRESSURE: 87 MMHG | WEIGHT: 130 LBS | BODY MASS INDEX: 23.4 KG/M2 | DIASTOLIC BLOOD PRESSURE: 53 MMHG | HEART RATE: 61 BPM

## 2018-01-31 DIAGNOSIS — Z95.810 ICD (IMPLANTABLE CARDIOVERTER-DEFIBRILLATOR) IN PLACE: ICD-10-CM

## 2018-01-31 DIAGNOSIS — I25.5 ISCHEMIC CARDIOMYOPATHY: ICD-10-CM

## 2018-01-31 DIAGNOSIS — I48.0 PAROXYSMAL ATRIAL FIBRILLATION: ICD-10-CM

## 2018-01-31 DIAGNOSIS — I47.20 VT (VENTRICULAR TACHYCARDIA): ICD-10-CM

## 2018-01-31 DIAGNOSIS — Z95.810 ICD (IMPLANTABLE CARDIOVERTER-DEFIBRILLATOR) IN PLACE: Primary | ICD-10-CM

## 2018-01-31 DIAGNOSIS — F20.9 SCHIZOPHRENIA, UNSPECIFIED TYPE: Primary | ICD-10-CM

## 2018-01-31 DIAGNOSIS — I25.10 CORONARY ARTERY DISEASE INVOLVING NATIVE CORONARY ARTERY OF NATIVE HEART WITHOUT ANGINA PECTORIS: ICD-10-CM

## 2018-01-31 DIAGNOSIS — J44.9 CHRONIC OBSTRUCTIVE PULMONARY DISEASE, UNSPECIFIED COPD TYPE: ICD-10-CM

## 2018-01-31 DIAGNOSIS — I50.42 CHRONIC COMBINED SYSTOLIC AND DIASTOLIC CHF, NYHA CLASS 4: ICD-10-CM

## 2018-01-31 DIAGNOSIS — F17.200 TOBACCO USE DISORDER: ICD-10-CM

## 2018-01-31 DIAGNOSIS — I48.0 PAF (PAROXYSMAL ATRIAL FIBRILLATION): ICD-10-CM

## 2018-01-31 DIAGNOSIS — I10 ESSENTIAL HYPERTENSION: ICD-10-CM

## 2018-01-31 PROCEDURE — 99214 OFFICE O/P EST MOD 30 MIN: CPT | Mod: S$PBB,,, | Performed by: INTERNAL MEDICINE

## 2018-01-31 PROCEDURE — 93005 ELECTROCARDIOGRAM TRACING: CPT | Mod: PBBFAC | Performed by: INTERNAL MEDICINE

## 2018-01-31 PROCEDURE — 93283 PRGRMG EVAL IMPLANTABLE DFB: CPT | Mod: PBBFAC | Performed by: INTERNAL MEDICINE

## 2018-01-31 PROCEDURE — 99999 PR PBB SHADOW E&M-EST. PATIENT-LVL III: CPT | Mod: PBBFAC,,, | Performed by: INTERNAL MEDICINE

## 2018-01-31 PROCEDURE — 99213 OFFICE O/P EST LOW 20 MIN: CPT | Mod: PBBFAC | Performed by: INTERNAL MEDICINE

## 2018-01-31 PROCEDURE — 93010 ELECTROCARDIOGRAM REPORT: CPT | Mod: S$PBB,,, | Performed by: INTERNAL MEDICINE

## 2018-01-31 NOTE — PROGRESS NOTES
Subjective:    Patient ID:  Polly Kwan is a 57 y.o. female who presents for evaluation of Pacemaker Check      HPI  57 y.o. F  ICM 20%.  CAD/PCI '06/MI.  ICD placed 2013 ([place])  HL on meds   COPD  bipolar d/o on meds   PAF, on Eliqiuis    I saw her previously in hosp after shock from ICD. Had shock x 2 Dec 29, for AF witih RVR.  In addition, had PMVT in November, resulting in appropriate shock then.  In December, amio started for AF and PMVT. No event since then.  She c/o have SOB with minimal movement.    PET as outpt: LAD infarct with jackson-infarct ischemia    My interpretation of today's ECG is NSR 61 bpm      Review of Systems   Constitution: Positive for malaise/fatigue. Negative for weakness.   HENT: Negative.  Negative for ear pain and tinnitus.    Eyes: Negative for blurred vision.   Cardiovascular: Positive for dyspnea on exertion. Negative for chest pain, near-syncope, palpitations and syncope.   Respiratory: Positive for shortness of breath.    Endocrine: Negative.  Negative for polyuria.   Hematologic/Lymphatic: Does not bruise/bleed easily.   Skin: Negative.  Negative for rash.   Musculoskeletal: Negative.  Negative for joint pain and muscle weakness.   Gastrointestinal: Negative.  Negative for abdominal pain and change in bowel habit.   Genitourinary: Negative for frequency.   Neurological: Negative.  Negative for dizziness.   Psychiatric/Behavioral: Negative.  Negative for depression. The patient is not nervous/anxious.    Allergic/Immunologic: Negative for environmental allergies.        Objective:    Physical Exam   Constitutional: She is oriented to person, place, and time. Vital signs are normal. She appears well-developed and well-nourished. She is active and cooperative.   HENT:   Head: Normocephalic and atraumatic.   Eyes: Conjunctivae and EOM are normal.   Neck: Normal range of motion. Carotid bruit is not present. No tracheal deviation and no edema present. No thyroid mass and no  thyromegaly present.   Cardiovascular: Normal rate, regular rhythm, normal heart sounds, intact distal pulses and normal pulses.   No extrasystoles are present. PMI is not displaced.  Exam reveals no gallop and no friction rub.    No murmur heard.  Pulmonary/Chest: Effort normal and breath sounds normal. No respiratory distress. She has no wheezes. She has no rales.   Abdominal: Soft. Normal appearance. She exhibits no distension. There is no hepatosplenomegaly.   Musculoskeletal: Normal range of motion.   Neurological: She is alert and oriented to person, place, and time. Coordination normal.   Skin: Skin is warm and dry. No rash noted.   Psychiatric: She has a normal mood and affect. Her speech is normal and behavior is normal. Thought content normal. Cognition and memory are normal.   Nursing note and vitals reviewed.        Assessment:       1. Schizophrenia, unspecified type    2. Chronic obstructive pulmonary disease, unspecified COPD type    3. Coronary artery disease involving native coronary artery of native heart without angina pectoris    4. Essential hypertension    5. Ischemic cardiomyopathy    6. Chronic combined systolic and diastolic CHF, NYHA class 4    7. ICD (implantable cardioverter-defibrillator) in place    8. Tobacco use disorder    9. VT (ventricular tachycardia)    10. PAF (paroxysmal atrial fibrillation)         Plan:       1. ICM.  Continue f/u with Dr Buck    2. ICD  f/u in device clinic and via transtelephone    3. Atrial fibrillation  Continue eliquis  Discussed the indications and possible side effects of the medications prescribed to the patient by me.    Return in 1 year with echo and amio tests, or earlier prn.

## 2018-02-05 ENCOUNTER — PATIENT OUTREACH (OUTPATIENT)
Dept: INTERNAL MEDICINE | Facility: CLINIC | Age: 58
End: 2018-02-05

## 2018-03-02 ENCOUNTER — OFFICE VISIT (OUTPATIENT)
Dept: PODIATRY | Facility: CLINIC | Age: 58
End: 2018-03-02
Payer: MEDICARE

## 2018-03-02 VITALS — BODY MASS INDEX: 23.04 KG/M2 | WEIGHT: 130 LBS | HEIGHT: 63 IN

## 2018-03-02 DIAGNOSIS — M20.10 VALGUS DEFORMITY OF GREAT TOE, UNSPECIFIED LATERALITY: ICD-10-CM

## 2018-03-02 DIAGNOSIS — L84 CORN OR CALLUS: ICD-10-CM

## 2018-03-02 DIAGNOSIS — B35.1 ONYCHOMYCOSIS DUE TO DERMATOPHYTE: ICD-10-CM

## 2018-03-02 DIAGNOSIS — E11.49 TYPE II DIABETES MELLITUS WITH NEUROLOGICAL MANIFESTATIONS: Primary | ICD-10-CM

## 2018-03-02 PROCEDURE — 99213 OFFICE O/P EST LOW 20 MIN: CPT | Mod: PBBFAC | Performed by: PODIATRIST

## 2018-03-02 PROCEDURE — 99999 PR PBB SHADOW E&M-EST. PATIENT-LVL III: CPT | Mod: PBBFAC,,, | Performed by: PODIATRIST

## 2018-03-02 PROCEDURE — 99202 OFFICE O/P NEW SF 15 MIN: CPT | Mod: S$PBB,,, | Performed by: PODIATRIST

## 2018-03-02 RX ORDER — CICLOPIROX 80 MG/ML
SOLUTION TOPICAL NIGHTLY
Qty: 6.6 ML | Refills: 11 | Status: SHIPPED | OUTPATIENT
Start: 2018-03-02

## 2018-03-02 RX ORDER — AMMONIUM LACTATE 12 G/100G
CREAM TOPICAL
Qty: 140 G | Refills: 11 | Status: SHIPPED | OUTPATIENT
Start: 2018-03-02

## 2018-03-02 NOTE — LETTER
March 2, 2018      Iza Kwan MD  [address] [place] [address]  [address]  [place] [address]           [facility]iatry  [address] [facility] [address]  Phone: [phone]          Patient: Polly Kwan   MR Number: [id number]   YOB: 1960   Date of Visit: 3/2/2018       Dear Dr. Iza Kwan:    Thank you for referring Polly Kwan to me for evaluation. Attached you will find relevant portions of my assessment and plan of care.    If you have questions, please do not hesitate to call me. I look forward to following Polly Kwan along with you.    Sincerely,    Heath Leslie, ELMER    Enclosure  CC:  No Recipients    If you would like to receive this communication electronically, please contact [email or web address] or [phone] to request more information on [employer] access.    For providers and/or their staff who would like to refer a patient to [facility], please contact us through our one-stop-shop provider referral line, Denzel Pérez, at [phone].    If you feel you have received this communication in error or would no longer like to receive these types of communications, please e-mail [email or web address]

## 2018-03-02 NOTE — PROGRESS NOTES
Subjective:      Patient ID: Polly Kwan is a 58 y.o. female.    Chief Complaint: Diabetic Foot Exam (Dr Iza Kwan)    Polly is a 58 y.o. female who presents to the clinic upon referral from Dr. Kwan  for evaluation and treatment of diabetic feet. Polly has a past medical history of Asthma; Bipolar 1 disorder; Cardiomyopathy; Chronic systolic (congestive) heart failure; COPD (chronic obstructive pulmonary disease); Coronary artery disease; Defibrillator discharge; Depression; Diabetes mellitus; H/O echocardiogram (05/17/2016); Hypertension; ICD (implantable cardioverter-defibrillator) in place (07/09/2013); MI (myocardial infarction); PTSD (post-traumatic stress disorder); Schizophrenia; and Seizures. Patient relates no major problem with feet. Only complaints today consist of thick toenails and callus left foot (negative biopsy recently).  Gradual onset, worsening over past several weeks, aggravated by increased weight bearing, shoe gear, pressure.  No previous medical treatment.  OTC pain med not helping.   Denies trauma.  Only surgery is biopsy left foot lesion.  Healed well.  PCP: Iza Kwan MD    Date Last Seen by PCP:   Chief Complaint   Patient presents with    Diabetic Foot Exam     Dr Iza Kwan         Current shoe gear: Casual shoes    Hemoglobin A1C   Date Value Ref Range Status   04/07/2017 6.2 4.5 - 6.2 % Final     Comment:     According to ADA guidelines, hemoglobin A1C <7.0% represents  optimal control in non-pregnant diabetic patients.  Different  metrics may apply to specific populations.   Standards of Medical Care in Diabetes - 2016.  For the purpose of screening for the presence of diabetes:  <5.7%     Consistent with the absence of diabetes  5.7-6.4%  Consistent with increasing risk for diabetes   (prediabetes)  >or=6.5%  Consistent with diabetes  Currently no consensus exists for use of hemoglobin A1C  for diagnosis of diabetes for children.     05/25/2016 5.8 4.5 - 6.2 % Final    01/05/2015 6.3 (H) 4.5 - 6.2 % Final           Review of Systems   Constitution: Negative for chills, diaphoresis, fever, malaise/fatigue and night sweats.   Cardiovascular: Negative for claudication, cyanosis, leg swelling and syncope.   Skin: Positive for nail changes and suspicious lesions. Negative for color change, dry skin, rash and unusual hair distribution.   Musculoskeletal: Negative for falls, joint pain, joint swelling, muscle cramps, muscle weakness and stiffness.   Gastrointestinal: Negative for constipation, diarrhea, nausea and vomiting.   Neurological: Positive for sensory change. Negative for brief paralysis, disturbances in coordination, focal weakness, numbness, paresthesias and tremors.           Objective:      Physical Exam   Constitutional: She is oriented to person, place, and time. She appears well-developed and well-nourished. She is cooperative. No distress.   Cardiovascular:   Pulses:       Popliteal pulses are 2+ on the right side, and 2+ on the left side.        Dorsalis pedis pulses are 2+ on the right side, and 2+ on the left side.        Posterior tibial pulses are 2+ on the right side, and 2+ on the left side.   Capillary refill 3 seconds all toes/distal feet, all toes/both feet warm to touch.      Negative lymphadenopathy bilateral popliteal fossa and tarsal tunnel.      Negavie lower extremity edema bilateral.     Musculoskeletal:        Right ankle: She exhibits normal range of motion, no swelling, no ecchymosis, no deformity, no laceration and normal pulse. Achilles tendon normal. Achilles tendon exhibits no pain, no defect and normal Verma's test results.   Visible and palpable bunion without pain at dorsomedial 1st metatarsal head left and right.  Hallux abducted left and right partially reducible, tracks laterally without being track bound.  No ecchymosis, erythema, edema, or cardinal signs infection or signs of trauma same foot.    Otherwise, Normal angle, base, station  of gait. All ten toes without clubbing, cyanosis, or signs of ischemia.  No pain to palpation bilateral lower extremities.  Range of motion, stability, muscle strength, and muscle tone normal bilateral feet and legs.     Lymphadenopathy:   Negative lymphadenopathy bilateral popliteal fossa and tarsal tunnel.    Negative lymphangitic streaking bilateral feet/ankles/legs.   Neurological: She is alert and oriented to person, place, and time. She has normal strength. She displays no atrophy and no tremor. A sensory deficit is present. She exhibits normal muscle tone. Gait normal.   Reflex Scores:       Patellar reflexes are 2+ on the right side and 2+ on the left side.       Achilles reflexes are 2+ on the right side and 2+ on the left side.  Decreased/absent vibratory sensation bilateral feet to 128Hz tuning fork.    Diminished/loss of protective sensation all toes bilateral to 10 gram monofilament.     Skin: Skin is warm, dry and intact. Capillary refill takes 2 to 3 seconds. No abrasion, no bruising, no burn, no ecchymosis, no laceration, no lesion and no rash noted. She is not diaphoretic. No cyanosis or erythema. No pallor. Nails show no clubbing.   Focal hyperkeratotic lesion consisting entirely of hyperkeratotic tissue without open skin, drainage, pus, fluctuance, malodor, or signs of infection plantar inferior 4th mtpj left.    Otherwise, Skin is normal age and health appropriate color, turgor, texture, and temperature bilateral lower extremities without ulceration, hyperpigmentation, discoloration, masses nodules or cords palpated.  No ecchymosis, erythema, edema, or cardinal signs of infection bilateral lower extremities.         Psychiatric: She has a normal mood and affect.             Assessment:       Encounter Diagnoses   Name Primary?    Type II diabetes mellitus with neurological manifestations Yes    Onychomycosis due to dermatophyte     Valgus deformity of great toe, unspecified laterality      Corn or callus          Plan:       Polly was seen today for diabetic foot exam.    Diagnoses and all orders for this visit:    Type II diabetes mellitus with neurological manifestations  -     DIABETIC SHOES FOR HOME USE    Onychomycosis due to dermatophyte    Valgus deformity of great toe, unspecified laterality  -     DIABETIC SHOES FOR HOME USE    Corn or callus  -     DIABETIC SHOES FOR HOME USE    Other orders  -     ciclopirox (CICLODAN) 8 % Soln; Apply topically nightly.  -     ammonium lactate 12 % Crea; Apply twice daily to affected parts both feet as needed.      I counseled the patient on her conditions, their implications and medical management.        - Shoe inspection. Diabetic Foot Education. Patient reminded of the importance of good nutrition and blood sugar control to help prevent podiatric complications of diabetes. Patient instructed on proper foot hygeine. We discussed wearing proper shoe gear, daily foot inspections, never walking without protective shoe gear, never putting sharp instruments to feet, routine podiatric visits at least annually.    Rx lachydrin, penlac, DM shoes, heat molded inserts x 3.    Discussed conservative treatment with shoes of adequate dimensions, material, and style to alleviate symptoms and delay or prevent surgical intervention.       Keep all dermatology follow up.          Follow-up in about 1 year (around 3/2/2019).

## 2018-03-19 ENCOUNTER — OFFICE VISIT (OUTPATIENT)
Dept: OPTOMETRY | Facility: CLINIC | Age: 58
End: 2018-03-19
Payer: MEDICARE

## 2018-03-19 DIAGNOSIS — H52.4 PRESBYOPIA: ICD-10-CM

## 2018-03-19 DIAGNOSIS — H04.123 DRY EYE SYNDROME, BILATERAL: ICD-10-CM

## 2018-03-19 DIAGNOSIS — H26.9 CORTICAL CATARACT OF BOTH EYES: ICD-10-CM

## 2018-03-19 DIAGNOSIS — E11.9 TYPE 2 DIABETES MELLITUS WITHOUT OPHTHALMIC MANIFESTATIONS: ICD-10-CM

## 2018-03-19 DIAGNOSIS — H25.13 NS (NUCLEAR SCLEROSIS), BILATERAL: ICD-10-CM

## 2018-03-19 DIAGNOSIS — I10 ESSENTIAL HYPERTENSION: ICD-10-CM

## 2018-03-19 DIAGNOSIS — E08.40 DIABETES MELLITUS DUE TO UNDERLYING CONDITION WITH DIABETIC NEUROPATHY, WITHOUT LONG-TERM CURRENT USE OF INSULIN: Primary | ICD-10-CM

## 2018-03-19 PROCEDURE — 92004 COMPRE OPH EXAM NEW PT 1/>: CPT | Mod: S$PBB,,, | Performed by: OPTOMETRIST

## 2018-03-19 PROCEDURE — 99999 PR PBB SHADOW E&M-EST. PATIENT-LVL II: CPT | Mod: PBBFAC,,, | Performed by: OPTOMETRIST

## 2018-03-19 PROCEDURE — 99212 OFFICE O/P EST SF 10 MIN: CPT | Mod: PBBFAC | Performed by: OPTOMETRIST

## 2018-03-19 PROCEDURE — 92015 DETERMINE REFRACTIVE STATE: CPT | Mod: ,,, | Performed by: OPTOMETRIST

## 2018-03-19 NOTE — LETTER
March 22, 2018      Iza Kwan MD  [address]  [address]  [place] [address]           [facility]  [address]  [facility] [address]  Phone: [phone]  Fax: [phone]          Patient: Polly Kwan   MR Number: [id number]   YOB: 1960   Date of Visit: 3/19/2018       Dear Dr. Iza Kwan:    Thank you for referring Polly Kwan to me for evaluation. Attached you will find relevant portions of my assessment and plan of care.    If you have questions, please do not hesitate to call me. I look forward to following Polly Kwan along with you.    Sincerely,    Faiza Chapman, OD    Enclosure  CC:  No Recipients    If you would like to receive this communication electronically, please contact [email or web address] or [phone] to request more information on [employer] access.    For providers and/or their staff who would like to refer a patient to [facility], please contact us through our one-stop-shop provider referral line, [facility], at [phone].    If you feel you have received this communication in error or would no longer like to receive these types of communications, please e-mail [email or web address]

## 2018-03-22 NOTE — PROGRESS NOTES
HPI     Patient's age: 58 y.o.    Approximate date of last eye examination:  1/22/15  Name of last eye doctor seen: Dr. Benito    Pt states that she is here for exam that is way over due primary care   doctor been telling need to see the eye doctor.  Vision is blurry can't   hardly see up close or at a distance, been getting headaches.  When waking   in the morning have trouble opening eyes feel like something constantly in   them, also have trouble with the sunlight, have to squench eyes a lot.    Wears glasses? broke       Wears CLs?:  no            Headaches?  yes  Eye pain/discomfort?  no                                                                                     Flashes?  no  Floaters?  no  Diplopia/Double vision?  no    Patient's Ocular History:         Any eye surgeries? no         Family history of eye disease?  no    Significant patient medical history:         1. Diabetes?  yes       If yes, IDDM or NIDDM? Diet control   2. HBP?  yes                 ! OTC eyedrops currently using:  none   ! Prescription eye meds currently using:  none    Hemoglobin A1C       Date                     Value               Ref Range             Status                04/07/2017               6.2                 4.5 - 6.2 %           Final                    05/25/2016               5.8                 4.5 - 6.2 %           Final                 01/05/2015               6.3 (H)             4.5 - 6.2 %           Final            ----------             Last edited by Mulu Colbert MA on 3/19/2018  8:25 AM. (History)            Assessment /Plan     For exam results, see Encounter Report.    Diabetes mellitus due to underlying condition with diabetic neuropathy, without long-term current use of insulin  Type 2 diabetes mellitus without ophthalmic manifestations   No retinopathy, monitor yearly    Essential hypertension    Presbyopia   rx specs    NS (nuclear sclerosis), bilateral  Cortical cataract of both  eyes   Monitor    Dry eye syndrome, bilateral   Use art tears BID OU      RTC 1 year, sooner PRN

## 2018-04-17 ENCOUNTER — TELEPHONE (OUTPATIENT)
Dept: DERMATOLOGY | Facility: CLINIC | Age: 58
End: 2018-04-17

## 2018-04-17 NOTE — TELEPHONE ENCOUNTER
----- Message from Merary Duff sent at 4/17/2018  7:44 AM CDT -----  Contact: self  Patient states was unable to make appointment this morning due to no transportation   Pt rescheduled appointment for 4/20/2018 with ROCHELLE Ramirez

## 2018-04-23 DIAGNOSIS — I10 ESSENTIAL HYPERTENSION: ICD-10-CM

## 2018-04-23 DIAGNOSIS — I25.5 ISCHEMIC CARDIOMYOPATHY: ICD-10-CM

## 2018-04-23 NOTE — LETTER
April 25, 2018    Polly Kwan  [address]  [facility] [address]             [facility] - Family Medicine/ Internal Medicine  [address] [facility] [address]  Phone: [phone]  Fax: [phone] Dear Polly Kwan,    Our office has been unsuccessful in our attempts to contact you via telephone or [facility] account. Please contact our office for information and instructions based on your recent laboratory studies. You may reach our office at [phone] or contact us via [facility].     Thanks you for you Prompt attention to this matter.  Andreina

## 2018-04-24 RX ORDER — LISINOPRIL 5 MG/1
5 TABLET ORAL DAILY
Qty: 90 TABLET | Refills: 0 | Status: SHIPPED | OUTPATIENT
Start: 2018-04-24 | End: 2018-07-27 | Stop reason: SDUPTHER

## 2018-05-02 ENCOUNTER — CLINICAL SUPPORT (OUTPATIENT)
Dept: ELECTROPHYSIOLOGY | Facility: CLINIC | Age: 58
End: 2018-05-02
Attending: INTERNAL MEDICINE
Payer: MEDICARE

## 2018-05-02 DIAGNOSIS — Z95.810 ICD (IMPLANTABLE CARDIOVERTER-DEFIBRILLATOR) IN PLACE: ICD-10-CM

## 2018-05-02 DIAGNOSIS — I25.5 ISCHEMIC CARDIOMYOPATHY: ICD-10-CM

## 2018-05-02 PROCEDURE — 93296 REM INTERROG EVL PM/IDS: CPT | Mod: PBBFAC | Performed by: INTERNAL MEDICINE

## 2018-05-02 PROCEDURE — 93295 DEV INTERROG REMOTE 1/2/MLT: CPT | Mod: ,,, | Performed by: INTERNAL MEDICINE

## 2018-05-30 DIAGNOSIS — F25.0 SCHIZOAFFECTIVE DISORDER, BIPOLAR TYPE: ICD-10-CM

## 2018-05-30 RX ORDER — TRAZODONE HYDROCHLORIDE 150 MG/1
150 TABLET ORAL NIGHTLY
Qty: 90 TABLET | Refills: 1 | Status: SHIPPED | OUTPATIENT
Start: 2018-05-30 | End: 2018-08-30 | Stop reason: SDUPTHER

## 2018-07-26 DIAGNOSIS — I10 ESSENTIAL HYPERTENSION: ICD-10-CM

## 2018-07-26 DIAGNOSIS — E78.9 ABNORMAL CHOLESTEROL TEST: ICD-10-CM

## 2018-07-26 DIAGNOSIS — J44.9 CHRONIC OBSTRUCTIVE PULMONARY DISEASE, UNSPECIFIED COPD TYPE: ICD-10-CM

## 2018-07-26 RX ORDER — ATORVASTATIN CALCIUM 40 MG/1
40 TABLET, FILM COATED ORAL DAILY
Qty: 90 TABLET | Refills: 1 | Status: SHIPPED | OUTPATIENT
Start: 2018-07-26 | End: 2019-01-23 | Stop reason: SDUPTHER

## 2018-07-27 DIAGNOSIS — I25.5 ISCHEMIC CARDIOMYOPATHY: ICD-10-CM

## 2018-07-27 DIAGNOSIS — I10 ESSENTIAL HYPERTENSION: ICD-10-CM

## 2018-07-27 RX ORDER — LISINOPRIL 5 MG/1
5 TABLET ORAL DAILY
Qty: 90 TABLET | Refills: 3 | Status: SHIPPED | OUTPATIENT
Start: 2018-07-27 | End: 2019-03-12

## 2018-07-30 RX ORDER — ALBUTEROL SULFATE 90 UG/1
2 AEROSOL, METERED RESPIRATORY (INHALATION) EVERY 6 HOURS PRN
Qty: 1 INHALER | Refills: 0 | Status: SHIPPED | OUTPATIENT
Start: 2018-07-30 | End: 2018-08-30 | Stop reason: SDUPTHER

## 2018-07-30 NOTE — TELEPHONE ENCOUNTER
Pt is requesting a refill for albuterol and fluticasone. Pt is schedule for 8/30/18 to see Dr. Kwan. And labs on 8/14

## 2018-08-07 ENCOUNTER — CLINICAL SUPPORT (OUTPATIENT)
Dept: ELECTROPHYSIOLOGY | Facility: CLINIC | Age: 58
End: 2018-08-07
Attending: INTERNAL MEDICINE
Payer: MEDICARE

## 2018-08-07 DIAGNOSIS — I25.5 ISCHEMIC CARDIOMYOPATHY: ICD-10-CM

## 2018-08-07 DIAGNOSIS — Z95.810 ICD (IMPLANTABLE CARDIOVERTER-DEFIBRILLATOR) IN PLACE: ICD-10-CM

## 2018-08-07 PROCEDURE — 93295 DEV INTERROG REMOTE 1/2/MLT: CPT | Mod: S$GLB,,, | Performed by: INTERNAL MEDICINE

## 2018-08-07 PROCEDURE — 93296 REM INTERROG EVL PM/IDS: CPT | Mod: S$GLB,,, | Performed by: INTERNAL MEDICINE

## 2018-08-20 ENCOUNTER — LAB VISIT (OUTPATIENT)
Dept: LAB | Facility: HOSPITAL | Age: 58
End: 2018-08-20
Attending: FAMILY MEDICINE
Payer: MEDICARE

## 2018-08-20 ENCOUNTER — TELEPHONE (OUTPATIENT)
Dept: INTERNAL MEDICINE | Facility: CLINIC | Age: 58
End: 2018-08-20

## 2018-08-20 DIAGNOSIS — E87.6 HYPOKALEMIA: ICD-10-CM

## 2018-08-20 DIAGNOSIS — I25.5 ISCHEMIC CARDIOMYOPATHY: ICD-10-CM

## 2018-08-20 DIAGNOSIS — E11.9 TYPE 2 DIABETES MELLITUS WITHOUT COMPLICATION: ICD-10-CM

## 2018-08-20 DIAGNOSIS — I50.42 CHRONIC COMBINED SYSTOLIC AND DIASTOLIC CHF, NYHA CLASS 4: ICD-10-CM

## 2018-08-20 DIAGNOSIS — I10 ESSENTIAL HYPERTENSION: ICD-10-CM

## 2018-08-20 DIAGNOSIS — I48.0 PAF (PAROXYSMAL ATRIAL FIBRILLATION): ICD-10-CM

## 2018-08-20 DIAGNOSIS — Z13.6 ENCOUNTER FOR LIPID SCREENING FOR CARDIOVASCULAR DISEASE: ICD-10-CM

## 2018-08-20 DIAGNOSIS — Z13.220 ENCOUNTER FOR LIPID SCREENING FOR CARDIOVASCULAR DISEASE: ICD-10-CM

## 2018-08-20 DIAGNOSIS — Z11.59 NEED FOR HEPATITIS C SCREENING TEST: ICD-10-CM

## 2018-08-20 DIAGNOSIS — I47.20 VT (VENTRICULAR TACHYCARDIA): ICD-10-CM

## 2018-08-20 LAB
ALBUMIN SERPL BCP-MCNC: 3.6 G/DL
ALBUMIN SERPL BCP-MCNC: 3.6 G/DL
ALP SERPL-CCNC: 122 U/L
ALP SERPL-CCNC: 122 U/L
ALT SERPL W/O P-5'-P-CCNC: 21 U/L
ALT SERPL W/O P-5'-P-CCNC: 21 U/L
ANION GAP SERPL CALC-SCNC: 9 MMOL/L
AST SERPL-CCNC: 27 U/L
AST SERPL-CCNC: 27 U/L
BILIRUB DIRECT SERPL-MCNC: 0.6 MG/DL
BILIRUB SERPL-MCNC: 1.1 MG/DL
BILIRUB SERPL-MCNC: 1.1 MG/DL
BUN SERPL-MCNC: 13 MG/DL
CALCIUM SERPL-MCNC: 8.9 MG/DL
CHLORIDE SERPL-SCNC: 100 MMOL/L
CHOLEST SERPL-MCNC: 109 MG/DL
CHOLEST/HDLC SERPL: 1.9 {RATIO}
CO2 SERPL-SCNC: 32 MMOL/L
CREAT SERPL-MCNC: 0.8 MG/DL
EST. GFR  (AFRICAN AMERICAN): >60 ML/MIN/1.73 M^2
EST. GFR  (NON AFRICAN AMERICAN): >60 ML/MIN/1.73 M^2
ESTIMATED AVG GLUCOSE: 120 MG/DL
GLUCOSE SERPL-MCNC: 100 MG/DL
HBA1C MFR BLD HPLC: 5.8 %
HCV AB SERPL QL IA: NEGATIVE
HDLC SERPL-MCNC: 56 MG/DL
HDLC SERPL: 51.4 %
LDLC SERPL CALC-MCNC: 46.8 MG/DL
NONHDLC SERPL-MCNC: 53 MG/DL
POTASSIUM SERPL-SCNC: 3.1 MMOL/L
PROT SERPL-MCNC: 7.1 G/DL
PROT SERPL-MCNC: 7.1 G/DL
SODIUM SERPL-SCNC: 141 MMOL/L
TRIGL SERPL-MCNC: 31 MG/DL
TSH SERPL DL<=0.005 MIU/L-ACNC: 2.1 UIU/ML

## 2018-08-20 PROCEDURE — 80053 COMPREHEN METABOLIC PANEL: CPT

## 2018-08-20 PROCEDURE — 84443 ASSAY THYROID STIM HORMONE: CPT

## 2018-08-20 PROCEDURE — 86803 HEPATITIS C AB TEST: CPT

## 2018-08-20 PROCEDURE — 80061 LIPID PANEL: CPT

## 2018-08-20 PROCEDURE — 36415 COLL VENOUS BLD VENIPUNCTURE: CPT

## 2018-08-20 PROCEDURE — 80076 HEPATIC FUNCTION PANEL: CPT

## 2018-08-20 PROCEDURE — 83036 HEMOGLOBIN GLYCOSYLATED A1C: CPT

## 2018-08-20 NOTE — TELEPHONE ENCOUNTER
"Informed pt "Please let her know her potassium is too low.  I have sent a potassium supplement to her pharmacy and we should repeat a blood test about a week after she starts the supplement.". Pt states that she will not be able to  supplement until appt with hansel on 8/30/18.  "

## 2018-08-20 NOTE — TELEPHONE ENCOUNTER
Please let her know her potassium is too low.  I have sent a potassium supplement to her pharmacy and we should repeat a blood test about a week after she starts the supplement.

## 2018-08-23 LAB
PERIPHERAL PTA: NORMAL
PERIPHERAL PTA: NORMAL

## 2018-08-30 ENCOUNTER — OFFICE VISIT (OUTPATIENT)
Dept: INTERNAL MEDICINE | Facility: CLINIC | Age: 58
End: 2018-08-30
Payer: MEDICARE

## 2018-08-30 VITALS
OXYGEN SATURATION: 97 % | WEIGHT: 120.69 LBS | SYSTOLIC BLOOD PRESSURE: 110 MMHG | HEART RATE: 66 BPM | DIASTOLIC BLOOD PRESSURE: 62 MMHG | HEIGHT: 62 IN | BODY MASS INDEX: 22.21 KG/M2

## 2018-08-30 DIAGNOSIS — L21.9 SEBORRHEIC DERMATITIS OF SCALP: ICD-10-CM

## 2018-08-30 DIAGNOSIS — I48.0 PAF (PAROXYSMAL ATRIAL FIBRILLATION): ICD-10-CM

## 2018-08-30 DIAGNOSIS — F25.0 SCHIZOAFFECTIVE DISORDER, BIPOLAR TYPE: ICD-10-CM

## 2018-08-30 DIAGNOSIS — I10 ESSENTIAL HYPERTENSION: Primary | ICD-10-CM

## 2018-08-30 DIAGNOSIS — D64.9 ANEMIA, UNSPECIFIED TYPE: ICD-10-CM

## 2018-08-30 DIAGNOSIS — F14.11 HISTORY OF COCAINE ABUSE: ICD-10-CM

## 2018-08-30 DIAGNOSIS — E08.40 DIABETES MELLITUS DUE TO UNDERLYING CONDITION WITH DIABETIC NEUROPATHY, WITHOUT LONG-TERM CURRENT USE OF INSULIN: ICD-10-CM

## 2018-08-30 DIAGNOSIS — J44.9 CHRONIC OBSTRUCTIVE PULMONARY DISEASE, UNSPECIFIED COPD TYPE: ICD-10-CM

## 2018-08-30 DIAGNOSIS — E46 PROTEIN-CALORIE MALNUTRITION, UNSPECIFIED SEVERITY: ICD-10-CM

## 2018-08-30 DIAGNOSIS — R63.0 DECREASED APPETITE: ICD-10-CM

## 2018-08-30 DIAGNOSIS — I50.42 CHRONIC COMBINED SYSTOLIC AND DIASTOLIC CHF, NYHA CLASS 4: ICD-10-CM

## 2018-08-30 DIAGNOSIS — F17.200 TOBACCO USE DISORDER: ICD-10-CM

## 2018-08-30 DIAGNOSIS — Z95.810 ICD (IMPLANTABLE CARDIOVERTER-DEFIBRILLATOR) IN PLACE: ICD-10-CM

## 2018-08-30 DIAGNOSIS — I25.5 ISCHEMIC CARDIOMYOPATHY: ICD-10-CM

## 2018-08-30 DIAGNOSIS — F20.9 SCHIZOPHRENIA, UNSPECIFIED TYPE: ICD-10-CM

## 2018-08-30 DIAGNOSIS — I25.10 CORONARY ARTERY DISEASE INVOLVING NATIVE CORONARY ARTERY OF NATIVE HEART WITHOUT ANGINA PECTORIS: ICD-10-CM

## 2018-08-30 DIAGNOSIS — Z12.39 SCREENING FOR BREAST CANCER: ICD-10-CM

## 2018-08-30 DIAGNOSIS — I27.20 PULMONARY HYPERTENSION: ICD-10-CM

## 2018-08-30 PROBLEM — F14.10 COCAINE ABUSE: Status: RESOLVED | Noted: 2017-04-07 | Resolved: 2018-08-30

## 2018-08-30 PROCEDURE — 90686 IIV4 VACC NO PRSV 0.5 ML IM: CPT | Mod: S$GLB,,, | Performed by: FAMILY MEDICINE

## 2018-08-30 PROCEDURE — 3044F HG A1C LEVEL LT 7.0%: CPT | Mod: CPTII,S$GLB,, | Performed by: FAMILY MEDICINE

## 2018-08-30 PROCEDURE — 99999 PR PBB SHADOW E&M-EST. PATIENT-LVL IV: CPT | Mod: PBBFAC,,, | Performed by: FAMILY MEDICINE

## 2018-08-30 PROCEDURE — 3078F DIAST BP <80 MM HG: CPT | Mod: CPTII,S$GLB,, | Performed by: FAMILY MEDICINE

## 2018-08-30 PROCEDURE — 99214 OFFICE O/P EST MOD 30 MIN: CPT | Mod: 25,S$GLB,, | Performed by: FAMILY MEDICINE

## 2018-08-30 PROCEDURE — 3008F BODY MASS INDEX DOCD: CPT | Mod: CPTII,S$GLB,, | Performed by: FAMILY MEDICINE

## 2018-08-30 PROCEDURE — G0008 ADMIN INFLUENZA VIRUS VAC: HCPCS | Mod: S$GLB,,, | Performed by: FAMILY MEDICINE

## 2018-08-30 PROCEDURE — 3074F SYST BP LT 130 MM HG: CPT | Mod: CPTII,S$GLB,, | Performed by: FAMILY MEDICINE

## 2018-08-30 RX ORDER — TRAZODONE HYDROCHLORIDE 150 MG/1
150 TABLET ORAL NIGHTLY
Qty: 90 TABLET | Refills: 1 | Status: SHIPPED | OUTPATIENT
Start: 2018-08-30 | End: 2019-03-12 | Stop reason: SDUPTHER

## 2018-08-30 RX ORDER — AMIODARONE HYDROCHLORIDE 400 MG/1
400 TABLET ORAL DAILY
Qty: 30 TABLET | Refills: 0 | Status: SHIPPED | OUTPATIENT
Start: 2018-08-30 | End: 2018-09-26 | Stop reason: SDUPTHER

## 2018-08-30 RX ORDER — CYPROHEPTADINE HYDROCHLORIDE 4 MG/1
4 TABLET ORAL 2 TIMES DAILY PRN
Qty: 90 TABLET | Refills: 1 | Status: ON HOLD | OUTPATIENT
Start: 2018-08-30 | End: 2018-10-08 | Stop reason: HOSPADM

## 2018-08-30 RX ORDER — ALBUTEROL SULFATE 90 UG/1
2 AEROSOL, METERED RESPIRATORY (INHALATION) EVERY 6 HOURS PRN
Qty: 1 INHALER | Refills: 0 | Status: SHIPPED | OUTPATIENT
Start: 2018-08-30 | End: 2018-09-21 | Stop reason: SDUPTHER

## 2018-08-30 NOTE — PROGRESS NOTES
After obtaining consent, and per orders of Dr. Kwan, injection of fluzone Lot [id number] Exp 6/30/19 given in the LD by ALETA MIRANDA. Patient tolerated well and band aid applied. Patient instructed to remain in clinic for 15 minutes afterwards, and to report any adverse reaction to me immediately.

## 2018-08-30 NOTE — PROGRESS NOTES
"Subjective:       Patient ID: Polly Kwan is a 58 y.o. female.    Chief Complaint: Follow-up; Medication Refill; Dizziness; Ear Fullness; and Back Pain    HPI  57 y/o female smoker (1 cigarette "when she is upset", occurs most days) with schizophrenia, DM2, HTN, CHF combined diastolic and systolic, CAD, ICM EF20% in 4/2017, ICD placed in 2013, COPD, cocaine use (in remission for 1 year) is here for follow up.   Her weight is down 14 pounds, she reports she is eating plenty. She is taking only lisinopril, furosemide, asa, trazodone and cyproheptadine, she has been out for a while, over 6 months. She say the med "she has to cut in half", the spironolactone makes her nauseated and she does not want to be on it. She has not seen cardiology. She has not made her follow up with psychiatry. She f/n/v, says she has had "acid reflux", denies d/constipation/cp, says she gets sob at times but its been "ok", denies urinary sx. Sleeping well.  DM2: not checking sugars, a1c 5.8 labs utd, eye and foot exam utd, not on meds, on asa, ace and statin    Review of Systems   Constitutional: Negative for activity change, appetite change, fatigue and fever.   Respiratory: Negative for cough and shortness of breath.    Cardiovascular: Negative for chest pain, palpitations and leg swelling.   Gastrointestinal: Negative for constipation, diarrhea, nausea and vomiting.   Genitourinary: Negative for difficulty urinating and dysuria.   Skin: Negative for rash.   Neurological: Negative for dizziness and light-headedness.   Psychiatric/Behavioral: Negative for sleep disturbance.       Objective:      /62 (BP Location: Left arm, BP Method: Medium (Manual))   Pulse 66   Ht 5' 2" (1.575 m)   Wt 54.8 kg (120 lb 11.2 oz)   SpO2 97%   BMI 22.08 kg/m²   Physical Exam   Constitutional: She appears well-developed and well-nourished.   HENT:   Head: Normocephalic and atraumatic.   Mouth/Throat: No oropharyngeal exudate.   Neck: Normal range of " motion. Neck supple. No thyromegaly present.   Cardiovascular: Normal rate, regular rhythm and normal heart sounds.   Pulmonary/Chest: Effort normal and breath sounds normal. No respiratory distress.   Musculoskeletal: She exhibits no edema.   Lymphadenopathy:     She has no cervical adenopathy.   Neurological: She is alert.   Skin: Skin is warm and dry.   Psychiatric: She has a normal mood and affect.   Nursing note and vitals reviewed.      Assessment:       1. Essential hypertension    2. Screening for breast cancer    3. Schizoaffective disorder, bipolar type    4. Decreased appetite    5. Chronic combined systolic and diastolic CHF, NYHA class 4    6. Coronary artery disease involving native coronary artery of native heart without angina pectoris    7. History of cocaine abuse    8. Chronic obstructive pulmonary disease, unspecified COPD type    9. Diabetes mellitus due to underlying condition with diabetic neuropathy, without long-term current use of insulin    10. ICD (implantable cardioverter-defibrillator) in place    11. Ischemic cardiomyopathy    12. PAF (paroxysmal atrial fibrillation)    13. Pulmonary hypertension    14. Schizophrenia, unspecified type    15. Tobacco use disorder    16. Anemia, unspecified type    17. Protein-calorie malnutrition, unspecified severity     18. Seborrheic dermatitis of scalp        Plan:   Polly was seen today for follow-up, medication refill, dizziness, ear fullness and back pain.    Diagnoses and all orders for this visit:    Essential hypertension    Screening for breast cancer  -     Mammo Digital Screening Bilat with Tomosynthesis CAD; Future    Schizoaffective disorder, bipolar type  -     traZODone (DESYREL) 150 MG tablet; Take 1 tablet (150 mg total) by mouth every evening.  -     Ambulatory referral to Psychiatry    Decreased appetite  -     cyproheptadine (PERIACTIN) 4 mg tablet; Take 1 tablet (4 mg total) by mouth 2 (two) times daily as needed.    Chronic  combined systolic and diastolic CHF, NYHA class 4  -     amiodarone (PACERONE) 400 MG tablet; Take 1 tablet (400 mg total) by mouth once daily.  -     apixaban 5 mg Tab; Take 1 tablet (5 mg total) by mouth 2 (two) times daily.  -     fluticasone furoate 100 mcg/actuation DsDv; Inhale 100 mcg into the lungs once daily. Controller    Coronary artery disease involving native coronary artery of native heart without angina pectoris  -     Ferritin; Future  -     Folate; Future  -     Iron and TIBC; Future  -     CBC auto differential; Future  -     Vitamin B12; Future  -     Reticulocytes; Future    History of cocaine abuse    Chronic obstructive pulmonary disease, unspecified COPD type    Diabetes mellitus due to underlying condition with diabetic neuropathy, without long-term current use of insulin    ICD (implantable cardioverter-defibrillator) in place  -     amiodarone (PACERONE) 400 MG tablet; Take 1 tablet (400 mg total) by mouth once daily.  -     apixaban 5 mg Tab; Take 1 tablet (5 mg total) by mouth 2 (two) times daily.    Ischemic cardiomyopathy  -     amiodarone (PACERONE) 400 MG tablet; Take 1 tablet (400 mg total) by mouth once daily.  -     apixaban 5 mg Tab; Take 1 tablet (5 mg total) by mouth 2 (two) times daily.    PAF (paroxysmal atrial fibrillation)  -     amiodarone (PACERONE) 400 MG tablet; Take 1 tablet (400 mg total) by mouth once daily.  -     apixaban 5 mg Tab; Take 1 tablet (5 mg total) by mouth 2 (two) times daily.    Pulmonary hypertension  -     amiodarone (PACERONE) 400 MG tablet; Take 1 tablet (400 mg total) by mouth once daily.    Schizophrenia, unspecified type    Tobacco use disorder    Anemia, unspecified type  -     Ferritin; Future  -     Folate; Future  -     Iron and TIBC; Future  -     CBC auto differential; Future  -     Vitamin B12; Future  -     Reticulocytes; Future    Protein-calorie malnutrition, unspecified severity   -     Folate; Future  -     Vitamin B12;  Future    Seborrheic dermatitis of scalp    Other orders  -     Influenza - Quadrivalent (3 years & older) (PF)

## 2018-09-12 ENCOUNTER — TELEPHONE (OUTPATIENT)
Dept: INTERNAL MEDICINE | Facility: CLINIC | Age: 58
End: 2018-09-12

## 2018-09-12 DIAGNOSIS — E87.6 HYPOKALEMIA: Primary | ICD-10-CM

## 2018-09-12 RX ORDER — POTASSIUM CHLORIDE 20 MEQ/1
20 TABLET, EXTENDED RELEASE ORAL 2 TIMES DAILY
Qty: 180 TABLET | Refills: 1 | Status: SHIPPED | OUTPATIENT
Start: 2018-09-12 | End: 2019-02-05 | Stop reason: SDUPTHER

## 2018-09-12 NOTE — TELEPHONE ENCOUNTER
"----- Message from Carolyn Manciniin sent at 9/12/2018 10:24 AM CDT -----  Contact: HANS NAQVI [[id number]]      Name of Who is Calling: HANS NAQVI [[id number]]      What is the request in detail:  Patient called to make this office aware that,  "she never received her potassium supplement medication."  Patient is requesting a call back at your earliest convenience.     THANKS!      Can the clinic reply by [facility]: NO      What Number to Call Back: HANS NAQVI / # [phone]                                    "

## 2018-09-12 NOTE — TELEPHONE ENCOUNTER
Pt states that she never started K+ from the 8/20/18 notes. In review of pt's meds, it looks like potassium was not sent in.    Please advise if you would like the pt to start potassium.

## 2018-09-21 DIAGNOSIS — J44.9 CHRONIC OBSTRUCTIVE PULMONARY DISEASE, UNSPECIFIED COPD TYPE: ICD-10-CM

## 2018-09-21 RX ORDER — ALBUTEROL SULFATE 90 UG/1
AEROSOL, METERED RESPIRATORY (INHALATION)
Qty: 18 INHALER | Refills: 0 | Status: SHIPPED | OUTPATIENT
Start: 2018-09-21 | End: 2019-01-21 | Stop reason: SDUPTHER

## 2018-09-26 DIAGNOSIS — I25.5 ISCHEMIC CARDIOMYOPATHY: ICD-10-CM

## 2018-09-26 DIAGNOSIS — I48.0 PAF (PAROXYSMAL ATRIAL FIBRILLATION): ICD-10-CM

## 2018-09-26 DIAGNOSIS — Z95.810 ICD (IMPLANTABLE CARDIOVERTER-DEFIBRILLATOR) IN PLACE: ICD-10-CM

## 2018-09-26 DIAGNOSIS — I50.42 CHRONIC COMBINED SYSTOLIC AND DIASTOLIC CHF, NYHA CLASS 4: ICD-10-CM

## 2018-09-26 DIAGNOSIS — I27.20 PULMONARY HYPERTENSION: ICD-10-CM

## 2018-09-26 RX ORDER — AMIODARONE HYDROCHLORIDE 400 MG/1
400 TABLET ORAL DAILY
Qty: 30 TABLET | Refills: 0 | Status: SHIPPED | OUTPATIENT
Start: 2018-09-26 | End: 2019-09-26

## 2018-09-26 RX ORDER — APIXABAN 5 MG/1
TABLET, FILM COATED ORAL
Qty: 60 TABLET | Refills: 0 | Status: SHIPPED | OUTPATIENT
Start: 2018-09-26 | End: 2018-11-05 | Stop reason: SDUPTHER

## 2018-09-26 NOTE — TELEPHONE ENCOUNTER
Spoke to patient, she is aware that she needs to get further refills from cardiology. She has a cardiology appointment scheduled on Oct 17.

## 2018-09-26 NOTE — TELEPHONE ENCOUNTER
Last month I gave her a temporary refill so that she could make a follow-up appointment with Cardiology.  I will give her another temporary 1 month refill but she needs to get in to see Cardiology for further refills.

## 2018-10-05 ENCOUNTER — HOSPITAL ENCOUNTER (INPATIENT)
Facility: HOSPITAL | Age: 58
LOS: 3 days | Discharge: HOSPICE | DRG: 292 | End: 2018-10-08
Attending: EMERGENCY MEDICINE | Admitting: INTERNAL MEDICINE
Payer: MEDICARE

## 2018-10-05 DIAGNOSIS — I50.43 ACUTE ON CHRONIC COMBINED SYSTOLIC AND DIASTOLIC CONGESTIVE HEART FAILURE: ICD-10-CM

## 2018-10-05 DIAGNOSIS — I50.9 CHF (CONGESTIVE HEART FAILURE): ICD-10-CM

## 2018-10-05 DIAGNOSIS — I50.9 ACUTE ON CHRONIC CONGESTIVE HEART FAILURE, UNSPECIFIED HEART FAILURE TYPE: Primary | ICD-10-CM

## 2018-10-05 DIAGNOSIS — Z95.810 ICD (IMPLANTABLE CARDIOVERTER-DEFIBRILLATOR) IN PLACE: ICD-10-CM

## 2018-10-05 DIAGNOSIS — I47.20 VT (VENTRICULAR TACHYCARDIA): ICD-10-CM

## 2018-10-05 DIAGNOSIS — I07.1 MODERATE TRICUSPID REGURGITATION: ICD-10-CM

## 2018-10-05 DIAGNOSIS — F17.200 TOBACCO USE DISORDER: ICD-10-CM

## 2018-10-05 DIAGNOSIS — I34.0 MITRAL VALVE INSUFFICIENCY, UNSPECIFIED ETIOLOGY: ICD-10-CM

## 2018-10-05 DIAGNOSIS — F14.10 COCAINE ABUSE: ICD-10-CM

## 2018-10-05 DIAGNOSIS — I48.0 PAF (PAROXYSMAL ATRIAL FIBRILLATION): ICD-10-CM

## 2018-10-05 DIAGNOSIS — I10 ESSENTIAL HYPERTENSION: ICD-10-CM

## 2018-10-05 DIAGNOSIS — R06.02 SOB (SHORTNESS OF BREATH): ICD-10-CM

## 2018-10-05 DIAGNOSIS — I25.5 ISCHEMIC CARDIOMYOPATHY: ICD-10-CM

## 2018-10-05 DIAGNOSIS — J44.9 CHRONIC OBSTRUCTIVE PULMONARY DISEASE, UNSPECIFIED COPD TYPE: ICD-10-CM

## 2018-10-05 DIAGNOSIS — I50.42 CHRONIC COMBINED SYSTOLIC AND DIASTOLIC CHF, NYHA CLASS 4: ICD-10-CM

## 2018-10-05 LAB
ALBUMIN SERPL BCP-MCNC: 3.2 G/DL
ALLENS TEST: ABNORMAL
ALP SERPL-CCNC: 145 U/L
ALT SERPL W/O P-5'-P-CCNC: 28 U/L
ANION GAP SERPL CALC-SCNC: 9 MMOL/L
AST SERPL-CCNC: 26 U/L
BASOPHILS # BLD AUTO: 0.01 K/UL
BASOPHILS NFR BLD: 0.2 %
BILIRUB SERPL-MCNC: 1.2 MG/DL
BILIRUB UR QL STRIP: NEGATIVE
BNP SERPL-MCNC: 1458 PG/ML
BUN SERPL-MCNC: 11 MG/DL
BUN SERPL-MCNC: 12 MG/DL (ref 6–30)
CALCIUM SERPL-MCNC: 8.2 MG/DL
CHLORIDE SERPL-SCNC: 101 MMOL/L
CHLORIDE SERPL-SCNC: 95 MMOL/L (ref 95–110)
CLARITY UR REFRACT.AUTO: CLEAR
CO2 SERPL-SCNC: 24 MMOL/L
COLOR UR AUTO: YELLOW
CREAT SERPL-MCNC: 1.1 MG/DL
CREAT SERPL-MCNC: 1.1 MG/DL (ref 0.5–1.4)
DIFFERENTIAL METHOD: ABNORMAL
EOSINOPHIL # BLD AUTO: 0 K/UL
EOSINOPHIL NFR BLD: 0.7 %
ERYTHROCYTE [DISTWIDTH] IN BLOOD BY AUTOMATED COUNT: 17.3 %
EST. GFR  (AFRICAN AMERICAN): >60 ML/MIN/1.73 M^2
EST. GFR  (NON AFRICAN AMERICAN): 55.5 ML/MIN/1.73 M^2
ESTIMATED AVG GLUCOSE: 120 MG/DL
GLUCOSE SERPL-MCNC: 100 MG/DL
GLUCOSE SERPL-MCNC: 98 MG/DL (ref 70–110)
GLUCOSE UR QL STRIP: NEGATIVE
HBA1C MFR BLD HPLC: 5.8 %
HCO3 UR-SCNC: 30.3 MMOL/L (ref 24–28)
HCT VFR BLD AUTO: 33.6 %
HCT VFR BLD CALC: 39 %PCV (ref 36–54)
HGB BLD-MCNC: 11.3 G/DL
HGB UR QL STRIP: ABNORMAL
IMM GRANULOCYTES # BLD AUTO: 0.02 K/UL
IMM GRANULOCYTES NFR BLD AUTO: 0.5 %
INR PPP: 1.4
KETONES UR QL STRIP: NEGATIVE
LACTATE SERPL-SCNC: 1.4 MMOL/L
LEUKOCYTE ESTERASE UR QL STRIP: NEGATIVE
LYMPHOCYTES # BLD AUTO: 1.2 K/UL
LYMPHOCYTES NFR BLD: 27.4 %
MAGNESIUM SERPL-MCNC: 1.8 MG/DL
MCH RBC QN AUTO: 29.2 PG
MCHC RBC AUTO-ENTMCNC: 33.6 G/DL
MCV RBC AUTO: 87 FL
MICROSCOPIC COMMENT: ABNORMAL
MONOCYTES # BLD AUTO: 0.4 K/UL
MONOCYTES NFR BLD: 9.6 %
NEUTROPHILS # BLD AUTO: 2.7 K/UL
NEUTROPHILS NFR BLD: 61.6 %
NITRITE UR QL STRIP: NEGATIVE
NRBC BLD-RTO: 0 /100 WBC
PCO2 BLDA: 53 MMHG (ref 35–45)
PH SMN: 7.37 [PH] (ref 7.35–7.45)
PH UR STRIP: 6 [PH] (ref 5–8)
PLATELET # BLD AUTO: 261 K/UL
PMV BLD AUTO: 10.3 FL
PO2 BLDA: 14 MMHG (ref 40–60)
POC BE: 5 MMOL/L
POC IONIZED CALCIUM: 1.02 MMOL/L (ref 1.06–1.42)
POC SATURATED O2: 15 % (ref 95–100)
POC TCO2 (MEASURED): 29 MMOL/L (ref 23–29)
POC TCO2: 32 MMOL/L (ref 24–29)
POCT GLUCOSE: 111 MG/DL (ref 70–110)
POTASSIUM BLD-SCNC: 3.9 MMOL/L (ref 3.5–5.1)
POTASSIUM SERPL-SCNC: 4 MMOL/L
PROT SERPL-MCNC: 6.5 G/DL
PROT UR QL STRIP: NEGATIVE
PROTHROMBIN TIME: 13.6 SEC
RBC # BLD AUTO: 3.87 M/UL
RBC #/AREA URNS AUTO: 8 /HPF (ref 0–4)
SAMPLE: ABNORMAL
SAMPLE: ABNORMAL
SITE: ABNORMAL
SODIUM BLD-SCNC: 137 MMOL/L (ref 136–145)
SODIUM SERPL-SCNC: 134 MMOL/L
SP GR UR STRIP: 1.01 (ref 1–1.03)
SQUAMOUS #/AREA URNS AUTO: 6 /HPF
TROPONIN I SERPL DL<=0.01 NG/ML-MCNC: <0.006 NG/ML
TSH SERPL DL<=0.005 MIU/L-ACNC: 3.34 UIU/ML
URN SPEC COLLECT METH UR: ABNORMAL
UROBILINOGEN UR STRIP-ACNC: 4 EU/DL
WBC # BLD AUTO: 4.38 K/UL
WBC #/AREA URNS AUTO: 1 /HPF (ref 0–5)

## 2018-10-05 PROCEDURE — 25000242 PHARM REV CODE 250 ALT 637 W/ HCPCS: Performed by: INTERNAL MEDICINE

## 2018-10-05 PROCEDURE — 84443 ASSAY THYROID STIM HORMONE: CPT

## 2018-10-05 PROCEDURE — 83605 ASSAY OF LACTIC ACID: CPT

## 2018-10-05 PROCEDURE — 63600175 PHARM REV CODE 636 W HCPCS: Performed by: INTERNAL MEDICINE

## 2018-10-05 PROCEDURE — 82803 BLOOD GASES ANY COMBINATION: CPT

## 2018-10-05 PROCEDURE — 94640 AIRWAY INHALATION TREATMENT: CPT

## 2018-10-05 PROCEDURE — 83880 ASSAY OF NATRIURETIC PEPTIDE: CPT

## 2018-10-05 PROCEDURE — 83036 HEMOGLOBIN GLYCOSYLATED A1C: CPT

## 2018-10-05 PROCEDURE — 25000003 PHARM REV CODE 250: Performed by: INTERNAL MEDICINE

## 2018-10-05 PROCEDURE — 84484 ASSAY OF TROPONIN QUANT: CPT

## 2018-10-05 PROCEDURE — A4216 STERILE WATER/SALINE, 10 ML: HCPCS | Performed by: INTERNAL MEDICINE

## 2018-10-05 PROCEDURE — 93010 ELECTROCARDIOGRAM REPORT: CPT | Mod: ,,, | Performed by: INTERNAL MEDICINE

## 2018-10-05 PROCEDURE — 99284 EMERGENCY DEPT VISIT MOD MDM: CPT | Mod: 25

## 2018-10-05 PROCEDURE — 85610 PROTHROMBIN TIME: CPT

## 2018-10-05 PROCEDURE — 85025 COMPLETE CBC W/AUTO DIFF WBC: CPT

## 2018-10-05 PROCEDURE — 63600175 PHARM REV CODE 636 W HCPCS: Performed by: EMERGENCY MEDICINE

## 2018-10-05 PROCEDURE — 11000001 HC ACUTE MED/SURG PRIVATE ROOM

## 2018-10-05 PROCEDURE — 99285 EMERGENCY DEPT VISIT HI MDM: CPT | Mod: ,,, | Performed by: EMERGENCY MEDICINE

## 2018-10-05 PROCEDURE — 99223 1ST HOSP IP/OBS HIGH 75: CPT | Mod: AI,,, | Performed by: INTERNAL MEDICINE

## 2018-10-05 PROCEDURE — 87040 BLOOD CULTURE FOR BACTERIA: CPT

## 2018-10-05 PROCEDURE — 81001 URINALYSIS AUTO W/SCOPE: CPT

## 2018-10-05 PROCEDURE — 80053 COMPREHEN METABOLIC PANEL: CPT

## 2018-10-05 PROCEDURE — 83735 ASSAY OF MAGNESIUM: CPT

## 2018-10-05 PROCEDURE — 96374 THER/PROPH/DIAG INJ IV PUSH: CPT

## 2018-10-05 RX ORDER — AMIODARONE HYDROCHLORIDE 200 MG/1
400 TABLET ORAL DAILY
Status: DISCONTINUED | OUTPATIENT
Start: 2018-10-06 | End: 2018-10-09 | Stop reason: HOSPADM

## 2018-10-05 RX ORDER — FLUTICASONE PROPIONATE 110 UG/1
1 AEROSOL, METERED RESPIRATORY (INHALATION) EVERY 12 HOURS
Status: DISCONTINUED | OUTPATIENT
Start: 2018-10-05 | End: 2018-10-09 | Stop reason: HOSPADM

## 2018-10-05 RX ORDER — ASPIRIN 81 MG/1
81 TABLET ORAL DAILY
Status: DISCONTINUED | OUTPATIENT
Start: 2018-10-06 | End: 2018-10-09 | Stop reason: HOSPADM

## 2018-10-05 RX ORDER — FUROSEMIDE 10 MG/ML
80 INJECTION INTRAMUSCULAR; INTRAVENOUS 2 TIMES DAILY
Status: DISCONTINUED | OUTPATIENT
Start: 2018-10-06 | End: 2018-10-05

## 2018-10-05 RX ORDER — FUROSEMIDE 10 MG/ML
40 INJECTION INTRAMUSCULAR; INTRAVENOUS
Status: COMPLETED | OUTPATIENT
Start: 2018-10-05 | End: 2018-10-05

## 2018-10-05 RX ORDER — AMOXICILLIN 250 MG
1 CAPSULE ORAL 2 TIMES DAILY
Status: DISCONTINUED | OUTPATIENT
Start: 2018-10-05 | End: 2018-10-09 | Stop reason: HOSPADM

## 2018-10-05 RX ORDER — IPRATROPIUM BROMIDE AND ALBUTEROL SULFATE 2.5; .5 MG/3ML; MG/3ML
3 SOLUTION RESPIRATORY (INHALATION)
Status: DISCONTINUED | OUTPATIENT
Start: 2018-10-05 | End: 2018-10-09 | Stop reason: HOSPADM

## 2018-10-05 RX ORDER — ONDANSETRON 2 MG/ML
4 INJECTION INTRAMUSCULAR; INTRAVENOUS EVERY 8 HOURS PRN
Status: DISCONTINUED | OUTPATIENT
Start: 2018-10-05 | End: 2018-10-09 | Stop reason: HOSPADM

## 2018-10-05 RX ORDER — SODIUM CHLORIDE 0.9 % (FLUSH) 0.9 %
3 SYRINGE (ML) INJECTION EVERY 8 HOURS
Status: DISCONTINUED | OUTPATIENT
Start: 2018-10-05 | End: 2018-10-09 | Stop reason: HOSPADM

## 2018-10-05 RX ORDER — LISINOPRIL 5 MG/1
5 TABLET ORAL DAILY
Status: DISCONTINUED | OUTPATIENT
Start: 2018-10-06 | End: 2018-10-09 | Stop reason: HOSPADM

## 2018-10-05 RX ORDER — FUROSEMIDE 10 MG/ML
80 INJECTION INTRAMUSCULAR; INTRAVENOUS ONCE
Status: COMPLETED | OUTPATIENT
Start: 2018-10-05 | End: 2018-10-05

## 2018-10-05 RX ORDER — ONDANSETRON 4 MG/1
4 TABLET, ORALLY DISINTEGRATING ORAL EVERY 8 HOURS PRN
Status: DISCONTINUED | OUTPATIENT
Start: 2018-10-05 | End: 2018-10-09 | Stop reason: HOSPADM

## 2018-10-05 RX ORDER — ATORVASTATIN CALCIUM 20 MG/1
40 TABLET, FILM COATED ORAL DAILY
Status: DISCONTINUED | OUTPATIENT
Start: 2018-10-06 | End: 2018-10-09 | Stop reason: HOSPADM

## 2018-10-05 RX ORDER — POTASSIUM CHLORIDE 20 MEQ/1
20 TABLET, EXTENDED RELEASE ORAL 2 TIMES DAILY
Status: DISCONTINUED | OUTPATIENT
Start: 2018-10-05 | End: 2018-10-09 | Stop reason: HOSPADM

## 2018-10-05 RX ORDER — FAMOTIDINE 20 MG/1
20 TABLET, FILM COATED ORAL 2 TIMES DAILY
Status: DISCONTINUED | OUTPATIENT
Start: 2018-10-05 | End: 2018-10-09 | Stop reason: HOSPADM

## 2018-10-05 RX ADMIN — APIXABAN 5 MG: 5 TABLET, FILM COATED ORAL at 09:10

## 2018-10-05 RX ADMIN — SENNOSIDES AND DOCUSATE SODIUM 1 TABLET: 8.6; 5 TABLET ORAL at 09:10

## 2018-10-05 RX ADMIN — FUROSEMIDE 40 MG: 10 INJECTION, SOLUTION INTRAMUSCULAR; INTRAVENOUS at 04:10

## 2018-10-05 RX ADMIN — FAMOTIDINE 20 MG: 20 TABLET ORAL at 09:10

## 2018-10-05 RX ADMIN — FUROSEMIDE 80 MG: 10 INJECTION, SOLUTION INTRAMUSCULAR; INTRAVENOUS at 06:10

## 2018-10-05 RX ADMIN — IPRATROPIUM BROMIDE AND ALBUTEROL SULFATE 3 ML: .5; 3 SOLUTION RESPIRATORY (INHALATION) at 04:10

## 2018-10-05 RX ADMIN — FUROSEMIDE 10 MG/HR: 10 INJECTION, SOLUTION INTRAMUSCULAR; INTRAVENOUS at 06:10

## 2018-10-05 RX ADMIN — FLUTICASONE PROPIONATE 1 PUFF: 110 AEROSOL, METERED RESPIRATORY (INHALATION) at 11:10

## 2018-10-05 RX ADMIN — Medication 3 ML: at 10:10

## 2018-10-05 RX ADMIN — POTASSIUM CHLORIDE 20 MEQ: 1500 TABLET, EXTENDED RELEASE ORAL at 09:10

## 2018-10-05 NOTE — ED PROVIDER NOTES
Encounter Date: 10/5/2018    SCRIBE #1 NOTE: I, Cynthia Velasco, am scribing for, and in the presence of,  Dr. Lacey. I have scribed the entire note. the EKG reading.       History     Chief Complaint   Patient presents with    Shortness of Breath    Leg Swelling     Time patient was seen by the provider: 2:41 PM      The patient is a 58 y.o. female with co-morbidities including: HTN, DM, asthma, MI, cardiomyopathy, CAD, CHF, COPD and cardiac defibrillator who presents to the ED via EMS with a complaint of SOB. Patient reports intermittent SOB, leg swelling and generalized abdominal pain x 2 weeks. She also complains of a cough and nausea. She denies chest pain, injury, trauma, headache, or any urinary symptoms. She states her defibrillator has been going off for the past few days. She admits to smoking cocaine yesterday after 10 years for a friend's birthday celebration. She denies alcohol use, but reports smoking socially. Per EMS, patient was on Lasix, but has been off of it for a week.       The history is provided by the patient and medical records.     Review of patient's allergies indicates:  No Known Allergies  Past Medical History:   Diagnosis Date    Asthma     Bipolar 1 disorder     Cardiomyopathy     Ischemic Cardiomyopathy with EF 25-30% by echo 5/17/16    Chronic systolic (congestive) heart failure     COPD (chronic obstructive pulmonary disease)     emphysema    Coronary artery disease     ROCHELLE 2006 and 2013    Defibrillator discharge     Depression     Diabetes mellitus     H/O echocardiogram 05/17/2016    EF 25-30%. LV diastolic dysfxn. Severe LAE. mod MR. PASP 86.    Hypertension     ICD (implantable cardioverter-defibrillator) in place 07/09/2013    MI (myocardial infarction)     x 3    PTSD (post-traumatic stress disorder)     Schizophrenia     Seizures      Past Surgical History:   Procedure Laterality Date    CARDIAC CATHETERIZATION      CARDIAC DEFIBRILLATOR PLACEMENT       [place]    CORONARY STENT PLACEMENT      INSERT / REPLACE / REMOVE PACEMAKER  2013     [facility]     Family History   Adopted: Yes   Problem Relation Age of Onset    Melanoma Neg Hx      Social History     Tobacco Use    Smoking status: Current Some Day Smoker     Packs/day: 3.00     Years: 30.00     Pack years: 90.00    Smokeless tobacco: Current User    Tobacco comment: 1-2  cigarettes a day when stressed   Substance Use Topics    Alcohol use: No    Drug use: Yes     Types: Cocaine     Comment: once on wednesday     Review of Systems   Constitutional: Negative for fever.   HENT: Negative for sore throat.    Respiratory: Positive for cough and shortness of breath.    Cardiovascular: Positive for leg swelling. Negative for chest pain.   Gastrointestinal: Positive for abdominal pain (generalized) and nausea.   Genitourinary: Negative for dysuria.   Musculoskeletal: Negative for back pain.   Skin: Negative for rash.   Neurological: Negative for weakness and headaches.   Hematological: Does not bruise/bleed easily.       Physical Exam     Initial Vitals [10/05/18 1431]   BP Pulse Resp Temp SpO2   (!) 87/53 60 (!) 21 98.9 °F (37.2 °C) (!) 89 %      MAP       --         Physical Exam    Nursing note and vitals reviewed.  Constitutional: She is not diaphoretic. No distress.   HENT:   Head: Atraumatic.   Mouth/Throat: Oropharynx is clear and moist.   Eyes: Conjunctivae are normal. No scleral icterus.   Neck: Neck supple. JVD present.   Cardiovascular: Normal rate and regular rhythm.   Murmur heard.  Pulmonary/Chest: No respiratory distress. She has rales (diffuse).   Abdominal: Bowel sounds are normal. She exhibits distension. There is no tenderness. There is no rebound.   Musculoskeletal: She exhibits edema (bilateral LL up to knees).   Neurological: She is alert and oriented to person, place, and time.   Skin: Skin is warm and dry.   Psychiatric: She has a normal mood and  affect. Thought content normal.         ED Course   Procedures  Labs Reviewed   CBC W/ AUTO DIFFERENTIAL - Abnormal; Notable for the following components:       Result Value    RBC 3.87 (*)     Hemoglobin 11.3 (*)     Hematocrit 33.6 (*)     RDW 17.3 (*)     All other components within normal limits   COMPREHENSIVE METABOLIC PANEL - Abnormal; Notable for the following components:    Sodium 134 (*)     Calcium 8.2 (*)     Albumin 3.2 (*)     Total Bilirubin 1.2 (*)     Alkaline Phosphatase 145 (*)     eGFR if non  55.5 (*)     All other components within normal limits   PROTIME-INR - Abnormal; Notable for the following components:    Prothrombin Time 13.6 (*)     INR 1.4 (*)     All other components within normal limits   B-TYPE NATRIURETIC PEPTIDE - Abnormal; Notable for the following components:    BNP 1,458 (*)     All other components within normal limits   POCT GLUCOSE - Abnormal; Notable for the following components:    POCT Glucose 111 (*)     All other components within normal limits   ISTAT PROCEDURE - Abnormal; Notable for the following components:    POC Ionized Calcium 1.02 (*)     All other components within normal limits   ISTAT PROCEDURE - Abnormal; Notable for the following components:    POC PCO2 53.0 (*)     POC PO2 14 (*)     POC HCO3 30.3 (*)     POC SATURATED O2 15 (*)     POC TCO2 32 (*)     All other components within normal limits   CULTURE, BLOOD   CULTURE, BLOOD   LACTIC ACID, PLASMA   TROPONIN I   HEMOGLOBIN A1C   MAGNESIUM   TSH     EKG Readings: (Independently Interpreted)   Sinus tachycardia at a rate of 101. No STEMI     ECG Results          EKG 12-lead (Final result)  Result time 10/05/18 17:16:12    Final result by Interface, Lab In [facility] (10/05/18 17:16:12)                 Narrative:    Test Reason :   Vent. Rate : 060 BPM     Atrial Rate : 060 BPM     P-R Int : 184 ms          QRS Dur : 086 ms      QT Int : 486 ms       P-R-T Axes : 096 242 100 degrees     QTc Int  : 486 ms    Atrial-paced rhythm  Lateral infarct (cited on or before 31-JAN-2018)  Right superior axis deviation  Abnormal ECG  When compared with ECG of 05-OCT-2018 14:49,  No significant change was found  Confirmed by Ashley Victor MD ([id number]) on 10/5/2018 5:16:01 PM    Referred By: NEW   SELF           Confirmed By:Ashley Victor MD                             EKG 12-lead (Final result)  Result time 10/05/18 17:14:06    Final result by Interface, Lab In [facility] (10/05/18 17:14:06)                 Narrative:    Test Reason : R06.02,  Blood Pressure : 100/055 mmHG  Vent. Rate : 060 BPM     Atrial Rate : 060 BPM     P-R Int : 182 ms          QRS Dur : 088 ms      QT Int : 478 ms       P-R-T Axes : 084 230 079 degrees     QTc Int : 478 ms    Atrial-paced rhythm  Lateral infarct (cited on or before 31-JAN-2018)  Right superior axis deviation  Abnormal ECG  When compared with ECG of 31-JAN-2018 10:04,  ST segment depression is no longer present in the anterior leads  Confirmed by Ashley Victor MD ([id number]) on 10/5/2018 5:14:01 PM    Referred By: [email or web address]   SELF           Confirmed By:Ashley Victor MD                            Imaging Results          X-Ray Chest AP Portable (Final result)  Result time 10/05/18 15:24:04    Final result by Sukhdeep Wooten Jr., MD (10/05/18 15:24:04)                 Impression:      Findings consistent with congestive failure.      Electronically signed by: Sukhdeep Wooten MD  Date:    10/05/2018  Time:    15:24             Narrative:    EXAMINATION:  XR CHEST AP PORTABLE    CLINICAL HISTORY:  sob;    TECHNIQUE:  Single frontal view of the chest was performed.    COMPARISON:  January 2018.    FINDINGS:  Cardiac pacemaker and leads noted.  Heart is enlarged.  Diffuse increase in the pulmonary vascular and interstitial markings.  There may be a small volume of pleural fluid.  No pneumothorax.                                 Medical Decision Making:   History:   Old Medical Records: I decided  to obtain old medical records.  Initial Assessment:   58 y.o. female presenting with SOB. My differential diagnosis includes acute CHF, ACS, PE, COPD exacerbation, cirrhosis, volume overload.  Independently Interpreted Test(s):   I have ordered and independently interpreted EKG Reading(s) - see prior notes  Clinical Tests:   Lab Tests: Ordered and Reviewed  Radiological Study: Reviewed and Ordered  Medical Tests: Ordered and Reviewed    This is an emergent evaluation of patient presenting with shortness of breath and chronic medical history.  She was initially hypotensive and tachycardic, but her baseline blood pressures run low given her chronic heart failure and low EF.  ECG obtained with no STEMI or ST changes.  IV line was placed, labs were obtained, patient was placed on cardiac monitor.  Telemetry monitoring without acute arrhythmias or ST changes.  Chest x-ray obtained with cardiomegaly and obvious fluid overload secondary to CHF.  Clinical exam confirm CHF with bilateral lower extremity edema, rales and JVD.  Given her relative low blood pressures, patient was given low dose of Lasix, and Cardiology was consulted.  Cardiology recommends pathway the Lasix drip and admission.  We discussed case with hospital admission team, the patient was admitted for combined systolic and diastolic congestive heart failure.  Patient agreeable to admission plan, no other red flags for PE, COPD or ACS at this time.             Scribe Attestation:   Scribe #1: I performed the above scribed service and the documentation accurately describes the services I performed. I attest to the accuracy of the note.    I, Dr. Jaylan Lacey, personally performed the services described in this documentation. All medical record entries made by the scribe were at my direction and in my presence.  I have reviewed the chart and agree that the record reflects my personal performance and is accurate and complete.              Clinical Impression:    The primary encounter diagnosis was Acute on chronic congestive heart failure, unspecified heart failure type. Diagnoses of SOB (shortness of breath) and CHF (congestive heart failure) were also pertinent to this visit.      Disposition:   Disposition: Admitted  Condition: Fair      Jaylan Lacey DO  Dept of Emergency Medicine   [facility]  Spectralink: [id number]                     Jaylan Lacey DO  10/08/18 1142

## 2018-10-05 NOTE — HPI
Ms. Polly Kwan is a 59 yo F with PMH of Bipolar disorder, polysubstance abuse (tobacco, cocaine), COPD, CAD s/p PCI in 2006, 2013, ICM (EF 20%), HTN, T2DM, VT on amiodarone, paroxysmal AF on eliquis who presented to the ED today with c/o progressively worsening SOB, abdominal distention, LE swelling for the last 1 week. Associated with dry cough, orthopnea and PND episodes. Reports she ran out of her lasix 1 week ago (80 mg BID) but has been taking the rest of her medications. Does not check her weight at home and is non-compliant with a low salt diet. Denies fever, chills, NV, diaphoresis, LH, LOC, abdominal pain, dysuria, numbness or weakness. Denies any ICD shocks. She admits to partying yesterday and snorting cocaine. Reports she is down to 1 cigarette per day. Denies alcohol use.     In the ED, she was noted to be profoundly volume overloaded on exam. VS showed /55, P 60s (a paced), RR 21, 100% on 4LNC. EKG showed a paced rhythm at 60 BPM, right axis, Q waves in lateral leads (no change). Tn negative, BNP 1458. CXR shows signs of pulmonary congestion. LA 1.4, TBILI 1.2, AST 24, ALT 26.

## 2018-10-05 NOTE — CONSULTS
[facility]  Cardiology  Consult Note    Patient Name: Polly Kwan  MRN: [id number]  Admission Date: 10/5/2018  Hospital Length of Stay: 0 days  Code Status: Full Code   Attending Provider: Niurka Dias MD   Consulting Provider: Arina Chen MD  Primary Care Physician: Iza Kwan MD  Principal Problem:Acute on chronic combined systolic and diastolic congestive heart failure    Patient information was obtained from patient and past medical records.     Inpatient consult to Cardiology  Consult performed by: Arina Chen MD  Consult ordered by: Niurka Dias MD  Reason for consult: CHF exacerbation        Subjective:     Chief Complaint:  SOB     HPI:   Ms. Polly Kwan is a 59 yo F with PMH of Bipolar disorder, polysubstance abuse (tobacco, cocaine), COPD, CAD s/p PCI in 2006, 2013, ICM (EF 20%), HTN, T2DM, VT on amiodarone, paroxysmal AF on eliquis who presented to the ED today with c/o progressively worsening SOB, abdominal distention, LE swelling for the last 1 week. Associated with dry cough, orthopnea and PND episodes. Reports she ran out of her lasix 1 week ago (80 mg BID) but has been taking the rest of her medications. Does not check her weight at home and is non-compliant with a low salt diet. Denies fever, chills, NV, diaphoresis, LH, LOC, abdominal pain, dysuria, numbness or weakness. Denies any ICD shocks. She admits to partying yesterday and snorting cocaine. Reports she is down to 1 cigarette per day. Denies alcohol use.     In the ED, she was noted to be profoundly volume overloaded on exam. VS showed /55, P 60s (a paced), RR 21, 100% on 4LNC. EKG showed a paced rhythm at 60 BPM, right axis, Q waves in lateral leads (no change). Tn negative, BNP 1458. CXR shows signs of pulmonary congestion. LA 1.4, TBILI 1.2, AST 24, ALT 26.     Past Medical History:   Diagnosis Date    Asthma     Bipolar 1 disorder     Cardiomyopathy     Ischemic Cardiomyopathy with EF  25-30% by echo 5/17/16    Chronic systolic (congestive) heart failure     COPD (chronic obstructive pulmonary disease)     emphysema    Coronary artery disease     ROCHELLE 2006 and 2013    Defibrillator discharge     Depression     Diabetes mellitus     H/O echocardiogram 05/17/2016    EF 25-30%. LV diastolic dysfxn. Severe LAE. mod MR. BRAMBILA 86.    Hypertension     ICD (implantable cardioverter-defibrillator) in place 07/09/2013    MI (myocardial infarction)     x 3    PTSD (post-traumatic stress disorder)     Schizophrenia     Seizures        Past Surgical History:   Procedure Laterality Date    CARDIAC CATHETERIZATION      CARDIAC DEFIBRILLATOR PLACEMENT      [place]  2006    [place]    CORONARY STENT PLACEMENT      INSERT / REPLACE / REMOVE PACEMAKER  2013     [facility]       Review of patient's allergies indicates:  No Known Allergies    No current facility-administered medications on file prior to encounter.      Current Outpatient Medications on File Prior to Encounter   Medication Sig    amiodarone (PACERONE) 400 MG tablet TAKE 1 TABLET (400 MG TOTAL) BY MOUTH ONCE DAILY.    ammonium lactate 12 % Crea Apply twice daily to affected parts both feet as needed.    aspirin (ECOTRIN) 81 MG EC tablet Take 1 tablet (81 mg total) by mouth once daily.    atorvastatin (LIPITOR) 40 MG tablet TAKE 1 TABLET (40 MG TOTAL) BY MOUTH ONCE DAILY.    ciclopirox (CICLODAN) 8 % Soln Apply topically nightly.    clobetasol (TEMOVATE) 0.05 % external solution Use on scalp one - two times daily as needed for scaling or itching    cyproheptadine (PERIACTIN) 4 mg tablet Take 1 tablet (4 mg total) by mouth 2 (two) times daily as needed.    ELIQUIS 5 mg Tab TAKE 1 TABLET BY MOUTH TWICE A DAY    fluticasone furoate 100 mcg/actuation DsDv Inhale 100 mcg into the lungs once daily. Controller    furosemide (LASIX) 40 MG tablet Take 2 tablets (80 mg total) by mouth 2 (two) times daily.     furosemide (LASIX) 80 MG tablet Take 1 tablet (80 mg total) by mouth 2 (two) times daily.    ketoconazole (NIZORAL) 2 % shampoo Wash hair with medicated shampoo at least 2x/week - let sit on scalp at least 5 minutes prior to rinsing    lidocaine (LIDODERM) 5 % Place 1 patch onto the skin once daily. Remove & Discard patch within 12 hours or as directed by MD    lisinopril (PRINIVIL,ZESTRIL) 5 MG tablet TAKE 1 TABLET (5 MG TOTAL) BY MOUTH ONCE DAILY.    nitroGLYCERIN (NITROSTAT) 0.4 MG SL tablet Place 1 tablet (0.4 mg total) under the tongue every 5 (five) minutes as needed for Chest pain.    potassium chloride SA (K-DUR,KLOR-CON) 20 MEQ tablet Take 1 tablet (20 mEq total) by mouth 2 (two) times daily.    ranitidine (ZANTAC) 75 MG tablet Take 1 tablet (75 mg total) by mouth 2 (two) times daily.    spironolactone (ALDACTONE) 25 MG tablet Take 0.5 tablets (12.5 mg total) by mouth once daily.    traZODone (DESYREL) 150 MG tablet Take 1 tablet (150 mg total) by mouth every evening.    triamcinolone acetonide 0.1% (KENALOG) 0.1 % ointment AAA bid    VENTOLIN HFA 90 mcg/actuation inhaler INHALE 2 PUFFS INTO THE LUNGS EVERY 6 HOURS AS NEEDED FOR WHEEZING OR SHORTNESS OF BREATH     Family History     None        Tobacco Use    Smoking status: Current Some Day Smoker     Packs/day: 3.00     Years: 30.00     Pack years: 90.00    Smokeless tobacco: Current User    Tobacco comment: 1-2  cigarettes a day when stressed   Substance and Sexual Activity    Alcohol use: No    Drug use: Yes     Types: Cocaine     Comment: once on wednesday    Sexual activity: No     ROS   Constitution: Negative for fever, chills, weight loss or gain.   HENT: Negative for sore throat, rhinorrhea, or headache.  Eyes: Negative for blurred or double vision.   Cardiovascular: See above  Pulmonary: Positive for SOB   Gastrointestinal: Negative for abdominal pain, nausea, vomiting, or diarrhea.   : Negative for dysuria.   Neurological:  Negative for focal weakness or sensory changes.    Objective:     Vital Signs (Most Recent):  Temp: 98.9 °F (37.2 °C) (10/05/18 1431)  Pulse: 60 (10/05/18 1616)  Resp: (!) 21 (10/05/18 1431)  BP: 99/61 (10/05/18 1532)  SpO2: 100 % (10/05/18 1616) Vital Signs (24h Range):  Temp:  [98.9 °F (37.2 °C)] 98.9 °F (37.2 °C)  Pulse:  [59-60] 60  Resp:  [21] 21  SpO2:  [84 %-100 %] 100 %  BP: ()/(53-61) 99/61     Weight: 72.6 kg (160 lb)  Body mass index is 29.26 kg/m².    SpO2: 100 %  O2 Device (Oxygen Therapy): room air    No intake or output data in the 24 hours ending 10/05/18 1634    Lines/Drains/Airways     Peripheral Intravenous Line                 Peripheral IV - Single Lumen 10/05/18 0000 Left Antecubital less than 1 day         Peripheral IV - Single Lumen 10/05/18 1459 Right Antecubital less than 1 day                Physical Exam  Constitutional: Appearing dyspneic  HEENT: Sclera anicteric, PERRLA, EOMI  Neck: Positive JVD up to angle of jaw, no carotid bruits  CV: RRR, 1/6 systolic mumur, normal S1/S2, +S3, +S4, No Pericardial rub  Pulm: rales audible up to mid lung fields  GI: Abdomen soft, NTND, +BS  Extremities: 3+ LE edema, warm and well perfused, No cyanosis, No clubbing  Skin: No ecchymosis, erythema, or ulcers  Psych: AOx3, appropriate affect  Neuro: CNII-XII intact, no focal deficits      Significant Labs:   Blood Culture: No results for input(s): LABBLOO in the last 48 hours., CMP   Recent Labs   Lab  10/05/18   1448   NA  134*   K  4.0   CL  101   CO2  24   GLU  100   BUN  11   CREATININE  1.1   CALCIUM  8.2*   PROT  6.5   ALBUMIN  3.2*   BILITOT  1.2*   ALKPHOS  145*   AST  26   ALT  28   ANIONGAP  9   ESTGFRAFRICA  >60.0   EGFRNONAA  55.5*   , CBC   Recent Labs   Lab  10/05/18   1448  10/05/18   1448   WBC  4.38   --    HGB  11.3*   --    HCT  33.6*  39   PLT  261   --    , INR   Recent Labs   Lab  10/05/18   1448   INR  1.4*   , Lipid Panel No results for input(s): CHOL, HDL, LDLCALC, TRIG,  CHOLHDL in the last 48 hours., Troponin   Recent Labs   Lab  10/05/18   1448   TROPONINI  <0.006    and All pertinent lab results from the last 24 hours have been reviewed.    Significant Imaging: Echocardiogram:   2D echo with color flow doppler:   Results for orders placed or performed during the hospital encounter of 04/06/17   2D echo with color flow doppler   Result Value Ref Range    EF 20 (A) 55 - 65    Mitral Valve Regurgitation MODERATE TO SEVERE (A)     Diastolic Dysfunction Yes (A)     Est. PA Systolic Pressure 84 (A)     Tricuspid Valve Regurgitation MODERATE (A)      Assessment and Plan:     * Acute on chronic combined systolic and diastolic congestive heart failure    Ms. Polly Kwan is a 57 yo F with PMH of Bipolar disorder, polysubstance abuse (tobacco, cocaine), COPD, CAD s/p PCI in 2006, 2013, ICM (EF 20%), HTN, T2DM, VT on amiodarone, paroxysmal AF on eliquis who presented to the ED today with c/o progressively worsening SOB, abdominal distention, LE swelling for the last 1 week. Associated with dry cough, orthopnea and PND episodes.     Recommend:  - Admit to HM - prefer IM-C/J  - give total lasix of 80 mg IV then start lasix drip at 10 mg/hr  - 2 gm Na diet, 1500 mL fluid restriction, daily standing weights, strict I/Os  - TTE w/ CFD when more euvolemic  - monitor BMP BID - keep K>4.0, Mg>2.0 (hx of VT)  - compliance with diet, weight checks, and medication refills advised - reports due to insurance issue her lasix was not getting refilled  - smoking cessation advised  - substance use abstinence advised  - GDMT for ICM - ASA 81 mg QD, lipitor 40 mg, lisinopril 5 mg, and aldactone 25 mg QD; avoid initiating beta blocker given hypotension and current decompensation        History of cocaine abuse    - cessation advised        VT (ventricular tachycardia)    - c/w amiodarone 400 mg QD  - F/u with EP as outpatient        PAF (paroxysmal atrial fibrillation)    - currently atrial paced at 60 BPM  -  CVA PPx with eliquis 5 mg BID            VTE Risk Mitigation (From admission, onward)    None          Thank you for your consult. I will follow-up with patient. Please contact us if you have any additional questions.    Arina Chen MD  Cardiology   [facility]

## 2018-10-05 NOTE — H&P
Hospital Medicine  History and Physical Exam    Patient Name; Polly Kwan  MRN: [id number]  [place]: [facility] Niurka Dias MD  Admit Date: 10/5/2018  ROXIE 10/9/2018  Principal Problem:  Acute on chronic combined systolic and diastolic congestive heart failure   Patient information was obtained from patient, past medical records and ER records.   Primary care Physician: Iza Kwan MD  Code status: Full Code    HPI: 58 y.o. female presented to the ER with past medical history of CHF, COPD, tobacco abuse, CAD, ICM with EF 20%, VT s/p ICD, PAF, DM-2, hypertension, depression and schizophrenia, cocaine use.  She was in her usual state of  stable health until the gradual onset of eventually severe shortness of breath on exertion beginning 1 week prior to admission with rapidly worsening course over hte past few days. Pertinent associated symptoms include LE edema, abdominal distension, left lower lateral rib pain. Patient ran out of Lasix > 1 week ago and used cocaine recently. She reports noises coming from her ICD daily but denies shocks.    Past Medical History: Patient has a past medical history of Asthma, Bipolar 1 disorder, Cardiomyopathy, Chronic systolic (congestive) heart failure, COPD (chronic obstructive pulmonary disease), Coronary artery disease, Defibrillator discharge, Depression, Diabetes mellitus, H/O echocardiogram (05/17/2016), Hypertension, ICD (implantable cardioverter-defibrillator) in place (07/09/2013), MI (myocardial infarction), PTSD (post-traumatic stress disorder), Schizophrenia, and Seizures.    Past Surgical History: Patient has a past surgical history that includes Cardiac defibrillator placement; Coronary stent placement; Cardiac catheterization; Coronary angioplasty (2006); and Insert / replace / remove pacemaker (2013 ).    Social History: Patient reports that she has been smoking.  She has a 90.00 pack-year smoking history. She uses smokeless tobacco. She reports that she uses  drugs. Drug: Cocaine. She reports that she does not drink alcohol.    Family History: family history is not on file. She was adopted.    Medications:   No current facility-administered medications on file prior to encounter.      Current Outpatient Medications on File Prior to Encounter   Medication Sig Dispense Refill    amiodarone (PACERONE) 400 MG tablet TAKE 1 TABLET (400 MG TOTAL) BY MOUTH ONCE DAILY. 30 tablet 0    ammonium lactate 12 % Crea Apply twice daily to affected parts both feet as needed. 140 g 11    aspirin (ECOTRIN) 81 MG EC tablet Take 1 tablet (81 mg total) by mouth once daily. 30 tablet 6    atorvastatin (LIPITOR) 40 MG tablet TAKE 1 TABLET (40 MG TOTAL) BY MOUTH ONCE DAILY. 90 tablet 1    ciclopirox (CICLODAN) 8 % Soln Apply topically nightly. 6.6 mL 11    clobetasol (TEMOVATE) 0.05 % external solution Use on scalp one - two times daily as needed for scaling or itching 60 mL 3    cyproheptadine (PERIACTIN) 4 mg tablet Take 1 tablet (4 mg total) by mouth 2 (two) times daily as needed. 90 tablet 1    ELIQUIS 5 mg Tab TAKE 1 TABLET BY MOUTH TWICE A DAY 60 tablet 0    fluticasone furoate 100 mcg/actuation DsDv Inhale 100 mcg into the lungs once daily. Controller 30 each 11    furosemide (LASIX) 40 MG tablet Take 2 tablets (80 mg total) by mouth 2 (two) times daily. 120 tablet 0    furosemide (LASIX) 80 MG tablet Take 1 tablet (80 mg total) by mouth 2 (two) times daily. 180 tablet 3    ketoconazole (NIZORAL) 2 % shampoo Wash hair with medicated shampoo at least 2x/week - let sit on scalp at least 5 minutes prior to rinsing 120 mL 5    lidocaine (LIDODERM) 5 % Place 1 patch onto the skin once daily. Remove & Discard patch within 12 hours or as directed by MD 10 patch 0    lisinopril (PRINIVIL,ZESTRIL) 5 MG tablet TAKE 1 TABLET (5 MG TOTAL) BY MOUTH ONCE DAILY. 90 tablet 3    nitroGLYCERIN (NITROSTAT) 0.4 MG SL tablet Place 1 tablet (0.4 mg total) under the tongue every 5 (five) minutes  as needed for Chest pain. 100 tablet 11    potassium chloride SA (K-DUR,KLOR-CON) 20 MEQ tablet Take 1 tablet (20 mEq total) by mouth 2 (two) times daily. 180 tablet 1    ranitidine (ZANTAC) 75 MG tablet Take 1 tablet (75 mg total) by mouth 2 (two) times daily. 180 tablet 1    spironolactone (ALDACTONE) 25 MG tablet Take 0.5 tablets (12.5 mg total) by mouth once daily. 45 tablet 3    traZODone (DESYREL) 150 MG tablet Take 1 tablet (150 mg total) by mouth every evening. 90 tablet 1    triamcinolone acetonide 0.1% (KENALOG) 0.1 % ointment AAA bid 60 g 3    VENTOLIN HFA 90 mcg/actuation inhaler INHALE 2 PUFFS INTO THE LUNGS EVERY 6 HOURS AS NEEDED FOR WHEEZING OR SHORTNESS OF BREATH 18 Inhaler 0       Allergies: Patient has No Known Allergies.    Review of Systems   Constitutional: Positive for malaise/fatigue. Negative for fever.   HENT: Negative.    Eyes: Negative.    Respiratory: Positive for cough and shortness of breath. Negative for sputum production and wheezing.    Cardiovascular: Positive for orthopnea, leg swelling and PND. Negative for chest pain and palpitations.   Gastrointestinal: Positive for abdominal pain. Negative for vomiting.   Genitourinary: Negative.    Musculoskeletal: Positive for myalgias.   Skin: Negative.    Neurological: Negative.        Physical Exam:  Temp:  [98.9 °F (37.2 °C)]   Pulse:  [59-60]   Resp:  [21]   BP: ()/(53-61)   SpO2:  [84 %-100 %]   Body mass index is 29.26 kg/m².     Physical Exam   Constitutional:  Non-toxic appearance. No distress.   HENT:   Head: Normocephalic.   Mouth/Throat: Mucous membranes are not pale and not cyanotic.   Eyes: Conjunctivae and lids are normal. Pupils are equal.   Neck: Neck supple. JVD present.   Cardiovascular: S1 normal and S2 normal.   Murmur heard.  Pulmonary/Chest: Effort normal and breath sounds normal.   Abdominal: Soft. Bowel sounds are normal. She exhibits distension. There is no tenderness.   Musculoskeletal: She exhibits  edema.   Neurological: She is alert. She is not disoriented. Coordination abnormal.   Skin: Skin is warm and dry. She is not diaphoretic. No cyanosis.   Psychiatric: Mood normal. Her affect is inappropriate.       Significant Labs and Imaging:    Recent Labs   Lab  10/05/18   1448  10/05/18   1448   WBC  4.38   --    HGB  11.3*   --    HCT  33.6*  39   PLT  261   --      Recent Labs   Lab  10/05/18   1448   NA  134*   K  4.0   CL  101   CO2  24   BUN  11   CREATININE  1.1   GLU  100   CALCIUM  8.2*   MG  1.8   ALKPHOS  145*   ALT  28   AST  26   ALBUMIN  3.2*   PROT  6.5   BILITOT  1.2*   INR  1.4*     Recent Labs   Lab  10/05/18   1446   POCTGLUCOSE  111*     A1C:   Recent Labs   Lab  08/20/18   0656   HGBA1C  5.8*     Recent Labs      10/05/18   1448   TROPONINI  <0.006   BNP  1,458*     ABGs:   Recent Labs   Lab  10/05/18   1501   PH  7.365   PCO2  53.0*   HCO3  30.3*   POCSATURATED  15*   BE  5     Lactic Acid:   Recent Labs   Lab  10/05/18   1448   LACTATE  1.4     TSH:   Recent Labs   Lab  08/20/18   0656   TSH  2.098     Urine Studies:   Recent Labs   Lab  10/05/18   1813   COLORU  Yellow   APPEARANCEUA  Clear   PHUR  6.0   SPECGRAV  1.010   PROTEINUA  Negative   GLUCUA  Negative   KETONESU  Negative   BILIRUBINUA  Negative   OCCULTUA  2+*   NITRITE  Negative   UROBILINOGEN  4.0   LEUKOCYTESUR  Negative   RBCUA  8*   WBCUA  1   SQUAMEPITHEL  6     Baselines:  Hemoglobin   Date Value Ref Range Status   10/05/2018 11.3 (L) 12.0 - 16.0 g/dL Final   01/02/2018 10.9 (L) 12.0 - 16.0 g/dL Final   05/26/2017 12.0 12.0 - 16.0 g/dL Final   05/25/2017 12.0 12.0 - 16.0 g/dL Final   05/24/2017 11.4 (L) 12.0 - 16.0 g/dL Final     Creatinine   Date Value Ref Range Status   10/05/2018 1.1 0.5 - 1.4 mg/dL Final   08/20/2018 0.8 0.5 - 1.4 mg/dL Final   01/16/2018 0.9 0.5 - 1.4 mg/dL Final   01/02/2018 0.8 0.5 - 1.4 mg/dL Final   05/26/2017 1.1 0.5 - 1.4 mg/dL Final       Radiology/Cardiac:  CXR (personally interpreted): No  pneumothorax, Cardiomegaly and Pulmonary edema  Radiographic tests reviewed chest xray  EKG (personally interpreted): unchanged from previous tracings, paced    Inpatient Medications prescribed for management of current Problems:   Scheduled Meds:    albuterol-ipratropium  3 mL Nebulization Q4H WAKE    [START ON 10/6/2018] amiodarone  400 mg Oral Daily    apixaban  5 mg Oral BID    [START ON 10/6/2018] aspirin  81 mg Oral Daily    [START ON 10/6/2018] atorvastatin  40 mg Oral Daily    famotidine  20 mg Oral BID    fluticasone  1 puff Inhalation Q12H    furosemide  80 mg Intravenous Once    [START ON 10/6/2018] lisinopril  5 mg Oral Daily    potassium chloride SA  20 mEq Oral BID    senna-docusate 8.6-50 mg  1 tablet Oral BID    sodium chloride 0.9%  3 mL Intravenous Q8H    [START ON 10/6/2018] spironolactone  12.5 mg Oral Daily    traZODone  150 mg Oral QHS     Continuous Infusions:     furosemide (LASIX) 2 mg/mL infusion (non-titrating) 10 mg/hr (10/05/18 1836)     As Needed: ondansetron, ondansetron    Active Hospital Problems    Diagnosis  POA    *Acute on chronic combined systolic and diastolic congestive heart failure [I50.43]  Yes    History of cocaine abuse [Z87.898]  Yes    VT (ventricular tachycardia) [I47.2]  Yes    PAF (paroxysmal atrial fibrillation) [I48.0]  Yes    Tobacco use disorder [F17.200]  Yes    Chronic obstructive pulmonary disease [J44.9]  Yes    Cocaine abuse [F14.10]  Yes    Ischemic cardiomyopathy [I25.5]  Yes     EF 20-25% with severe MR and moderate TR with mod-severe RV dysfunction      CAD (coronary artery disease) [I25.10]  Yes     2006 and 2013 ROCHELLE      Essential hypertension [I10]  Yes    Diabetes mellitus with diabetic neuropathy [E11.40]  Yes    ICD (implantable cardioverter-defibrillator) in place [Z95.810]  Yes      Resolved Hospital Problems   No resolved problems to display.       Overview:  Patient is a 58 y.o. female with significant past medical  history of CAD, PAF, CHF admitted to hospital for CHF exacerbation.     Acute on chronic combined systolic and diastolic congestive heart failure  Ischemic cardiomyopathy / CAD  Essential hypertension  Cardiology consulted in ED. Patient started on CHF Pathway with Lasix gtt.     VT (ventricular tachycardia)  ICD (implantable cardioverter-defibrillator) in place  Monitor     PAF (paroxysmal atrial fibrillation) on anticoagulation  Continuing current management with amiodarone and apixaban     Diabetes mellitus with diabetic neuropathy  Monitor. A1c < 6%.     Chronic obstructive pulmonary disease Tobacco use disorder  .Continuing current management with controller medication, DuoNebs.     Cocaine abuse  History of cocaine abuse with mental illness  Needs OP follow up    DVT Prophylaxis:   Anticoagulants   Medication Route Frequency    apixaban tablet 5 mg Oral BID       HIGH RISK CONDITION(S):   Patient has a condition that poses threat to life and bodily function: Severe Respiratory Distress     Discharge plan and follow up  Home or Self Care      Provider  Niurka Dias MD  Department of Hospital Medicine

## 2018-10-05 NOTE — ED TRIAGE NOTES
Patient presents via [facility] from home for evaluation of shortness of breath , abdominal swelling, leg swelling. Patient has been out of her meds for a week. Patient admits to smoking crack yesterday.

## 2018-10-05 NOTE — ED NOTES
LOC: The patient is awake, alert and aware of environment with an appropriate affect, the patient is oriented x 3 and speaking appropriately.  APPEARANCE: Patient resting comfortably and in no acute distress, patient is clean and well groomed, patient's clothing is properly fastened.  SKIN: The skin is warm and dry, patient has normal skin turgor and moist mucus membranes, skin intact, no breakdown or brusing noted.  MUSKULOSKELETAL: Patient moving all extremities well, no obvious deformities noted.  RESPIRATORY: Airway is open and patent, respirations are spontaneous, patient has a normal effort and rate. Breath sounds are clear and equal bilaterally.  CARDIAC: Normal heart sounds. + 3 peripheral edema. AICD present  ABDOMEN: Firm and tender to palpation, distention noted. Bowel sounds present.

## 2018-10-05 NOTE — ASSESSMENT & PLAN NOTE
Ms. Polly Kwan is a 59 yo F with PMH of Bipolar disorder, polysubstance abuse (tobacco, cocaine), COPD, CAD s/p PCI in 2006, 2013, ICM (EF 20%), HTN, T2DM, VT on amiodarone, paroxysmal AF on eliquis who presented to the ED today with c/o progressively worsening SOB, abdominal distention, LE swelling for the last 1 week. Associated with dry cough, orthopnea and PND episodes.     Recommend:  - Admit to HM - prefer IM-C/J  - give total lasix of 80 mg IV then start lasix drip at 10 mg/hr  - 2 gm Na diet, 1500 mL fluid restriction, daily standing weights, strict I/Os  - TTE w/ CFD when more euvolemic  - monitor BMP BID - keep K>4.0, Mg>2.0 (hx of VT)  - compliance with diet, weight checks, and medication refills advised - reports due to insurance issue her lasix was not getting refilled  - smoking cessation advised  - substance use abstinence advised  - GDMT for ICM - ASA 81 mg QD, lipitor 40 mg, lisinopril 5 mg, and aldactone 25 mg QD; avoid initiating beta blocker given hypotension and current decompensation

## 2018-10-05 NOTE — SUBJECTIVE & OBJECTIVE
Past Medical History:   Diagnosis Date    Asthma     Bipolar 1 disorder     Cardiomyopathy     Ischemic Cardiomyopathy with EF 25-30% by echo 5/17/16    Chronic systolic (congestive) heart failure     COPD (chronic obstructive pulmonary disease)     emphysema    Coronary artery disease     ROCHELLE 2006 and 2013    Defibrillator discharge     Depression     Diabetes mellitus     H/O echocardiogram 05/17/2016    EF 25-30%. LV diastolic dysfxn. Severe LAE. mod MR. BRAMBILA 86.    Hypertension     ICD (implantable cardioverter-defibrillator) in place 07/09/2013    MI (myocardial infarction)     x 3    PTSD (post-traumatic stress disorder)     Schizophrenia     Seizures        Past Surgical History:   Procedure Laterality Date    CARDIAC CATHETERIZATION      CARDIAC DEFIBRILLATOR PLACEMENT      [place]  2006    [place]    CORONARY STENT PLACEMENT      INSERT / REPLACE / REMOVE PACEMAKER  2013     [facility]       Review of patient's allergies indicates:  No Known Allergies    No current facility-administered medications on file prior to encounter.      Current Outpatient Medications on File Prior to Encounter   Medication Sig    amiodarone (PACERONE) 400 MG tablet TAKE 1 TABLET (400 MG TOTAL) BY MOUTH ONCE DAILY.    ammonium lactate 12 % Crea Apply twice daily to affected parts both feet as needed.    aspirin (ECOTRIN) 81 MG EC tablet Take 1 tablet (81 mg total) by mouth once daily.    atorvastatin (LIPITOR) 40 MG tablet TAKE 1 TABLET (40 MG TOTAL) BY MOUTH ONCE DAILY.    ciclopirox (CICLODAN) 8 % Soln Apply topically nightly.    clobetasol (TEMOVATE) 0.05 % external solution Use on scalp one - two times daily as needed for scaling or itching    cyproheptadine (PERIACTIN) 4 mg tablet Take 1 tablet (4 mg total) by mouth 2 (two) times daily as needed.    ELIQUIS 5 mg Tab TAKE 1 TABLET BY MOUTH TWICE A DAY    fluticasone furoate 100 mcg/actuation DsDv Inhale 100 mcg into the lungs  once daily. Controller    furosemide (LASIX) 40 MG tablet Take 2 tablets (80 mg total) by mouth 2 (two) times daily.    furosemide (LASIX) 80 MG tablet Take 1 tablet (80 mg total) by mouth 2 (two) times daily.    ketoconazole (NIZORAL) 2 % shampoo Wash hair with medicated shampoo at least 2x/week - let sit on scalp at least 5 minutes prior to rinsing    lidocaine (LIDODERM) 5 % Place 1 patch onto the skin once daily. Remove & Discard patch within 12 hours or as directed by MD    lisinopril (PRINIVIL,ZESTRIL) 5 MG tablet TAKE 1 TABLET (5 MG TOTAL) BY MOUTH ONCE DAILY.    nitroGLYCERIN (NITROSTAT) 0.4 MG SL tablet Place 1 tablet (0.4 mg total) under the tongue every 5 (five) minutes as needed for Chest pain.    potassium chloride SA (K-DUR,KLOR-CON) 20 MEQ tablet Take 1 tablet (20 mEq total) by mouth 2 (two) times daily.    ranitidine (ZANTAC) 75 MG tablet Take 1 tablet (75 mg total) by mouth 2 (two) times daily.    spironolactone (ALDACTONE) 25 MG tablet Take 0.5 tablets (12.5 mg total) by mouth once daily.    traZODone (DESYREL) 150 MG tablet Take 1 tablet (150 mg total) by mouth every evening.    triamcinolone acetonide 0.1% (KENALOG) 0.1 % ointment AAA bid    VENTOLIN HFA 90 mcg/actuation inhaler INHALE 2 PUFFS INTO THE LUNGS EVERY 6 HOURS AS NEEDED FOR WHEEZING OR SHORTNESS OF BREATH     Family History     None        Tobacco Use    Smoking status: Current Some Day Smoker     Packs/day: 3.00     Years: 30.00     Pack years: 90.00    Smokeless tobacco: Current User    Tobacco comment: 1-2  cigarettes a day when stressed   Substance and Sexual Activity    Alcohol use: No    Drug use: Yes     Types: Cocaine     Comment: once on wednesday    Sexual activity: No     ROS   Constitution: Negative for fever, chills, weight loss or gain.   HENT: Negative for sore throat, rhinorrhea, or headache.  Eyes: Negative for blurred or double vision.   Cardiovascular: See above  Pulmonary: Positive for SOB    Gastrointestinal: Negative for abdominal pain, nausea, vomiting, or diarrhea.   : Negative for dysuria.   Neurological: Negative for focal weakness or sensory changes.    Objective:     Vital Signs (Most Recent):  Temp: 98.9 °F (37.2 °C) (10/05/18 1431)  Pulse: 60 (10/05/18 1616)  Resp: (!) 21 (10/05/18 1431)  BP: 99/61 (10/05/18 1532)  SpO2: 100 % (10/05/18 1616) Vital Signs (24h Range):  Temp:  [98.9 °F (37.2 °C)] 98.9 °F (37.2 °C)  Pulse:  [59-60] 60  Resp:  [21] 21  SpO2:  [84 %-100 %] 100 %  BP: ()/(53-61) 99/61     Weight: 72.6 kg (160 lb)  Body mass index is 29.26 kg/m².    SpO2: 100 %  O2 Device (Oxygen Therapy): room air    No intake or output data in the 24 hours ending 10/05/18 1634    Lines/Drains/Airways     Peripheral Intravenous Line                 Peripheral IV - Single Lumen 10/05/18 0000 Left Antecubital less than 1 day         Peripheral IV - Single Lumen 10/05/18 1459 Right Antecubital less than 1 day                Physical Exam  Constitutional: Appearing dyspneic  HEENT: Sclera anicteric, PERRLA, EOMI  Neck: Positive JVD up to angle of jaw, no carotid bruits  CV: RRR, 1/6 systolic mumur, normal S1/S2, +S3, +S4, No Pericardial rub  Pulm: rales audible up to mid lung fields  GI: Abdomen soft, NTND, +BS  Extremities: 3+ LE edema, warm and well perfused, No cyanosis, No clubbing  Skin: No ecchymosis, erythema, or ulcers  Psych: AOx3, appropriate affect  Neuro: CNII-XII intact, no focal deficits      Significant Labs:   Blood Culture: No results for input(s): LABBLOO in the last 48 hours., CMP   Recent Labs   Lab  10/05/18   1448   NA  134*   K  4.0   CL  101   CO2  24   GLU  100   BUN  11   CREATININE  1.1   CALCIUM  8.2*   PROT  6.5   ALBUMIN  3.2*   BILITOT  1.2*   ALKPHOS  145*   AST  26   ALT  28   ANIONGAP  9   ESTGFRAFRICA  >60.0   EGFRNONAA  55.5*   , CBC   Recent Labs   Lab  10/05/18   1448  10/05/18   1448   WBC  4.38   --    HGB  11.3*   --    HCT  33.6*  39   PLT  261   --     , INR   Recent Labs   Lab  10/05/18   1448   INR  1.4*   , Lipid Panel No results for input(s): CHOL, HDL, LDLCALC, TRIG, CHOLHDL in the last 48 hours., Troponin   Recent Labs   Lab  10/05/18   1448   TROPONINI  <0.006    and All pertinent lab results from the last 24 hours have been reviewed.    Significant Imaging: Echocardiogram:   2D echo with color flow doppler:   Results for orders placed or performed during the hospital encounter of 04/06/17   2D echo with color flow doppler   Result Value Ref Range    EF 20 (A) 55 - 65    Mitral Valve Regurgitation MODERATE TO SEVERE (A)     Diastolic Dysfunction Yes (A)     Est. PA Systolic Pressure 84 (A)     Tricuspid Valve Regurgitation MODERATE (A)

## 2018-10-06 LAB
ALBUMIN SERPL BCP-MCNC: 3.2 G/DL
ANION GAP SERPL CALC-SCNC: 11 MMOL/L
BASOPHILS # BLD AUTO: 0.01 K/UL
BASOPHILS NFR BLD: 0.2 %
BUN SERPL-MCNC: 11 MG/DL
CALCIUM SERPL-MCNC: 8.1 MG/DL
CHLORIDE SERPL-SCNC: 100 MMOL/L
CO2 SERPL-SCNC: 25 MMOL/L
CREAT SERPL-MCNC: 1 MG/DL
DIFFERENTIAL METHOD: ABNORMAL
EOSINOPHIL # BLD AUTO: 0 K/UL
EOSINOPHIL NFR BLD: 0.7 %
ERYTHROCYTE [DISTWIDTH] IN BLOOD BY AUTOMATED COUNT: 17.2 %
EST. GFR  (AFRICAN AMERICAN): >60 ML/MIN/1.73 M^2
EST. GFR  (NON AFRICAN AMERICAN): >60 ML/MIN/1.73 M^2
GLUCOSE SERPL-MCNC: 81 MG/DL
HCT VFR BLD AUTO: 29.9 %
HGB BLD-MCNC: 9.7 G/DL
IMM GRANULOCYTES # BLD AUTO: 0.02 K/UL
IMM GRANULOCYTES NFR BLD AUTO: 0.4 %
LYMPHOCYTES # BLD AUTO: 1.8 K/UL
LYMPHOCYTES NFR BLD: 31.8 %
MAGNESIUM SERPL-MCNC: 1.8 MG/DL
MCH RBC QN AUTO: 28.7 PG
MCHC RBC AUTO-ENTMCNC: 32.4 G/DL
MCV RBC AUTO: 89 FL
MONOCYTES # BLD AUTO: 1 K/UL
MONOCYTES NFR BLD: 18.1 %
NEUTROPHILS # BLD AUTO: 2.7 K/UL
NEUTROPHILS NFR BLD: 48.8 %
NRBC BLD-RTO: 0 /100 WBC
PHOSPHATE SERPL-MCNC: 3.6 MG/DL
PLATELET # BLD AUTO: 275 K/UL
PMV BLD AUTO: 10.5 FL
POTASSIUM SERPL-SCNC: 3.8 MMOL/L
RBC # BLD AUTO: 3.38 M/UL
SODIUM SERPL-SCNC: 136 MMOL/L
WBC # BLD AUTO: 5.59 K/UL

## 2018-10-06 PROCEDURE — 80069 RENAL FUNCTION PANEL: CPT

## 2018-10-06 PROCEDURE — 25000003 PHARM REV CODE 250: Performed by: INTERNAL MEDICINE

## 2018-10-06 PROCEDURE — A4216 STERILE WATER/SALINE, 10 ML: HCPCS | Performed by: INTERNAL MEDICINE

## 2018-10-06 PROCEDURE — 85025 COMPLETE CBC W/AUTO DIFF WBC: CPT

## 2018-10-06 PROCEDURE — 27000221 HC OXYGEN, UP TO 24 HOURS

## 2018-10-06 PROCEDURE — 63600175 PHARM REV CODE 636 W HCPCS: Performed by: INTERNAL MEDICINE

## 2018-10-06 PROCEDURE — 83735 ASSAY OF MAGNESIUM: CPT

## 2018-10-06 PROCEDURE — 11000001 HC ACUTE MED/SURG PRIVATE ROOM

## 2018-10-06 PROCEDURE — 99232 SBSQ HOSP IP/OBS MODERATE 35: CPT | Mod: ,,, | Performed by: INTERNAL MEDICINE

## 2018-10-06 PROCEDURE — 36415 COLL VENOUS BLD VENIPUNCTURE: CPT

## 2018-10-06 PROCEDURE — 94640 AIRWAY INHALATION TREATMENT: CPT

## 2018-10-06 PROCEDURE — 94761 N-INVAS EAR/PLS OXIMETRY MLT: CPT

## 2018-10-06 PROCEDURE — 25000242 PHARM REV CODE 250 ALT 637 W/ HCPCS: Performed by: INTERNAL MEDICINE

## 2018-10-06 RX ORDER — ACETAMINOPHEN 500 MG
500 TABLET ORAL EVERY 6 HOURS PRN
Status: DISCONTINUED | OUTPATIENT
Start: 2018-10-06 | End: 2018-10-09 | Stop reason: HOSPADM

## 2018-10-06 RX ADMIN — IPRATROPIUM BROMIDE AND ALBUTEROL SULFATE 3 ML: .5; 3 SOLUTION RESPIRATORY (INHALATION) at 08:10

## 2018-10-06 RX ADMIN — Medication 3 ML: at 10:10

## 2018-10-06 RX ADMIN — FAMOTIDINE 20 MG: 20 TABLET ORAL at 10:10

## 2018-10-06 RX ADMIN — IPRATROPIUM BROMIDE AND ALBUTEROL SULFATE 3 ML: .5; 3 SOLUTION RESPIRATORY (INHALATION) at 10:10

## 2018-10-06 RX ADMIN — FAMOTIDINE 20 MG: 20 TABLET ORAL at 09:10

## 2018-10-06 RX ADMIN — MAGNESIUM OXIDE TAB 400 MG (241.3 MG ELEMENTAL MG) 200 MG: 400 (241.3 MG) TAB at 11:10

## 2018-10-06 RX ADMIN — Medication 3 ML: at 06:10

## 2018-10-06 RX ADMIN — ASPIRIN 81 MG: 81 TABLET, COATED ORAL at 10:10

## 2018-10-06 RX ADMIN — IPRATROPIUM BROMIDE AND ALBUTEROL SULFATE 3 ML: .5; 3 SOLUTION RESPIRATORY (INHALATION) at 07:10

## 2018-10-06 RX ADMIN — SENNOSIDES AND DOCUSATE SODIUM 1 TABLET: 8.6; 5 TABLET ORAL at 10:10

## 2018-10-06 RX ADMIN — SPIRONOLACTONE 12.5 MG: 25 TABLET, FILM COATED ORAL at 11:10

## 2018-10-06 RX ADMIN — APIXABAN 5 MG: 5 TABLET, FILM COATED ORAL at 10:10

## 2018-10-06 RX ADMIN — FUROSEMIDE 5 MG/HR: 10 INJECTION, SOLUTION INTRAMUSCULAR; INTRAVENOUS at 11:10

## 2018-10-06 RX ADMIN — TRAZODONE HYDROCHLORIDE 150 MG: 100 TABLET ORAL at 09:10

## 2018-10-06 RX ADMIN — SENNOSIDES AND DOCUSATE SODIUM 1 TABLET: 8.6; 5 TABLET ORAL at 09:10

## 2018-10-06 RX ADMIN — FLUTICASONE PROPIONATE 1 PUFF: 110 AEROSOL, METERED RESPIRATORY (INHALATION) at 09:10

## 2018-10-06 RX ADMIN — Medication 3 ML: at 02:10

## 2018-10-06 RX ADMIN — ATORVASTATIN CALCIUM 40 MG: 20 TABLET, FILM COATED ORAL at 10:10

## 2018-10-06 RX ADMIN — AMIODARONE HYDROCHLORIDE 400 MG: 200 TABLET ORAL at 10:10

## 2018-10-06 RX ADMIN — POTASSIUM CHLORIDE 20 MEQ: 1500 TABLET, EXTENDED RELEASE ORAL at 09:10

## 2018-10-06 RX ADMIN — FLUTICASONE PROPIONATE 1 PUFF: 110 AEROSOL, METERED RESPIRATORY (INHALATION) at 10:10

## 2018-10-06 RX ADMIN — APIXABAN 5 MG: 5 TABLET, FILM COATED ORAL at 09:10

## 2018-10-06 RX ADMIN — POTASSIUM CHLORIDE 20 MEQ: 1500 TABLET, EXTENDED RELEASE ORAL at 10:10

## 2018-10-06 NOTE — CONSULTS
"Food & Nutrition  Education    Diet Education: Low Sodium  Time Spent: 15 minutes  Learners: patient      Nutrition Education provided with handouts: Heart Failure Medical Nutrition Therapy      Comments: Pt reports not limiting salt intake PTA. Reports "liking" the taste and "needing" it. RD discouraged pt from overuse of salt related to symptoms caused by too much salt in diet. Pt reports she will try to start decreasing intake. Pt continues to need reinforcement of restrictions.   All questions and concerns answered. Dietitian's contact information provided.       Follow-Up: LOS Day 10 or PRN    Please Re-consult as needed    Thanks!  CORRINE Ruiz, LDN    "

## 2018-10-06 NOTE — MEDICAL/APP STUDENT
Hospital Medicine  Progress Note    Patient Name: Polly Kwan  MRN: [id number]  Team: [facility] [place] [facility] MED D Niurka Dias MD  Admit Date: 10/5/2018  ROXIE   Code status: Full Code    Principal Problem:  Acute on chronic combined systolic and diastolic congestive heart failure    Interval history: Complains of headache. BP low this AM; BP meds held.    Review of Systems   Constitutional: Negative for fever.   Respiratory: Negative for shortness of breath.        Physical Exam:  Temp:  [97.3 °F (36.3 °C)-98.9 °F (37.2 °C)]   Pulse:  [59-85]   Resp:  [15-21]   BP: ()/(49-66)   SpO2:  [84 %-100 %]      Temp: 97.6 °F (36.4 °C) (10/06/18 0951)  Pulse: 65 (10/06/18 1100)  Resp: 19 (10/06/18 1057)  BP: (!) 83/49 (10/06/18 0951)  SpO2: 95 % (10/06/18 1057)    Intake/Output Summary (Last 24 hours) at 10/6/2018 1110  Last data filed at 10/6/2018 1000  Gross per 24 hour   Intake 200 ml   Output 3450 ml   Net -3250 ml     Weight: 65.2 kg (143 lb 11.1 oz)  Body mass index is 26.28 kg/m².    Physical Exam   Constitutional: No distress.   Eyes: Conjunctivae and lids are normal.   Neck: JVD present.   Cardiovascular: S1 normal and S2 normal.   Pulmonary/Chest: Effort normal and breath sounds normal.   Abdominal: Soft. Bowel sounds are normal. She exhibits distension. There is no tenderness.   Musculoskeletal: She exhibits edema.   Neurological: Coordination abnormal.   Skin: Skin is warm and dry.   Psychiatric: Mood normal. Her affect is inappropriate.       Significant Labs:  Recent Labs   Lab  10/05/18   1448  10/05/18   1448  10/06/18   0530   WBC  4.38   --   5.59   HGB  11.3*   --   9.7*   HCT  33.6*  39  29.9*   PLT  261   --   275     Recent Labs   Lab  10/05/18   1448  10/06/18   0530   NA  134*  136   K  4.0  3.8   CL  101  100   CO2  24  25   BUN  11  11   CREATININE  1.1  1.0   GLU  100  81   CALCIUM  8.2*  8.1*   MG  1.8  1.8   PHOS   --   3.6   ALKPHOS  145*   --    ALT  28   --    AST  26   --    ALBUMIN  3.2*  3.2*    PROT  6.5   --    BILITOT  1.2*   --    INR  1.4*   --      Recent Labs   Lab  10/05/18   1446   POCTGLUCOSE  111*     A1C:   Recent Labs   Lab  08/20/18   0656  10/05/18   1448   HGBA1C  5.8*  5.8*     Recent Labs   Lab  10/05/18   1448   TROPONINI  <0.006     ABGs:   Recent Labs   Lab  10/05/18   1501   PH  7.365   PCO2  53.0*   HCO3  30.3*   POCSATURATED  15*   BE  5     Lactic Acid:   Recent Labs   Lab  10/05/18   1448   LACTATE  1.4     TSH:   Recent Labs   Lab  10/05/18   1448   TSH  3.338     Urine Studies:   Recent Labs   Lab  10/05/18   1813   COLORU  Yellow   APPEARANCEUA  Clear   PHUR  6.0   SPECGRAV  1.010   PROTEINUA  Negative   GLUCUA  Negative   KETONESU  Negative   BILIRUBINUA  Negative   OCCULTUA  2+*   NITRITE  Negative   UROBILINOGEN  4.0   LEUKOCYTESUR  Negative   RBCUA  8*   WBCUA  1   SQUAMEPITHEL  6       Inpatient Medications prescribed for management of current Problems:   Scheduled Meds:    albuterol-ipratropium  3 mL Nebulization Q4H WAKE    amiodarone  400 mg Oral Daily    apixaban  5 mg Oral BID    aspirin  81 mg Oral Daily    atorvastatin  40 mg Oral Daily    famotidine  20 mg Oral BID    fluticasone  1 puff Inhalation Q12H    lisinopril  5 mg Oral Daily    magnesium oxide  200 mg Oral Daily    potassium chloride SA  20 mEq Oral BID    senna-docusate 8.6-50 mg  1 tablet Oral BID    sodium chloride 0.9%  3 mL Intravenous Q8H    spironolactone  12.5 mg Oral Daily    traZODone  150 mg Oral QHS     Continuous Infusions:    furosemide (LASIX) 2 mg/mL infusion (non-titrating) 5 mg/hr (10/06/18 1001)     As Needed: ondansetron, ondansetron    Active Hospital Problems    Diagnosis  POA    *Acute on chronic combined systolic and diastolic congestive heart failure [I50.43]  Yes    History of cocaine abuse [Z87.898]  Yes    VT (ventricular tachycardia) [I47.2]  Yes    PAF (paroxysmal atrial fibrillation) [I48.0]  Yes    Tobacco use disorder [F17.200]  Yes    Chronic  obstructive pulmonary disease [J44.9]  Yes    Cocaine abuse [F14.10]  Yes    Ischemic cardiomyopathy [I25.5]  Yes     EF 20-25% with severe MR and moderate TR with mod-severe RV dysfunction      CAD (coronary artery disease) [I25.10]  Yes     2006 and 2013 ROCHELLE      Essential hypertension [I10]  Yes    Diabetes mellitus with diabetic neuropathy [E11.40]  Yes    ICD (implantable cardioverter-defibrillator) in place [Z95.810]  Yes      Resolved Hospital Problems   No resolved problems to display.       Overview:  Patient is a 58 y.o. female with significant past medical history of cocaine use, DMII, CAD s/p PCI ('06 & '13), VT s/p ICD on amiodarone, PAF on apixiban, & CHF (EF 25%) admitted to hospital for CHF exacerbation. Cardiology was consulted and patient was managed with furosemide gtt. Home ASA, lisinopril, spironolactone, atorvastatin, amiodarone and apixiban were continued.   Additionally on admit, ABG indicated hypoxia with hypercapnia; her COPD was managed by continuing home controller med and DuoNebs.      Assessment and Plan for Problems addressed today:    Acute on chronic combined systolic and diastolic congestive heart failure  Ischemic cardiomyopathy / CAD  Essential hypertension  · BNP elevated. CXR: cardiomegaly with increased interstitial markings consistent with CHF.   · Cardiology consulted in ED. Patient started on CHF Pathway with Lasix gtt. Continued ASA, lisinopril, spironolactone, atrovastatin.   · 2D Echo ordered  · Held BP meds and decreased rate of Lasix gtt due to hypotension. (10/6)      VT (ventricular tachycardia)  ICD (implantable cardioverter-defibrillator) in place  · Monitoring. Continued home amiodarone.   · Plan for EP follow up as an OP.      PAF (paroxysmal atrial fibrillation) on anticoagulation  · EKG: Atrial paced at 60 bpm   · Continuing current management with apixaban     Diabetes mellitus with diabetic neuropathy  · HA1c (10/5): 5.8%  · Monitor.      Chronic  obstructive pulmonary disease  Tobacco use disorder  · ABG (10/5): hypoxia with hypercapnia   · Continuing current management with controller medication, DuoNebs.     Cocaine abuse  History of cocaine abuse with mental illness  · Needs OP follow up    Diet: Diet Low Sodium, 2gm [facility]; Fluid - 1500mL   GI Ppx: famotadine   DVT Prophylaxis:   Anticoagulants   Medication Route Frequency    apixaban tablet 5 mg Oral BID     L/D/A:   PIV    HIGH RISK CONDITION(S):   Patient has a condition that poses threat to life and bodily function: Severe Respiratory Distress     Discharge plan and follow up  Home or Self Care      Provider  Niurka Dias MD  [facility] [place] [facility] MED D   Department of Hospital Medicine    Kailyn[place]bob Attestation: I personally scribed for Niurka Dias MD on 10/06/2018 at 11:26 AM. Electronically signed by julien Schmidt on 10/06/2018 at 11:26 AM.

## 2018-10-07 LAB
ALBUMIN SERPL BCP-MCNC: 2.8 G/DL
ANION GAP SERPL CALC-SCNC: 7 MMOL/L
BASOPHILS # BLD AUTO: 0.01 K/UL
BASOPHILS NFR BLD: 0.2 %
BUN SERPL-MCNC: 10 MG/DL
CALCIUM SERPL-MCNC: 7.7 MG/DL
CHLORIDE SERPL-SCNC: 101 MMOL/L
CO2 SERPL-SCNC: 30 MMOL/L
CREAT SERPL-MCNC: 0.9 MG/DL
DIFFERENTIAL METHOD: ABNORMAL
EOSINOPHIL # BLD AUTO: 0 K/UL
EOSINOPHIL NFR BLD: 0.5 %
ERYTHROCYTE [DISTWIDTH] IN BLOOD BY AUTOMATED COUNT: 16.9 %
EST. GFR  (AFRICAN AMERICAN): >60 ML/MIN/1.73 M^2
EST. GFR  (NON AFRICAN AMERICAN): >60 ML/MIN/1.73 M^2
GLUCOSE SERPL-MCNC: 94 MG/DL
HCT VFR BLD AUTO: 30.9 %
HGB BLD-MCNC: 10.2 G/DL
IMM GRANULOCYTES # BLD AUTO: 0.01 K/UL
IMM GRANULOCYTES NFR BLD AUTO: 0.2 %
LYMPHOCYTES # BLD AUTO: 1.6 K/UL
LYMPHOCYTES NFR BLD: 37 %
MAGNESIUM SERPL-MCNC: 1.8 MG/DL
MCH RBC QN AUTO: 28.6 PG
MCHC RBC AUTO-ENTMCNC: 33 G/DL
MCV RBC AUTO: 87 FL
MONOCYTES # BLD AUTO: 0.4 K/UL
MONOCYTES NFR BLD: 9.9 %
NEUTROPHILS # BLD AUTO: 2.2 K/UL
NEUTROPHILS NFR BLD: 52.2 %
NRBC BLD-RTO: 0 /100 WBC
PHOSPHATE SERPL-MCNC: 3.3 MG/DL
PLATELET # BLD AUTO: 252 K/UL
PMV BLD AUTO: 10.2 FL
POTASSIUM SERPL-SCNC: 4.1 MMOL/L
RBC # BLD AUTO: 3.57 M/UL
SODIUM SERPL-SCNC: 138 MMOL/L
TROPONIN I SERPL DL<=0.01 NG/ML-MCNC: <0.006 NG/ML
WBC # BLD AUTO: 4.24 K/UL

## 2018-10-07 PROCEDURE — 93010 ELECTROCARDIOGRAM REPORT: CPT | Mod: ,,, | Performed by: INTERNAL MEDICINE

## 2018-10-07 PROCEDURE — 25000003 PHARM REV CODE 250: Performed by: INTERNAL MEDICINE

## 2018-10-07 PROCEDURE — 94640 AIRWAY INHALATION TREATMENT: CPT

## 2018-10-07 PROCEDURE — 99900035 HC TECH TIME PER 15 MIN (STAT)

## 2018-10-07 PROCEDURE — 11000001 HC ACUTE MED/SURG PRIVATE ROOM

## 2018-10-07 PROCEDURE — 99232 SBSQ HOSP IP/OBS MODERATE 35: CPT | Mod: ,,, | Performed by: INTERNAL MEDICINE

## 2018-10-07 PROCEDURE — 85025 COMPLETE CBC W/AUTO DIFF WBC: CPT

## 2018-10-07 PROCEDURE — 84484 ASSAY OF TROPONIN QUANT: CPT

## 2018-10-07 PROCEDURE — 25000242 PHARM REV CODE 250 ALT 637 W/ HCPCS: Performed by: INTERNAL MEDICINE

## 2018-10-07 PROCEDURE — 27000221 HC OXYGEN, UP TO 24 HOURS

## 2018-10-07 PROCEDURE — A4216 STERILE WATER/SALINE, 10 ML: HCPCS | Performed by: INTERNAL MEDICINE

## 2018-10-07 PROCEDURE — 36415 COLL VENOUS BLD VENIPUNCTURE: CPT

## 2018-10-07 PROCEDURE — 94761 N-INVAS EAR/PLS OXIMETRY MLT: CPT

## 2018-10-07 PROCEDURE — 63600175 PHARM REV CODE 636 W HCPCS: Performed by: INTERNAL MEDICINE

## 2018-10-07 PROCEDURE — 93005 ELECTROCARDIOGRAM TRACING: CPT

## 2018-10-07 PROCEDURE — 83735 ASSAY OF MAGNESIUM: CPT

## 2018-10-07 PROCEDURE — 80069 RENAL FUNCTION PANEL: CPT

## 2018-10-07 RX ORDER — BISACODYL 10 MG
10 SUPPOSITORY, RECTAL RECTAL DAILY PRN
Status: DISCONTINUED | OUTPATIENT
Start: 2018-10-07 | End: 2018-10-09 | Stop reason: HOSPADM

## 2018-10-07 RX ORDER — POLYETHYLENE GLYCOL 3350 17 G/17G
17 POWDER, FOR SOLUTION ORAL 2 TIMES DAILY
Status: DISPENSED | OUTPATIENT
Start: 2018-10-07 | End: 2018-10-08

## 2018-10-07 RX ORDER — IPRATROPIUM BROMIDE AND ALBUTEROL SULFATE 2.5; .5 MG/3ML; MG/3ML
3 SOLUTION RESPIRATORY (INHALATION) EVERY 4 HOURS PRN
Status: DISCONTINUED | OUTPATIENT
Start: 2018-10-07 | End: 2018-10-09 | Stop reason: HOSPADM

## 2018-10-07 RX ORDER — FUROSEMIDE 10 MG/ML
80 INJECTION INTRAMUSCULAR; INTRAVENOUS 2 TIMES DAILY
Status: DISCONTINUED | OUTPATIENT
Start: 2018-10-07 | End: 2018-10-08

## 2018-10-07 RX ADMIN — IPRATROPIUM BROMIDE AND ALBUTEROL SULFATE 3 ML: .5; 3 SOLUTION RESPIRATORY (INHALATION) at 06:10

## 2018-10-07 RX ADMIN — Medication 3 ML: at 10:10

## 2018-10-07 RX ADMIN — IPRATROPIUM BROMIDE AND ALBUTEROL SULFATE 3 ML: .5; 3 SOLUTION RESPIRATORY (INHALATION) at 11:10

## 2018-10-07 RX ADMIN — APIXABAN 5 MG: 5 TABLET, FILM COATED ORAL at 09:10

## 2018-10-07 RX ADMIN — ASPIRIN 81 MG: 81 TABLET, COATED ORAL at 08:10

## 2018-10-07 RX ADMIN — MAGNESIUM OXIDE TAB 400 MG (241.3 MG ELEMENTAL MG) 200 MG: 400 (241.3 MG) TAB at 08:10

## 2018-10-07 RX ADMIN — Medication 3 ML: at 02:10

## 2018-10-07 RX ADMIN — POTASSIUM CHLORIDE 20 MEQ: 1500 TABLET, EXTENDED RELEASE ORAL at 09:10

## 2018-10-07 RX ADMIN — IPRATROPIUM BROMIDE AND ALBUTEROL SULFATE 3 ML: .5; 3 SOLUTION RESPIRATORY (INHALATION) at 03:10

## 2018-10-07 RX ADMIN — ACETAMINOPHEN 500 MG: 500 TABLET, FILM COATED ORAL at 10:10

## 2018-10-07 RX ADMIN — AMIODARONE HYDROCHLORIDE 400 MG: 200 TABLET ORAL at 08:10

## 2018-10-07 RX ADMIN — FAMOTIDINE 20 MG: 20 TABLET ORAL at 08:10

## 2018-10-07 RX ADMIN — SPIRONOLACTONE 12.5 MG: 25 TABLET, FILM COATED ORAL at 08:10

## 2018-10-07 RX ADMIN — APIXABAN 5 MG: 5 TABLET, FILM COATED ORAL at 08:10

## 2018-10-07 RX ADMIN — SENNOSIDES AND DOCUSATE SODIUM 1 TABLET: 8.6; 5 TABLET ORAL at 08:10

## 2018-10-07 RX ADMIN — ATORVASTATIN CALCIUM 40 MG: 20 TABLET, FILM COATED ORAL at 08:10

## 2018-10-07 RX ADMIN — Medication 3 ML: at 06:10

## 2018-10-07 RX ADMIN — FUROSEMIDE 80 MG: 10 INJECTION, SOLUTION INTRAMUSCULAR; INTRAVENOUS at 06:10

## 2018-10-07 RX ADMIN — POTASSIUM CHLORIDE 20 MEQ: 1500 TABLET, EXTENDED RELEASE ORAL at 08:10

## 2018-10-07 RX ADMIN — MAGNESIUM OXIDE TAB 400 MG (241.3 MG ELEMENTAL MG) 200 MG: 400 (241.3 MG) TAB at 09:10

## 2018-10-07 RX ADMIN — FUROSEMIDE 5 MG/HR: 10 INJECTION, SOLUTION INTRAMUSCULAR; INTRAVENOUS at 03:10

## 2018-10-07 RX ADMIN — TRAZODONE HYDROCHLORIDE 150 MG: 100 TABLET ORAL at 09:10

## 2018-10-07 RX ADMIN — POLYETHYLENE GLYCOL 3350 17 G: 17 POWDER, FOR SOLUTION ORAL at 09:10

## 2018-10-07 RX ADMIN — FAMOTIDINE 20 MG: 20 TABLET ORAL at 09:10

## 2018-10-07 RX ADMIN — FLUTICASONE PROPIONATE 1 PUFF: 110 AEROSOL, METERED RESPIRATORY (INHALATION) at 09:10

## 2018-10-07 NOTE — PROGRESS NOTES
Physician Attestation for Scribe:  I, Niurka Dias MD, personally performed the services described in this documentation. All medical record entries made by the scribe were at my direction and in my presence.  I have reviewed this note and agree that the record reflects my personal performance and is accurate and complete.     Hospital Medicine  Progress Note     Patient Name: Polly Kwan  MRN: [id number]  Team: [facility] [place] [facility]OS[facility] MED D Niurka Dias MD  Admit Date: 10/5/2018  ROXIE   Code status: Full Code     Principal Problem:  Acute on chronic combined systolic and diastolic congestive heart failure     Interval history: Complains of headache. BP low this AM; BP meds held.     Review of Systems   Constitutional: Negative for fever.   Respiratory: Negative for shortness of breath.          Physical Exam:  Temp:  [97.3 °F (36.3 °C)-98.9 °F (37.2 °C)]   Pulse:  [59-85]   Resp:  [15-21]   BP: ()/(49-66)   SpO2:  [84 %-100 %]       Temp: 97.6 °F (36.4 °C) (10/06/18 0951)  Pulse: 65 (10/06/18 1100)  Resp: 19 (10/06/18 1057)  BP: (!) 83/49 (10/06/18 0951)  SpO2: 95 % (10/06/18 1057)     Intake/Output Summary (Last 24 hours) at 10/6/2018 1110  Last data filed at 10/6/2018 1000      Gross per 24 hour   Intake 200 ml   Output 3450 ml   Net -3250 ml      Weight: 65.2 kg (143 lb 11.1 oz)  Body mass index is 26.28 kg/m².     Physical Exam   Constitutional: No distress.   Eyes: Conjunctivae and lids are normal.   Neck: JVD present.   Cardiovascular: S1 normal and S2 normal.   Pulmonary/Chest: Effort normal and breath sounds normal.   Abdominal: Soft. Bowel sounds are normal. She exhibits distension. There is no tenderness.   Musculoskeletal: She exhibits edema.   Neurological: Coordination abnormal.   Skin: Skin is warm and dry.   Psychiatric: Mood normal. Her affect is inappropriate.         Significant Labs:        Recent Labs   Lab  10/05/18   1448  10/05/18   1448  10/06/18   0530   WBC  4.38   --   5.59   HGB  11.3*   --    9.7*   HCT  33.6*  39  29.9*   PLT  261   --   275           Recent Labs   Lab  10/05/18   1448  10/06/18   0530   NA  134*  136   K  4.0  3.8   CL  101  100   CO2  24  25   BUN  11  11   CREATININE  1.1  1.0   GLU  100  81   CALCIUM  8.2*  8.1*   MG  1.8  1.8   PHOS   --   3.6   ALKPHOS  145*   --    ALT  28   --    AST  26   --    ALBUMIN  3.2*  3.2*   PROT  6.5   --    BILITOT  1.2*   --    INR  1.4*   --           Recent Labs   Lab  10/05/18   1446   POCTGLUCOSE  111*      A1C:        Recent Labs   Lab  08/20/18   0656  10/05/18   1448   HGBA1C  5.8*  5.8*          Recent Labs   Lab  10/05/18   1448   TROPONINI  <0.006      ABGs:       Recent Labs   Lab  10/05/18   1501   PH  7.365   PCO2  53.0*   HCO3  30.3*   POCSATURATED  15*   BE  5      Lactic Acid:       Recent Labs   Lab  10/05/18   1448   LACTATE  1.4      TSH:       Recent Labs   Lab  10/05/18   1448   TSH  3.338      Urine Studies:       Recent Labs   Lab  10/05/18   1813   COLORU  Yellow   APPEARANCEUA  Clear   PHUR  6.0   SPECGRAV  1.010   PROTEINUA  Negative   GLUCUA  Negative   KETONESU  Negative   BILIRUBINUA  Negative   OCCULTUA  2+*   NITRITE  Negative   UROBILINOGEN  4.0   LEUKOCYTESUR  Negative   RBCUA  8*   WBCUA  1   SQUAMEPITHEL  6         Inpatient Medications prescribed for management of current Problems:   Scheduled Meds:    albuterol-ipratropium  3 mL Nebulization Q4H WAKE    amiodarone  400 mg Oral Daily    apixaban  5 mg Oral BID    aspirin  81 mg Oral Daily    atorvastatin  40 mg Oral Daily    famotidine  20 mg Oral BID    fluticasone  1 puff Inhalation Q12H    lisinopril  5 mg Oral Daily    magnesium oxide  200 mg Oral Daily    potassium chloride SA  20 mEq Oral BID    senna-docusate 8.6-50 mg  1 tablet Oral BID    sodium chloride 0.9%  3 mL Intravenous Q8H    spironolactone  12.5 mg Oral Daily    traZODone  150 mg Oral QHS      Continuous Infusions:    furosemide (LASIX) 2 mg/mL infusion (non-titrating) 5 mg/hr  (10/06/18 1001)      As Needed: ondansetron, ondansetron            Active Hospital Problems     Diagnosis   POA    *Acute on chronic combined systolic and diastolic congestive heart failure [I50.43]   Yes    History of cocaine abuse [Z87.898]   Yes    VT (ventricular tachycardia) [I47.2]   Yes    PAF (paroxysmal atrial fibrillation) [I48.0]   Yes    Tobacco use disorder [F17.200]   Yes    Chronic obstructive pulmonary disease [J44.9]   Yes    Cocaine abuse [F14.10]   Yes    Ischemic cardiomyopathy [I25.5]   Yes       EF 20-25% with severe MR and moderate TR with mod-severe RV dysfunction       CAD (coronary artery disease) [I25.10]   Yes       2006 and 2013 ROCHELLE       Essential hypertension [I10]   Yes    Diabetes mellitus with diabetic neuropathy [E11.40]   Yes    ICD (implantable cardioverter-defibrillator) in place [Z95.810]   Yes       Resolved Hospital Problems   No resolved problems to display.         Overview:  Patient is a 58 y.o. female with significant past medical history of cocaine use, DMII, CAD s/p PCI ('06 & '13), VT s/p ICD on amiodarone, PAF on apixiban, & CHF (EF 25%) admitted to hospital for CHF exacerbation. Cardiology was consulted and patient was managed with furosemide gtt. Home ASA, lisinopril, spironolactone, atorvastatin, amiodarone and apixiban were continued.   Additionally on admit, ABG indicated hypoxia with hypercapnia; her COPD was managed by continuing home controller med and DuoNebs.       Assessment and Plan for Problems addressed today:     Acute on chronic combined systolic and diastolic congestive heart failure  Ischemic cardiomyopathy / CAD  Essential hypertension  · BNP elevated. CXR: cardiomegaly with increased interstitial markings consistent with CHF.   · Cardiology consulted in ED. Patient started on CHF Pathway with Lasix gtt. Continued ASA, lisinopril, spironolactone, atrovastatin.   · 2D Echo ordered  · Held BP meds and decreased rate of Lasix gtt due to  hypotension. (10/6)      VT (ventricular tachycardia)  ICD (implantable cardioverter-defibrillator) in place  · Monitoring. Continued home amiodarone.   · Plan for EP follow up as an OP.      PAF (paroxysmal atrial fibrillation) on anticoagulation  · EKG: Atrial paced at 60 bpm   · Continuing current management with apixaban     Diabetes mellitus with diabetic neuropathy  · HA1c (10/5): 5.8%  · Monitor.      Chronic obstructive pulmonary disease  Tobacco use disorder  · ABG (10/5): hypoxia with hypercapnia   · Continuing current management with controller medication, DuoNebs.     Cocaine abuse  History of cocaine abuse with mental illness  · Needs OP follow up     Diet: Diet Low Sodium, 2gm [facility]; Fluid - 1500mL   GI Ppx: famotadine   DVT Prophylaxis:         Anticoagulants   Medication Route Frequency    apixaban tablet 5 mg Oral BID      L/D/A:   PIV     HIGH RISK CONDITION(S):   Patient has a condition that poses threat to life and bodily function: Severe Respiratory Distress      Discharge plan and follow up  Home or Self Care        Provider  Niurka Dias MD  [facility] [place] HOSP MED D   Department of Hospital Medicine     Scribe Attestation: I personally scribed for Niurka Dias MD on 10/06/2018 at 11:26 AM. Electronically signed by julien Schmidt on 10/06/2018 at 11:26 AM.

## 2018-10-07 NOTE — PLAN OF CARE
Problem: Patient Care Overview  Goal: Plan of Care Review  Outcome: Ongoing (interventions implemented as appropriate)  Patient AAOx4. Patient VSS. Patient denies pain. Patient free from falls or injury during shift. Patient repositioned independently. Patient in bed, bed in lowest position, call light in reach, bed alarm set, and personal items at bedside. Will continue to monitor.

## 2018-10-07 NOTE — MEDICAL/APP STUDENT
Hospital Medicine  Progress Note    Patient Name: Polly Kwan  MRN: [id number]  Team: [facility] [place] [facility] MED D Niurka Dias MD  Admit Date: 10/5/2018  Code status: Full Code    Principal Problem:  Acute on chronic combined systolic and diastolic congestive heart failure    Interval history: Reports not feeling back to respiratory baseline, but reports overall improvement in respiratory status.     Review of Systems   Constitutional: Negative for fever.   Respiratory: Negative for shortness of breath.    Gastrointestinal: Positive for constipation.       Physical Exam:  Temp:  [97.6 °F (36.4 °C)-98.9 °F (37.2 °C)]   Pulse:  [59-85]   Resp:  [15-20]   BP: ()/(49-66)   SpO2:  [95 %-100 %]      Temp: 97.7 °F (36.5 °C) (10/07/18 0756)  Pulse: 60 (10/07/18 0756)  Resp: 20 (10/07/18 0756)  BP: 100/64 (10/07/18 0756)  SpO2: 98 % (10/07/18 0756)    Intake/Output Summary (Last 24 hours) at 10/7/2018 0856  Last data filed at 10/7/2018 0600  Gross per 24 hour   Intake --   Output 1700 ml   Net -1700 ml     Weight: 63.8 kg (140 lb 9.6 oz)  Body mass index is 25.72 kg/m².    Physical Exam   Constitutional: No distress.   Eyes: Conjunctivae and lids are normal.   Neck: JVD present.   Cardiovascular: S1 normal and S2 normal.   Pulmonary/Chest: Effort normal and breath sounds normal.   Abdominal: Soft. Bowel sounds are normal. She exhibits distension. There is no tenderness.   Musculoskeletal: She exhibits no edema.   Neurological: Coordination abnormal.   Skin: Skin is warm and dry.   Psychiatric: Mood normal. Her affect is inappropriate.       Significant Labs:  Recent Labs   Lab  10/05/18   1448  10/05/18   1448  10/06/18   0530  10/07/18   0535   WBC  4.38   --   5.59  4.24   HGB  11.3*   --   9.7*  10.2*   HCT  33.6*  39  29.9*  30.9*   PLT  261   --   275  252     Recent Labs   Lab  10/05/18   1448  10/06/18   0530  10/07/18   0535   NA  134*  136  138   K  4.0  3.8  4.1   CL  101  100  101   CO2  24  25  30*   BUN  11  11  10    CREATININE  1.1  1.0  0.9   GLU  100  81  94   CALCIUM  8.2*  8.1*  7.7*   MG  1.8  1.8  1.8   PHOS   --   3.6  3.3   ALKPHOS  145*   --    --    ALT  28   --    --    AST  26   --    --    ALBUMIN  3.2*  3.2*  2.8*   PROT  6.5   --    --    BILITOT  1.2*   --    --    INR  1.4*   --    --      Recent Labs   Lab  10/05/18   1446   POCTGLUCOSE  111*     A1C:   Recent Labs   Lab  08/20/18   0656  10/05/18   1448   HGBA1C  5.8*  5.8*     Recent Labs   Lab  10/05/18   1448   TROPONINI  <0.006     ABGs:   Recent Labs   Lab  10/05/18   1501   PH  7.365   PCO2  53.0*   HCO3  30.3*   POCSATURATED  15*   BE  5     Lactic Acid:   Recent Labs   Lab  10/05/18   1448   LACTATE  1.4     TSH:   Recent Labs   Lab  10/05/18   1448   TSH  3.338     Urine Studies:   Recent Labs   Lab  10/05/18   1813   COLORU  Yellow   APPEARANCEUA  Clear   PHUR  6.0   SPECGRAV  1.010   PROTEINUA  Negative   GLUCUA  Negative   KETONESU  Negative   BILIRUBINUA  Negative   OCCULTUA  2+*   NITRITE  Negative   UROBILINOGEN  4.0   LEUKOCYTESUR  Negative   RBCUA  8*   WBCUA  1   SQUAMEPITHEL  6       Inpatient Medications prescribed for management of current Problems:   Scheduled Meds:    albuterol-ipratropium  3 mL Nebulization Q4H WAKE    amiodarone  400 mg Oral Daily    apixaban  5 mg Oral BID    aspirin  81 mg Oral Daily    atorvastatin  40 mg Oral Daily    famotidine  20 mg Oral BID    fluticasone  1 puff Inhalation Q12H    furosemide  80 mg Intravenous BID    lisinopril  5 mg Oral Daily    magnesium oxide  200 mg Oral BID    polyethylene glycol  17 g Oral BID    potassium chloride SA  20 mEq Oral BID    senna-docusate 8.6-50 mg  1 tablet Oral BID    sodium chloride 0.9%  3 mL Intravenous Q8H    spironolactone  12.5 mg Oral Daily    traZODone  150 mg Oral QHS     Continuous Infusions:    furosemide (LASIX) 2 mg/mL infusion (non-titrating) 5 mg/hr (10/07/18 2018)     As Needed: acetaminophen, bisacodyl, ondansetron,  ondansetron    Active Hospital Problems    Diagnosis  POA    *Acute on chronic combined systolic and diastolic congestive heart failure [I50.43]  Yes    History of cocaine abuse [Z87.898]  Yes    VT (ventricular tachycardia) [I47.2]  Yes    PAF (paroxysmal atrial fibrillation) [I48.0]  Yes    Tobacco use disorder [F17.200]  Yes    Chronic obstructive pulmonary disease [J44.9]  Yes    Cocaine abuse [F14.10]  Yes    Ischemic cardiomyopathy [I25.5]  Yes     EF 20-25% with severe MR and moderate TR with mod-severe RV dysfunction      CAD (coronary artery disease) [I25.10]  Yes     2006 and 2013 ROCHELLE      Essential hypertension [I10]  Yes    Diabetes mellitus with diabetic neuropathy [E11.40]  Yes    ICD (implantable cardioverter-defibrillator) in place [Z95.810]  Yes      Resolved Hospital Problems   No resolved problems to display.       Overview:  Patient is a 58 y.o. female with significant past medical history of cocaine use, DMII, CAD s/p PCI ('06 & '13), VT s/p ICD on amiodarone, PAF on apixiban, & CHF (EF 25%) admitted to hospital for CHF exacerbation. Cardiology was consulted in ED and patient was managed with furosemide gtt. Home ASA, lisinopril, spironolactone, atorvastatin, amiodarone and apixiban were continued.   Additionally on admission, ABG indicated hypoxia with hypercapnia; her COPD was managed by continuing home controller med and DuoNebs.      Assessment and Plan for Problems addressed today:    Acute on chronic combined systolic and diastolic congestive heart failure  Ischemic cardiomyopathy / CAD  Essential hypertension  · BNP elevated. CXR: cardiomegaly with increased interstitial markings consistent with CHF.   · Cardiology consulted in ED. Patient started on CHF Pathway with Lasix gtt. Continued ASA, lisinopril, spironolactone, atrovastatin.   · 2D Echo ordered  · Held BP meds and decreased rate of Lasix gtt due to hypotension. (10/6)   · Lisinopril held for hypotension. Lasix  changed from infusion to IVP in PM. (10/7)     VT (ventricular tachycardia)  ICD (implantable cardioverter-defibrillator) in place  · Monitoring. Continued home amiodarone.   · Plan for EP follow up as an OP.      PAF (paroxysmal atrial fibrillation) on anticoagulation  · EKG: Atrial paced at 60 bpm   · Continuing current management with apixaban     Diabetes mellitus with diabetic neuropathy  · HA1c (10/5): 5.8%  · Monitor.      Chronic obstructive pulmonary disease  Tobacco use disorder  · ABG (10/5): hypoxia with hypercapnia   · Continuing current management with controller medication, DuoNebs.     Cocaine abuse  History of cocaine abuse with mental illness  · Needs OP follow up    Diet: Diet Low Sodium, 2gm [facility]; Fluid - 1500mL   GI Ppx: famotadine   DVT Prophylaxis:   Anticoagulants   Medication Route Frequency    apixaban tablet 5 mg Oral BID     L/D/A:   PIV    HIGH RISK CONDITION(S):   Patient has a condition that poses threat to life and bodily function: Severe Respiratory Distress     Discharge plan and follow up  Home or Self Care      Provider  Niurka Dias MD  [facility] [place] [facility] [place]   Department of Hospital Medicine    Scribe Attestation: I personally scribed for Niurka Dias MD on 10/07/2018 at 11:26 AM. Electronically signed by julien Schmidt on 10/07/2018 at 11:26 AM.

## 2018-10-07 NOTE — NURSING
Pt BP trending down throughout last 24 hours as pt is being diuresed. Latest BP 80/51 (61)MAP. Pt is AAO x 4 and otherwise asymptomatic and appears to be resting comfortably. Dante benitez/ JE SOLORZANO notified [facility]

## 2018-10-07 NOTE — PROGRESS NOTES
Physician Attestation for Scribe:  I, Niurka Dias MD, personally performed the services described in this documentation. All medical record entries made by the scribe were at my direction and in my presence.  I have reviewed this note and agree that the record reflects my personal performance and is accurate and complete.     Hospital Medicine  Progress Note     Patient Name: Polly Kwan  MRN: [id number]  Team: [facility] MED D Niurka Dias MD  Admit Date: 10/5/2018  Code status: Full Code     Principal Problem:  Acute on chronic combined systolic and diastolic congestive heart failure     Interval history: Reports not feeling back to respiratory baseline, but reports overall improvement in respiratory status.      Review of Systems   Constitutional: Negative for fever.   Respiratory: Negative for shortness of breath.    Gastrointestinal: Positive for constipation.         Physical Exam:  Temp:  [97.6 °F (36.4 °C)-98.9 °F (37.2 °C)]   Pulse:  [59-85]   Resp:  [15-20]   BP: ()/(49-66)   SpO2:  [95 %-100 %]       Temp: 97.7 °F (36.5 °C) (10/07/18 0756)  Pulse: 60 (10/07/18 0756)  Resp: 20 (10/07/18 0756)  BP: 100/64 (10/07/18 0756)  SpO2: 98 % (10/07/18 0756)     Intake/Output Summary (Last 24 hours) at 10/7/2018 0856  Last data filed at 10/7/2018 0600      Gross per 24 hour   Intake --   Output 1700 ml   Net -1700 ml      Weight: 63.8 kg (140 lb 9.6 oz)  Body mass index is 25.72 kg/m².     Physical Exam   Constitutional: No distress.   Eyes: Conjunctivae and lids are normal.   Neck: JVD present.   Cardiovascular: S1 normal and S2 normal.   Pulmonary/Chest: Effort normal and breath sounds normal.   Abdominal: Soft. Bowel sounds are normal. She exhibits distension. There is no tenderness.   Musculoskeletal: She exhibits no edema.   Neurological: Coordination abnormal.   Skin: Skin is warm and dry.   Psychiatric: Mood normal. Her affect is inappropriate.         Significant Labs:         Recent Labs   Lab   10/05/18   1448  10/05/18   1448  10/06/18   0530  10/07/18   0535   WBC  4.38   --   5.59  4.24   HGB  11.3*   --   9.7*  10.2*   HCT  33.6*  39  29.9*  30.9*   PLT  261   --   275  252      Recent Labs   Lab  10/05/18   1448  10/06/18   0530  10/07/18   0535   NA  134*  136  138   K  4.0  3.8  4.1   CL  101  100  101   CO2  24  25  30*   BUN  11  11  10   CREATININE  1.1  1.0  0.9   GLU  100  81  94   CALCIUM  8.2*  8.1*  7.7*   MG  1.8  1.8  1.8   PHOS   --   3.6  3.3   ALKPHOS  145*   --    --    ALT  28   --    --    AST  26   --    --    ALBUMIN  3.2*  3.2*  2.8*   PROT  6.5   --    --    BILITOT  1.2*   --    --    INR  1.4*   --    --           Recent Labs   Lab  10/05/18   1446   POCTGLUCOSE  111*      A1C:        Recent Labs   Lab  08/20/18   0656  10/05/18   1448   HGBA1C  5.8*  5.8*          Recent Labs   Lab  10/05/18   1448   TROPONINI  <0.006      ABGs:       Recent Labs   Lab  10/05/18   1501   PH  7.365   PCO2  53.0*   HCO3  30.3*   POCSATURATED  15*   BE  5      Lactic Acid:       Recent Labs   Lab  10/05/18   1448   LACTATE  1.4      TSH:       Recent Labs   Lab  10/05/18   1448   TSH  3.338      Urine Studies:       Recent Labs   Lab  10/05/18   1813   COLORU  Yellow   APPEARANCEUA  Clear   PHUR  6.0   SPECGRAV  1.010   PROTEINUA  Negative   GLUCUA  Negative   KETONESU  Negative   BILIRUBINUA  Negative   OCCULTUA  2+*   NITRITE  Negative   UROBILINOGEN  4.0   LEUKOCYTESUR  Negative   RBCUA  8*   WBCUA  1   SQUAMEPITHEL  6         Inpatient Medications prescribed for management of current Problems:   Scheduled Meds:    albuterol-ipratropium  3 mL Nebulization Q4H WAKE    amiodarone  400 mg Oral Daily    apixaban  5 mg Oral BID    aspirin  81 mg Oral Daily    atorvastatin  40 mg Oral Daily    famotidine  20 mg Oral BID    fluticasone  1 puff Inhalation Q12H    furosemide  80 mg Intravenous BID    lisinopril  5 mg Oral Daily    magnesium oxide  200 mg Oral BID    polyethylene glycol  17 g  Oral BID    potassium chloride SA  20 mEq Oral BID    senna-docusate 8.6-50 mg  1 tablet Oral BID    sodium chloride 0.9%  3 mL Intravenous Q8H    spironolactone  12.5 mg Oral Daily    traZODone  150 mg Oral QHS      Continuous Infusions:    furosemide (LASIX) 2 mg/mL infusion (non-titrating) 5 mg/hr (10/07/18 1535)      As Needed: acetaminophen, bisacodyl, ondansetron, ondansetron            Active Hospital Problems     Diagnosis   POA    *Acute on chronic combined systolic and diastolic congestive heart failure [I50.43]   Yes    History of cocaine abuse [Z87.898]   Yes    VT (ventricular tachycardia) [I47.2]   Yes    PAF (paroxysmal atrial fibrillation) [I48.0]   Yes    Tobacco use disorder [F17.200]   Yes    Chronic obstructive pulmonary disease [J44.9]   Yes    Cocaine abuse [F14.10]   Yes    Ischemic cardiomyopathy [I25.5]   Yes       EF 20-25% with severe MR and moderate TR with mod-severe RV dysfunction       CAD (coronary artery disease) [I25.10]   Yes       2006 and 2013 ROCHELLE       Essential hypertension [I10]   Yes    Diabetes mellitus with diabetic neuropathy [E11.40]   Yes    ICD (implantable cardioverter-defibrillator) in place [Z95.810]   Yes       Resolved Hospital Problems   No resolved problems to display.         Overview:  Patient is a 58 y.o. female with significant past medical history of cocaine use, DMII, CAD s/p PCI ('06 & '13), VT s/p ICD on amiodarone, PAF on apixiban, & CHF (EF 25%) admitted to hospital for CHF exacerbation. Cardiology was consulted in ED and patient was managed with furosemide gtt. Home ASA, lisinopril, spironolactone, atorvastatin, amiodarone and apixiban were continued.   Additionally on admission, ABG indicated hypoxia with hypercapnia; her COPD was managed by continuing home controller med and DuoNebs.       Assessment and Plan for Problems addressed today:     Acute on chronic combined systolic and diastolic congestive heart failure  Ischemic  cardiomyopathy / CAD  Essential hypertension  · BNP elevated. CXR: cardiomegaly with increased interstitial markings consistent with CHF.   · Cardiology consulted in ED. Patient started on CHF Pathway with Lasix gtt. Continued ASA, lisinopril, spironolactone, atrovastatin.   · 2D Echo ordered  · Held BP meds and decreased rate of Lasix gtt due to hypotension. (10/6)   · Lisinopril held for hypotension. Lasix changed from infusion to IVP in PM. (10/7)     VT (ventricular tachycardia)  ICD (implantable cardioverter-defibrillator) in place  · Monitoring. Continued home amiodarone.   · Plan for EP follow up as an OP.      PAF (paroxysmal atrial fibrillation) on anticoagulation  · EKG: Atrial paced at 60 bpm   · Continuing current management with apixaban     Diabetes mellitus with diabetic neuropathy  · HA1c (10/5): 5.8%  · Monitor.      Chronic obstructive pulmonary disease  Tobacco use disorder  · ABG (10/5): hypoxia with hypercapnia   · Continuing current management with controller medication, DuoNebs.     Cocaine abuse  History of cocaine abuse with mental illness  · Needs OP follow up     Diet: Diet Low Sodium, 2gm [facility]; Fluid - 1500mL   GI Ppx: famotadine   DVT Prophylaxis:         Anticoagulants   Medication Route Frequency    apixaban tablet 5 mg Oral BID      L/D/A:   PIV     HIGH RISK CONDITION(S):   Patient has a condition that poses threat to life and bodily function: Severe Respiratory Distress      Discharge plan and follow up  Home or Self Care        Provider  Niurka Dias MD  [facility] MED D   Department of Hospital Medicine     Scribe Attestation: I personally scribed for Niurka Dias MD on 10/07/2018 at 11:26 AM. Electronically signed by julien Schmidt on 10/07/2018 at 11:26 AM.

## 2018-10-08 VITALS
HEIGHT: 62 IN | BODY MASS INDEX: 25.88 KG/M2 | TEMPERATURE: 98 F | HEART RATE: 80 BPM | WEIGHT: 140.63 LBS | RESPIRATION RATE: 22 BRPM | OXYGEN SATURATION: 98 % | DIASTOLIC BLOOD PRESSURE: 57 MMHG | SYSTOLIC BLOOD PRESSURE: 114 MMHG

## 2018-10-08 LAB
ALBUMIN SERPL BCP-MCNC: 2.9 G/DL
ANION GAP SERPL CALC-SCNC: 6 MMOL/L
BASOPHILS # BLD AUTO: 0.02 K/UL
BASOPHILS NFR BLD: 0.4 %
BNP SERPL-MCNC: 1147 PG/ML
BUN SERPL-MCNC: 9 MG/DL
CALCIUM SERPL-MCNC: 8.2 MG/DL
CHLORIDE SERPL-SCNC: 97 MMOL/L
CO2 SERPL-SCNC: 32 MMOL/L
CREAT SERPL-MCNC: 1.1 MG/DL
DIFFERENTIAL METHOD: ABNORMAL
EOSINOPHIL # BLD AUTO: 0 K/UL
EOSINOPHIL NFR BLD: 0.7 %
ERYTHROCYTE [DISTWIDTH] IN BLOOD BY AUTOMATED COUNT: 16.7 %
EST. GFR  (AFRICAN AMERICAN): >60 ML/MIN/1.73 M^2
EST. GFR  (NON AFRICAN AMERICAN): 55.5 ML/MIN/1.73 M^2
ESTIMATED PA SYSTOLIC PRESSURE: 76.47
GLOBAL PERICARDIAL EFFUSION: ABNORMAL
GLUCOSE SERPL-MCNC: 116 MG/DL
HCT VFR BLD AUTO: 30.5 %
HGB BLD-MCNC: 10.3 G/DL
IMM GRANULOCYTES # BLD AUTO: 0.01 K/UL
IMM GRANULOCYTES NFR BLD AUTO: 0.2 %
LYMPHOCYTES # BLD AUTO: 1.7 K/UL
LYMPHOCYTES NFR BLD: 37.9 %
MAGNESIUM SERPL-MCNC: 2 MG/DL
MCH RBC QN AUTO: 29.1 PG
MCHC RBC AUTO-ENTMCNC: 33.8 G/DL
MCV RBC AUTO: 86 FL
MITRAL VALVE REGURGITATION: ABNORMAL
MONOCYTES # BLD AUTO: 0.5 K/UL
MONOCYTES NFR BLD: 11.1 %
NEUTROPHILS # BLD AUTO: 2.2 K/UL
NEUTROPHILS NFR BLD: 49.7 %
NRBC BLD-RTO: 0 /100 WBC
PHOSPHATE SERPL-MCNC: 4 MG/DL
PLATELET # BLD AUTO: 271 K/UL
PMV BLD AUTO: 10.5 FL
POTASSIUM SERPL-SCNC: 4.3 MMOL/L
RBC # BLD AUTO: 3.54 M/UL
RETIRED EF AND QEF - SEE NOTES: 8 (ref 55–65)
SODIUM SERPL-SCNC: 135 MMOL/L
TRICUSPID VALVE REGURGITATION: ABNORMAL
WBC # BLD AUTO: 4.49 K/UL

## 2018-10-08 PROCEDURE — 94761 N-INVAS EAR/PLS OXIMETRY MLT: CPT

## 2018-10-08 PROCEDURE — 83880 ASSAY OF NATRIURETIC PEPTIDE: CPT

## 2018-10-08 PROCEDURE — 27000221 HC OXYGEN, UP TO 24 HOURS

## 2018-10-08 PROCEDURE — 83735 ASSAY OF MAGNESIUM: CPT

## 2018-10-08 PROCEDURE — 85025 COMPLETE CBC W/AUTO DIFF WBC: CPT

## 2018-10-08 PROCEDURE — 25000242 PHARM REV CODE 250 ALT 637 W/ HCPCS: Performed by: INTERNAL MEDICINE

## 2018-10-08 PROCEDURE — 25000003 PHARM REV CODE 250: Performed by: INTERNAL MEDICINE

## 2018-10-08 PROCEDURE — 93306 TTE W/DOPPLER COMPLETE: CPT | Mod: 26,,, | Performed by: INTERNAL MEDICINE

## 2018-10-08 PROCEDURE — 80069 RENAL FUNCTION PANEL: CPT

## 2018-10-08 PROCEDURE — 99239 HOSP IP/OBS DSCHRG MGMT >30: CPT | Mod: ,,, | Performed by: INTERNAL MEDICINE

## 2018-10-08 PROCEDURE — 93306 TTE W/DOPPLER COMPLETE: CPT

## 2018-10-08 PROCEDURE — 94640 AIRWAY INHALATION TREATMENT: CPT

## 2018-10-08 PROCEDURE — 99900035 HC TECH TIME PER 15 MIN (STAT)

## 2018-10-08 PROCEDURE — A4216 STERILE WATER/SALINE, 10 ML: HCPCS | Performed by: INTERNAL MEDICINE

## 2018-10-08 PROCEDURE — 36415 COLL VENOUS BLD VENIPUNCTURE: CPT

## 2018-10-08 RX ORDER — AMOXICILLIN 250 MG
1 CAPSULE ORAL 2 TIMES DAILY
COMMUNITY
Start: 2018-10-08 | End: 2019-03-12

## 2018-10-08 RX ORDER — SPIRONOLACTONE 25 MG/1
25 TABLET ORAL DAILY
Status: DISCONTINUED | OUTPATIENT
Start: 2018-10-09 | End: 2018-10-09 | Stop reason: HOSPADM

## 2018-10-08 RX ORDER — SPIRONOLACTONE 25 MG/1
25 TABLET ORAL DAILY
Qty: 90 TABLET | Refills: 3 | Status: SHIPPED | OUTPATIENT
Start: 2018-10-08 | End: 2018-10-17

## 2018-10-08 RX ORDER — LANOLIN ALCOHOL/MO/W.PET/CERES
200 CREAM (GRAM) TOPICAL DAILY
Refills: 0 | COMMUNITY
Start: 2018-10-08

## 2018-10-08 RX ORDER — FUROSEMIDE 80 MG/1
80 TABLET ORAL 2 TIMES DAILY
Status: DISCONTINUED | OUTPATIENT
Start: 2018-10-08 | End: 2018-10-09 | Stop reason: HOSPADM

## 2018-10-08 RX ORDER — FUROSEMIDE 80 MG/1
80 TABLET ORAL 2 TIMES DAILY
Qty: 180 TABLET | Refills: 3 | Status: SHIPPED | OUTPATIENT
Start: 2018-10-08 | End: 2019-10-08

## 2018-10-08 RX ADMIN — LISINOPRIL 5 MG: 5 TABLET ORAL at 09:10

## 2018-10-08 RX ADMIN — FUROSEMIDE 80 MG: 80 TABLET ORAL at 09:10

## 2018-10-08 RX ADMIN — FLUTICASONE PROPIONATE 1 PUFF: 110 AEROSOL, METERED RESPIRATORY (INHALATION) at 09:10

## 2018-10-08 RX ADMIN — POTASSIUM CHLORIDE 20 MEQ: 1500 TABLET, EXTENDED RELEASE ORAL at 09:10

## 2018-10-08 RX ADMIN — ASPIRIN 81 MG: 81 TABLET, COATED ORAL at 09:10

## 2018-10-08 RX ADMIN — ATORVASTATIN CALCIUM 40 MG: 20 TABLET, FILM COATED ORAL at 09:10

## 2018-10-08 RX ADMIN — AMIODARONE HYDROCHLORIDE 400 MG: 200 TABLET ORAL at 09:10

## 2018-10-08 RX ADMIN — APIXABAN 5 MG: 5 TABLET, FILM COATED ORAL at 09:10

## 2018-10-08 RX ADMIN — IPRATROPIUM BROMIDE AND ALBUTEROL SULFATE 3 ML: .5; 3 SOLUTION RESPIRATORY (INHALATION) at 07:10

## 2018-10-08 RX ADMIN — IPRATROPIUM BROMIDE AND ALBUTEROL SULFATE 3 ML: .5; 3 SOLUTION RESPIRATORY (INHALATION) at 03:10

## 2018-10-08 RX ADMIN — Medication 3 ML: at 06:10

## 2018-10-08 RX ADMIN — IPRATROPIUM BROMIDE AND ALBUTEROL SULFATE 3 ML: .5; 3 SOLUTION RESPIRATORY (INHALATION) at 11:10

## 2018-10-08 RX ADMIN — MAGNESIUM OXIDE TAB 400 MG (241.3 MG ELEMENTAL MG) 200 MG: 400 (241.3 MG) TAB at 09:10

## 2018-10-08 RX ADMIN — FAMOTIDINE 20 MG: 20 TABLET ORAL at 09:10

## 2018-10-08 NOTE — PLAN OF CARE
10/08/18 0936   Discharge Assessment   Assessment Type Discharge Planning Assessment   Confirmed/corrected address and phone number on facesheet? Yes   Assessment information obtained from? Patient   Expected Length of Stay (days) 3   Prior to hospitilization cognitive status: Alert/Oriented   Prior to hospitalization functional status: Assistive Equipment;Independent   Current cognitive status: Alert/Oriented   Current Functional Status: Assistive Equipment;Independent   Lives With significant other   Able to Return to Prior Arrangements yes   Is patient able to care for self after discharge? Yes   Readmission Within The Last 30 Days no previous admission in last 30 days   Patient currently being followed by outpatient case management? No   Patient currently receives any other outside agency services? No   Equipment Currently Used at Home walker, rolling   Do you have any problems affording any of your prescribed medications? No   Is the patient taking medications as prescribed? no   If no, which medications is patient not taking? (i could not get my medicine because the pharmacy said it was too soon)   Does the patient have transportation home? No   Does the patient receive services at the [facility]? No   Discharge Plan A Home with family   Patient/Family In Agreement With Plan yes

## 2018-10-08 NOTE — PLAN OF CARE
Long discussion with pt regarding plan of care, d/c disposition.  She will be returning home with luisana, states she has neighbors who also act as support system.  We discussed her medication noncompliance, states she is not always clear what meds she should be taking.  Understands this is what often leads to hospitalization.  Pt became tearful, states at times she becomes anxious and worries a lot about her children who all live several miles from her but are in contact via telephone.  We discussed her heart failure, her intermittent drug use.  Pt does not elaborate on this but states she understands this is detrimental to her health.  As discussed with Dr Dias, hospice consult/referral will be placed as pt is interested in resources, supportive care it provides.  Referral placed to [facility].  Will follow after initial visit.    Taryn Rhodes RN  Case Management  [id number]

## 2018-10-08 NOTE — PLAN OF CARE
Plan for d/c to home on today with [facility] to begin services after d/c.  Pt has had informative meeting with Abel in admissions, will begin services once she arrives home.  [facility] transport arranged for pickup at 7pm.  O2 is being delivered to pt's home, currently she is on room air, O2 is for pt's comfort with hospice.      Taryn Rhodes RN  Case Management  [id number]

## 2018-10-08 NOTE — NURSING
Patients blood pressure 96/64 and heart rate 60. Dr. Dias notified and she instructed to still give lisinopril and lasix. Will continue to monitor.

## 2018-10-08 NOTE — PROGRESS NOTES
"  RN Proactive Rounding Note  Time of Visit:     Admit Date: 10/5/2018  LOS: 3  Code Status: Full Code   Date of Visit: 10/08/2018  : 1960  Age: 58 y.o.  Sex: female  Bed: [address] [place] [address] B:   MRN: [id number]  Was the patient discharged from an ICU this admission? no   Was the patient discharged from a PACU within last 24 hours? no  Did the patient receive conscious sedation/general anesthesia in last 24 hours? no  Was the patient in the ED within the past 24 hours? no  Was the patient started on NIPPV within the past 24 hours? no  Attending Physician: Niurka Dias MD  Primary Service: [facility] MED D      ASSESSMENT:     Clinical Issues: Circulatory     INTERVENTIONS/ RECOMMENDATIONS:     Asked to see pt d/t new onset c/o 10/10 CP as well as 3 beat run of Vtach on telemetry monitor. Pt admitted on 10/5 for acute on chronic CHF exacerbation. On assessment pt resting comfortably in bed. Describes pain as "thrombing" to L lower ribs, worsening on breathing, movement, and to touch. Pt Denies SOB. Primary team already notified by primary RN, EKG, CXR, and troponin ordered.     Discussed plan of care with RN Ekg normal showing ST, will await results of troponin to determine need for trending labs.     PHYSICIAN ESCALATION:     Yes/No done by primary RN    Orders received and case discussed with MARTHA Campbell    Disposition: remain on floor    FOLLOW-UP/CONTINGENCY:       Call back the Rapid Response Nurse at x [phone]  for additional questions or concerns      "

## 2018-10-08 NOTE — PLAN OF CARE
Problem: Patient Care Overview  Goal: Plan of Care Review  Outcome: Ongoing (interventions implemented as appropriate)  Pt remained pain free after initial onset of CP resolved w/o intervention. Pt AAO x 4 and rested well throughout shift. SR up x2, call bell in reach, bed in low and locked position, skid proof socks on. Care plan explained to patient, no additional complaints at this time.

## 2018-10-08 NOTE — MEDICAL/APP STUDENT
Hospital Medicine  Progress Note    Patient Name: Polly Kwan  MRN: [id number]  [facility]: [facility] D Niurka Dias MD  Admit Date: 10/5/2018  Code status: Full Code    Principal Problem:  Acute on chronic combined systolic and diastolic congestive heart failure    Interval history: Reported SOB overnight. More comfortable this AM.    Review of Systems   Constitutional: Negative for fever.   Respiratory: Negative for shortness of breath.        Physical Exam:  Temp:  [97 °F (36.1 °C)-98.4 °F (36.9 °C)]   Pulse:  [59-85]   Resp:  [17-24]   BP: ()/(60-69)   SpO2:  [95 %-99 %]      Temp: 97 °F (36.1 °C) (10/08/18 0403)  Pulse: (!) 59 (10/08/18 0750)  Resp: 18 (10/08/18 0403)  BP: (!) 145/69 (10/08/18 0403)  SpO2: 99 % (10/08/18 0403)    Intake/Output Summary (Last 24 hours) at 10/8/2018 0821  Last data filed at 10/7/2018 1535  Gross per 24 hour   Intake --   Output 850 ml   Net -850 ml     Weight: 63.8 kg (140 lb 9.6 oz)  Body mass index is 25.72 kg/m².    Physical Exam   Constitutional: No distress.   Eyes: Conjunctivae and lids are normal.   Cardiovascular: S1 normal and S2 normal.   Pulmonary/Chest: Effort normal and breath sounds normal.   Abdominal: Soft. Bowel sounds are normal. She exhibits distension. There is no tenderness.   Musculoskeletal: She exhibits no edema.   Psychiatric: Mood normal.       Significant Labs:  Recent Labs   Lab  10/05/18   1448  10/05/18   1448  10/06/18   0530  10/07/18   0535  10/08/18   0643   WBC  4.38   --   5.59  4.24  4.49   HGB  11.3*   --   9.7*  10.2*  10.3*   HCT  33.6*  39  29.9*  30.9*  30.5*   PLT  261   --   275  252  271     Recent Labs   Lab  10/05/18   1448  10/06/18   0530  10/07/18   0535  10/08/18   0643   NA  134*  136  138  135*   K  4.0  3.8  4.1  4.3   CL  101  100  101  97   CO2  24  25  30*  32*   BUN  11  11  10  9   CREATININE  1.1  1.0  0.9  1.1   GLU  100  81  94  116*   CALCIUM  8.2*  8.1*  7.7*  8.2*   MG  1.8  1.8  1.8  2.0   PHOS   --   3.6  3.3   4.0   ALKPHOS  145*   --    --    --    ALT  28   --    --    --    AST  26   --    --    --    ALBUMIN  3.2*  3.2*  2.8*  2.9*   PROT  6.5   --    --    --    BILITOT  1.2*   --    --    --    INR  1.4*   --    --    --      Recent Labs   Lab  10/05/18   1446   POCTGLUCOSE  111*     A1C:   Recent Labs   Lab  08/20/18   0656  10/05/18   1448   HGBA1C  5.8*  5.8*     Recent Labs   Lab  10/05/18   1448  10/07/18   1949   TROPONINI  <0.006  <0.006     TSH:   Recent Labs   Lab  10/05/18   1448   TSH  3.338       Inpatient Medications prescribed for management of current Problems:   Scheduled Meds:    albuterol-ipratropium  3 mL Nebulization Q4H WAKE    amiodarone  400 mg Oral Daily    apixaban  5 mg Oral BID    aspirin  81 mg Oral Daily    atorvastatin  40 mg Oral Daily    famotidine  20 mg Oral BID    fluticasone  1 puff Inhalation Q12H    furosemide  80 mg Intravenous BID    lisinopril  5 mg Oral Daily    magnesium oxide  200 mg Oral BID    polyethylene glycol  17 g Oral BID    potassium chloride SA  20 mEq Oral BID    senna-docusate 8.6-50 mg  1 tablet Oral BID    sodium chloride 0.9%  3 mL Intravenous Q8H    spironolactone  12.5 mg Oral Daily    traZODone  150 mg Oral QHS     Continuous Infusions:     As Needed: acetaminophen, albuterol-ipratropium, bisacodyl, ondansetron, ondansetron    Active Hospital Problems    Diagnosis  POA    *Acute on chronic combined systolic and diastolic congestive heart failure [I50.43]  Yes    History of cocaine abuse [Z87.898]  Yes    VT (ventricular tachycardia) [I47.2]  Yes    PAF (paroxysmal atrial fibrillation) [I48.0]  Yes    Tobacco use disorder [F17.200]  Yes    Chronic obstructive pulmonary disease [J44.9]  Yes    Cocaine abuse [F14.10]  Yes    Ischemic cardiomyopathy [I25.5]  Yes     EF 20-25% with severe MR and moderate TR with mod-severe RV dysfunction      CAD (coronary artery disease) [I25.10]  Yes     2006 and 2013 ROCHELLE      Essential hypertension  [I10]  Yes    Diabetes mellitus with diabetic neuropathy [E11.40]  Yes    ICD (implantable cardioverter-defibrillator) in place [Z95.810]  Yes      Resolved Hospital Problems   No resolved problems to display.       Overview:  Patient is a 58 y.o. female with significant past medical history of occasional cocaine use, DMII, CAD s/p PCI ('06 & '13), VT s/p ICD on amiodarone, PAF on apixiban, & CHF (EF 25%) admitted to hospital for CHF exacerbation. Cardiology was consulted in ED and patient was managed with furosemide gtt. Home ASA, lisinopril, spironolactone, atorvastatin, amiodarone and apixiban were continued. Repeat 2D Echo (10/8) shows worsening EF <10%; Severe MR & TR; PulmHTN 76 mmHg; Increased CVP.   Additionally on admission, ABG indicated hypoxia with hypercapnia; her COPD was managed by continuing home controller med and DuoNebs.  Patient plans for home hospice considering the extent of the disease process and poor social support.    Assessment and Plan for Problems addressed today:    Acute on chronic combined systolic and diastolic congestive heart failure  Ischemic cardiomyopathy / CAD  Essential hypertension  · BNP elevated. CXR: cardiomegaly with increased interstitial markings consistent with CHF.   · Cardiology consulted in ED. Patient started on CHF Pathway with Lasix gtt. Continued ASA, lisinopril, spironolactone, atrovastatin.   · 2D Echo ordered  · Held BP meds and decreased rate of Lasix gtt due to hypotension. (10/6)   · Lisinopril held for hypotension. Lasix changed from infusion to IVP in PM. (10/7)  · 2D Echo (10/8): EF <10%; Severe MR & TR; PulmHTN 76 mmHg; Increased CVP.   · Reported SOB and chest pain overnight; CXR with no acute detrimental changes, Troponin negative, EKG with no evidence of acute ischemia or ST changes. BNP >1,000. (10/8)       VT (ventricular tachycardia)  ICD (implantable cardioverter-defibrillator) in place  · Monitoring. Continued home amiodarone.   · Plan for  EP follow up as an OP.      PAF (paroxysmal atrial fibrillation) on anticoagulation  · EKG: Atrial paced at 60 bpm   · Continuing current management with apixaban     Diabetes mellitus with diabetic neuropathy  · HA1c (10/5): 5.8%  · Monitor.      Chronic obstructive pulmonary disease  Tobacco use disorder  · ABG (10/5): hypoxia with hypercapnia   · Continuing current management with controller medication, DuoNebs.      History of cocaine abuse with mental illness  · Needs OP follow up    Diet: Diet Low Sodium, 2gm [facility]; Fluid - 1500mL   GI Ppx: famotadine   DVT Prophylaxis:   Anticoagulants   Medication Route Frequency    apixaban tablet 5 mg Oral BID     L/D/A:   PIV    HIGH RISK CONDITION(S):   Patient has a condition that poses threat to life and bodily function: Severe Respiratory Distress     Discharge plan and follow up  Hospice/Home      Provider  Niurka Dias MD  [facility] MED D   Department of Hospital Medicine    Scribe Attestation: I personally scribed for Niurka Dias MD on 10/08/2018 at 11:26 AM. Electronically signed by julien Schmidt on 10/08/2018 at 11:26 AM.

## 2018-10-08 NOTE — PLAN OF CARE
[facility]     Department of Hospital Medicine     [address] [place] [address]     [phone] after hours  [phone] fax                                   HOSPICE  ORDERS     10/08/2018    Admit to Hospice:  Home Service      Diagnoses:  Active Hospital Problems    Diagnosis  POA    *Acute on chronic combined systolic and diastolic congestive heart failure [I50.43]  Yes    History of cocaine abuse [Z87.898]  Yes    VT (ventricular tachycardia) [I47.2]  Yes    PAF (paroxysmal atrial fibrillation) [I48.0]  Yes    Tobacco use disorder [F17.200]  Yes    Chronic obstructive pulmonary disease [J44.9]  Yes    Ischemic cardiomyopathy [I25.5]  Yes     EF 20-25% with severe MR and moderate TR with mod-severe RV dysfunction      CAD (coronary artery disease) [I25.10]  Yes     2006 and 2013 ROCHELLE      Essential hypertension [I10]  Yes    Diabetes mellitus with diabetic neuropathy [E11.40]  Yes    ICD (implantable cardioverter-defibrillator) in place [Z95.810]  Yes      Resolved Hospital Problems   No resolved problems to display.       Hospice Qualifying Diagnoses: End Stage CHF, EF < 10%       Patient has a life expectancy < 6 months due to these conditions.    Vital Signs: Routine per Hospice Protocol.    Allergies:Review of patient's allergies indicates:  No Known Allergies    Diet: Cardiac, Fluid restriction 1.5 L    Activities: As tolerated    Nursing: Per Hospice Routine    Future Orders:  Hospice Medical Director may dictate new orders for comfortable care measures & sign death certificate.    Medications:   Comfort Care Medications     Medication Information      amiodarone (PACERONE) 400 MG tablet  TAKE 1 TABLET (400 MG TOTAL) BY MOUTH ONCE DAILY.     ammonium lactate 12 % Crea  Apply twice daily to affected parts both feet as needed.     aspirin (ECOTRIN) 81 MG EC tablet  Take 1 tablet (81 mg total) by mouth once daily.     atorvastatin (LIPITOR) 40  MG tablet  TAKE 1 TABLET (40 MG TOTAL) BY MOUTH ONCE DAILY.     ciclopirox (CICLODAN) 8 % Soln  Apply topically nightly.     clobetasol (TEMOVATE) 0.05 % external solution  Use on scalp one - two times daily as needed for scaling or itching     ELIQUIS 5 mg Tab  TAKE 1 TABLET BY MOUTH TWICE A DAY     fluticasone furoate 100 mcg/actuation DsDv  Inhale 100 mcg into the lungs once daily. Controller     furosemide (LASIX) 80 MG tablet  Take 1 tablet (80 mg total) by mouth 2 (two) times daily.     ketoconazole (NIZORAL) 2 % shampoo  Wash hair with medicated shampoo at least 2x/week - let sit on scalp at least 5 minutes prior to rinsing     lisinopril (PRINIVIL,ZESTRIL) 5 MG tablet  TAKE 1 TABLET (5 MG TOTAL) BY MOUTH ONCE DAILY.     magnesium oxide (MAG-OX) 400 mg tablet  Take 0.5 tablets (200 mg total) by mouth once daily.     nitroGLYCERIN (NITROSTAT) 0.4 MG SL tablet  Place 1 tablet (0.4 mg total) under the tongue every 5 (five) minutes as needed for Chest pain.     potassium chloride SA (K-DUR,KLOR-CON) 20 MEQ tablet  Take 1 tablet (20 mEq total) by mouth 2 (two) times daily.     ranitidine (ZANTAC) 75 MG tablet  Take 1 tablet (75 mg total) by mouth 2 (two) times daily.     senna-docusate 8.6-50 mg (PERICOLACE) 8.6-50 mg per tablet  Take 1 tablet by mouth 2 (two) times daily.     spironolactone (ALDACTONE) 25 MG tablet  Take 1 tablet (25 mg total) by mouth once daily.     traZODone (DESYREL) 150 MG tablet  Take 1 tablet (150 mg total) by mouth every evening.     triamcinolone acetonide 0.1% (KENALOG) 0.1 % ointment  AAA bid     VENTOLIN HFA 90 mcg/actuation inhaler  INHALE 2 PUFFS INTO THE LUNGS EVERY 6 HOURS AS NEEDED FOR WHEEZING OR SHORTNESS OF BREATH         _________________________________  Niurka Dias MD  10/08/2018

## 2018-10-08 NOTE — DISCHARGE SUMMARY
Discharge Summary  Hospital Medicine    Patient Name: Polly Kwan  MRN: [id number]  Attending Provider on Discharge: Niurka Dias MD  Hospital Medicine Team: [facility] MED D  Date of Admission:  10/5/2018     Date of Discharge:  10/8/2018  9:50 PM  Code status: Full Code    Active Hospital Problems    Diagnosis  POA    *Acute on chronic combined systolic and diastolic congestive heart failure [I50.43]  Yes    History of cocaine abuse [Z87.898]  Yes    VT (ventricular tachycardia) [I47.2]  Yes    PAF (paroxysmal atrial fibrillation) [I48.0]  Yes    Tobacco use disorder [F17.200]  Yes    Chronic obstructive pulmonary disease [J44.9]  Yes    Ischemic cardiomyopathy [I25.5]  Yes     EF 20-25% with severe MR and moderate TR with mod-severe RV dysfunction      CAD (coronary artery disease) [I25.10]  Yes     2006 and 2013 ROCHELLE      Essential hypertension [I10]  Yes    Diabetes mellitus with diabetic neuropathy [E11.40]  Yes    ICD (implantable cardioverter-defibrillator) in place [Z95.810]  Yes      Resolved Hospital Problems   No resolved problems to display.        HPI: 58 y.o. female with past medical history of CHF, COPD, tobacco abuse, CAD, ICM with EF 20%, VT s/p ICD, PAF, DM-2, hypertension, depression and schizophrenia, and occasional cocaine use presented with the gradual onset of eventually severe shortness of breath on exertion beginning 1 week prior to admission with rapidly worsening course over the past few days. Pertinent associated symptoms include LE edema, abdominal distension, left lower lateral rib pain. Patient ran out of Lasix > 1 week ago and used cocaine recently. She reports noises coming from her ICD daily but denies shocks.    Hospital Course:  [place] with significant past medical history of DMII, CAD s/p PCI ('06 & '13), VT s/p ICD on amiodarone, PAF on apixiban, & CHF (EF 25%) admitted to hospital for CHF exacerbation. Cardiology was consulted in ED and patient was managed with  furosemide gtt. Home ASA, lisinopril, spironolactone, atorvastatin, amiodarone and apixiban were continued. Repeat 2D Echo (10/8) shows worsening EF <10%; Severe MR & TR; PulmHTN 76 mmHg; Increased CVP.   Additionally on admission, ABG indicated hypoxia with hypercapnia; her COPD was managed by continuing home controller med and DuoNebs.  Patient plans for home hospice considering the extent of the disease process and poor social support.     Acute on chronic combined systolic and diastolic congestive heart failure  Ischemic cardiomyopathy / CAD  Essential hypertension  · BNP elevated. CXR: cardiomegaly with increased interstitial markings consistent with CHF.   · Cardiology consulted in ED. Patient started on CHF Pathway with Lasix gtt. Continued ASA, lisinopril, spironolactone, atrovastatin.   · 2D Echo ordered  · Held BP meds and decreased rate of Lasix gtt due to hypotension. (10/6)   · Lisinopril held for hypotension. Lasix changed from infusion to IVP in PM. (10/7)  · 2D Echo (10/8): EF <10%; Severe MR & TR; PulmHTN 76 mmHg; Increased CVP.   · Reported SOB and chest pain overnight; CXR with no acute detrimental changes, Troponin negative, EKG with no evidence of acute ischemia or ST changes. BNP >1,000. (10/8)    · Patient with very poor social support leading to chronic non-adherence with medical management, worsening cardiac function with EF now <10% and severe valvular heart disease. Patient is not a candidate for advanced HF options due to non-adherence. Recommend home hospice to optimize end of life support and relief of symptoms.     VT (ventricular tachycardia)  ICD (implantable cardioverter-defibrillator) in place  · Monitoring. Continued home amiodarone.   · Plan for EP follow up as needed.      PAF (paroxysmal atrial fibrillation) on anticoagulation  · EKG: Atrial paced at 60 bpm   · Continuing current management with apixaban     Diabetes mellitus with diabetic neuropathy  · HA1c (10/5):  5.8%  · Monitor.      Chronic obstructive pulmonary disease  Tobacco use disorder  · ABG (10/5): hypoxia with hypercapnia   · Continuing current management with controller medication, DuoNebs.      History of cocaine abuse with mental illness  · OP follow up as needed      Recent Labs   Lab  10/06/18   0530  10/07/18   0535  10/08/18   0643   WBC  5.59  4.24  4.49   HGB  9.7*  10.2*  10.3*   HCT  29.9*  30.9*  30.5*   PLT  275  252  271     Recent Labs   Lab  10/06/18   0530  10/07/18   0535  10/08/18   0643   NA  136  138  135*   K  3.8  4.1  4.3   CL  100  101  97   CO2  25  30*  32*   BUN  11  10  9   CREATININE  1.0  0.9  1.1   GLU  81  94  116*   CALCIUM  8.1*  7.7*  8.2*   MG  1.8  1.8  2.0   PHOS  3.6  3.3  4.0     Recent Labs   Lab  10/05/18   1448  10/06/18   0530  10/07/18   0535  10/08/18   0643   ALKPHOS  145*   --    --    --    ALT  28   --    --    --    AST  26   --    --    --    ALBUMIN  3.2*  3.2*  2.8*  2.9*   PROT  6.5   --    --    --    BILITOT  1.2*   --    --    --    INR  1.4*   --    --    --       Recent Labs   Lab  10/05/18   1446   POCTGLUCOSE  111*     Recent Labs      10/07/18   1949   TROPONINI  <0.006      Procedures: none    Consultants:   Consults (From admission, onward)        Status Ordering Provider     Inpatient consult to Cardiology  Once     Provider:  (Not yet assigned)    Completed [facility]     Inpatient consult to Hospice  Once     Provider:  (Not yet assigned)    Acknowledged [facility]     Inpatient consult to Registered Dietitian/Nutritionist  Once     Provider:  (Not yet assigned)    Completed [facility]     Inpatient consult to Social Work/Case Management  Once     Provider:  (Not yet assigned)    Acknowledged [facility]          Medications:  Reconciled Home Medications:      Medication List      START taking these medications    magnesium oxide 400 mg tablet  Commonly known as:  MAG-OX  Take 0.5 tablets (200 mg total) by mouth once  daily.     senna-docusate 8.6-50 mg 8.6-50 mg per tablet  Commonly known as:  PERICOLACE  Take 1 tablet by mouth 2 (two) times daily.        CHANGE how you take these medications    spironolactone 25 MG tablet  Commonly known as:  ALDACTONE  Take 1 tablet (25 mg total) by mouth once daily.  What changed:  how much to take        CONTINUE taking these medications    amiodarone 400 MG tablet  Commonly known as:  PACERONE  TAKE 1 TABLET (400 MG TOTAL) BY MOUTH ONCE DAILY.     ammonium lactate 12 % Crea  Apply twice daily to affected parts both feet as needed.     aspirin 81 MG EC tablet  Commonly known as:  ECOTRIN  Take 1 tablet (81 mg total) by mouth once daily.     atorvastatin 40 MG tablet  Commonly known as:  LIPITOR  TAKE 1 TABLET (40 MG TOTAL) BY MOUTH ONCE DAILY.     ciclopirox 8 % Soln  Commonly known as:  CICLODAN  Apply topically nightly.     clobetasol 0.05 % external solution  Commonly known as:  TEMOVATE  Use on scalp one - two times daily as needed for scaling or itching     ELIQUIS 5 mg Tab  Generic drug:  apixaban  TAKE 1 TABLET BY MOUTH TWICE A DAY     fluticasone furoate 100 mcg/actuation Dsdv  Commonly known as:  ARNUITY ELLIPTA  Inhale 100 mcg into the lungs once daily. Controller     furosemide 80 MG tablet  Commonly known as:  LASIX  Take 1 tablet (80 mg total) by mouth 2 (two) times daily.     ketoconazole 2 % shampoo  Commonly known as:  NIZORAL  Wash hair with medicated shampoo at least 2x/week - let sit on scalp at least 5 minutes prior to rinsing     lisinopril 5 MG tablet  Commonly known as:  PRINIVIL,ZESTRIL  TAKE 1 TABLET (5 MG TOTAL) BY MOUTH ONCE DAILY.     nitroGLYCERIN 0.4 MG SL tablet  Commonly known as:  NITROSTAT  Place 1 tablet (0.4 mg total) under the tongue every 5 (five) minutes as needed for Chest pain.     potassium chloride SA 20 MEQ tablet  Commonly known as:  K-DUR,KLOR-CON  Take 1 tablet (20 mEq total) by mouth 2 (two) times daily.     ranitidine 75 MG tablet  Commonly  known as:  ZANTAC  Take 1 tablet (75 mg total) by mouth 2 (two) times daily.     traZODone 150 MG tablet  Commonly known as:  DESYREL  Take 1 tablet (150 mg total) by mouth every evening.     triamcinolone acetonide 0.1% 0.1 % ointment  Commonly known as:  KENALOG  AAA bid     VENTOLIN HFA 90 mcg/actuation inhaler  Generic drug:  albuterol  INHALE 2 PUFFS INTO THE LUNGS EVERY 6 HOURS AS NEEDED FOR WHEEZING OR SHORTNESS OF BREATH        STOP taking these medications    cyproheptadine 4 mg tablet  Commonly known as:  PERIACTIN     lidocaine 5 %  Commonly known as:  LIDODERM            Discharge Instructions:  Discharge Procedure Orders   Diet Cardiac     Notify your health care provider if you experience any of the following:  temperature >100.4     Notify your health care provider if you experience any of the following:  persistent nausea and vomiting or diarrhea     Notify your health care provider if you experience any of the following:  difficulty breathing or increased cough     Notify your health care provider if you experience any of the following:  persistent dizziness, light-headedness, or visual disturbances     Notify your health care provider if you experience any of the following:  increased confusion or weakness     Activity as tolerated       Discharge Condition: poor    Disposition: Hospice/Home    Indwelling Lines/Drains at time of discharge: none    Tests pending at the time of discharge: none      Time spent on the discharge of the patient including review of hospital course with the patient, reviewing discharge medications and arranging follow-up care: 45 minutes.    Discharge examination Patient was seen and examined on 10/8/2018 and determined to be suitable for discharge.    Discharge plan and follow up:    Future Appointments   Date Time Provider Department Center   10/9/2018  7:00 AM LAB, APPOINTMENT [facility]RA[facility] LAB [facility]   10/9/2018  8:00 AM [facility] MAMMO[facility] Imaging  Ctr   10/17/2018  8:00 AM Monique Garcia NP [facility] CARDIO [facility]   11/12/2018  8:00 AM HOME MONITOR DEVICE CHECK, [facility] ARRHYTH [facility]       Provider  Niurka Dias MD  Department of Hospital Medicine  [facility] [place] [facility]

## 2018-10-10 LAB
BACTERIA BLD CULT: NORMAL
BACTERIA BLD CULT: NORMAL

## 2018-10-16 PROBLEM — Z79.01 CHRONIC ANTICOAGULATION: Status: ACTIVE | Noted: 2018-10-16

## 2018-10-16 PROBLEM — Z79.899 LONG TERM CURRENT USE OF AMIODARONE: Status: ACTIVE | Noted: 2018-10-16

## 2018-10-16 NOTE — PROGRESS NOTES
"Ms. Kwan is a patient of Dr. Buck and was last seen in [facility] Cardiology Visit 1/2/18    Subjective:   Patient ID:  Polly Kwan is a 58 y.o. female who presents for follow-up of Congestive Heart Failure (Hospital d/c 10/8/18); Coronary Artery Disease; Cardiomyopathy; Hypertension; and Dizziness    Problems  ICM with LVEF 8%   HFrEF NYHA class III, s/p ICD 2013 (medtronic)   Pulmonary hypertension  Moderate TR  Moderate to severe MR  CAD s/p PCI x2 (2006 and 2013) MI x3 (2006, 2011, 2013)   HTN   DM2   Polysubstance abuse (cocaine)   COPD   90 pack year h/o smoking, quit ~3/2018  Schizophrenia   Paroxysmal atrial fibrillation    HPI  Ms. Kwan is in clinic today for hospital f/u for HFrEF, 10/5-10/8/18. Prior to her admission, she was unable to fill her lasix prescription for 2 weeks and she became fluid overloaded.  She was initially started on an IV lasix gtt. Her repeat echo showed worsening EF <10%.  Patient with very poor social support leading to chronic non-adherence with medical management, worsening cardiac function with EF now <10% and severe valvular heart disease. Patient is not a candidate for advanced HF options due to non-adherence.  Prior to discharge, hospice was discussed for increased life support and sx relief.  Reports doing daily home weights without change in wt.  She is taking 80mg furosemide BID and lisinopril 5mg for the HF.  She's on amiodarone to prevent VT.  She had some Non-sustained VT on her last remote transmission in 8/2018. She last used cocaine 2 days before she came to the hospital.  States that "I only did 2 lines."      Review of Systems   Constitution: Negative for decreased appetite, diaphoresis, weakness, malaise/fatigue, weight gain and weight loss.   Eyes: Negative for visual disturbance.   Cardiovascular: Positive for dyspnea on exertion and orthopnea. Negative for chest pain, claudication, irregular heartbeat, leg swelling, near-syncope, palpitations, paroxysmal " nocturnal dyspnea and syncope.        Denies chest pressure   Respiratory: Positive for cough. Negative for hemoptysis, shortness of breath, sleep disturbances due to breathing and snoring.    Endocrine: Negative for cold intolerance and heat intolerance.   Hematologic/Lymphatic: Negative for bleeding problem. Does not bruise/bleed easily.   Musculoskeletal: Positive for back pain. Negative for myalgias.   Gastrointestinal: Negative for bloating, abdominal pain, anorexia, change in bowel habit, constipation, diarrhea, nausea and vomiting.   Neurological: Negative for difficulty with concentration, disturbances in coordination, excessive daytime sleepiness, dizziness, headaches, light-headedness, loss of balance and numbness.   Psychiatric/Behavioral: The patient does not have insomnia.      Allergies and current medications updated and reviewed:  Review of patient's allergies indicates:  No Known Allergies  Current Outpatient Medications   Medication Sig    amiodarone (PACERONE) 400 MG tablet TAKE 1 TABLET (400 MG TOTAL) BY MOUTH ONCE DAILY.    ammonium lactate 12 % Crea Apply twice daily to affected parts both feet as needed.    aspirin (ECOTRIN) 81 MG EC tablet Take 1 tablet (81 mg total) by mouth once daily.    atorvastatin (LIPITOR) 40 MG tablet TAKE 1 TABLET (40 MG TOTAL) BY MOUTH ONCE DAILY.    ciclopirox (CICLODAN) 8 % Soln Apply topically nightly.    clobetasol (TEMOVATE) 0.05 % external solution Use on scalp one - two times daily as needed for scaling or itching    cyproheptadine (PERIACTIN) 4 mg tablet Take 4 mg by mouth 3 (three) times daily as needed.    ELIQUIS 5 mg Tab TAKE 1 TABLET BY MOUTH TWICE A DAY    fluticasone furoate 100 mcg/actuation DsDv Inhale 100 mcg into the lungs once daily. Controller    furosemide (LASIX) 80 MG tablet Take 1 tablet (80 mg total) by mouth 2 (two) times daily.    ketoconazole (NIZORAL) 2 % shampoo Wash hair with medicated shampoo at least 2x/week - let sit on  "scalp at least 5 minutes prior to rinsing    lisinopril (PRINIVIL,ZESTRIL) 5 MG tablet TAKE 1 TABLET (5 MG TOTAL) BY MOUTH ONCE DAILY.    magnesium oxide (MAG-OX) 400 mg tablet Take 0.5 tablets (200 mg total) by mouth once daily.    nitroGLYCERIN (NITROSTAT) 0.4 MG SL tablet Place 1 tablet (0.4 mg total) under the tongue every 5 (five) minutes as needed for Chest pain.    senna-docusate 8.6-50 mg (PERICOLACE) 8.6-50 mg per tablet Take 1 tablet by mouth 2 (two) times daily.    traZODone (DESYREL) 150 MG tablet Take 1 tablet (150 mg total) by mouth every evening.    triamcinolone acetonide 0.1% (KENALOG) 0.1 % ointment AAA bid    VENTOLIN HFA 90 mcg/actuation inhaler INHALE 2 PUFFS INTO THE LUNGS EVERY 6 HOURS AS NEEDED FOR WHEEZING OR SHORTNESS OF BREATH    potassium chloride SA (K-DUR,KLOR-CON) 20 MEQ tablet Take 1 tablet (20 mEq total) by mouth 2 (two) times daily.    ranitidine (ZANTAC) 75 MG tablet Take 1 tablet (75 mg total) by mouth 2 (two) times daily.     No current facility-administered medications for this visit.      Objective:     Right Arm BP - Sittin/54 (10/17/18 0813)  Left Arm BP - Sittin/55 (10/17/18 0813)    BP (!) 91/55 (BP Location: Left arm, Patient Position: Sitting, BP Method: Small (Automatic))   Pulse 60   Ht 5' 2.5" (1.588 m)   Wt 59.9 kg (132 lb 0.9 oz)   BMI 23.77 kg/m²     Physical Exam   Constitutional: She is oriented to person, place, and time. Vital signs are normal. She appears well-developed and well-nourished. She is active. No distress.   HENT:   Head: Normocephalic and atraumatic.   Eyes: Conjunctivae and lids are normal. No scleral icterus.   Neck: Neck supple. Normal carotid pulses, no hepatojugular reflux and no JVD present. Carotid bruit is not present.   Cardiovascular: Normal rate, regular rhythm, S1 normal, S2 normal and intact distal pulses. PMI is not displaced. Exam reveals no gallop and no friction rub.   No murmur heard.  Pulses:       Carotid " pulses are 2+ on the right side, and 2+ on the left side.       Radial pulses are 2+ on the right side, and 2+ on the left side.        Dorsalis pedis pulses are 2+ on the right side, and 2+ on the left side.        Posterior tibial pulses are 1+ on the right side, and 1+ on the left side.   Pulmonary/Chest: Effort normal and breath sounds normal. No respiratory distress. She has no decreased breath sounds. She has no wheezes. She has no rhonchi. She has no rales. She exhibits no tenderness.   Abdominal: Soft. Normal appearance and bowel sounds are normal. She exhibits no distension, no fluid wave, no abdominal bruit, no ascites and no pulsatile midline mass. There is no hepatosplenomegaly. There is no tenderness.   Musculoskeletal: She exhibits no edema.   Neurological: She is alert and oriented to person, place, and time. Gait normal.   Skin: Skin is warm, dry and intact. No rash noted. She is not diaphoretic. Nails show no clubbing.   Psychiatric: She has a normal mood and affect. Her speech is normal and behavior is normal. Judgment and thought content normal. Cognition and memory are normal.   Nursing note and vitals reviewed.      Chemistry        Component Value Date/Time     (L) 10/08/2018 0643    K 4.3 10/08/2018 0643    CL 97 10/08/2018 0643    CO2 32 (H) 10/08/2018 0643    BUN 9 10/08/2018 0643    CREATININE 1.1 10/08/2018 0643     (H) 10/08/2018 0643        Component Value Date/Time    CALCIUM 8.2 (L) 10/08/2018 0643    ALKPHOS 145 (H) 10/05/2018 1448    AST 26 10/05/2018 1448    ALT 28 10/05/2018 1448    BILITOT 1.2 (H) 10/05/2018 1448    ESTGFRAFRICA >60.0 10/08/2018 0643    EGFRNONAA 55.5 (A) 10/08/2018 0643        Lab Results   Component Value Date    HGBA1C 5.8 (H) 10/05/2018     Recent Labs   Lab  01/02/18   1223  08/20/18   0656  10/05/18   1448   10/08/18   0643   WHITE BLOOD CELL COUNT  4.71   --   4.38   < >  4.49   HEMOGLOBIN  10.9 L   --   11.3 L   < >  10.3 L   POC HEMATOCRIT    --    --    --    < >   --    HEMATOCRIT  32.9 L   --   33.6 L   < >  30.5 L   MCV  85   --   87   < >  86   PLATELETS  261   --   261   < >  271   BNP  1,350 H   --   1,458 H   --   1,147 H   TSH  0.780  2.098  3.338   --    --    CHOLESTEROL   --   109 L   --    --    --    HDL   --   56   --    --    --    LDL CHOLESTEROL   --   46.8 L   --    --    --    TRIGLYCERIDES   --   31   --    --    --    HDL/CHOLESTEROL RATIO   --   51.4 H   --    --    --     < > = values in this interval not displayed.       Recent Labs   Lab  05/16/16   2326  07/25/16   1947  04/06/17   2107  10/05/18   1448   INR  1.1  1.2  1.3 H  1.4 H        Test(s) Reviewed  I have reviewed the following in detail:  [] Stress test   [] Angiography   [x] Echocardiogram   [x] Labs   [] Other:         Assessment/Plan:   1. Chronic combined systolic and diastolic CHF, NYHA class 4  Well compensated. Continue current regimen. Discussed possibility of inotrope therapy as a palliative measure with advanced HF team, Dr. Castro.  Adding an inotrope would likely cause her to go into a VT storm. No advanced heart failure options currently available for her.  She is waiting for hospice/palliative care to start.    2. Ischemic cardiomyopathy      3. ICD (implantable cardioverter-defibrillator) in place  Pt to be starting hospice/palliative care for end stage HF.  Encouraged her to discuss whether she would like her ICD turned off when she enters hospice with Dr. Slaughter.    4. VT (ventricular tachycardia)  Taking amiodarone 400mg daily. Last remote pacemaker interrogation revealed some non-sustained VT. Defer to Dr. Slaughter in EP.     5. Pulmonary hypertension      6. Chronic obstructive pulmonary disease, unspecified COPD type      7. Coronary artery disease involving native coronary artery of native heart without angina pectoris  Asymptomatic. No changes.     8. Essential hypertension  Hypotensive in clinic today but asymptomatic. ACEI/BB continued for HF  benefits.     9. Mitral valve insufficiency, unspecified etiology      10. PAF (paroxysmal atrial fibrillation)  RRR. Continue anticoagulation Rx.    11. Cocaine use disorder, mild, abuse  Last reported use 9/2018    12. Schizophrenia, unspecified type    13. Tobacco use disorder  Reports quiting about 6 months ago.     14. Chronic anticoagulation  Denies bleeding. Continue apixaban 5mg BID    15. Long term current use of amiodarone  Last LFT and TSH wnl 10/5/18. Followed by Dr. Slaughter.     16. Hypocalcemia  Corrected calcium for albumin is still low at 8.4. F/u with PCP      Follow-up in about 3 months (around 1/17/2019).

## 2018-10-17 ENCOUNTER — OFFICE VISIT (OUTPATIENT)
Dept: CARDIOLOGY | Facility: CLINIC | Age: 58
End: 2018-10-17
Payer: MEDICARE

## 2018-10-17 VITALS
HEART RATE: 60 BPM | SYSTOLIC BLOOD PRESSURE: 91 MMHG | BODY MASS INDEX: 23.4 KG/M2 | HEIGHT: 63 IN | DIASTOLIC BLOOD PRESSURE: 55 MMHG | WEIGHT: 132.06 LBS

## 2018-10-17 DIAGNOSIS — I25.5 ISCHEMIC CARDIOMYOPATHY: ICD-10-CM

## 2018-10-17 DIAGNOSIS — I48.0 PAF (PAROXYSMAL ATRIAL FIBRILLATION): ICD-10-CM

## 2018-10-17 DIAGNOSIS — Z79.01 CHRONIC ANTICOAGULATION: ICD-10-CM

## 2018-10-17 DIAGNOSIS — E83.51 HYPOCALCEMIA: ICD-10-CM

## 2018-10-17 DIAGNOSIS — Z95.810 ICD (IMPLANTABLE CARDIOVERTER-DEFIBRILLATOR) IN PLACE: ICD-10-CM

## 2018-10-17 DIAGNOSIS — I27.20 PULMONARY HYPERTENSION: ICD-10-CM

## 2018-10-17 DIAGNOSIS — Z79.899 LONG TERM CURRENT USE OF AMIODARONE: ICD-10-CM

## 2018-10-17 DIAGNOSIS — I25.10 CORONARY ARTERY DISEASE INVOLVING NATIVE CORONARY ARTERY OF NATIVE HEART WITHOUT ANGINA PECTORIS: ICD-10-CM

## 2018-10-17 DIAGNOSIS — F14.10 COCAINE USE DISORDER, MILD, ABUSE: ICD-10-CM

## 2018-10-17 DIAGNOSIS — I34.0 MITRAL VALVE INSUFFICIENCY, UNSPECIFIED ETIOLOGY: ICD-10-CM

## 2018-10-17 DIAGNOSIS — I50.42 CHRONIC COMBINED SYSTOLIC AND DIASTOLIC CHF, NYHA CLASS 4: Primary | ICD-10-CM

## 2018-10-17 DIAGNOSIS — I10 ESSENTIAL HYPERTENSION: ICD-10-CM

## 2018-10-17 DIAGNOSIS — F20.9 SCHIZOPHRENIA, UNSPECIFIED TYPE: ICD-10-CM

## 2018-10-17 DIAGNOSIS — I47.20 VT (VENTRICULAR TACHYCARDIA): ICD-10-CM

## 2018-10-17 DIAGNOSIS — J44.9 CHRONIC OBSTRUCTIVE PULMONARY DISEASE, UNSPECIFIED COPD TYPE: ICD-10-CM

## 2018-10-17 DIAGNOSIS — F17.200 TOBACCO USE DISORDER: ICD-10-CM

## 2018-10-17 PROCEDURE — 3074F SYST BP LT 130 MM HG: CPT | Mod: CPTII,,, | Performed by: NURSE PRACTITIONER

## 2018-10-17 PROCEDURE — 3078F DIAST BP <80 MM HG: CPT | Mod: CPTII,,, | Performed by: NURSE PRACTITIONER

## 2018-10-17 PROCEDURE — 99999 PR PBB SHADOW E&M-EST. PATIENT-LVL III: CPT | Mod: PBBFAC,,, | Performed by: NURSE PRACTITIONER

## 2018-10-17 PROCEDURE — 99213 OFFICE O/P EST LOW 20 MIN: CPT | Mod: PBBFAC | Performed by: NURSE PRACTITIONER

## 2018-10-17 PROCEDURE — 3008F BODY MASS INDEX DOCD: CPT | Mod: CPTII,,, | Performed by: NURSE PRACTITIONER

## 2018-10-17 PROCEDURE — 99214 OFFICE O/P EST MOD 30 MIN: CPT | Mod: S$PBB,,, | Performed by: NURSE PRACTITIONER

## 2018-10-17 RX ORDER — CYPROHEPTADINE HYDROCHLORIDE 4 MG/1
4 TABLET ORAL 3 TIMES DAILY PRN
COMMUNITY
End: 2019-03-12 | Stop reason: SDUPTHER

## 2018-10-17 NOTE — Clinical Note
Hi Dr. Slaughter,I advised Ms. Kwan to make an appointment with you to discuss whether her ICD should possibly be shut off given that she is moving to palliative/hospice care.  She is d/t see you in January I believe.Thank you,Monique

## 2018-11-05 DIAGNOSIS — I48.0 PAF (PAROXYSMAL ATRIAL FIBRILLATION): ICD-10-CM

## 2018-11-05 DIAGNOSIS — I50.42 CHRONIC COMBINED SYSTOLIC AND DIASTOLIC CHF, NYHA CLASS 4: ICD-10-CM

## 2018-11-05 DIAGNOSIS — Z95.810 ICD (IMPLANTABLE CARDIOVERTER-DEFIBRILLATOR) IN PLACE: ICD-10-CM

## 2018-11-05 DIAGNOSIS — I25.5 ISCHEMIC CARDIOMYOPATHY: ICD-10-CM

## 2018-11-05 RX ORDER — APIXABAN 5 MG/1
TABLET, FILM COATED ORAL
Qty: 60 TABLET | Refills: 3 | Status: SHIPPED | OUTPATIENT
Start: 2018-11-05 | End: 2019-03-12 | Stop reason: SDUPTHER

## 2018-11-09 DIAGNOSIS — I50.42 CHRONIC COMBINED SYSTOLIC AND DIASTOLIC CHF, NYHA CLASS 4: ICD-10-CM

## 2018-11-09 DIAGNOSIS — I48.0 PAF (PAROXYSMAL ATRIAL FIBRILLATION): ICD-10-CM

## 2018-11-09 DIAGNOSIS — I25.5 ISCHEMIC CARDIOMYOPATHY: ICD-10-CM

## 2018-11-09 DIAGNOSIS — I27.20 PULMONARY HYPERTENSION: ICD-10-CM

## 2018-11-09 DIAGNOSIS — Z95.810 ICD (IMPLANTABLE CARDIOVERTER-DEFIBRILLATOR) IN PLACE: ICD-10-CM

## 2018-11-12 DIAGNOSIS — Z95.810 CARDIAC DEFIBRILLATOR IN SITU: Primary | ICD-10-CM

## 2018-11-12 RX ORDER — AMIODARONE HYDROCHLORIDE 400 MG/1
400 TABLET ORAL DAILY
Qty: 30 TABLET | Refills: 0 | OUTPATIENT
Start: 2018-11-12 | End: 2019-11-12

## 2018-11-12 NOTE — TELEPHONE ENCOUNTER
Informed pt that she will have to contact her cardiologst for a refill on Pacerone. Pt verbalized understanding.

## 2018-11-29 DIAGNOSIS — Z12.11 COLON CANCER SCREENING: ICD-10-CM

## 2018-12-03 NOTE — ASSESSMENT & PLAN NOTE
Chronic in nature. No intention for cessation at this time.  1. Encouraged cocaine cessation.      Hatchet Flap Text: The defect edges were debeveled with a #15 scalpel blade.  Given the location of the defect, shape of the defect and the proximity to free margins a hatchet flap was deemed most appropriate.  Using a sterile surgical marker, an appropriate hatchet flap was drawn incorporating the defect and placing the expected incisions within the relaxed skin tension lines where possible.    The area thus outlined was incised deep to adipose tissue with a #15 scalpel blade.  The skin margins were undermined to an appropriate distance in all directions utilizing iris scissors.

## 2019-01-18 DIAGNOSIS — I25.10 CORONARY ARTERY DISEASE INVOLVING NATIVE CORONARY ARTERY OF NATIVE HEART WITHOUT ANGINA PECTORIS: ICD-10-CM

## 2019-01-18 DIAGNOSIS — I25.5 ISCHEMIC CARDIOMYOPATHY: ICD-10-CM

## 2019-01-18 RX ORDER — ASPIRIN 81 MG/1
81 TABLET ORAL DAILY
Qty: 30 TABLET | Refills: 0 | Status: SHIPPED | OUTPATIENT
Start: 2019-01-18 | End: 2019-03-12 | Stop reason: SDUPTHER

## 2019-01-21 DIAGNOSIS — J44.9 CHRONIC OBSTRUCTIVE PULMONARY DISEASE, UNSPECIFIED COPD TYPE: ICD-10-CM

## 2019-01-21 DIAGNOSIS — I50.42 CHRONIC COMBINED SYSTOLIC AND DIASTOLIC CHF, NYHA CLASS 4: ICD-10-CM

## 2019-01-22 RX ORDER — ALBUTEROL SULFATE 90 UG/1
AEROSOL, METERED RESPIRATORY (INHALATION)
Qty: 18 INHALER | Refills: 0 | Status: SHIPPED | OUTPATIENT
Start: 2019-01-22 | End: 2019-02-27 | Stop reason: SDUPTHER

## 2019-01-23 DIAGNOSIS — I10 ESSENTIAL HYPERTENSION: ICD-10-CM

## 2019-01-23 DIAGNOSIS — E78.9 ABNORMAL CHOLESTEROL TEST: ICD-10-CM

## 2019-01-23 RX ORDER — ATORVASTATIN CALCIUM 40 MG/1
40 TABLET, FILM COATED ORAL DAILY
Qty: 90 TABLET | Refills: 1 | Status: SHIPPED | OUTPATIENT
Start: 2019-01-23 | End: 2019-08-19 | Stop reason: SDUPTHER

## 2019-01-25 DIAGNOSIS — E11.9 TYPE 2 DIABETES MELLITUS WITHOUT COMPLICATION: ICD-10-CM

## 2019-01-28 ENCOUNTER — OFFICE VISIT (OUTPATIENT)
Dept: CARDIOLOGY | Facility: CLINIC | Age: 59
End: 2019-01-28
Payer: MEDICARE

## 2019-01-28 VITALS
WEIGHT: 129.44 LBS | DIASTOLIC BLOOD PRESSURE: 63 MMHG | SYSTOLIC BLOOD PRESSURE: 109 MMHG | BODY MASS INDEX: 22.93 KG/M2 | HEIGHT: 63 IN | HEART RATE: 66 BPM

## 2019-01-28 DIAGNOSIS — I25.5 ISCHEMIC CARDIOMYOPATHY: ICD-10-CM

## 2019-01-28 DIAGNOSIS — I48.0 PAF (PAROXYSMAL ATRIAL FIBRILLATION): ICD-10-CM

## 2019-01-28 DIAGNOSIS — Z95.810 ICD (IMPLANTABLE CARDIOVERTER-DEFIBRILLATOR) IN PLACE: ICD-10-CM

## 2019-01-28 DIAGNOSIS — I50.42 CHRONIC COMBINED SYSTOLIC AND DIASTOLIC CHF, NYHA CLASS 4: Primary | ICD-10-CM

## 2019-01-28 DIAGNOSIS — I25.10 CORONARY ARTERY DISEASE INVOLVING NATIVE CORONARY ARTERY OF NATIVE HEART WITHOUT ANGINA PECTORIS: ICD-10-CM

## 2019-01-28 DIAGNOSIS — I10 ESSENTIAL HYPERTENSION: ICD-10-CM

## 2019-01-28 DIAGNOSIS — Z79.899 LONG TERM CURRENT USE OF AMIODARONE: ICD-10-CM

## 2019-01-28 PROCEDURE — 3008F PR BODY MASS INDEX (BMI) DOCUMENTED: ICD-10-PCS | Mod: HCNC,CPTII,S$GLB, | Performed by: NURSE PRACTITIONER

## 2019-01-28 PROCEDURE — 3078F DIAST BP <80 MM HG: CPT | Mod: HCNC,CPTII,S$GLB, | Performed by: NURSE PRACTITIONER

## 2019-01-28 PROCEDURE — 99999 PR PBB SHADOW E&M-EST. PATIENT-LVL III: CPT | Mod: PBBFAC,HCNC,, | Performed by: NURSE PRACTITIONER

## 2019-01-28 PROCEDURE — 99214 OFFICE O/P EST MOD 30 MIN: CPT | Mod: HCNC,S$GLB,, | Performed by: NURSE PRACTITIONER

## 2019-01-28 PROCEDURE — 3074F PR MOST RECENT SYSTOLIC BLOOD PRESSURE < 130 MM HG: ICD-10-PCS | Mod: HCNC,CPTII,S$GLB, | Performed by: NURSE PRACTITIONER

## 2019-01-28 PROCEDURE — 3078F PR MOST RECENT DIASTOLIC BLOOD PRESSURE < 80 MM HG: ICD-10-PCS | Mod: HCNC,CPTII,S$GLB, | Performed by: NURSE PRACTITIONER

## 2019-01-28 PROCEDURE — 3008F BODY MASS INDEX DOCD: CPT | Mod: HCNC,CPTII,S$GLB, | Performed by: NURSE PRACTITIONER

## 2019-01-28 PROCEDURE — 99999 PR PBB SHADOW E&M-EST. PATIENT-LVL III: ICD-10-PCS | Mod: PBBFAC,HCNC,, | Performed by: NURSE PRACTITIONER

## 2019-01-28 PROCEDURE — 99214 PR OFFICE/OUTPT VISIT, EST, LEVL IV, 30-39 MIN: ICD-10-PCS | Mod: HCNC,S$GLB,, | Performed by: NURSE PRACTITIONER

## 2019-01-28 PROCEDURE — 3074F SYST BP LT 130 MM HG: CPT | Mod: HCNC,CPTII,S$GLB, | Performed by: NURSE PRACTITIONER

## 2019-01-28 RX ORDER — LORATADINE 10 MG/1
10 TABLET ORAL DAILY
COMMUNITY
End: 2019-03-12 | Stop reason: SDUPTHER

## 2019-01-28 RX ORDER — ACETAMINOPHEN 650 MG/1
SUPPOSITORY RECTAL
Refills: 0 | COMMUNITY
Start: 2018-10-25 | End: 2019-04-11

## 2019-01-28 RX ORDER — DIPHENHYDRAMINE HCL 25 MG
25 CAPSULE ORAL EVERY 6 HOURS PRN
COMMUNITY
End: 2019-03-12

## 2019-01-28 RX ORDER — OXYCODONE HYDROCHLORIDE 10 MG/1
TABLET ORAL
Refills: 0 | COMMUNITY
Start: 2018-10-25 | End: 2019-03-12

## 2019-01-28 RX ORDER — METOPROLOL SUCCINATE 25 MG/1
25 TABLET, EXTENDED RELEASE ORAL DAILY
Qty: 30 TABLET | Refills: 11 | Status: ON HOLD | OUTPATIENT
Start: 2019-01-28 | End: 2019-07-03 | Stop reason: HOSPADM

## 2019-01-28 RX ORDER — PROCHLORPERAZINE MALEATE 10 MG
TABLET ORAL
Refills: 0 | COMMUNITY
Start: 2018-10-25 | End: 2019-03-12

## 2019-01-28 RX ORDER — BACLOFEN 10 MG/1
TABLET ORAL
Refills: 0 | COMMUNITY
Start: 2018-10-26 | End: 2019-07-05 | Stop reason: SDUPTHER

## 2019-01-28 NOTE — PROGRESS NOTES
Ms. Kwan is a patient of Dr. Buck and was last seen in [facility] 10/17/2018.      Subjective:   Patient ID:  Polly Kwan is a 58 y.o. female who presents for follow-up of Chronic combined systolic and diastolic CHF, NYHA class 4 (3 month f/u ); Dizziness; and Fatigue    Problems  ICM with LVEF 8%   HFrEF NYHA class III, s/p ICD 2013 (medtronic) - Dr. Slaughter  Pulmonary hypertension  Moderate TR  Moderate to severe MR  CAD s/p PCI x2 (2006 and 2013) MI x3 (2006, 2011, 2013)   HTN   DM2   Polysubstance abuse (cocaine)   COPD   90 pack year h/o smoking, quit ~3/2018  Schizophrenia   Paroxysmal atrial fibrillation    HPI  Ms. Kwan is in clinic today for routine follow up, HFrEF NYHA class III.  She is SOB with walking longer distances and with ADLs. Reports being discharged from [facility]'s care.  She is weighing herself daily and reports eating a low salt diet.  Patient denies chest pain with exertion or at rest, palpitations, dizziness, syncope, edema, orthopnea, PND, or claudication.  Reports routine exercise.  Patient is taking apixaban 5mg BID for thromboembolic prophylaxis.  Denies bleeding gums, epistaxis, hematuria, and hematochezia.  She was seen at the end of 2018 by the eye doctor.  She is due for TSH and LFTs next month.  She is smoking 1/2 cigarette daily.     Review of Systems   Constitution: Negative for decreased appetite, diaphoresis, weakness, malaise/fatigue, weight gain and weight loss.   Eyes: Negative for visual disturbance.   Cardiovascular: Negative for chest pain, claudication, dyspnea on exertion, irregular heartbeat, leg swelling, near-syncope, orthopnea, palpitations, paroxysmal nocturnal dyspnea and syncope.        Denies chest pressure   Respiratory: Negative for cough, hemoptysis, shortness of breath, sleep disturbances due to breathing and snoring.    Endocrine: Negative for cold intolerance and heat intolerance.   Hematologic/Lymphatic: Negative for bleeding problem. Does not  bruise/bleed easily.   Musculoskeletal: Negative for myalgias.   Gastrointestinal: Negative for bloating, abdominal pain, anorexia, change in bowel habit, constipation, diarrhea, nausea and vomiting.   Neurological: Negative for difficulty with concentration, disturbances in coordination, excessive daytime sleepiness, dizziness, headaches, light-headedness, loss of balance and numbness.   Psychiatric/Behavioral: The patient does not have insomnia.        Allergies and current medications updated and reviewed:  Review of patient's allergies indicates:  No Known Allergies  Current Outpatient Medications   Medication Sig    acetaminophen (TYLENOL) 650 MG Supp UNWRAP AND INSERT 1 SUPP. RECTALLY EVERY 6 HOURS AS NEEDED FOR MILD PAIN OR ELEVATED TEMPERATURE    ammonium lactate 12 % Crea Apply twice daily to affected parts both feet as needed.    aspirin (ECOTRIN) 81 MG EC tablet TAKE 1 TABLET (81 MG TOTAL) BY MOUTH ONCE DAILY.    baclofen (LIORESAL) 10 MG tablet take 1 to 2 TABLETS BY MOUTH every 6 hours as needed for muscle spasms    ciclopirox (CICLODAN) 8 % Soln Apply topically nightly.    clobetasol (TEMOVATE) 0.05 % external solution Use on scalp one - two times daily as needed for scaling or itching    cyproheptadine (PERIACTIN) 4 mg tablet Take 4 mg by mouth 3 (three) times daily as needed.    diphenhydrAMINE (BENADRYL) 25 mg capsule Take 25 mg by mouth every 6 (six) hours as needed for Itching.    ELIQUIS 5 mg Tab TAKE 1 TABLET BY MOUTH TWICE A DAY    fluticasone furoate (ARNUITY ELLIPTA) 100 mcg/actuation DsDv Inhale 100 mcg into the lungs once daily. Controller    furosemide (LASIX) 80 MG tablet Take 1 tablet (80 mg total) by mouth 2 (two) times daily.    ketoconazole (NIZORAL) 2 % shampoo Wash hair with medicated shampoo at least 2x/week - let sit on scalp at least 5 minutes prior to rinsing    lisinopril (PRINIVIL,ZESTRIL) 5 MG tablet TAKE 1 TABLET (5 MG TOTAL) BY MOUTH ONCE DAILY.    loratadine  "(CLARITIN) 10 mg tablet Take 10 mg by mouth once daily.    nitroGLYCERIN (NITROSTAT) 0.4 MG SL tablet Place 1 tablet (0.4 mg total) under the tongue every 5 (five) minutes as needed for Chest pain.    oxyCODONE (ROXICODONE) 10 mg Tab immediate release tablet TAKE 1 TAB BY MOUTH EVERY 6 HOURS AS NEEDED FOR PAIN    prochlorperazine (COMPAZINE) 10 MG tablet TAKE 1 TABLET BY MOUTH / RECTALLY EVERY 6 HOURS AS NEEDED FOR NAUSEA / VOMITING    senna-docusate 8.6-50 mg (PERICOLACE) 8.6-50 mg per tablet Take 1 tablet by mouth 2 (two) times daily.    traZODone (DESYREL) 150 MG tablet Take 1 tablet (150 mg total) by mouth every evening.    triamcinolone acetonide 0.1% (KENALOG) 0.1 % ointment AAA bid    amiodarone (PACERONE) 400 MG tablet TAKE 1 TABLET (400 MG TOTAL) BY MOUTH ONCE DAILY.    atorvastatin (LIPITOR) 40 MG tablet TAKE 1 TABLET (40 MG TOTAL) BY MOUTH ONCE DAILY.    magnesium oxide (MAG-OX) 400 mg tablet Take 0.5 tablets (200 mg total) by mouth once daily.    potassium chloride SA (K-DUR,KLOR-CON) 20 MEQ tablet Take 1 tablet (20 mEq total) by mouth 2 (two) times daily.    ranitidine (ZANTAC) 75 MG tablet Take 1 tablet (75 mg total) by mouth 2 (two) times daily.    VENTOLIN HFA 90 mcg/actuation inhaler INHALE 2 PUFFS INTO THE LUNGS EVERY 6 HOURS AS NEEDED FOR WHEEZING OR SHORTNESS OF BREATH     No current facility-administered medications for this visit.      Objective:     Right Arm BP - Sittin/56 (19 1539)  Left Arm BP - Sittin/63 (19 1539)    /63 (BP Location: Left arm, Patient Position: Sitting, BP Method: Medium (Automatic))   Pulse 66   Ht 5' 2.5" (1.588 m)   Wt 58.7 kg (129 lb 6.6 oz)   BMI 23.29 kg/m²       Physical Exam   Constitutional: She is oriented to person, place, and time. Vital signs are normal. She appears well-developed and well-nourished. She is active. No distress.   HENT:   Head: Normocephalic and atraumatic.   Eyes: Conjunctivae and lids are normal. " No scleral icterus.   Neck: Neck supple. Normal carotid pulses, no hepatojugular reflux and no JVD present. Carotid bruit is not present.   Cardiovascular: Normal rate, regular rhythm, S1 normal, S2 normal and intact distal pulses. PMI is not displaced. Exam reveals no gallop and no friction rub.   No murmur heard.  Pulses:       Carotid pulses are 2+ on the right side, and 2+ on the left side.       Radial pulses are 2+ on the right side, and 2+ on the left side.        Dorsalis pedis pulses are 2+ on the right side, and 2+ on the left side.        Posterior tibial pulses are 1+ on the right side, and 1+ on the left side.   Pulmonary/Chest: Effort normal and breath sounds normal. No respiratory distress. She has no decreased breath sounds. She has no wheezes. She has no rhonchi. She has no rales. She exhibits no tenderness.   Abdominal: Soft. Normal appearance and bowel sounds are normal. She exhibits no distension, no fluid wave, no abdominal bruit, no ascites and no pulsatile midline mass. There is no hepatosplenomegaly. There is no tenderness.   Musculoskeletal: She exhibits no edema.   Neurological: She is alert and oriented to person, place, and time. Gait normal.   Skin: Skin is warm, dry and intact. No rash noted. She is not diaphoretic. Nails show no clubbing.   Psychiatric: She has a normal mood and affect. Her speech is normal and behavior is normal. Judgment and thought content normal. Cognition and memory are normal.   Nursing note and vitals reviewed.      Chemistry        Component Value Date/Time     (L) 10/08/2018 0643    K 4.3 10/08/2018 0643    CL 97 10/08/2018 0643    CO2 32 (H) 10/08/2018 0643    BUN 9 10/08/2018 0643    CREATININE 1.1 10/08/2018 0643     (H) 10/08/2018 0643        Component Value Date/Time    CALCIUM 8.2 (L) 10/08/2018 0643    ALKPHOS 145 (H) 10/05/2018 1448    AST 26 10/05/2018 1448    ALT 28 10/05/2018 1448    BILITOT 1.2 (H) 10/05/2018 1448    ESTGFRAFRICA >60.0  10/08/2018 0643    EGFRNONAA 55.5 (A) 10/08/2018 0643        Lab Results   Component Value Date    HGBA1C 5.8 (H) 10/05/2018     Recent Labs   Lab 01/02/18  1223 08/20/18  0656 10/05/18  1448  10/08/18  0643   WHITE BLOOD CELL COUNT 4.71  --  4.38   < > 4.49   HEMOGLOBIN 10.9 L  --  11.3 L   < > 10.3 L   POC HEMATOCRIT  --   --   --    < >  --    HEMATOCRIT 32.9 L  --  33.6 L   < > 30.5 L   MCV 85  --  87   < > 86   PLATELETS 261  --  261   < > 271   BNP 1,350 H  --  1,458 H  --  1,147 H   TSH 0.780 2.098 3.338  --   --    CHOLESTEROL  --  109 L  --   --   --    HDL  --  56  --   --   --    LDL CHOLESTEROL  --  46.8 L  --   --   --    TRIGLYCERIDES  --  31  --   --   --    HDL/CHOLESTEROL RATIO  --  51.4 H  --   --   --     < > = values in this interval not displayed.       Recent Labs   Lab 05/16/16  2326 07/25/16  1947 04/06/17  2107 10/05/18  1448   INR 1.1 1.2 1.3 H 1.4 H        Test(s) Reviewed  I have reviewed the following in detail:  [] Stress test   [] Angiography   [x] Echocardiogram   [x] Labs   [] Other:         Assessment/Plan:   1. Chronic combined systolic and diastolic CHF, NYHA class 4  Euvolemic on exam. No changes.     - metoprolol succinate (TOPROL-XL) 25 MG 24 hr tablet; Take 1 tablet (25 mg total) by mouth once daily.  Dispense: 30 tablet; Refill: 11    2. Ischemic cardiomyopathy    - metoprolol succinate (TOPROL-XL) 25 MG 24 hr tablet; Take 1 tablet (25 mg total) by mouth once daily.  Dispense: 30 tablet; Refill: 11    3. ICD (implantable cardioverter-defibrillator) in place      4. Coronary artery disease involving native coronary artery of native heart without angina pectoris  Asymptomatic. Taking high intensity statin and ASA. No changes.      5. Essential hypertension  BP at goal <130/80. Continue current regimen.      6. PAF (paroxysmal atrial fibrillation)  RRR. Continue anticoagulation.     - metoprolol succinate (TOPROL-XL) 25 MG 24 hr tablet; Take 1 tablet (25 mg total) by mouth once  daily.  Dispense: 30 tablet; Refill: 11    7. Long term current use of amiodarone  Due for LFTs and TSH.  Ordered. F/U in EP as planned.    - TSH; Future  - Comprehensive metabolic panel; Future      Patient was discussed with but not examined by Dr. Buck    Follow-up in about 6 months (around 7/28/2019).

## 2019-02-05 ENCOUNTER — LAB VISIT (OUTPATIENT)
Dept: LAB | Facility: HOSPITAL | Age: 59
End: 2019-02-05
Payer: MEDICARE

## 2019-02-05 DIAGNOSIS — E87.6 HYPOKALEMIA: ICD-10-CM

## 2019-02-05 DIAGNOSIS — Z79.899 LONG TERM CURRENT USE OF AMIODARONE: ICD-10-CM

## 2019-02-05 LAB
ALBUMIN SERPL BCP-MCNC: 3.3 G/DL
ALP SERPL-CCNC: 137 U/L
ALT SERPL W/O P-5'-P-CCNC: 17 U/L
ANION GAP SERPL CALC-SCNC: 8 MMOL/L
AST SERPL-CCNC: 28 U/L
BILIRUB SERPL-MCNC: 1.3 MG/DL
BUN SERPL-MCNC: 16 MG/DL
CALCIUM SERPL-MCNC: 8.9 MG/DL
CHLORIDE SERPL-SCNC: 96 MMOL/L
CO2 SERPL-SCNC: 35 MMOL/L
CREAT SERPL-MCNC: 1 MG/DL
EST. GFR  (AFRICAN AMERICAN): >60 ML/MIN/1.73 M^2
EST. GFR  (NON AFRICAN AMERICAN): >60 ML/MIN/1.73 M^2
GLUCOSE SERPL-MCNC: 108 MG/DL
POTASSIUM SERPL-SCNC: 3.4 MMOL/L
PROT SERPL-MCNC: 7.5 G/DL
SODIUM SERPL-SCNC: 139 MMOL/L
TSH SERPL DL<=0.005 MIU/L-ACNC: 3.01 UIU/ML

## 2019-02-05 PROCEDURE — 36415 COLL VENOUS BLD VENIPUNCTURE: CPT | Mod: HCNC

## 2019-02-05 PROCEDURE — 84443 ASSAY THYROID STIM HORMONE: CPT | Mod: HCNC

## 2019-02-05 PROCEDURE — 80053 COMPREHEN METABOLIC PANEL: CPT | Mod: HCNC

## 2019-02-06 RX ORDER — POTASSIUM CHLORIDE 20 MEQ/1
20 TABLET, EXTENDED RELEASE ORAL DAILY
Qty: 90 TABLET | Refills: 1 | Status: SHIPPED | OUTPATIENT
Start: 2019-02-06

## 2019-02-27 DIAGNOSIS — J44.9 CHRONIC OBSTRUCTIVE PULMONARY DISEASE, UNSPECIFIED COPD TYPE: ICD-10-CM

## 2019-02-27 DIAGNOSIS — J45.909 UNCOMPLICATED ASTHMA, UNSPECIFIED ASTHMA SEVERITY, UNSPECIFIED WHETHER PERSISTENT: Primary | ICD-10-CM

## 2019-02-27 RX ORDER — ALBUTEROL SULFATE 90 UG/1
AEROSOL, METERED RESPIRATORY (INHALATION)
Qty: 18 INHALER | Refills: 0 | Status: SHIPPED | OUTPATIENT
Start: 2019-02-27 | End: 2019-03-12 | Stop reason: SDUPTHER

## 2019-03-12 ENCOUNTER — OFFICE VISIT (OUTPATIENT)
Dept: INTERNAL MEDICINE | Facility: CLINIC | Age: 59
End: 2019-03-12
Payer: MEDICARE

## 2019-03-12 VITALS
SYSTOLIC BLOOD PRESSURE: 92 MMHG | HEIGHT: 62 IN | OXYGEN SATURATION: 96 % | HEART RATE: 60 BPM | WEIGHT: 128.31 LBS | BODY MASS INDEX: 23.61 KG/M2 | DIASTOLIC BLOOD PRESSURE: 54 MMHG

## 2019-03-12 DIAGNOSIS — J44.9 CHRONIC OBSTRUCTIVE PULMONARY DISEASE, UNSPECIFIED COPD TYPE: ICD-10-CM

## 2019-03-12 DIAGNOSIS — I10 ESSENTIAL HYPERTENSION: ICD-10-CM

## 2019-03-12 DIAGNOSIS — G47.00 INSOMNIA, UNSPECIFIED TYPE: ICD-10-CM

## 2019-03-12 DIAGNOSIS — I50.42 CHRONIC COMBINED SYSTOLIC AND DIASTOLIC CHF, NYHA CLASS 4: ICD-10-CM

## 2019-03-12 DIAGNOSIS — I27.20 PULMONARY HYPERTENSION: ICD-10-CM

## 2019-03-12 DIAGNOSIS — Z95.810 ICD (IMPLANTABLE CARDIOVERTER-DEFIBRILLATOR) IN PLACE: ICD-10-CM

## 2019-03-12 DIAGNOSIS — F25.0 SCHIZOAFFECTIVE DISORDER, BIPOLAR TYPE: ICD-10-CM

## 2019-03-12 DIAGNOSIS — I25.5 ISCHEMIC CARDIOMYOPATHY: ICD-10-CM

## 2019-03-12 DIAGNOSIS — I48.0 PAF (PAROXYSMAL ATRIAL FIBRILLATION): ICD-10-CM

## 2019-03-12 DIAGNOSIS — M47.816 LUMBAR FACET ARTHROPATHY: ICD-10-CM

## 2019-03-12 DIAGNOSIS — E87.6 HYPOKALEMIA: ICD-10-CM

## 2019-03-12 DIAGNOSIS — J30.2 SEASONAL ALLERGIES: ICD-10-CM

## 2019-03-12 DIAGNOSIS — F17.200 TOBACCO USE DISORDER: Primary | ICD-10-CM

## 2019-03-12 DIAGNOSIS — I25.10 CORONARY ARTERY DISEASE INVOLVING NATIVE CORONARY ARTERY OF NATIVE HEART WITHOUT ANGINA PECTORIS: ICD-10-CM

## 2019-03-12 PROCEDURE — 99214 OFFICE O/P EST MOD 30 MIN: CPT | Mod: HCNC,S$GLB,, | Performed by: FAMILY MEDICINE

## 2019-03-12 PROCEDURE — 3008F BODY MASS INDEX DOCD: CPT | Mod: HCNC,CPTII,S$GLB, | Performed by: FAMILY MEDICINE

## 2019-03-12 PROCEDURE — 99214 PR OFFICE/OUTPT VISIT, EST, LEVL IV, 30-39 MIN: ICD-10-PCS | Mod: HCNC,S$GLB,, | Performed by: FAMILY MEDICINE

## 2019-03-12 PROCEDURE — 3078F PR MOST RECENT DIASTOLIC BLOOD PRESSURE < 80 MM HG: ICD-10-PCS | Mod: HCNC,CPTII,S$GLB, | Performed by: FAMILY MEDICINE

## 2019-03-12 PROCEDURE — 3074F SYST BP LT 130 MM HG: CPT | Mod: HCNC,CPTII,S$GLB, | Performed by: FAMILY MEDICINE

## 2019-03-12 PROCEDURE — 99999 PR PBB SHADOW E&M-EST. PATIENT-LVL V: ICD-10-PCS | Mod: PBBFAC,HCNC,, | Performed by: FAMILY MEDICINE

## 2019-03-12 PROCEDURE — 3008F PR BODY MASS INDEX (BMI) DOCUMENTED: ICD-10-PCS | Mod: HCNC,CPTII,S$GLB, | Performed by: FAMILY MEDICINE

## 2019-03-12 PROCEDURE — 3078F DIAST BP <80 MM HG: CPT | Mod: HCNC,CPTII,S$GLB, | Performed by: FAMILY MEDICINE

## 2019-03-12 PROCEDURE — 3074F PR MOST RECENT SYSTOLIC BLOOD PRESSURE < 130 MM HG: ICD-10-PCS | Mod: HCNC,CPTII,S$GLB, | Performed by: FAMILY MEDICINE

## 2019-03-12 PROCEDURE — 99999 PR PBB SHADOW E&M-EST. PATIENT-LVL V: CPT | Mod: PBBFAC,HCNC,, | Performed by: FAMILY MEDICINE

## 2019-03-12 RX ORDER — ALBUTEROL SULFATE 90 UG/1
AEROSOL, METERED RESPIRATORY (INHALATION)
Qty: 18 INHALER | Refills: 0 | Status: SHIPPED | OUTPATIENT
Start: 2019-03-12 | End: 2019-05-21 | Stop reason: SDUPTHER

## 2019-03-12 RX ORDER — LORATADINE 10 MG/1
10 TABLET ORAL DAILY
COMMUNITY
Start: 2019-03-12

## 2019-03-12 RX ORDER — NITROGLYCERIN 0.4 MG/1
0.4 TABLET SUBLINGUAL EVERY 5 MIN PRN
Qty: 100 TABLET | Refills: 11 | Status: CANCELLED | OUTPATIENT
Start: 2019-03-12

## 2019-03-12 RX ORDER — ASPIRIN 81 MG/1
81 TABLET ORAL DAILY
Qty: 30 TABLET | Refills: 0 | Status: SHIPPED | OUTPATIENT
Start: 2019-03-12 | End: 2019-06-28 | Stop reason: SDUPTHER

## 2019-03-12 RX ORDER — BACLOFEN 10 MG/1
TABLET ORAL
Refills: 0 | Status: CANCELLED | OUTPATIENT
Start: 2019-03-12

## 2019-03-12 RX ORDER — AMOXICILLIN 250 MG
1 CAPSULE ORAL 2 TIMES DAILY
Status: CANCELLED | COMMUNITY
Start: 2019-03-12

## 2019-03-12 RX ORDER — TRAZODONE HYDROCHLORIDE 150 MG/1
150 TABLET ORAL NIGHTLY
Qty: 90 TABLET | Refills: 1 | Status: SHIPPED | OUTPATIENT
Start: 2019-03-12

## 2019-03-12 RX ORDER — AMIODARONE HYDROCHLORIDE 400 MG/1
400 TABLET ORAL DAILY
Qty: 30 TABLET | Refills: 0 | Status: CANCELLED | OUTPATIENT
Start: 2019-03-12 | End: 2020-03-11

## 2019-03-12 RX ORDER — METOPROLOL SUCCINATE 25 MG/1
25 TABLET, EXTENDED RELEASE ORAL DAILY
Qty: 30 TABLET | Refills: 11 | Status: CANCELLED | OUTPATIENT
Start: 2019-03-12 | End: 2020-03-11

## 2019-03-12 RX ORDER — CYPROHEPTADINE HYDROCHLORIDE 4 MG/1
4 TABLET ORAL 2 TIMES DAILY PRN
Qty: 60 TABLET | Refills: 6 | Status: SHIPPED | OUTPATIENT
Start: 2019-03-12

## 2019-03-12 RX ORDER — IRBESARTAN 75 MG/1
37.5 TABLET ORAL NIGHTLY
Qty: 45 TABLET | Refills: 3 | Status: SHIPPED | OUTPATIENT
Start: 2019-03-12 | End: 2019-07-05 | Stop reason: SDUPTHER

## 2019-03-12 RX ORDER — OXYCODONE HYDROCHLORIDE 10 MG/1
TABLET ORAL
Qty: 30 TABLET | Refills: 0 | Status: CANCELLED | OUTPATIENT
Start: 2019-03-12

## 2019-03-12 NOTE — PROGRESS NOTES
"Subjective:       Patient ID: Polly Kwan is a 59 y.o. female.    Chief Complaint: Medication Refill and Back Pain    HPI  60 y/o female smoker with schizophrenia, DM2, HTN, CHF combined diastolic and systolic, CAD, ICM EF <10% in 10/2018, ICD placed in 2013, COPD, hx of cocaine use is here for follow up.   She is smoking 1/2 a cigarette a day. Her weight is up 8 pounds. Her appetite is poor, she has been out of periactin. She is denies f, has some nausea mostly after meals, denies v/heartburn/d/constipation/cp/sob/urinary sx. She is sleeping fair.  She reports she has lower back pain, she has know lumbar arthritis, she reports she was being followed in "[facility]" she was taking oxicodone, baclofen prn, her bottle says 12/2018 from Dr. Oliva Philip, this is consistent with , she has not done any recent evaluation. Recent low potassium, she has been taking her daily supplement.    ICM/CAD/HTN: she has been out of amiodarone for over a year she says, taking Lasix 80 mg bid, Lisinopril 5 mg daily, Toprol XL 25 mg daily, Lipitor 40 mg, Eliquis 5 mg daily  DM2: not checking sugars, 10/2018 a1c 5.8 labs, eye and foot exam utd, not on meds, on asa, ace and statin  COPD:still smoking, taking fluticasone daily, using albuterol prn    Review of Systems   Constitutional: Negative for activity change, appetite change, fatigue and fever.   Respiratory: Negative for cough and shortness of breath.    Cardiovascular: Negative for chest pain, palpitations and leg swelling.   Gastrointestinal: Positive for nausea. Negative for constipation, diarrhea and vomiting.   Genitourinary: Negative for difficulty urinating and dysuria.   Skin: Negative for rash.   Neurological: Negative for dizziness and light-headedness.   Psychiatric/Behavioral: Positive for sleep disturbance.       Objective:      BP (!) 92/54 (BP Location: Right arm, Patient Position: Sitting, BP Method: Medium (Automatic))   Pulse 60   Ht 5' 2" (1.575 m)   Wt " 58.2 kg (128 lb 4.9 oz)   SpO2 96%   BMI 23.47 kg/m²   Physical Exam   Constitutional: She appears well-developed and well-nourished.   HENT:   Head: Normocephalic and atraumatic.   Mouth/Throat: No oropharyngeal exudate.   Neck: Normal range of motion. Neck supple. No thyromegaly present.   Cardiovascular: Normal rate, regular rhythm and normal heart sounds.   Pulmonary/Chest: Effort normal and breath sounds normal. No respiratory distress.   Musculoskeletal: Normal range of motion. She exhibits no edema.   Strength 5/5 bilateral lower extremity     Lymphadenopathy:     She has no cervical adenopathy.   Neurological: She is alert.   Skin: Skin is warm and dry.   Psychiatric: She has a normal mood and affect.   Nursing note and vitals reviewed.      Assessment:       1. Tobacco use disorder    2. Chronic combined systolic and diastolic CHF, NYHA class 4    3. ICD (implantable cardioverter-defibrillator) in place    4. Ischemic cardiomyopathy    5. PAF (paroxysmal atrial fibrillation)    6. Pulmonary hypertension    7. Coronary artery disease involving native coronary artery of native heart without angina pectoris    8. Schizoaffective disorder, bipolar type    9. Chronic obstructive pulmonary disease, unspecified COPD type    10. Essential hypertension    11. Lumbar facet arthropathy    12. Seasonal allergies    13. Insomnia, unspecified type    14. Hypokalemia        Plan:   Polly was seen today for medication refill and back pain.    Diagnoses and all orders for this visit:    Tobacco use disorder    Chronic combined systolic and diastolic CHF, NYHA class 4  -     apixaban (ELIQUIS) 5 mg Tab; Take 1 tablet (5 mg total) by mouth 2 (two) times daily.  -     irbesartan (AVAPRO) 75 MG tablet; Take 0.5 tablets (37.5 mg total) by mouth every evening.    ICD (implantable cardioverter-defibrillator) in place  -     apixaban (ELIQUIS) 5 mg Tab; Take 1 tablet (5 mg total) by mouth 2 (two) times daily.    Ischemic  cardiomyopathy  -     aspirin (ECOTRIN) 81 MG EC tablet; Take 1 tablet (81 mg total) by mouth once daily.  -     apixaban (ELIQUIS) 5 mg Tab; Take 1 tablet (5 mg total) by mouth 2 (two) times daily.  -     irbesartan (AVAPRO) 75 MG tablet; Take 0.5 tablets (37.5 mg total) by mouth every evening.    PAF (paroxysmal atrial fibrillation)  -     apixaban (ELIQUIS) 5 mg Tab; Take 1 tablet (5 mg total) by mouth 2 (two) times daily.  -     irbesartan (AVAPRO) 75 MG tablet; Take 0.5 tablets (37.5 mg total) by mouth every evening.    Pulmonary hypertension    Coronary artery disease involving native coronary artery of native heart without angina pectoris  -     aspirin (ECOTRIN) 81 MG EC tablet; Take 1 tablet (81 mg total) by mouth once daily.    Schizoaffective disorder, bipolar type  -     traZODone (DESYREL) 150 MG tablet; Take 1 tablet (150 mg total) by mouth every evening.  -     Ambulatory referral to Psychiatry    Chronic obstructive pulmonary disease, unspecified COPD type  -     albuterol (VENTOLIN HFA) 90 mcg/actuation inhaler; INHALE 2 PUFFS INTO THE LUNGS EVERY 6 HOURS AS NEEDED FOR WHEEZING OR SHORTNESS OF BREATH    Essential hypertension  -     irbesartan (AVAPRO) 75 MG tablet; Take 0.5 tablets (37.5 mg total) by mouth every evening.    Lumbar facet arthropathy  -     Ambulatory Referral to [facility]  -     X-Ray Lumbar Complete With Flex And Ext; Future    Seasonal allergies  -     loratadine (CLARITIN) 10 mg tablet; Take 1 tablet (10 mg total) by mouth once daily.  -     cyproheptadine (PERIACTIN) 4 mg tablet; Take 1 tablet (4 mg total) by mouth 2 (two) times daily as needed.    Insomnia, unspecified type  -     traZODone (DESYREL) 150 MG tablet; Take 1 tablet (150 mg total) by mouth every evening.  -     Ambulatory referral to Psychiatry    Hypokalemia  -     Potassium; Future    Other orders  -     Cancel: apixaban (ELIQUIS) 5 mg Tab; Take 1 tablet (5 mg total) by mouth 2 (two) times daily.  -      Cancel: senna-docusate 8.6-50 mg (PERICOLACE) 8.6-50 mg per tablet; Take 1 tablet by mouth 2 (two) times daily.  -     Cancel: oxyCODONE (ROXICODONE) 10 mg Tab immediate release tablet; TAKE 1 TAB BY MOUTH EVERY 6 HOURS AS NEEDED FOR PAIN  -     Cancel: metoprolol succinate (TOPROL-XL) 25 MG 24 hr tablet; Take 1 tablet (25 mg total) by mouth once daily.  -     Cancel: amiodarone (PACERONE) 400 MG tablet; Take 1 tablet (400 mg total) by mouth once daily.  -     Cancel: baclofen (LIORESAL) 10 MG tablet  -     Cancel: nitroGLYCERIN (NITROSTAT) 0.4 MG SL tablet; Place 1 tablet (0.4 mg total) under the tongue every 5 (five) minutes as needed for Chest pain.

## 2019-03-13 ENCOUNTER — TELEPHONE (OUTPATIENT)
Dept: CARDIOLOGY | Facility: CLINIC | Age: 59
End: 2019-03-13

## 2019-03-13 NOTE — TELEPHONE ENCOUNTER
----- Message from Monique Garcia NP sent at 3/12/2019  1:15 PM CDT -----  Ira,  Please have her come in for an ECG and to see me.  It would be easier to re-start her medications with her here.  Have her bring all her medications with her.      Dr. Kwan,  Is she eligible for bubble wrapped medications?  ThanksMonique  ----- Message -----  From: Sukhdeep Slaughter MD  Sent: 3/12/2019   1:09 PM  To: Enrico Buck MD, Iza Kwan MD, #    Thanks for writing.  You can just restart it at 400 qd, but please be sure she's in NSR before starting it (as if she's in AF we'd like to know there's no ANNIKA thrombus before getting her back to NSR).  thanks  Yg    ----- Message -----  From: Monique Garcia NP  Sent: 3/12/2019  12:55 PM  To: Enrico Buck MD, Sukhdeep Slaughter MD, #    Dr. Slaughter,    This patient is highly non-compliant, which makes treating her difficult.  I just saw her last month and reviewed her medications with her and she said she was taking the amiodarone.  If she hasn't been taking it for 6 months, I would assume she would require a new loading dose and ECG before.  I do not feel comfortable initiating the amiodarone without your guidance.  Thanks,  Monique  ----- Message -----  From: Iza Kwan MD  Sent: 3/12/2019  11:22 AM  To: Enrico Buck MD, ROCHELLE Lin Dr. and Arianne  Mrs. wKan was in clinic today, I was hoping you can help me with her medications.  She has been out of her Amiodarone for over 6 months, she is taking Eliquis 5 mg daily, Toprol XL 25 mg daily, Furosemide 80 mg twice daily and Lisinopril 5 mg daily.  Can send in a refill on what you all recommend she remain on as far as Furosemide, Amiodarone and Eliquis.  I was going to stop her lisinopril and start Irbesartan 75 mg today.   Thanks,  Iza

## 2019-03-18 ENCOUNTER — TELEPHONE (OUTPATIENT)
Dept: INTERNAL MEDICINE | Facility: CLINIC | Age: 59
End: 2019-03-18

## 2019-03-18 ENCOUNTER — HOSPITAL ENCOUNTER (OUTPATIENT)
Dept: RADIOLOGY | Facility: HOSPITAL | Age: 59
Discharge: HOME | End: 2019-03-18
Attending: FAMILY MEDICINE
Payer: MEDICARE

## 2019-03-18 DIAGNOSIS — Z12.39 SCREENING FOR BREAST CANCER: ICD-10-CM

## 2019-03-18 PROCEDURE — 77063 BREAST TOMOSYNTHESIS BI: CPT | Mod: 26,HCNC,, | Performed by: RADIOLOGY

## 2019-03-18 PROCEDURE — 77067 MAMMO DIGITAL SCREENING BILAT WITH TOMOSYNTHESIS_CAD: ICD-10-PCS | Mod: 26,HCNC,, | Performed by: RADIOLOGY

## 2019-03-18 PROCEDURE — 77067 SCR MAMMO BI INCL CAD: CPT | Mod: 26,HCNC,, | Performed by: RADIOLOGY

## 2019-03-18 PROCEDURE — 77067 SCR MAMMO BI INCL CAD: CPT | Mod: TC,HCNC

## 2019-03-18 PROCEDURE — 77063 MAMMO DIGITAL SCREENING BILAT WITH TOMOSYNTHESIS_CAD: ICD-10-PCS | Mod: 26,HCNC,, | Performed by: RADIOLOGY

## 2019-03-19 ENCOUNTER — TELEPHONE (OUTPATIENT)
Dept: INTERNAL MEDICINE | Facility: CLINIC | Age: 59
End: 2019-03-19

## 2019-03-19 NOTE — TELEPHONE ENCOUNTER
Please let her know that her potassium level looks good.  Her blood sugar has increased significantly since our previous check.  Please make sure she has everything she needs to check her blood sugars and have her start checking at least once a day in the morning to bring in to her upcoming visit with me.

## 2019-03-20 ENCOUNTER — HOSPITAL ENCOUNTER (OUTPATIENT)
Dept: RADIOLOGY | Facility: OTHER | Age: 59
Discharge: HOME | End: 2019-03-20
Attending: FAMILY MEDICINE
Payer: MEDICARE

## 2019-03-20 ENCOUNTER — OFFICE VISIT (OUTPATIENT)
Dept: SPINE | Facility: CLINIC | Age: 59
End: 2019-03-20
Payer: MEDICARE

## 2019-03-20 VITALS
WEIGHT: 128 LBS | DIASTOLIC BLOOD PRESSURE: 57 MMHG | BODY MASS INDEX: 23.55 KG/M2 | HEIGHT: 62 IN | TEMPERATURE: 98 F | HEART RATE: 60 BPM | SYSTOLIC BLOOD PRESSURE: 99 MMHG

## 2019-03-20 DIAGNOSIS — M47.816 LUMBAR FACET ARTHROPATHY: ICD-10-CM

## 2019-03-20 DIAGNOSIS — G89.29 CHRONIC BILATERAL LOW BACK PAIN WITHOUT SCIATICA: Primary | ICD-10-CM

## 2019-03-20 DIAGNOSIS — M51.36 DDD (DEGENERATIVE DISC DISEASE), LUMBAR: ICD-10-CM

## 2019-03-20 DIAGNOSIS — M54.50 CHRONIC BILATERAL LOW BACK PAIN WITHOUT SCIATICA: Primary | ICD-10-CM

## 2019-03-20 DIAGNOSIS — M43.16 SPONDYLOLISTHESIS, LUMBAR REGION: ICD-10-CM

## 2019-03-20 DIAGNOSIS — M47.26 OTHER SPONDYLOSIS WITH RADICULOPATHY, LUMBAR REGION: ICD-10-CM

## 2019-03-20 DIAGNOSIS — M54.16 LUMBAR RADICULOPATHY: ICD-10-CM

## 2019-03-20 PROCEDURE — 3074F SYST BP LT 130 MM HG: CPT | Mod: HCNC,CPTII,S$GLB, | Performed by: PHYSICIAN ASSISTANT

## 2019-03-20 PROCEDURE — 72110 X-RAY EXAM L-2 SPINE 4/>VWS: CPT | Mod: 26,HCNC,, | Performed by: INTERNAL MEDICINE

## 2019-03-20 PROCEDURE — 99999 PR PBB SHADOW E&M-EST. PATIENT-LVL V: ICD-10-PCS | Mod: PBBFAC,HCNC,, | Performed by: PHYSICIAN ASSISTANT

## 2019-03-20 PROCEDURE — 3078F DIAST BP <80 MM HG: CPT | Mod: HCNC,CPTII,S$GLB, | Performed by: PHYSICIAN ASSISTANT

## 2019-03-20 PROCEDURE — 3074F PR MOST RECENT SYSTOLIC BLOOD PRESSURE < 130 MM HG: ICD-10-PCS | Mod: HCNC,CPTII,S$GLB, | Performed by: PHYSICIAN ASSISTANT

## 2019-03-20 PROCEDURE — 99204 PR OFFICE/OUTPT VISIT, NEW, LEVL IV, 45-59 MIN: ICD-10-PCS | Mod: HCNC,S$GLB,, | Performed by: PHYSICIAN ASSISTANT

## 2019-03-20 PROCEDURE — 3078F PR MOST RECENT DIASTOLIC BLOOD PRESSURE < 80 MM HG: ICD-10-PCS | Mod: HCNC,CPTII,S$GLB, | Performed by: PHYSICIAN ASSISTANT

## 2019-03-20 PROCEDURE — 3008F BODY MASS INDEX DOCD: CPT | Mod: HCNC,CPTII,S$GLB, | Performed by: PHYSICIAN ASSISTANT

## 2019-03-20 PROCEDURE — 72110 X-RAY EXAM L-2 SPINE 4/>VWS: CPT | Mod: TC,HCNC,FY

## 2019-03-20 PROCEDURE — 72110 XR LUMBAR SPINE 5 VIEW WITH FLEX AND EXT: ICD-10-PCS | Mod: 26,HCNC,, | Performed by: INTERNAL MEDICINE

## 2019-03-20 PROCEDURE — 99204 OFFICE O/P NEW MOD 45 MIN: CPT | Mod: HCNC,S$GLB,, | Performed by: PHYSICIAN ASSISTANT

## 2019-03-20 PROCEDURE — 99999 PR PBB SHADOW E&M-EST. PATIENT-LVL V: CPT | Mod: PBBFAC,HCNC,, | Performed by: PHYSICIAN ASSISTANT

## 2019-03-20 PROCEDURE — 3008F PR BODY MASS INDEX (BMI) DOCUMENTED: ICD-10-PCS | Mod: HCNC,CPTII,S$GLB, | Performed by: PHYSICIAN ASSISTANT

## 2019-03-20 NOTE — TELEPHONE ENCOUNTER
Spoke to patient, gave results, patient understood and verbalized. She states she cannot afford the testing supplies or her medications. She has been having her neighbor check her blood sugar, and she will continue to check it every morning. She will bring the log to the visit.

## 2019-03-20 NOTE — LETTER
March 20, 2019      Iza Kwan MD  [address] [place] [address]  [address]  [place] [address]           [facility]  [address], [address]  [facility] [address]  Phone: [phone]  Fax: [phone]          Patient: Polly Kwan   MR Number: [id number]   YOB: 1960   Date of Visit: 3/20/2019       Dear Dr. Iza Kwan:    Thank you for referring Polly Kwan to me for evaluation. Attached you will find relevant portions of my assessment and plan of care.    If you have questions, please do not hesitate to call me. I look forward to following Polly Kwan along with you.    Sincerely,    Madelin Piper PA-C    Enclosure  CC:  No Recipients    If you would like to receive this communication electronically, please contact [email or web address] or [phone] to request more information on [employer] access.    For providers and/or their staff who would like to refer a patient to [facility], please contact us through our one-stop-shop provider referral line, [facility], at [phone].    If you feel you have received this communication in error or would no longer like to receive these types of communications, please e-mail [email or web address]

## 2019-03-21 RX ORDER — INSULIN PUMP SYRINGE, 3 ML
EACH MISCELLANEOUS
Qty: 1 EACH | Refills: 0 | Status: CANCELLED | OUTPATIENT
Start: 2019-03-21 | End: 2020-03-20

## 2019-03-21 NOTE — PROGRESS NOTES
Subjective:     Patient ID:  Polly Kwan is a 59 y.o. female.    [facility]    Chief Complaint: Back pain and left leg numbness    HPI    Polly Kwan is a 59 y.o. female who presents with the above CC.  Patient states she has been following with pain management in [place] and then [place] and then moved here in 2016.  She says she used to take Percocet and Vicodin over the years and recently has been taking baclofen and oxycontin.  She says she was going through a program at [facility] and was getting her medications through them but she is not in the program anymore.  She does not know the name of the program.  She is a poor historian but would like to continue taking narcotics to manage her pain.    Low back pain started in 2010.  Pain is constant across the low back and worse when walking and when laying down and no position makes it better.  Pain rated 10/10.  She has numbness in the left calf to the foot and feels like her left leg janette at times.  No leg pain.    Patient has not had PT.  One ROCHELLE in [place] without relief.  No spine surgery.  Patient is currently taking baclofen and oxycontin per her verbal history.    She told the MA that she does cocaine on Wednesdays.    Patient denies any recent accidents or trauma, no saddle anesthesias, and no bowel or bladder incontinence.    Problems with balance.  Dropping things.  No trouble with buttons or opening container or with holding a pen or with handwriting.    Denies neck and arm pain and paresthesias.      Review of Systems:    Review of Systems   Constitutional: Negative for chills, diaphoresis, fever, malaise/fatigue and weight loss.   HENT: Negative for congestion, ear discharge, ear pain, hearing loss, nosebleeds, sinus pain, sore throat and tinnitus.    Eyes: Negative for blurred vision, double vision, photophobia, pain, discharge and redness.   Respiratory: Negative for cough, hemoptysis, sputum production, shortness of breath, wheezing and stridor.     Cardiovascular: Negative for chest pain, palpitations, orthopnea, claudication, leg swelling and PND.   Gastrointestinal: Negative for abdominal pain, blood in stool, constipation, diarrhea, heartburn, melena, nausea and vomiting.   Genitourinary: Negative for dysuria, flank pain, frequency, hematuria and urgency.   Musculoskeletal: Positive for back pain and myalgias. Negative for falls, joint pain and neck pain.   Skin: Negative for itching and rash.   Neurological: Negative for dizziness, tingling, tremors, sensory change, speech change, focal weakness, seizures, loss of consciousness, weakness and headaches.   Endo/Heme/Allergies: Negative for environmental allergies and polydipsia. Does not bruise/bleed easily.   Psychiatric/Behavioral: Negative for depression, hallucinations, memory loss, substance abuse and suicidal ideas. The patient is not nervous/anxious and does not have insomnia.          Past Medical History:   Diagnosis Date    Asthma     Bipolar 1 disorder     Cardiomyopathy     Ischemic Cardiomyopathy with EF 25-30% by echo 5/17/16    Chronic systolic (congestive) heart failure     Cocaine use     COPD (chronic obstructive pulmonary disease)     emphysema    Coronary artery disease     ROCHELLE 2006 and 2013    Defibrillator discharge     Depression     Diabetes mellitus     H/O echocardiogram 05/17/2016    EF 25-30%. LV diastolic dysfxn. Severe LAE. mod MR. PASP 86.    Hypertension     ICD (implantable cardioverter-defibrillator) in place 07/09/2013    MI (myocardial infarction)     x 3    PTSD (post-traumatic stress disorder)     Schizophrenia     Seizures      Past Surgical History:   Procedure Laterality Date    CARDIAC CATHETERIZATION      CARDIAC DEFIBRILLATOR PLACEMENT      CORONARY [place]GIOPLASTY  2006    [place]    CORONARY STENT PLACEMENT      INSERT / REPLACE / REMOVE PACEMAKER  2013     ICD [facility]     Current Outpatient Medications on File Prior to Visit    Medication Sig Dispense Refill    albuterol (VENTOLIN HFA) 90 mcg/actuation inhaler INHALE 2 PUFFS INTO THE LUNGS EVERY 6 HOURS AS NEEDED FOR WHEEZING OR SHORTNESS OF BREATH 18 Inhaler 0    amiodarone (PACERONE) 400 MG tablet TAKE 1 TABLET (400 MG TOTAL) BY MOUTH ONCE DAILY. 30 tablet 0    ammonium lactate 12 % Crea Apply twice daily to affected parts both feet as needed. 140 g 11    aspirin (ECOTRIN) 81 MG EC tablet Take 1 tablet (81 mg total) by mouth once daily. 30 tablet 0    atorvastatin (LIPITOR) 40 MG tablet TAKE 1 TABLET (40 MG TOTAL) BY MOUTH ONCE DAILY. 90 tablet 1    baclofen (LIORESAL) 10 MG tablet take 1 to 2 TABLETS BY MOUTH every 6 hours as needed for muscle spasms  0    ciclopirox (CICLODAN) 8 % Soln Apply topically nightly. 6.6 mL 11    clobetasol (TEMOVATE) 0.05 % external solution Use on scalp one - two times daily as needed for scaling or itching 60 mL 3    cyproheptadine (PERIACTIN) 4 mg tablet Take 1 tablet (4 mg total) by mouth 2 (two) times daily as needed. 60 tablet 6    fluticasone furoate (ARNUITY ELLIPTA) 100 mcg/actuation DsDv Inhale 100 mcg into the lungs once daily. Controller 30 each 11    furosemide (LASIX) 80 MG tablet Take 1 tablet (80 mg total) by mouth 2 (two) times daily. 180 tablet 3    irbesartan (AVAPRO) 75 MG tablet Take 0.5 tablets (37.5 mg total) by mouth every evening. 45 tablet 3    ketoconazole (NIZORAL) 2 % shampoo Wash hair with medicated shampoo at least 2x/week - let sit on scalp at least 5 minutes prior to rinsing 120 mL 5    loratadine (CLARITIN) 10 mg tablet Take 1 tablet (10 mg total) by mouth once daily.      magnesium oxide (MAG-OX) 400 mg tablet Take 0.5 tablets (200 mg total) by mouth once daily.  0    metoprolol succinate (TOPROL-XL) 25 MG 24 hr tablet Take 1 tablet (25 mg total) by mouth once daily. 30 tablet 11    nitroGLYCERIN (NITROSTAT) 0.4 MG SL tablet Place 1 tablet (0.4 mg total) under the tongue every 5 (five) minutes as needed  for Chest pain. 100 tablet 11    potassium chloride SA (K-DUR,KLOR-CON) 20 MEQ tablet Take 1 tablet (20 mEq total) by mouth once daily. 90 tablet 1    traZODone (DESYREL) 150 MG tablet Take 1 tablet (150 mg total) by mouth every evening. 90 tablet 1    acetaminophen (TYLENOL) 650 MG Supp UNWRAP AND INSERT 1 SUPP. RECTALLY EVERY 6 HOURS AS NEEDED FOR MILD PAIN OR ELEVATED TEMPERATURE  0    apixaban (ELIQUIS) 5 mg Tab Take 1 tablet (5 mg total) by mouth 2 (two) times daily. 60 tablet 0    triamcinolone acetonide 0.1% (KENALOG) 0.1 % ointment AAA bid 60 g 3     No current facility-administered medications on file prior to visit.      Review of patient's allergies indicates:  No Known Allergies  Social History     Socioeconomic History    Marital status:      Spouse name: Not on file    Number of children: 4    Years of education: Not on file    Highest education level: Not on file   Social Needs    Financial resource strain: Not on file    Food insecurity - worry: Not on file    Food insecurity - inability: Not on file    Transportation needs - medical: Not on file    Transportation needs - non-medical: Not on file   Occupational History    Occupation: On disability   Tobacco Use    Smoking status: Former Smoker     Packs/day: 3.00     Years: 30.00     Pack years: 90.00    Smokeless tobacco: Current User    Tobacco comment: 1-2  cigarettes a day when stressed   Substance and Sexual Activity    Alcohol use: No    Drug use: Yes     Types: Cocaine     Comment: once on wednesday    Sexual activity: No   Other Topics Concern    Patient feels they ought to cut down on drinking/drug use Not Asked    Patient annoyed by others criticizing their drinking/drug use Not Asked    Patient has felt bad or guilty about drinking/drug use Not Asked    Patient has had a drink/used drugs as an eye opener in the AM Not Asked    Are you pregnant or think you may be? Not Asked    Breast-feeding Not Asked  "  Social History Narrative    Not on file     Family History   Adopted: Yes   Problem Relation Age of Onset    Melanoma Neg Hx        Objective:      Vitals:    03/20/19 1314   BP: (!) 99/57   Pulse: 60   Temp: 98.2 °F (36.8 °C)   TempSrc: Oral   Weight: 58.1 kg (128 lb)   Height: 5' 2" (1.575 m)   PainSc: 10-Worst pain ever   PainLoc: Back         Physical Exam:    General:  Polly Kwan is well-developed, well-nourished, appears stated age, in no acute distress, alert and oriented to person, place, and time.    Pulmonary/Chest:  Respiratory effort normal  Abdominal: Exhibits no distension  Psychiatric:  Normal mood and affect.  Behavior is normal.  Judgement and thought content normal    Musculoskeletal: (Exam done in the chair)    Patient arises from a sitting to standing position without difficulty.  Patient walks to the door bent forward on her rolling walker with a scissor gait.    Lumbar ROM:   Pain in lumbar flexion, extension, right lateral bending, and left lateral bending.    Lumbar Spine Inspection:  Normal with no surgical scars and no visible rashes.    Lumbar Spine Palpation:  Severe tenderness to low back palpation.    SI Joint Palpation:  Moderate tenderness to bilateral SI Joint palpation.    Straight Leg Raise:  Cannot assess    Neurological: Alert and oriented to person, place, and time    Muscle strength against resistance:     Right Left   Hip flexion  5 / 5 2 / 5   Hip extension 5 / 5 5 / 5   Hip abduction 5 / 5 5 / 5   Hip adduction  5 / 5 5 / 5   Knee extension  5 / 5 5 / 5   Knee flexion 5 / 5 5 / 5   Dorsiflexion  5 / 5 5 / 5   EHL  5 / 5 5 / 5   Plantar flexion  5 / 5 5 / 5   Inversion of the feet 5 / 5 5 / 5   Eversion of the feet  5 / 5 5 / 5   5/5 UE against resistance bilaterally  Reflexes:     Right Left   Patellar 2+ 2+   Achilles 2+ 2+   2+ bilateral UE reflexes   Negative mason bilaterally  Clonus:  Negative bilaterally    On gross examination of the bilateral upper " extremities, patient has full painfree ROM with no signs of clubbing, cyanosis, edema, or weakness.     XRAY Interpretation:     Lumbar spine ap/lateral/flexion/extension xrays were personally reviewed today.  No fractures.  No movement on flexion and extension.  Grade one spondylolisthesis L4-5.  L4-5 and L5-S1 DDD and spondylosis.      Assessment:          1. Chronic bilateral low back pain without sciatica    2. DDD (degenerative disc disease), lumbar    3. Other spondylosis with radiculopathy, lumbar region    4. Lumbar radiculopathy    5. Spondylolisthesis, lumbar region            Plan:          Orders Placed This Encounter    Ambulatory Referral to Physical/Occupational Therapy    Ambulatory referral to Pain Clinic     Grade one spondylolisthesis L4-5.  L4-5 and L5-S1 DDD and spondylosis    -Recommend PT which she will do through [facility]  -She states she is not interested in interventional pain procedures or spine surgery  -She requests to see pain management to discuss medication options.  I explained to her that they do not typically write for narcotics long term or even start writing for them except for certain circumstances.      Follow-Up:  Follow-up if symptoms worsen or fail to improve. If there are any questions prior to this, the patient was instructed to contact the office.       Madelin Piper [facility], PA-C  Neurosurgery  [facility]  [facility]

## 2019-03-26 ENCOUNTER — TELEPHONE (OUTPATIENT)
Dept: INTERNAL MEDICINE | Facility: CLINIC | Age: 59
End: 2019-03-26

## 2019-03-26 NOTE — TELEPHONE ENCOUNTER
Please let her know that her back x-ray shows some arthritic changes.  I agree that physical therapy would be beneficial

## 2019-03-26 NOTE — TELEPHONE ENCOUNTER
Informed pt of x ray results. Pt verbalized understanding.  Pt states that she would like to do PT. Pt has no further questions or concerns.

## 2019-03-28 ENCOUNTER — HOSPITAL ENCOUNTER (EMERGENCY)
Facility: OTHER | Age: 59
Discharge: HOME | End: 2019-03-28
Attending: EMERGENCY MEDICINE
Payer: MEDICARE

## 2019-03-28 ENCOUNTER — PATIENT OUTREACH (OUTPATIENT)
Dept: ADMINISTRATIVE | Facility: HOSPITAL | Age: 59
End: 2019-03-28

## 2019-03-28 VITALS
RESPIRATION RATE: 20 BRPM | DIASTOLIC BLOOD PRESSURE: 67 MMHG | WEIGHT: 129 LBS | OXYGEN SATURATION: 98 % | SYSTOLIC BLOOD PRESSURE: 104 MMHG | TEMPERATURE: 100 F | BODY MASS INDEX: 23.74 KG/M2 | HEART RATE: 60 BPM | HEIGHT: 62 IN

## 2019-03-28 DIAGNOSIS — E87.6 HYPOKALEMIA: ICD-10-CM

## 2019-03-28 DIAGNOSIS — R07.89 MUSCULOSKELETAL CHEST PAIN: ICD-10-CM

## 2019-03-28 DIAGNOSIS — R07.9 CHEST PAIN: ICD-10-CM

## 2019-03-28 DIAGNOSIS — Z72.0 TOBACCO ABUSE: ICD-10-CM

## 2019-03-28 DIAGNOSIS — J40 BRONCHITIS: Primary | ICD-10-CM

## 2019-03-28 LAB
ALBUMIN SERPL BCP-MCNC: 3.4 G/DL (ref 3.5–5.2)
ALP SERPL-CCNC: 121 U/L (ref 55–135)
ALT SERPL W/O P-5'-P-CCNC: 16 U/L (ref 10–44)
ANION GAP SERPL CALC-SCNC: 10 MMOL/L (ref 8–16)
AST SERPL-CCNC: 21 U/L (ref 10–40)
BACTERIA #/AREA URNS HPF: ABNORMAL /HPF
BASOPHILS # BLD AUTO: 0.01 K/UL (ref 0–0.2)
BASOPHILS NFR BLD: 0.2 % (ref 0–1.9)
BILIRUB SERPL-MCNC: 1.3 MG/DL (ref 0.1–1)
BILIRUB UR QL STRIP: NEGATIVE
BUN SERPL-MCNC: 7 MG/DL (ref 6–20)
CALCIUM SERPL-MCNC: 8.9 MG/DL (ref 8.7–10.5)
CHLORIDE SERPL-SCNC: 95 MMOL/L (ref 95–110)
CLARITY UR: CLEAR
CO2 SERPL-SCNC: 33 MMOL/L (ref 23–29)
COLOR UR: YELLOW
CREAT SERPL-MCNC: 0.8 MG/DL (ref 0.5–1.4)
DIFFERENTIAL METHOD: ABNORMAL
EOSINOPHIL # BLD AUTO: 0 K/UL (ref 0–0.5)
EOSINOPHIL NFR BLD: 0.5 % (ref 0–8)
ERYTHROCYTE [DISTWIDTH] IN BLOOD BY AUTOMATED COUNT: 19 % (ref 11.5–14.5)
EST. GFR  (AFRICAN AMERICAN): >60 ML/MIN/1.73 M^2
EST. GFR  (NON AFRICAN AMERICAN): >60 ML/MIN/1.73 M^2
GLUCOSE SERPL-MCNC: 121 MG/DL (ref 70–110)
GLUCOSE UR QL STRIP: NEGATIVE
HCT VFR BLD AUTO: 36.2 % (ref 37–48.5)
HGB BLD-MCNC: 12.2 G/DL (ref 12–16)
HGB UR QL STRIP: ABNORMAL
KETONES UR QL STRIP: NEGATIVE
LACTATE SERPL-SCNC: 2 MMOL/L (ref 0.5–2.2)
LEUKOCYTE ESTERASE UR QL STRIP: NEGATIVE
LYMPHOCYTES # BLD AUTO: 0.7 K/UL (ref 1–4.8)
LYMPHOCYTES NFR BLD: 17.5 % (ref 18–48)
MCH RBC QN AUTO: 28.4 PG (ref 27–31)
MCHC RBC AUTO-ENTMCNC: 33.7 G/DL (ref 32–36)
MCV RBC AUTO: 84 FL (ref 82–98)
MICROSCOPIC COMMENT: ABNORMAL
MONOCYTES # BLD AUTO: 0.4 K/UL (ref 0.3–1)
MONOCYTES NFR BLD: 9.5 % (ref 4–15)
NEUTROPHILS # BLD AUTO: 2.9 K/UL (ref 1.8–7.7)
NEUTROPHILS NFR BLD: 71.8 % (ref 38–73)
NITRITE UR QL STRIP: NEGATIVE
PH UR STRIP: 7 [PH] (ref 5–8)
PLATELET # BLD AUTO: 276 K/UL (ref 150–350)
PMV BLD AUTO: 10.3 FL (ref 9.2–12.9)
POTASSIUM SERPL-SCNC: 2.9 MMOL/L (ref 3.5–5.1)
PROT SERPL-MCNC: 7.1 G/DL (ref 6–8.4)
PROT UR QL STRIP: NEGATIVE
RBC # BLD AUTO: 4.3 M/UL (ref 4–5.4)
RBC #/AREA URNS HPF: 11 /HPF (ref 0–4)
SODIUM SERPL-SCNC: 138 MMOL/L (ref 136–145)
SP GR UR STRIP: 1.01 (ref 1–1.03)
SQUAMOUS #/AREA URNS HPF: 1 /HPF
TROPONIN I SERPL DL<=0.01 NG/ML-MCNC: 0.01 NG/ML (ref 0–0.03)
URN SPEC COLLECT METH UR: ABNORMAL
UROBILINOGEN UR STRIP-ACNC: 1 EU/DL
WBC # BLD AUTO: 4.01 K/UL (ref 3.9–12.7)
WBC #/AREA URNS HPF: 2 /HPF (ref 0–5)

## 2019-03-28 PROCEDURE — 96365 THER/PROPH/DIAG IV INF INIT: CPT | Mod: HCNC

## 2019-03-28 PROCEDURE — 87040 BLOOD CULTURE FOR BACTERIA: CPT | Mod: 59,HCNC

## 2019-03-28 PROCEDURE — 96361 HYDRATE IV INFUSION ADD-ON: CPT | Mod: HCNC

## 2019-03-28 PROCEDURE — 25000242 PHARM REV CODE 250 ALT 637 W/ HCPCS: Mod: HCNC | Performed by: EMERGENCY MEDICINE

## 2019-03-28 PROCEDURE — 84484 ASSAY OF TROPONIN QUANT: CPT | Mod: HCNC

## 2019-03-28 PROCEDURE — 63600175 PHARM REV CODE 636 W HCPCS: Mod: HCNC | Performed by: EMERGENCY MEDICINE

## 2019-03-28 PROCEDURE — 93005 ELECTROCARDIOGRAM TRACING: CPT | Mod: HCNC

## 2019-03-28 PROCEDURE — 85025 COMPLETE CBC W/AUTO DIFF WBC: CPT | Mod: HCNC

## 2019-03-28 PROCEDURE — 94761 N-INVAS EAR/PLS OXIMETRY MLT: CPT | Mod: HCNC

## 2019-03-28 PROCEDURE — 81000 URINALYSIS NONAUTO W/SCOPE: CPT | Mod: HCNC

## 2019-03-28 PROCEDURE — 93010 EKG 12-LEAD: ICD-10-PCS | Mod: HCNC,,, | Performed by: INTERNAL MEDICINE

## 2019-03-28 PROCEDURE — 25000003 PHARM REV CODE 250: Mod: HCNC | Performed by: EMERGENCY MEDICINE

## 2019-03-28 PROCEDURE — 80053 COMPREHEN METABOLIC PANEL: CPT | Mod: HCNC

## 2019-03-28 PROCEDURE — 94640 AIRWAY INHALATION TREATMENT: CPT | Mod: HCNC

## 2019-03-28 PROCEDURE — 83605 ASSAY OF LACTIC ACID: CPT | Mod: HCNC

## 2019-03-28 PROCEDURE — 99285 EMERGENCY DEPT VISIT HI MDM: CPT | Mod: 25,HCNC

## 2019-03-28 PROCEDURE — 93010 ELECTROCARDIOGRAM REPORT: CPT | Mod: HCNC,,, | Performed by: INTERNAL MEDICINE

## 2019-03-28 RX ORDER — IPRATROPIUM BROMIDE AND ALBUTEROL SULFATE 2.5; .5 MG/3ML; MG/3ML
3 SOLUTION RESPIRATORY (INHALATION)
Status: COMPLETED | OUTPATIENT
Start: 2019-03-28 | End: 2019-03-28

## 2019-03-28 RX ORDER — BENZONATATE 100 MG/1
100 CAPSULE ORAL 3 TIMES DAILY PRN
Status: DISCONTINUED | OUTPATIENT
Start: 2019-03-28 | End: 2019-03-28 | Stop reason: HOSPADM

## 2019-03-28 RX ORDER — POTASSIUM CHLORIDE 7.45 MG/ML
10 INJECTION INTRAVENOUS
Status: COMPLETED | OUTPATIENT
Start: 2019-03-28 | End: 2019-03-28

## 2019-03-28 RX ORDER — KETOROLAC TROMETHAMINE 30 MG/ML
10 INJECTION, SOLUTION INTRAMUSCULAR; INTRAVENOUS
Status: COMPLETED | OUTPATIENT
Start: 2019-03-28 | End: 2019-03-28

## 2019-03-28 RX ORDER — ACETAMINOPHEN 500 MG
1000 TABLET ORAL
Status: COMPLETED | OUTPATIENT
Start: 2019-03-28 | End: 2019-03-28

## 2019-03-28 RX ORDER — BENZONATATE 100 MG/1
100 CAPSULE ORAL 3 TIMES DAILY PRN
Qty: 20 CAPSULE | Refills: 0 | Status: SHIPPED | OUTPATIENT
Start: 2019-03-28 | End: 2019-04-07

## 2019-03-28 RX ADMIN — KETOROLAC TROMETHAMINE 10 MG: 30 INJECTION, SOLUTION INTRAMUSCULAR; INTRAVENOUS at 05:03

## 2019-03-28 RX ADMIN — ACETAMINOPHEN 1000 MG: 500 TABLET ORAL at 04:03

## 2019-03-28 RX ADMIN — SODIUM CHLORIDE 500 ML: 0.9 INJECTION, SOLUTION INTRAVENOUS at 04:03

## 2019-03-28 RX ADMIN — POTASSIUM CHLORIDE 10 MEQ: 10 INJECTION, SOLUTION INTRAVENOUS at 05:03

## 2019-03-28 RX ADMIN — IPRATROPIUM BROMIDE AND ALBUTEROL SULFATE 3 ML: .5; 3 SOLUTION RESPIRATORY (INHALATION) at 04:03

## 2019-03-28 RX ADMIN — BENZONATATE 100 MG: 100 CAPSULE ORAL at 05:03

## 2019-03-28 NOTE — ED NOTES
Pt presents to ED via EMS with c/o cough and SOB x2 days. PMH of COPD, CHF, current tobacco user. Pt reporting green productive cough x2 days, subj fever at home. Activated EMS due to worsening s/s. Pt reports compliance with home medication regimen. Lung sounds are diminished, no crackles auscultated. Fever of 100.5 on arrival to ED. CP with cough reported. Pt denies dysuria, numbness/tingling. AAOx4, RR even and unlabored. Will continue to monitor.    Two patient identifiers have been checked and are correct.      Appearance: Pt awake, alert & oriented to person, place & time. Pt in no acute distress at present time. Pt is clean and well groomed with clothes appropriately fastened.   Skin: Skin dry & intact. Skin is hot to touch. Color consistent with ethnicity. Mucous membranes moist. No breakdown or brusing noted.   Musculoskeletal: Patient moving all extremities well, no obvious swelling or deformities noted.   Respiratory: Respirations spontaneous, even, and non-labored. Visible chest rise noted. Airway is open and patent. No accessory muscle use noted. Breath sounds are diminished bilaterally.   Neurologic: Sensation is intact. Speech is clear and appropriate. Eyes open spontaneously, behavior appropriate to situation, follows commands, facial expression symmetrical, purposeful motor response noted.  Cardiac: All peripheral pulses present. No Bilateral lower extremity edema. Cap refill is <3 seconds. Pt with cardiac pacemaker, rhythm 100% paced on monitor.   Abdomen: Abdomen soft, non-tender to palpation. Intermittent N/V reported.   : Pt reports no dysuria or hematuria.

## 2019-03-28 NOTE — ED NOTES
MD informed pt still c/o pain, requesting medication.  No follow-up action requested at this time.

## 2019-03-28 NOTE — ED PROVIDER NOTES
Encounter Date: 3/28/2019    SCRIBE #1 NOTE: I, Liliam Jennings, am scribing for, and in the presence of, Dr. Ramirez.       History     Chief Complaint   Patient presents with    Cough     Pt w/ generalized cough (green mucous) and chest pain for 2 days, fever, VSS, received aspirin 324mg po,      Time seen by provider: 3:55 PM    This is a 59 y.o. female who presents with complaint of a productive cough and chest pain for two days. The patient also reports fever, nausea, shortness of breath, and constipation. The patient reports coughing exacerbates her chest pain. The patient reports history of asthma and CHF. She admits to tobacco use.    The history is provided by the patient.     Review of patient's allergies indicates:  No Known Allergies  Past Medical History:   Diagnosis Date    Asthma     Bipolar 1 disorder     Cardiomyopathy     Ischemic Cardiomyopathy with EF 25-30% by echo 5/17/16    Chronic systolic (congestive) heart failure     Cocaine use     COPD (chronic obstructive pulmonary disease)     emphysema    Coronary artery disease     ROCHELLE 2006 and 2013    Defibrillator discharge     Depression     Diabetes mellitus     H/O echocardiogram 05/17/2016    EF 25-30%. LV diastolic dysfxn. Severe LAE. mod MR. PASP 86.    Hypertension     ICD (implantable cardioverter-defibrillator) in place 07/09/2013    MI (myocardial infarction)     x 3    PTSD (post-traumatic stress disorder)     Schizophrenia     Seizures      Past Surgical History:   Procedure Laterality Date    CARDIAC CATHETERIZATION      CARDIAC DEFIBRILLATOR PLACEMENT      [place]ON[place]GIOPLASTY  2006    [place]    CORONARY STENT PLACEMENT      INSERT / REPLACE / REMOVE PACEMAKER  2013     ICD [facility]     Family History   Adopted: Yes   Problem Relation Age of Onset    Melanoma Neg Hx      Social History     Tobacco Use    Smoking status: Former Smoker     Packs/day: 3.00     Years: 30.00     Pack years: 90.00     Smokeless tobacco: Current User    Tobacco comment: 1-2  cigarettes a day when stressed   Substance Use Topics    Alcohol use: No    Drug use: Yes     Types: Cocaine     Comment: once on wednesday     Review of Systems   Constitutional: Positive for fever.   HENT: Negative for sore throat.    Respiratory: Positive for cough and shortness of breath.    Cardiovascular: Positive for chest pain.   Gastrointestinal: Positive for constipation and nausea.   Genitourinary: Negative for dysuria.   Musculoskeletal: Negative for back pain.   Skin: Negative for rash.   Neurological: Negative for weakness.   Hematological: Does not bruise/bleed easily.       Physical Exam     Initial Vitals [03/28/19 1548]   BP Pulse Resp Temp SpO2   108/65 65 (!) 24 (!) 100.5 °F (38.1 °C) 100 %      MAP       --         Physical Exam    Nursing note and vitals reviewed.  Constitutional: She appears well-developed and well-nourished. She is not diaphoretic. No distress.   Thin female. Uncomfortable appearing.   HENT:   Head: Normocephalic and atraumatic.   Mouth/Throat: No oropharyngeal exudate.   Eyes: Conjunctivae and EOM are normal. Pupils are equal, round, and reactive to light. No scleral icterus.   No pallor or icterus.   Neck: Normal range of motion. Neck supple. No JVD present.   Cardiovascular: Normal rate.   Pacemaker in left chest wall.   Pulmonary/Chest: No respiratory distress.   Diffuse mild wheezing and scattered rhonchi, but good air exchange. No basilar rales. No accessory muscle use.   Abdominal: Soft. There is no tenderness.   Musculoskeletal: Normal range of motion. She exhibits no edema.   No peripheral edema.   Neurological: She is alert and oriented to person, place, and time.   Incessant lip smacking and tongue rolling movements.   Skin: Skin is warm and dry.   Warm to touch.         ED Course   Procedures  Labs Reviewed   CBC W/ AUTO DIFFERENTIAL - Abnormal; Notable for the following components:       Result Value     Hematocrit 36.2 (*)     RDW 19.0 (*)     Lymph # 0.7 (*)     Lymph% 17.5 (*)     All other components within normal limits   COMPREHENSIVE METABOLIC PANEL - Abnormal; Notable for the following components:    Potassium 2.9 (*)     CO2 33 (*)     Glucose 121 (*)     Albumin 3.4 (*)     Total Bilirubin 1.3 (*)     All other components within normal limits   URINALYSIS, REFLEX TO URINE CULTURE - Abnormal; Notable for the following components:    Occult Blood UA 2+ (*)     All other components within normal limits    Narrative:     Preferred Collection Type->Urine, Clean Catch   URINALYSIS MICROSCOPIC - Abnormal; Notable for the following components:    RBC, UA 11 (*)     All other components within normal limits    Narrative:     Preferred Collection Type->Urine, Clean Catch   CULTURE, BLOOD   CULTURE, BLOOD   LACTIC ACID, PLASMA    Narrative:     Recoll. [id number] by ANIKA at 03/28/2019 17:06, reason: Specimen   hemolyzed. Notified to Gustavo RN @ED   TROPONIN I     EKG Readings: (Independently Interpreted)   Paced rhythm at a rate of 60 bpm. Non-specific ST and T wave changes. No acute ischemia. Similar to October 2018.       Imaging Results          X-Ray Chest AP Portable (Final result)  Result time 03/28/19 16:29:22    Final result by Bette Rubi MD (03/28/19 16:29:22)                 Impression:      No acute abnormality.      Electronically signed by: Bette Rubi MD  Date:    03/28/2019  Time:    16:29             Narrative:    EXAMINATION:  XR CHEST AP PORTABLE    CLINICAL HISTORY:  Sepsis;.    TECHNIQUE:  Single frontal portable view of the chest was performed.    COMPARISON:  10/7/2018    FINDINGS:  Support devices: AICD present without complication    The lungs are clear, with normal appearance of pulmonary vasculature and no pleural effusion or pneumothorax.    Stable cardiology.  The hilar and mediastinal contours are unremarkable.    Bones are intact.                              X-Rays:    Independently Interpreted Readings:   Chest X-Ray: Pacemaker in place. Cardiomegaly. No focal infiltrate, or pulmonary edema.     Medical Decision Making:   Clinical Tests:   Lab Tests: Ordered and Reviewed  Radiological Study: Ordered and Reviewed  Medical Tests: Ordered and Reviewed            Scribe Attestation:   Scribe #1: I performed the above scribed service and the documentation accurately describes the services I performed. I attest to the accuracy of the note.    Attending Attestation:           Physician Attestation for Scribe:  Physician Attestation Statement for Scribe #1: I, Dr. Ramirez, reviewed documentation, as scribed by Liliam Jennings in my presence, and it is both accurate and complete.          Patient presents complaining of cough and chest pain. She has chest and pain around the torso only with coughing.  No exertional component.  During her ER visit she was noted to be coughing incredibly loudly, and complains of pain with these episodes.  I think this is unlikely cardiac pain in nature her chest x-ray does not show pneumonia.  Laboratory studies show normal white count.  Normal lactic.  Hypokalemia which was repleted with IV potassium.  Urinalysis does not suggest UTI.  She had presented with low-grade fever, tachypnea.  Given nebulizer treatment and lungs now clear to auscultation. Respiratory rate normalized.  At this time patient has only residual complaint is cough associated chest pain.  Will be placed on Tessalon.  Suspect bronchitis, likely viral.  Continue the use of her home inhalers.  Encouraged smoking cessation.  Return precautions discussed.  Encouraged follow-up with primary care, especially if symptoms persist.                Clinical Impression:     1. Bronchitis    2. Chest pain    3. Hypokalemia    4. Musculoskeletal chest pain    5. Tobacco abuse                                   Shad Ramirez II, MD  03/28/19 0648

## 2019-03-28 NOTE — PROGRESS NOTES
[facility] is committed to your overall health.  To help you get the most out of each of your visits, we will review your information to make sure you are up to date on all of your recommended tests and/or procedures.       Your PCP  Iza Kwan MD   found that you may be due for:       Health Maintenance Due   Topic Date Due    Pap Smear with HPV Cotest  03/02/1981    Foot Exam  11/16/2018    Fecal Occult Blood Test (FOBT)/[employer]  11/27/2018             If you have had any of the above done at another facility, please bring the records or information with you so that your record at [facility] will be complete.  If you would like to schedule any of these, please contact me.     If you are currently taking medication, please bring it with you to your appointment for review.     Also, if you have any type of Advanced Directives, please bring them with you to your office visit so we may scan them into your chart.       Thank you for Choosing [facility] for your healthcare needs.        Additional Information  If you have questions, you can email [email or web address] or call [phone]  to talk to our [facility] staff. Remember, [facility] is NOT to be used for urgent needs. For medical emergencies, dial 911.

## 2019-03-28 NOTE — ED NOTES
Pt remains connected to continuous cardiac monitoring, pulse ox, BP cycled prior to IV potassium administration. 2 RNs to verify correct concentration and dosage prior to starting IV potassium.

## 2019-03-31 DIAGNOSIS — I25.10 CORONARY ARTERY DISEASE INVOLVING NATIVE CORONARY ARTERY OF NATIVE HEART WITHOUT ANGINA PECTORIS: ICD-10-CM

## 2019-04-01 RX ORDER — NITROGLYCERIN 0.4 MG/1
0.4 TABLET SUBLINGUAL EVERY 5 MIN PRN
Qty: 100 TABLET | Refills: 3 | Status: SHIPPED | OUTPATIENT
Start: 2019-04-01

## 2019-04-02 LAB
BACTERIA BLD CULT: NORMAL
BACTERIA BLD CULT: NORMAL

## 2019-04-03 ENCOUNTER — TELEPHONE (OUTPATIENT)
Dept: CARDIOLOGY | Facility: CLINIC | Age: 59
End: 2019-04-03

## 2019-04-03 DIAGNOSIS — R00.2 PALPITATIONS: Primary | ICD-10-CM

## 2019-04-03 NOTE — TELEPHONE ENCOUNTER
----- Message from Monique Garcia NP sent at 4/3/2019  3:44 PM CDT -----  Nikki,  She missed her scheduled appointment with me and she needs to f/u because she was off a number of her cardiac medications.  She needs an ECG prior to the visit.  Can you please call her and schedule?  Thanks!  Monique

## 2019-04-11 ENCOUNTER — OFFICE VISIT (OUTPATIENT)
Dept: OBSTETRICS AND GYNECOLOGY | Facility: CLINIC | Age: 59
End: 2019-04-11
Payer: MEDICARE

## 2019-04-11 ENCOUNTER — OFFICE VISIT (OUTPATIENT)
Dept: INTERNAL MEDICINE | Facility: CLINIC | Age: 59
End: 2019-04-11
Payer: MEDICARE

## 2019-04-11 VITALS
HEIGHT: 63 IN | SYSTOLIC BLOOD PRESSURE: 96 MMHG | HEART RATE: 60 BPM | WEIGHT: 121.25 LBS | OXYGEN SATURATION: 95 % | BODY MASS INDEX: 21.48 KG/M2 | DIASTOLIC BLOOD PRESSURE: 60 MMHG

## 2019-04-11 VITALS
BODY MASS INDEX: 21.51 KG/M2 | HEIGHT: 63 IN | WEIGHT: 121.38 LBS | SYSTOLIC BLOOD PRESSURE: 94 MMHG | DIASTOLIC BLOOD PRESSURE: 64 MMHG

## 2019-04-11 DIAGNOSIS — B96.89 ACUTE BACTERIAL BRONCHITIS: ICD-10-CM

## 2019-04-11 DIAGNOSIS — I50.42 CHRONIC COMBINED SYSTOLIC AND DIASTOLIC CHF, NYHA CLASS 4: ICD-10-CM

## 2019-04-11 DIAGNOSIS — F17.200 TOBACCO USE DISORDER: ICD-10-CM

## 2019-04-11 DIAGNOSIS — J20.8 ACUTE BACTERIAL BRONCHITIS: ICD-10-CM

## 2019-04-11 DIAGNOSIS — E11.40 TYPE 2 DIABETES MELLITUS WITH DIABETIC NEUROPATHY, WITHOUT LONG-TERM CURRENT USE OF INSULIN: Primary | ICD-10-CM

## 2019-04-11 DIAGNOSIS — Z79.01 CHRONIC ANTICOAGULATION: ICD-10-CM

## 2019-04-11 DIAGNOSIS — I10 ESSENTIAL HYPERTENSION: ICD-10-CM

## 2019-04-11 DIAGNOSIS — I25.10 CORONARY ARTERY DISEASE INVOLVING NATIVE CORONARY ARTERY OF NATIVE HEART WITHOUT ANGINA PECTORIS: ICD-10-CM

## 2019-04-11 DIAGNOSIS — Z01.419 ENCOUNTER FOR GYNECOLOGICAL EXAMINATION WITHOUT ABNORMAL FINDING: Primary | ICD-10-CM

## 2019-04-11 DIAGNOSIS — E87.6 HYPOKALEMIA: ICD-10-CM

## 2019-04-11 PROCEDURE — 3074F PR MOST RECENT SYSTOLIC BLOOD PRESSURE < 130 MM HG: ICD-10-PCS | Mod: HCNC,CPTII,S$GLB, | Performed by: FAMILY MEDICINE

## 2019-04-11 PROCEDURE — 3074F SYST BP LT 130 MM HG: CPT | Mod: HCNC,CPTII,S$GLB, | Performed by: OBSTETRICS & GYNECOLOGY

## 2019-04-11 PROCEDURE — 94640 AIRWAY INHALATION TREATMENT: CPT | Mod: HCNC,S$GLB,, | Performed by: FAMILY MEDICINE

## 2019-04-11 PROCEDURE — 3078F PR MOST RECENT DIASTOLIC BLOOD PRESSURE < 80 MM HG: ICD-10-PCS | Mod: HCNC,CPTII,S$GLB, | Performed by: OBSTETRICS & GYNECOLOGY

## 2019-04-11 PROCEDURE — 99999 PR PBB SHADOW E&M-EST. PATIENT-LVL IV: CPT | Mod: PBBFAC,HCNC,, | Performed by: OBSTETRICS & GYNECOLOGY

## 2019-04-11 PROCEDURE — 99999 PR PBB SHADOW E&M-EST. PATIENT-LVL V: CPT | Mod: PBBFAC,HCNC,, | Performed by: FAMILY MEDICINE

## 2019-04-11 PROCEDURE — 99999 PR PBB SHADOW E&M-EST. PATIENT-LVL IV: ICD-10-PCS | Mod: PBBFAC,HCNC,, | Performed by: OBSTETRICS & GYNECOLOGY

## 2019-04-11 PROCEDURE — G0101 PR CA SCREEN;PELVIC/BREAST EXAM: ICD-10-PCS | Mod: HCNC,S$GLB,, | Performed by: OBSTETRICS & GYNECOLOGY

## 2019-04-11 PROCEDURE — G0101 CA SCREEN;PELVIC/BREAST EXAM: HCPCS | Mod: HCNC,S$GLB,, | Performed by: OBSTETRICS & GYNECOLOGY

## 2019-04-11 PROCEDURE — 99214 PR OFFICE/OUTPT VISIT, EST, LEVL IV, 30-39 MIN: ICD-10-PCS | Mod: 25,HCNC,S$GLB, | Performed by: FAMILY MEDICINE

## 2019-04-11 PROCEDURE — 88175 CYTOPATH C/V AUTO FLUID REDO: CPT | Mod: HCNC

## 2019-04-11 PROCEDURE — 3074F PR MOST RECENT SYSTOLIC BLOOD PRESSURE < 130 MM HG: ICD-10-PCS | Mod: HCNC,CPTII,S$GLB, | Performed by: OBSTETRICS & GYNECOLOGY

## 2019-04-11 PROCEDURE — 99999 PR PBB SHADOW E&M-EST. PATIENT-LVL V: ICD-10-PCS | Mod: PBBFAC,HCNC,, | Performed by: FAMILY MEDICINE

## 2019-04-11 PROCEDURE — 3078F DIAST BP <80 MM HG: CPT | Mod: HCNC,CPTII,S$GLB, | Performed by: FAMILY MEDICINE

## 2019-04-11 PROCEDURE — 3008F PR BODY MASS INDEX (BMI) DOCUMENTED: ICD-10-PCS | Mod: HCNC,CPTII,S$GLB, | Performed by: FAMILY MEDICINE

## 2019-04-11 PROCEDURE — 3044F HG A1C LEVEL LT 7.0%: CPT | Mod: HCNC,CPTII,S$GLB, | Performed by: FAMILY MEDICINE

## 2019-04-11 PROCEDURE — 3078F DIAST BP <80 MM HG: CPT | Mod: HCNC,CPTII,S$GLB, | Performed by: OBSTETRICS & GYNECOLOGY

## 2019-04-11 PROCEDURE — 3044F PR MOST RECENT HEMOGLOBIN A1C LEVEL <7.0%: ICD-10-PCS | Mod: HCNC,CPTII,S$GLB, | Performed by: FAMILY MEDICINE

## 2019-04-11 PROCEDURE — 94640 PR INHAL RX, AIRWAY OBST/DX SPUTUM INDUCT: ICD-10-PCS | Mod: HCNC,S$GLB,, | Performed by: FAMILY MEDICINE

## 2019-04-11 PROCEDURE — 3078F PR MOST RECENT DIASTOLIC BLOOD PRESSURE < 80 MM HG: ICD-10-PCS | Mod: HCNC,CPTII,S$GLB, | Performed by: FAMILY MEDICINE

## 2019-04-11 PROCEDURE — 3074F SYST BP LT 130 MM HG: CPT | Mod: HCNC,CPTII,S$GLB, | Performed by: FAMILY MEDICINE

## 2019-04-11 PROCEDURE — 3008F BODY MASS INDEX DOCD: CPT | Mod: HCNC,CPTII,S$GLB, | Performed by: FAMILY MEDICINE

## 2019-04-11 PROCEDURE — 99214 OFFICE O/P EST MOD 30 MIN: CPT | Mod: 25,HCNC,S$GLB, | Performed by: FAMILY MEDICINE

## 2019-04-11 RX ORDER — GUAIFENESIN 600 MG/1
600 TABLET, EXTENDED RELEASE ORAL 2 TIMES DAILY
Qty: 60 TABLET | Refills: 0 | Status: SHIPPED | OUTPATIENT
Start: 2019-04-11 | End: 2019-04-21

## 2019-04-11 RX ORDER — ALBUTEROL SULFATE 0.83 MG/ML
2.5 SOLUTION RESPIRATORY (INHALATION)
Status: COMPLETED | OUTPATIENT
Start: 2019-04-11 | End: 2019-04-11

## 2019-04-11 RX ORDER — AMOXICILLIN AND CLAVULANATE POTASSIUM 875; 125 MG/1; MG/1
1 TABLET, FILM COATED ORAL EVERY 12 HOURS
Qty: 20 TABLET | Refills: 0 | Status: SHIPPED | OUTPATIENT
Start: 2019-04-11 | End: 2019-04-21

## 2019-04-11 RX ORDER — BENZONATATE 200 MG/1
200 CAPSULE ORAL 3 TIMES DAILY PRN
Qty: 30 CAPSULE | Refills: 0 | Status: SHIPPED | OUTPATIENT
Start: 2019-04-11 | End: 2019-04-21

## 2019-04-11 RX ADMIN — ALBUTEROL SULFATE 2.5 MG: 0.83 SOLUTION RESPIRATORY (INHALATION) at 03:04

## 2019-04-11 NOTE — PROGRESS NOTES
Albuterol neb administer per Dr. Kwan orders. Pt's SPO2 is 99% on RA at beginning of treatment. At end of treatment pt's SPO2 is 97% on RA.

## 2019-04-11 NOTE — PROGRESS NOTES
Subjective:       Patient ID: Polly Kwan is a 59 y.o. female.    Chief Complaint: Hypertension; Diabetes; and Nausea    HPI  60 y/o female smoker with schizophrenia, DM2, HTN, CHF combined diastolic and systolic, CAD, ICM EF <10% in 10/2018, ICD placed in 2013, COPD, hx of cocaine use is here for ER follow up.     She was in ER 3/28 with cough and chest pain, xray normal, potassium low, sent home with instructions to stop smoking. She is still coughing up yellow sputum, feels a little better but continues to have nasal congestion, pnd, sinus pressure, she denies feeling sob and no longer having chest pain. She is denies f/v/heartburn/d/constipation/cp/sob/urinary sx.  She has not picked up her Eliquis, Irbesartan, she did not  the Lasix refill either from Cardiology. She is smoking 1/2 a cigarette a day. Her weight is down 7 pounds since last visit, she is eating better since starting her Cyproheptadine. She is not sleeping well secondary to cough.     ICM/CAD/HTN: she has been out of amiodarone for over a year she says, taking Lasix 80 mg bid, Toprol XL 25 mg daily, Lipitor 40 mg, Eliquis 5 mg daily  DM2: not checking sugars, 3/2019 a1c 6.4, eye and foot exam utd, not on meds, on asa and statin  COPD:still smoking, taking fluticasone daily, using albuterol prn  Decreased appetite: cyproheptadine 4 mg twice a day    Review of Systems   Constitutional: Negative for activity change, appetite change, fatigue and fever.   HENT: Positive for congestion, postnasal drip and rhinorrhea.    Respiratory: Positive for cough. Negative for shortness of breath.    Cardiovascular: Negative for chest pain, palpitations and leg swelling.   Gastrointestinal: Negative for constipation, diarrhea, nausea and vomiting.   Genitourinary: Negative for difficulty urinating and dysuria.   Skin: Negative for rash.   Neurological: Negative for dizziness and light-headedness.   Psychiatric/Behavioral: Negative for sleep disturbance.      "  Objective:      BP 96/60 (BP Location: Left arm, Patient Position: Sitting, BP Method: Large (Manual))   Pulse 60   Ht 5' 2.5" (1.588 m)   Wt 55 kg (121 lb 4.1 oz)   SpO2 95%   BMI 21.82 kg/m²   Physical Exam   Constitutional: She appears well-developed and well-nourished.   HENT:   Head: Normocephalic and atraumatic.   Mouth/Throat: No oropharyngeal exudate.   Neck: Normal range of motion. Neck supple. No thyromegaly present.   Cardiovascular: Normal rate, regular rhythm and normal heart sounds.   Pulmonary/Chest: Effort normal and breath sounds normal. No respiratory distress.   Musculoskeletal: She exhibits no edema.   Lymphadenopathy:     She has no cervical adenopathy.   Neurological: She is alert.   Skin: Skin is warm and dry.   Psychiatric: She has a normal mood and affect.   Nursing note and vitals reviewed.      Assessment:       1. Type 2 diabetes mellitus with diabetic neuropathy, without long-term current use of insulin    2. Hypokalemia    3. Coronary artery disease involving native coronary artery of native heart without angina pectoris    4. Chronic anticoagulation    5. Chronic combined systolic and diastolic CHF, NYHA class 4    6. Essential hypertension    7. Tobacco use disorder    8. Acute bacterial bronchitis        Plan:   Polly was seen today for hypertension, diabetes and nausea.    Diagnoses and all orders for this visit:    Type 2 diabetes mellitus with diabetic neuropathy, without long-term current use of insulin    Hypokalemia  -     Cancel: Potassium; Future  -     Potassium; Future    Coronary artery disease involving native coronary artery of native heart without angina pectoris    Chronic anticoagulation    Chronic combined systolic and diastolic CHF, NYHA class 4  -     albuterol nebulizer solution 2.5 mg    Essential hypertension    Tobacco use disorder  -     albuterol nebulizer solution 2.5 mg  -     guaiFENesin (MUCINEX) 600 mg 12 hr tablet; Take 1 tablet (600 mg total) by " mouth 2 (two) times daily. for 10 days    Acute bacterial bronchitis  -     albuterol nebulizer solution 2.5 mg  -     amoxicillin-clavulanate 875-125mg (AUGMENTIN) 875-125 mg per tablet; Take 1 tablet by mouth every 12 (twelve) hours. for 10 days  -     benzonatate (TESSALON) 200 MG capsule; Take 1 capsule (200 mg total) by mouth 3 (three) times daily as needed for Cough.  -     guaiFENesin (MUCINEX) 600 mg 12 hr tablet; Take 1 tablet (600 mg total) by mouth 2 (two) times daily. for 10 days

## 2019-04-11 NOTE — PROGRESS NOTES
Pharmacy will fill furosemide. Patient declined labs today, she will have them done at her next appointment.

## 2019-04-12 NOTE — PROGRESS NOTES
CC: Well woman exam    Polly Kwan is a 59 y.o. female  presents for a well woman exam.  LMP: No LMP recorded. Patient is postmenopausal..  No GYN issues, problems, or complaints.  She is dealing with significant medical conditions.     Past Medical History:   Diagnosis Date    Asthma     Bipolar 1 disorder     Cardiomyopathy     Ischemic Cardiomyopathy with EF 25-30% by echo 16    Chronic systolic (congestive) heart failure     Cocaine use     COPD (chronic obstructive pulmonary disease)     emphysema    Coronary artery disease     ROCHELLE  and     Defibrillator discharge     Depression     Diabetes mellitus     H/O echocardiogram 2016    EF 25-30%. LV diastolic dysfxn. Severe LAE. mod MR. PASP 86.    Hypertension     ICD (implantable cardioverter-defibrillator) in place 2013    MI (myocardial infarction)     x 3    PTSD (post-traumatic stress disorder)     Schizophrenia     Seizures      Past Surgical History:   Procedure Laterality Date    CARDIAC CATHETERIZATION      CARDIAC DEFIBRILLATOR PLACEMENT      [place]      [place]    CORONARY STENT PLACEMENT      INSERT / REPLACE / REMOVE PACEMAKER       ICD [facility]     Social History     Socioeconomic History    Marital status:      Spouse name: Not on file    Number of children: 4    Years of education: Not on file    Highest education level: Not on file   Occupational History    Occupation: On disability   Social Needs    Financial resource strain: Not on file    Food insecurity:     Worry: Not on file     Inability: Not on file    Transportation needs:     Medical: Not on file     Non-medical: Not on file   Tobacco Use    Smoking status: Current Some Day Smoker     Packs/day: 3.00     Years: 30.00     Pack years: 90.00    Smokeless tobacco: Current User    Tobacco comment: 1-2  cigarettes a day when stressed   Substance and Sexual Activity    Alcohol use: No    Drug  "use: Yes     Types: Marijuana, Cocaine     Comment: once on wednesday    Sexual activity: Not Currently   Lifestyle    Physical activity:     Days per week: Not on file     Minutes per session: Not on file    Stress: Not on file   Relationships    Social connections:     Talks on phone: Not on file     Gets together: Not on file     Attends Restoration service: Not on file     Active member of club or organization: Not on file     Attends meetings of clubs or organizations: Not on file     Relationship status: Not on file   Other Topics Concern    Patient feels they ought to cut down on drinking/drug use Not Asked    Patient annoyed by others criticizing their drinking/drug use Not Asked    Patient has felt bad or guilty about drinking/drug use Not Asked    Patient has had a drink/used drugs as an eye opener in the AM Not Asked    Are you pregnant or think you may be? Not Asked    Breast-feeding Not Asked   Social History Narrative    Not on file     Family History   Adopted: Yes   Problem Relation Age of Onset    Melanoma Neg Hx      OB History        4    Para   4    Term   4            AB        Living           SAB        TAB        Ectopic        Multiple        Live Births                     BP 94/64   Ht 5' 2.5" (1.588 m)   Wt 55 kg (121 lb 5.8 oz)   BMI 21.84 kg/m²       ROS:    ROS:  GENERAL: Denies weight gain or weight loss. Feeling well overall.   SKIN: Denies rash or lesions.   HEAD: Denies head injury or headache.   NODES: Denies enlarged lymph nodes.   CHEST: Denies chest pain or shortness of breath.   CARDIOVASCULAR: Denies palpitations or left sided chest pain.   ABDOMEN: No abdominal pain, constipation, diarrhea, nausea, vomiting or rectal bleeding.   URINARY: No frequency, dysuria, hematuria, or burning on urination.  REPRODUCTIVE: See HPI.   BREASTS: The patient performs breast self-examination and denies pain, lumps, or nipple discharge.   HEMATOLOGIC: No easy " bruisability or excessive bleeding.   MUSCULOSKELETAL: Denies joint pain or swelling.   NEUROLOGIC: Denies syncope or weakness.   PSYCHIATRIC: Denies depression, anxiety or mood swings.    PHYSICAL EXAM:    APPEARANCE: Well nourished, well developed, in no acute distress.  AFFECT: WNL, alert and oriented x 3  SKIN: No acne or hirsutism  NECK: Neck symmetric without masses or thyromegaly  NODES: No inguinal, cervical, axillary, or femoral lymph node enlargement  CHEST: Good respiratory effect  ABDOMEN: Soft.  No tenderness or masses.  No hepatosplenomegaly.  No hernias.  BREASTS: Symmetrical, no skin changes or visible lesions.  No palpable masses, nipple discharge bilaterally.  PELVIC: atrophic external genitalia without lesions.  Normal hair distribution.  Adequate perineal body, normal urethral meatus.  Vagina DRY and well rugated without lesions or discharge.  Cervix pink, without lesions, discharge or tenderness.  No significant cystocele or rectocele.  Bimanual exam shows uterus to be normal size, regular, mobile and nontender.  Adnexa without masses or tenderness.    RECTAL: Rectovaginal exam confirms above with normal sphincter tone, no masses.  EXTREMITIES: No edema.      ICD-10-CM ICD-9-CM    1. Encounter for gynecological examination without abnormal finding Z01.419 V72.31 Liquid-based pap smear, screening   2.     Postmenopausal        Patient was counseled today on A.C.S. Pap guidelines and recommendations for yearly pelvic exams, mammograms and monthly self breast exams; to see her PCP for other health maintenance.     No follow-ups on file.

## 2019-04-15 ENCOUNTER — CLINICAL SUPPORT (OUTPATIENT)
Dept: REHABILITATION | Facility: OTHER | Age: 59
End: 2019-04-15
Payer: MEDICARE

## 2019-04-15 DIAGNOSIS — Z74.09 IMPAIRED FUNCTIONAL MOBILITY, BALANCE, GAIT, AND ENDURANCE: ICD-10-CM

## 2019-04-15 DIAGNOSIS — R29.898 DECONDITIONED LOW BACK: ICD-10-CM

## 2019-04-15 DIAGNOSIS — R29.898 WEAKNESS OF LEFT LOWER EXTREMITY: ICD-10-CM

## 2019-04-15 PROCEDURE — G8978 MOBILITY CURRENT STATUS: HCPCS | Mod: CL,HCNC,PN | Performed by: PHYSICAL THERAPIST

## 2019-04-15 PROCEDURE — 97163 PT EVAL HIGH COMPLEX 45 MIN: CPT | Mod: HCNC,PN | Performed by: PHYSICAL THERAPIST

## 2019-04-15 PROCEDURE — G8979 MOBILITY GOAL STATUS: HCPCS | Mod: CK,HCNC,PN | Performed by: PHYSICAL THERAPIST

## 2019-04-15 NOTE — PLAN OF CARE
"[facility]  Physical Therapy Initial Evaluation    Name: Polly Kwan  Clinic Number: [id number]    Therapy Diagnosis:   Encounter Diagnoses   Name Primary?    Deconditioned low back     Weakness of left lower extremity     Impaired functional mobility, balance, gait, and endurance      Physician: Madelin Piper, *    Physician Orders: PT Eval and Treat -3 times a week/ 6-8 weeks  Lumbar protocol with home exercises.  ROM, stretching lumbar and abdominal musculature. Core muscle strengthening exercises and gait training. Aerobic conditioning, aqua therapy, with modalities including tens, ultrasound and massage PRN.  Light weight (<30 lbs)  Medical Diagnosis from Referral:   M54.5,G89.29 (ICD-10-CM) - Chronic bilateral low back pain without sciatica   M51.36 (ICD-10-CM) - DDD (degenerative disc disease), lumbar   M47.26 (ICD-10-CM) - Other spondylosis with radiculopathy, lumbar region   M54.16 (ICD-10-CM) - Lumbar radiculopathy   M43.16 (ICD-10-CM) - Spondylolisthesis, lumbar region     Evaluation Date: 4/15/2019  Authorization Period Expiration: 12/31/2019  Plan of Care Expiration: 6/28/2019  Visit # / Visits authorized: 1/ 20    Time In: 12:00pm  Time Out: 12:40pm  Total Billable Time: 40 minutes    Precautions: Standard, Fall and pacemaker    Subjective   Date of onset: 14 years ago  History of current condition - Polly reports: Back pain since 45 years old. She reports the pain is due to "sciatic nerve damage", scoliosis and arthritis all along her L side. She was receiving treatment in [place] and being prescribed percocet. She tried physical therapy but unable to tolerate the increased pain with the exercises. She was recommended to not walk without assist, use a walker, and not reach overhead due to falls. Denies any recent falls and some days won't leave her house at all. Her weight has been up and down with recent weight loss that her  Is aware of. Also notes numbness on " outside of L leg. No B/B changes, saddle anesthesia. Requests therapy only once a week. She has many Dr's appointments with her cardiologist, pain management, podiatrist, eye dr. Coming up.      Medical History:   Past Medical History:   Diagnosis Date    Asthma     Bipolar 1 disorder     Cardiomyopathy     Ischemic Cardiomyopathy with EF 25-30% by echo 5/17/16    Chronic systolic (congestive) heart failure     Cocaine use     COPD (chronic obstructive pulmonary disease)     emphysema    Coronary artery disease     ROCHELLE 2006 and 2013    Defibrillator discharge     Depression     Diabetes mellitus     H/O echocardiogram 05/17/2016    EF 25-30%. LV diastolic dysfxn. Severe LAE. mod MR. PASP 86.    Hypertension     ICD (implantable cardioverter-defibrillator) in place 07/09/2013    MI (myocardial infarction)     x 3    PTSD (post-traumatic stress disorder)     Schizophrenia     Seizures        Surgical History:   Polly Kwan  has a past surgical history that includes Cardiac defibrillator placement; Coronary stent placement; Cardiac catheterization; Coronary angioplasty (2006); and Insert / replace / remove pacemaker (2013 ).    Medications:   Polly has a current medication list which includes the following prescription(s): albuterol, amiodarone, ammonium lactate, amoxicillin-clavulanate 875-125mg, apixaban, aspirin, atorvastatin, baclofen, benzonatate, ciclopirox, clobetasol, cyproheptadine, fluticasone furoate, furosemide, guaifenesin, irbesartan, ketoconazole, loratadine, magnesium oxide, metoprolol succinate, nitroglycerin, potassium chloride sa, trazodone, and triamcinolone acetonide 0.1%.    Allergies:   Review of patient's allergies indicates:  No Known Allergies     Imaging, bone scan films:   Pacemaker leads project over the lower chest.    The vertebral bodies are normal in height.  There is L4-5 8 mm anterolisthesis and minimal retrolisthesis at L5-S1.  Alignment does not change significantly  with flexion or extension.  Disc space narrowing present at L4-5 and L5-S1.  There is some mild osteophytic spurring at L5-S1.  Facet degenerative change present in the lower spine.          Prior Therapy: yes, in [place]  Social History:  lives with their spouse  Prior Level of Function: ran track, swam, lifting weights all in her youth. Sedentary lifestyle the last 10 years.   Current Level of Function: Will walk 1 block with her rollator, her  helps her get in<>out of the bed, can't sweep, mop, cook    Pain:  Current 10/10, worst 10/10, best 10/10   Location: left back  and torso   Description: pinching  Aggravating Factors: Sitting, Laying and Walking  Easing Factors: left side lying propped with pillows    Pts goals: To be able to walk longer than to the corner of her house    Objective     Observation: Pt with back braced donned, Labored breathing with inhaler in hand; tearful during the evaluation    Posture:  Forward flexed posture over Rollator, elevated scapula     Lumbar Range of Motion: (tested in sitting 2/2 balance impairments)   percentage Pain   Flexion 50%   Painful L low back        Extension 0%   Painful L low back        Left Side Bending 25% Painful L ribcage        Right Side Bending 25% Painful L ribcage        Left rotation   25% Painful thoracic spine        Right Rotation   25% Painful thoracic spine             Lower Extremity Strength  Right LE  Left LE    Knee extension: 4/5 Knee extension: 3-/5   Knee flexion: 4-/5 Knee flexion: 3+/5   Hip flexion: 3/5 Hip flexion: 2/5   Hip abduction: 3/5 Hip abduction: 2/5   Hip adduction: 3+/5 Hip adduction 3+/5   Ankle dorsiflexion: 4/5 Ankle dorsiflexion: 3/5       DTR: 2+ Bilaterally per MD note    Neuro Dynamic Testing:    Sciatic nerve:      SLR: R = negative     L = negative      Palpation: Tenderness to palpation grossly to thoracolumbar paraspinals    Sensation: BLE light touch sensation grossly intact    Flexibility:    Popliteal  Angle: R = 0 degrees ; L = -10 degrees     Function:  Gait speed:  0.4 ft/s (10 ft : 25sec)  Sit<>stand: mod I with increased time to perform  Sit to supine: min A LLE  Supine to sit: mod A  Static standing balance: Poor, Wide base of support with increased sway < 10 sec without UE support    Gait: narrow base of support with scissored gait, decreased heel strike B, trendelenburg, and FF posture over walker    CMS Impairment/Limitation/Restriction for FOTO back Survey    Therapist reviewed FOTO scores for Polly Kwan on 4/15/2019.   FOTO documents entered into [employer] - see Media section.    Limitation Score: 66%  Category: Mobility    Current : CL = least 60% but < 80% impaired, limited or restricted  Goal: CK = at least 40% but < 60% impaired, limited or restricted  Discharge: na         TREATMENT     Deferred per patient request. Consider seated and standing strengthening, gait training, and therapeutic exercise next visit. Poor tolerance to supine positioning**    Home Exercises and Patient Education Provided    Education provided:   - role of PT, benefits of exercise for management of condition    Written Home Exercises Provided: no, Pt deferred any treatment till next visit.      Assessment   Polly is a 59 y.o. female referred to outpatient Physical Therapy with a medical diagnosis of DDD Lumbar spine and chronic B low back pain, . Pt presents with decreased lumbar spine ROM, poor standing dynamic/ static standing balance, LLE weakness, trunk weakness, poor posture, decreased functional endurance and gait. Pt with poor tolerance to assessment with exacerbation and tearful for duration of examination.     Pt prognosis is Fair.   Pt will benefit from skilled outpatient Physical Therapy to address the deficits stated above and in the chart below, provide pt/family education, and to maximize pt's level of independence.     Plan of care discussed with patient: Yes  Pt's spiritual, cultural and educational needs  considered and patient is agreeable to the plan of care and goals as stated below:     Anticipated Barriers for therapy: severity of pain, chronicity of condition     Medical Necessity is demonstrated by the following  History  Co-morbidities and personal factors that may impact the plan of care Co-morbidities:   depression and Bipolar 1 disorder, cardiomegaly    Personal Factors:   no deficits     high   Examination  Body Structures and Functions, activity limitations and participation restrictions that may impact the plan of care Body Regions:   back  lower extremities  trunk    Body Systems:    ROM  strength  balance  gait  transfers    Participation Restrictions:   Walking, transfers, ADL's    Activity limitations:   Learning and applying knowledge  no deficits    General Tasks and Commands  no deficits    Communication  no deficits    Mobility  lifting and carrying objects  walking    Self care  washing oneself (bathing, drying, washing hands)  dressing    Domestic Life  shopping  cooking  doing house work (cleaning house, washing dishes, laundry)    Interactions/Relationships  no deficits    Life Areas  no deficits    Community and Social Life  recreation and leisure         high   Clinical Presentation unstable clinical presentation with unpredictable characteristics high   Decision Making/ Complexity Score: high     Goals:  Short Term Goals: 6 weeks   1. Patient to demonstrate independence with postural awareness for improved management of condition  2. Patient to report decreased pain in Low back and RLE by 30% or greater with ADL's  3. Patient to have improve trunk and L hip strength as noted by supine<>sit transfers mod I  4. Patient to increase gait speed by 50% or greater for improved community ambulation    Long Term Goals: 12 weeks   1. Patient to be independent with home exercise program for improved self management of condition  2. Patient to have decreased subjective report of disability as noted  by a score of 55% or less on the FOTO Low back questionnaire   3. Patient to increased strength in BLE's by 1/2 muscle grade or greater for improved performance of ADL's   4. Patient to increased AROM in lumbar spine to WFL for improved performance of ADL's      Plan   Plan of care Certification: 4/15/2019 to 6/28/2019.    Outpatient Physical Therapy 1-2 times weekly for 10 weeks to include the following interventions: Aquatic Therapy, Gait Training, Manual Therapy, Moist Heat/ Ice, Neuromuscular Re-ed, Patient Education, Therapeutic Activites, Therapeutic Exercise and modalities PRN.     Lisa Mahajan, PT

## 2019-04-17 ENCOUNTER — CLINICAL SUPPORT (OUTPATIENT)
Dept: REHABILITATION | Facility: OTHER | Age: 59
End: 2019-04-17
Payer: MEDICARE

## 2019-04-17 ENCOUNTER — OFFICE VISIT (OUTPATIENT)
Dept: OPTOMETRY | Facility: CLINIC | Age: 59
End: 2019-04-17
Payer: COMMERCIAL

## 2019-04-17 DIAGNOSIS — R29.898 WEAKNESS OF LEFT LOWER EXTREMITY: ICD-10-CM

## 2019-04-17 DIAGNOSIS — E08.40 DIABETES MELLITUS DUE TO UNDERLYING CONDITION WITH DIABETIC NEUROPATHY, WITHOUT LONG-TERM CURRENT USE OF INSULIN: ICD-10-CM

## 2019-04-17 DIAGNOSIS — H04.123 DRY EYE SYNDROME, BILATERAL: ICD-10-CM

## 2019-04-17 DIAGNOSIS — H52.4 PRESBYOPIA: Primary | ICD-10-CM

## 2019-04-17 DIAGNOSIS — H18.003 CORNEA VERTICILLATA OF BOTH EYES: ICD-10-CM

## 2019-04-17 DIAGNOSIS — R29.898 DECONDITIONED LOW BACK: ICD-10-CM

## 2019-04-17 DIAGNOSIS — E11.9 TYPE 2 DIABETES MELLITUS WITHOUT OPHTHALMIC MANIFESTATIONS: ICD-10-CM

## 2019-04-17 DIAGNOSIS — T46.2X5A CORNEAL DEPOSITS OF BOTH EYES DUE TO AMIODARONE THERAPY: ICD-10-CM

## 2019-04-17 DIAGNOSIS — H18.003 CORNEAL DEPOSITS OF BOTH EYES DUE TO AMIODARONE THERAPY: ICD-10-CM

## 2019-04-17 DIAGNOSIS — H25.13 NS (NUCLEAR SCLEROSIS), BILATERAL: ICD-10-CM

## 2019-04-17 DIAGNOSIS — Z74.09 IMPAIRED FUNCTIONAL MOBILITY, BALANCE, GAIT, AND ENDURANCE: ICD-10-CM

## 2019-04-17 DIAGNOSIS — H26.9 CORTICAL CATARACT OF BOTH EYES: ICD-10-CM

## 2019-04-17 DIAGNOSIS — I10 ESSENTIAL HYPERTENSION: ICD-10-CM

## 2019-04-17 PROCEDURE — 99999 PR PBB SHADOW E&M-EST. PATIENT-LVL II: CPT | Mod: PBBFAC,,, | Performed by: OPTOMETRIST

## 2019-04-17 PROCEDURE — 92014 PR EYE EXAM, EST PATIENT,COMPREHESV: ICD-10-PCS | Mod: S$GLB,,, | Performed by: OPTOMETRIST

## 2019-04-17 PROCEDURE — 92014 COMPRE OPH EXAM EST PT 1/>: CPT | Mod: S$GLB,,, | Performed by: OPTOMETRIST

## 2019-04-17 PROCEDURE — 92015 PR REFRACTION: ICD-10-PCS | Mod: S$GLB,,, | Performed by: OPTOMETRIST

## 2019-04-17 PROCEDURE — 97110 THERAPEUTIC EXERCISES: CPT | Mod: HCNC,PN

## 2019-04-17 PROCEDURE — 92015 DETERMINE REFRACTIVE STATE: CPT | Mod: S$GLB,,, | Performed by: OPTOMETRIST

## 2019-04-17 PROCEDURE — 99999 PR PBB SHADOW E&M-EST. PATIENT-LVL II: ICD-10-PCS | Mod: PBBFAC,,, | Performed by: OPTOMETRIST

## 2019-04-17 NOTE — PROGRESS NOTES
"  Physical Therapy Daily Treatment Note     Name: Polly Kwan  Clinic Number: [id number]    Therapy Diagnosis:   Encounter Diagnoses   Name Primary?    Deconditioned low back     Weakness of left lower extremity     Impaired functional mobility, balance, gait, and endurance      Physician: Madelin Piper, *    Visit Date: 4/17/2019    Physician Orders: PT Eval and Treat -3 times a week/ 6-8 weeks  Lumbar protocol with home exercises.  ROM, stretching lumbar and abdominal musculature. Core muscle strengthening exercises and gait training. Aerobic conditioning, aqua therapy, with modalities including tens, ultrasound and massage PRN.  Light weight (<30 lbs)  Medical Diagnosis:   M54.5,G89.29 (ICD-10-CM) - Chronic bilateral low back pain without sciatica   M51.36 (ICD-10-CM) - DDD (degenerative disc disease), lumbar   M47.26 (ICD-10-CM) - Other spondylosis with radiculopathy, lumbar region   M54.16 (ICD-10-CM) - Lumbar radiculopathy   M43.16 (ICD-10-CM) - Spondylolisthesis, lumbar region     Evaluation Date: 4/15/2019  Authorization Period Expiration: 12/31/2019  Plan of Care Certification Period: 6/28/2019  Visit #/Visits authorized: 2/20     Time In: 5:00 PM  Time Out: 5:50 PM  Total Billable Time: 50 minutes    Precautions: Standard, Fall and pacemaker    Subjective     Pt reports: "I'm very tired from being at the doctor's appointment all day today." Pt stated she is in a lot of pain, primarily in the lower back. Stated she drove to therapy alone today.  She was not compliant with home exercise program.  Response to previous treatment: no changes  Functional change: no new changes    Pain: 10/10  Location: left back      Objective     Polly received therapeutic exercises to develop strength, endurance, ROM, flexibility, posture and core stabilization for 50 minutes including:  Seated marches 15x  LAQ 15x  Seated ball squeezes 15x  Seated toe raises 15x  Seated abduction w/ OTB 15x  Standing hip flexion / abd " " In // bars 15x  Seated scap retractions 15 x 5"  Seated rows w/ YTB 15x    Home Exercises Provided and Patient Education Provided     Education provided:   - importance of LE strengthening for improved gait, importance of core stabilization, proper form / technique    Written Home Exercises Provided: Patient instructed to cont prior HEP.  Exercises were reviewed and Polly was able to demonstrate them prior to the end of the session.  Polly demonstrated fair  understanding of the education provided.     See EMR under Media for exercises provided prior visit.    Assessment     Fair tolerance to treatment session today. Pt unable to tolerated supine therex and was modified to seated / standing. Pt display decreased cardiovascular endurance and required frequent seated rest breaks for recovery. Pt was tearful throughout therex and required encouragement / motivation.   Polly is progressing well towards her goals.   Pt prognosis is Guarded.     Pt will continue to benefit from skilled outpatient physical therapy to address the deficits listed in the problem list box on initial evaluation, provide pt/family education and to maximize pt's level of independence in the home and community environment.     Pt's spiritual, cultural and educational needs considered and pt agreeable to plan of care and goals.     Anticipated barriers to physical therapy: severity of pain, chronicity of condition      Goals:     Short Term Goals: 6 weeks   1. Patient to demonstrate independence with postural awareness for improved management of condition (progressing, not met)  2. Patient to report decreased pain in Low back and RLE by 30% or greater with ADL's (progressing, not met)   3. Patient to have improve trunk and L hip strength as noted by supine<>sit transfers mod I (progressing, not met)   4. Patient to increase gait speed by 50% or greater for improved community ambulation (progressing, not met)      Long Term Goals: 12 weeks   1. " Patient to be independent with home exercise program for improved self management of condition (progressing, not met)   2. Patient to have decreased subjective report of disability as noted by a score of 55% or less on the FOTO Low back questionnaire (progressing, not met)    3. Patient to increased strength in BLE's by 1/2 muscle grade or greater for improved performance of ADL's  (progressing, not met)   4. Patient to increased AROM in lumbar spine to WFL for improved performance of ADL's (progressing, not met)        Plan     Continue with established PT Plan of Care and focus on B LE strengthening, balance, and gait training.    Gallito Orozco, PTA

## 2019-04-18 NOTE — PROGRESS NOTES
HPI     Pt here for routine eye exam. She says OU get dry and blurry often. She   says she gets headaches often which cause a pressure in OU.     Do you wear glasses? yes  Do you wear contacts? No   Any changes to vision? Yes distance and near has decreased  Flashes/floaters? No  Itching/burning/tearing? Irritation OU very frequently  Eye pain? No  Double vision? No  Do you use eye gtts? no  Eye injuries? No  Eye sx? No   Family hx of diabetes or glaucoma? No, son has congenital glaucoma     Hemoglobin A1C       Date                     Value               Ref Range             Status                03/18/2019               6.4 (H)             4.0 - 5.6 %           Final                      10/05/2018               5.8 (H)             4.0 - 5.6 %           Final                     08/20/2018               5.8 (H)             4.0 - 5.6 %           Final                        Last edited by Jolynn Boone on 4/17/2019  8:14 AM. (History)            Assessment /Plan     For exam results, see Encounter Report.    Presbyopia          l    Cornea verticillata of both eyes  Corneal deposits of both eyes due to amiodarone therapy    Diabetes mellitus due to underlying condition with diabetic neuropathy, without long-term current use of insulin  Type 2 diabetes mellitus without ophthalmic manifestations              No retinopathy, monitor yearly     Essential hypertension     Presbyopia              rx specs     NS (nuclear sclerosis), bilateral  Cortical cataract of both eyes              Monitor     Dry eye syndrome, bilateral              Use art tears BID OU        RTC 1 year, sooner PRN

## 2019-04-22 ENCOUNTER — OFFICE VISIT (OUTPATIENT)
Dept: PAIN MEDICINE | Facility: CLINIC | Age: 59
End: 2019-04-22
Attending: ANESTHESIOLOGY
Payer: MEDICARE

## 2019-04-22 VITALS
HEART RATE: 59 BPM | SYSTOLIC BLOOD PRESSURE: 96 MMHG | WEIGHT: 119.69 LBS | DIASTOLIC BLOOD PRESSURE: 56 MMHG | BODY MASS INDEX: 23.5 KG/M2 | HEIGHT: 60 IN

## 2019-04-22 DIAGNOSIS — M51.36 DDD (DEGENERATIVE DISC DISEASE), LUMBAR: Primary | ICD-10-CM

## 2019-04-22 DIAGNOSIS — M43.16 SPONDYLOLISTHESIS OF LUMBAR REGION: ICD-10-CM

## 2019-04-22 DIAGNOSIS — M54.16 CHRONIC LEFT LUMBAR RADICULOPATHY: ICD-10-CM

## 2019-04-22 DIAGNOSIS — M47.816 LUMBAR SPONDYLOSIS: ICD-10-CM

## 2019-04-22 PROCEDURE — 99204 PR OFFICE/OUTPT VISIT, NEW, LEVL IV, 45-59 MIN: ICD-10-PCS | Mod: HCNC,S$GLB,, | Performed by: ANESTHESIOLOGY

## 2019-04-22 PROCEDURE — 99204 OFFICE O/P NEW MOD 45 MIN: CPT | Mod: HCNC,S$GLB,, | Performed by: ANESTHESIOLOGY

## 2019-04-22 PROCEDURE — 3078F PR MOST RECENT DIASTOLIC BLOOD PRESSURE < 80 MM HG: ICD-10-PCS | Mod: HCNC,CPTII,S$GLB, | Performed by: ANESTHESIOLOGY

## 2019-04-22 PROCEDURE — 3074F SYST BP LT 130 MM HG: CPT | Mod: HCNC,CPTII,S$GLB, | Performed by: ANESTHESIOLOGY

## 2019-04-22 PROCEDURE — 3078F DIAST BP <80 MM HG: CPT | Mod: HCNC,CPTII,S$GLB, | Performed by: ANESTHESIOLOGY

## 2019-04-22 PROCEDURE — 3074F PR MOST RECENT SYSTOLIC BLOOD PRESSURE < 130 MM HG: ICD-10-PCS | Mod: HCNC,CPTII,S$GLB, | Performed by: ANESTHESIOLOGY

## 2019-04-22 PROCEDURE — 3008F BODY MASS INDEX DOCD: CPT | Mod: HCNC,CPTII,S$GLB, | Performed by: ANESTHESIOLOGY

## 2019-04-22 PROCEDURE — 3008F PR BODY MASS INDEX (BMI) DOCUMENTED: ICD-10-PCS | Mod: HCNC,CPTII,S$GLB, | Performed by: ANESTHESIOLOGY

## 2019-04-22 RX ORDER — GABAPENTIN 300 MG/1
300 CAPSULE ORAL 2 TIMES DAILY
Qty: 60 CAPSULE | Refills: 2 | Status: SHIPPED | OUTPATIENT
Start: 2019-04-22 | End: 2019-07-21

## 2019-04-22 NOTE — PROGRESS NOTES
Chronic Pain - New Consult    Referring Physician: Madelin Piper, *      Chief Complaint   Patient presents with    Low-back Pain        SUBJECTIVE:    Polly Kwan is a 59-year-old female with history of ischemic cardiomyopathy with pacemaker/defibrillator in situ, schizophrenia, and hx of cocaine use who presents to the clinic for the evaluation of lower back pain. The back pain started 14 years ago and symptoms have been worsening.The pain is located in the lower back area and radiates down the posterior aspect of the left leg into the foot.  The pain is described as sharp, shooting and stabbing and is rated as 10/10.  Symptoms interfere with daily activity and sleeping. The pain is exacerbated by sitting, standing and walking.  The pain is mitigated by nothing. She recently started PT. The patient reports 3-4 hours of uninterrupted sleep per night. She is not interested in back surgery or interventional pain procedures. She is requesting opioid pain medication at this time.     Patient denies night fever/night sweats, bowel/bladder incontinence, significant weight loss, significant motor weakness and loss of sensations.    Physical Therapy/Home Exercise: yes      Pain Disability Index Review:  Last 3 PDI Scores 4/22/2019   Pain Disability Index (PDI) 50       Pain Medications:    - Anti-Coagulants: Aspirin and Eliquis      report:  Reviewed     Pain Procedures: None on file     Imaging:     X-Ray Lumbar Complete With Flex And Ext (3/20/2019):    FINDINGS:  Pacemaker leads project over the lower chest.    The vertebral bodies are normal in height.  There is L4-5 8 mm anterolisthesis and minimal retrolisthesis at L5-S1.  Alignment does not change significantly with flexion or extension.  Disc space narrowing present at L4-5 and L5-S1.  There is some mild osteophytic spurring at L5-S1.  Facet degenerative change present in the lower spine.    Past Medical History:   Diagnosis Date    Asthma     Bipolar  1 disorder     Cardiomyopathy     Ischemic Cardiomyopathy with EF 25-30% by echo 5/17/16    Chronic systolic (congestive) heart failure     Cocaine use     COPD (chronic obstructive pulmonary disease)     emphysema    Coronary artery disease     ROCHELLE 2006 and 2013    Defibrillator discharge     Depression     Diabetes mellitus     H/O echocardiogram 05/17/2016    EF 25-30%. LV diastolic dysfxn. Severe LAE. mod MR. BRAMBILA 86.    Hypertension     ICD (implantable cardioverter-defibrillator) in place 07/09/2013    MI (myocardial infarction)     x 3    PTSD (post-traumatic stress disorder)     Schizophrenia     Seizures      Past Surgical History:   Procedure Laterality Date    CARDIAC CATHETERIZATION      CARDIAC DEFIBRILLATOR PLACEMENT      [place]    CORONARY STENT PLACEMENT      INSERT / REPLACE / REMOVE PACEMAKER  2013     [facility]     Social History     Socioeconomic History    Marital status:      Spouse name: Not on file    Number of children: 4    Years of education: Not on file    Highest education level: Not on file   Occupational History    Occupation: On disability   Social Needs    Financial resource strain: Not on file    Food insecurity:     Worry: Not on file     Inability: Not on file    Transportation needs:     Medical: Not on file     Non-medical: Not on file   Tobacco Use    Smoking status: Current Some Day Smoker     Packs/day: 3.00     Years: 30.00     Pack years: 90.00    Smokeless tobacco: Current User    Tobacco comment: 1-2  cigarettes a day when stressed   Substance and Sexual Activity    Alcohol use: No    Drug use: Yes     Types: Marijuana, Cocaine     Comment: once on wednesday    Sexual activity: Not Currently   Lifestyle    Physical activity:     Days per week: Not on file     Minutes per session: Not on file    Stress: Not on file   Relationships    Social connections:     Talks on phone: Not on file      Gets together: Not on file     Attends Rastafari service: Not on file     Active member of club or organization: Not on file     Attends meetings of clubs or organizations: Not on file     Relationship status: Not on file   Other Topics Concern    Patient feels they ought to cut down on drinking/drug use Not Asked    Patient annoyed by others criticizing their drinking/drug use Not Asked    Patient has felt bad or guilty about drinking/drug use Not Asked    Patient has had a drink/used drugs as an eye opener in the AM Not Asked    Are you pregnant or think you may be? Not Asked    Breast-feeding Not Asked   Social History Narrative    Not on file     Family History   Adopted: Yes   Problem Relation Age of Onset    Melanoma Neg Hx        Review of patient's allergies indicates:  No Known Allergies    Current Outpatient Medications   Medication Sig    albuterol (VENTOLIN HFA) 90 mcg/actuation inhaler INHALE 2 PUFFS INTO THE LUNGS EVERY 6 HOURS AS NEEDED FOR WHEEZING OR SHORTNESS OF BREATH    amiodarone (PACERONE) 400 MG tablet TAKE 1 TABLET (400 MG TOTAL) BY MOUTH ONCE DAILY.    ammonium lactate 12 % Crea Apply twice daily to affected parts both feet as needed.    apixaban (ELIQUIS) 5 mg Tab Take 1 tablet (5 mg total) by mouth 2 (two) times daily.    aspirin (ECOTRIN) 81 MG EC tablet Take 1 tablet (81 mg total) by mouth once daily.    atorvastatin (LIPITOR) 40 MG tablet TAKE 1 TABLET (40 MG TOTAL) BY MOUTH ONCE DAILY.    baclofen (LIORESAL) 10 MG tablet take 1 to 2 TABLETS BY MOUTH every 6 hours as needed for muscle spasms    ciclopirox (CICLODAN) 8 % Soln Apply topically nightly.    clobetasol (TEMOVATE) 0.05 % external solution Use on scalp one - two times daily as needed for scaling or itching    cyproheptadine (PERIACTIN) 4 mg tablet Take 1 tablet (4 mg total) by mouth 2 (two) times daily as needed.    fluticasone furoate (ARNUITY ELLIPTA) 100 mcg/actuation DsDv Inhale 100 mcg into the lungs  once daily. Controller    furosemide (LASIX) 80 MG tablet Take 1 tablet (80 mg total) by mouth 2 (two) times daily.    irbesartan (AVAPRO) 75 MG tablet Take 0.5 tablets (37.5 mg total) by mouth every evening.    loratadine (CLARITIN) 10 mg tablet Take 1 tablet (10 mg total) by mouth once daily.    magnesium oxide (MAG-OX) 400 mg tablet Take 0.5 tablets (200 mg total) by mouth once daily.    metoprolol succinate (TOPROL-XL) 25 MG 24 hr tablet Take 1 tablet (25 mg total) by mouth once daily.    nitroGLYCERIN (NITROSTAT) 0.4 MG SL tablet PLACE 1 TABLET (0.4 MG TOTAL) UNDER THE TONGUE EVERY 5 (FIVE) MINUTES AS NEEDED FOR CHEST PAIN.    potassium chloride SA (K-DUR,KLOR-CON) 20 MEQ tablet Take 1 tablet (20 mEq total) by mouth once daily.    traZODone (DESYREL) 150 MG tablet Take 1 tablet (150 mg total) by mouth every evening.    triamcinolone acetonide 0.1% (KENALOG) 0.1 % ointment AAA bid    gabapentin (NEURONTIN) 300 MG capsule Take 1 capsule (300 mg total) by mouth 2 (two) times daily.     No current facility-administered medications for this visit.        REVIEW OF SYSTEMS:    GENERAL:  No weight loss, malaise or fevers.  HEENT:  Negative for frequent or significant headaches.  NECK:  Negative for lumps, goiter, and significant neck swelling.  RESPIRATORY:  Negative for cough, wheezing or shortness of breath.  CARDIOVASCULAR:  Negative for chest pain or palpitations.  GI:  Negative for abdominal discomfort, blood in stools or black stools or change in bowel habits.  MUSCULOSKELETAL:  See HPI.  SKIN:  Negative for lesions, rash, and itching.  PSYCH:  + sleep disturbance  HEMATOLOGY/LYMPHOLOGY:  Negative for prolonged bleeding, bruising easily or swollen nodes.  NEURO:   No history of seizures or tremors.  All other reviewed and negative other than HPI.    OBJECTIVE:    BP (!) 96/56   Pulse (!) 59   Ht 5' (1.524 m)   Wt 54.3 kg (119 lb 11.4 oz)   BMI 23.38 kg/m²     PHYSICAL EXAMINATION:    General  appearance: Well appearing, in no acute distress, alert and oriented x3.  Psych:  Mood and affect appropriate.  Skin: Skin color, texture, turgor normal, no rashes or lesions, in both upper and lower body.  Head/face:  Normocephalic, atraumatic.   Neck: No pain to palpation over the cervical paraspinous muscles.  No pain with neck flexion, extension, or lateral flexion.   Cor: RRR  Pulm: Breathing unlabored.  GI:  Soft and non-tender.  Back: Straight leg raising  is negative to radicular pain. Diffuse tenderness to palpation throughout the lumbar spine and paraspinous muscles out of proportion to exam. ROM is decreased on flexion and extension.  Extremities: Peripheral joint ROM is full and pain free without obvious instability or laxity in all four extremities. No deformities, edema, or skin discoloration.   Musculoskeletal:  No atrophy or tone abnormalities are noted. Tenderness to palpation over the PSIS.  Neuro: Bilateral  lower extremity coordination and muscle stretch reflexes are physiologic and symmetric.  No loss of sensation is noted.  Strength testing:    Right hip flexion: 5/5  Left hip flexion: 4/5  Right knee extension: 5/5  Left knee extension: 5/5  Right knee flexion: 5/5  Left knee flexion: 5/5  Right ankle dorsiflexion: 5/5  Left ankle dorsiflexion: 4+/5      Gait: Antalgic, uses walker.    ASSESSMENT: 59 y.o.female with chronic low back and left leg pain, consistent with discogenic pain and radiculitis.  Low back pain also a likely component of facet arthropathy.    1. DDD (degenerative disc disease), lumbar    2. Chronic left lumbar radiculopathy    3. Lumbar spondylosis    4. Spondylolisthesis of lumbar region          PLAN:     - I have stressed the importance of physical activity and a home exercise plan to help with pain and improve health.  - I have stressed the importance of smoking cessation.  - Continue Physical therapy for lumbar stabilization, core strengthening, and a home exercise  program.  - Rx Gabapentin 300 mg b.i.d. to help with neuropathic pain.  - I do not feel this patient is a candidate for long term opioids for this non-cancer pain at this time.  - in the future I will consider lumbar ROCHELLE.  The patient is not interested in interventional pain procedure at this time.  - Return to clinic with any new or worsening symptoms, or if symptoms persist.      The above plan and management options were discussed at length with patient. Patient is in agreement with the above and verbalized understanding. It will be communicated with the referring physician via electronic record, fax, or mail.    Aron Ryder III  04/22/2019

## 2019-04-22 NOTE — LETTER
April 22, 2019      Madelin Piper PA-C  [address]  [facility] [address]           [facility]  [address]  [facility] [address]  Phone: [phone]  Fax: [phone]          Patient: Polly Kwan   MR Number: [id number]   YOB: 1960   Date of Visit: 4/22/2019       Dear Madelin Piper:    Thank you for referring Polly Kwan to me for evaluation. Attached you will find relevant portions of my assessment and plan of care.    If you have questions, please do not hesitate to call me. I look forward to following Polly Kwan along with you.    Sincerely,    Aron Ryder III, MD    Enclosure  CC:  No Recipients    If you would like to receive this communication electronically, please contact [email or web address] or [phone] to request more information on [employer] Link access.    For providers and/or their staff who would like to refer a patient to [facility], please contact us through our one-stop-shop provider referral line, [facility] [place], at [phone].    If you feel you have received this communication in error or would no longer like to receive these types of communications, please e-mail [email or web address]

## 2019-04-23 DIAGNOSIS — J44.9 CHRONIC OBSTRUCTIVE PULMONARY DISEASE, UNSPECIFIED COPD TYPE: ICD-10-CM

## 2019-04-23 RX ORDER — ALBUTEROL SULFATE 90 UG/1
AEROSOL, METERED RESPIRATORY (INHALATION)
Qty: 18 INHALER | Refills: 0 | Status: SHIPPED | OUTPATIENT
Start: 2019-04-23

## 2019-05-21 DIAGNOSIS — J44.9 CHRONIC OBSTRUCTIVE PULMONARY DISEASE, UNSPECIFIED COPD TYPE: ICD-10-CM

## 2019-05-21 RX ORDER — ALBUTEROL SULFATE 90 UG/1
AEROSOL, METERED RESPIRATORY (INHALATION)
Qty: 18 INHALER | Refills: 0 | Status: SHIPPED | OUTPATIENT
Start: 2019-05-21 | End: 2019-07-17 | Stop reason: SDUPTHER

## 2019-06-28 DIAGNOSIS — I25.5 ISCHEMIC CARDIOMYOPATHY: ICD-10-CM

## 2019-06-28 DIAGNOSIS — I25.10 CORONARY ARTERY DISEASE INVOLVING NATIVE CORONARY ARTERY OF NATIVE HEART WITHOUT ANGINA PECTORIS: ICD-10-CM

## 2019-06-28 RX ORDER — ASPIRIN 81 MG/1
TABLET ORAL
Qty: 90 TABLET | Refills: 0 | Status: SHIPPED | OUTPATIENT
Start: 2019-06-28 | End: 2019-09-20 | Stop reason: SDUPTHER

## 2019-06-29 ENCOUNTER — HOSPITAL ENCOUNTER (INPATIENT)
Facility: HOSPITAL | Age: 59
LOS: 4 days | Discharge: HOME | DRG: 291 | End: 2019-07-03
Attending: EMERGENCY MEDICINE | Admitting: INTERNAL MEDICINE
Payer: MEDICARE

## 2019-06-29 DIAGNOSIS — I50.9 HEART FAILURE: ICD-10-CM

## 2019-06-29 DIAGNOSIS — I21.4 NSTEMI (NON-ST ELEVATED MYOCARDIAL INFARCTION): ICD-10-CM

## 2019-06-29 DIAGNOSIS — I48.91 ATRIAL FIBRILLATION: ICD-10-CM

## 2019-06-29 DIAGNOSIS — I50.43 ACUTE ON CHRONIC COMBINED SYSTOLIC AND DIASTOLIC CONGESTIVE HEART FAILURE: ICD-10-CM

## 2019-06-29 DIAGNOSIS — I48.0 PAROXYSMAL A-FIB: ICD-10-CM

## 2019-06-29 DIAGNOSIS — R06.02 SHORTNESS OF BREATH: ICD-10-CM

## 2019-06-29 DIAGNOSIS — Z71.89 ADVANCE CARE PLANNING: ICD-10-CM

## 2019-06-29 DIAGNOSIS — I50.23 ACUTE ON CHRONIC SYSTOLIC CONGESTIVE HEART FAILURE: Primary | ICD-10-CM

## 2019-06-29 DIAGNOSIS — Z71.89 GOALS OF CARE, COUNSELING/DISCUSSION: ICD-10-CM

## 2019-06-29 DIAGNOSIS — Z51.5 PALLIATIVE CARE ENCOUNTER: ICD-10-CM

## 2019-06-29 DIAGNOSIS — I48.91 ATRIAL FIBRILLATION WITH RVR: ICD-10-CM

## 2019-06-29 DIAGNOSIS — I25.5 ISCHEMIC CARDIOMYOPATHY: ICD-10-CM

## 2019-06-29 PROBLEM — J96.21 ACUTE AND CHRONIC RESPIRATORY FAILURE WITH HYPOXIA: Status: ACTIVE | Noted: 2019-06-29

## 2019-06-29 PROBLEM — Z86.79 HISTORY OF VENTRICULAR TACHYCARDIA: Status: ACTIVE | Noted: 2018-01-31

## 2019-06-29 PROBLEM — J44.1 CHRONIC OBSTRUCTIVE PULMONARY DISEASE WITH ACUTE EXACERBATION: Status: ACTIVE | Noted: 2017-06-05

## 2019-06-29 LAB
ALBUMIN SERPL BCP-MCNC: 3.1 G/DL (ref 3.5–5.2)
ALBUMIN SERPL BCP-MCNC: 3.6 G/DL (ref 3.5–5.2)
ALLENS TEST: ABNORMAL
ALLENS TEST: ABNORMAL
ALP SERPL-CCNC: 103 U/L (ref 55–135)
ALP SERPL-CCNC: 94 U/L (ref 55–135)
ALT SERPL W/O P-5'-P-CCNC: 18 U/L (ref 10–44)
ALT SERPL W/O P-5'-P-CCNC: 20 U/L (ref 10–44)
AMPHET+METHAMPHET UR QL: NEGATIVE
AMPHET+METHAMPHET UR QL: NEGATIVE
ANION GAP SERPL CALC-SCNC: 11 MMOL/L (ref 8–16)
ANION GAP SERPL CALC-SCNC: 12 MMOL/L (ref 8–16)
AST SERPL-CCNC: 25 U/L (ref 10–40)
AST SERPL-CCNC: 27 U/L (ref 10–40)
BACTERIA #/AREA URNS AUTO: ABNORMAL /HPF
BARBITURATES UR QL SCN>200 NG/ML: NEGATIVE
BARBITURATES UR QL SCN>200 NG/ML: NEGATIVE
BASOPHILS # BLD AUTO: 0.01 K/UL (ref 0–0.2)
BASOPHILS NFR BLD: 0.2 % (ref 0–1.9)
BENZODIAZ UR QL SCN>200 NG/ML: NEGATIVE
BENZODIAZ UR QL SCN>200 NG/ML: NEGATIVE
BILIRUB SERPL-MCNC: 2.4 MG/DL (ref 0.1–1)
BILIRUB SERPL-MCNC: 2.6 MG/DL (ref 0.1–1)
BILIRUB UR QL STRIP: NEGATIVE
BNP SERPL-MCNC: 2739 PG/ML (ref 0–99)
BUN SERPL-MCNC: 15 MG/DL (ref 6–20)
BUN SERPL-MCNC: 17 MG/DL (ref 6–20)
BZE UR QL SCN: NORMAL
BZE UR QL SCN: NORMAL
CALCIUM SERPL-MCNC: 8.4 MG/DL (ref 8.7–10.5)
CALCIUM SERPL-MCNC: 9.3 MG/DL (ref 8.7–10.5)
CANNABINOIDS UR QL SCN: NORMAL
CANNABINOIDS UR QL SCN: NORMAL
CHLORIDE SERPL-SCNC: 103 MMOL/L (ref 95–110)
CHLORIDE SERPL-SCNC: 95 MMOL/L (ref 95–110)
CLARITY UR REFRACT.AUTO: ABNORMAL
CO2 SERPL-SCNC: 21 MMOL/L (ref 23–29)
CO2 SERPL-SCNC: 28 MMOL/L (ref 23–29)
COLOR UR AUTO: ABNORMAL
CREAT SERPL-MCNC: 0.7 MG/DL (ref 0.5–1.4)
CREAT SERPL-MCNC: 0.8 MG/DL (ref 0.5–1.4)
CREAT SERPL-MCNC: 1 MG/DL (ref 0.5–1.4)
CREAT UR-MCNC: 115 MG/DL (ref 15–325)
CREAT UR-MCNC: 115 MG/DL (ref 15–325)
DELSYS: ABNORMAL
DIFFERENTIAL METHOD: ABNORMAL
EOSINOPHIL # BLD AUTO: 0 K/UL (ref 0–0.5)
EOSINOPHIL NFR BLD: 0.4 % (ref 0–8)
EP: 5
ERYTHROCYTE [DISTWIDTH] IN BLOOD BY AUTOMATED COUNT: 15.9 % (ref 11.5–14.5)
ERYTHROCYTE [SEDIMENTATION RATE] IN BLOOD BY WESTERGREN METHOD: 16 MM/H
EST. GFR  (AFRICAN AMERICAN): >60 ML/MIN/1.73 M^2
EST. GFR  (AFRICAN AMERICAN): >60 ML/MIN/1.73 M^2
EST. GFR  (NON AFRICAN AMERICAN): >60 ML/MIN/1.73 M^2
EST. GFR  (NON AFRICAN AMERICAN): >60 ML/MIN/1.73 M^2
ETHANOL UR-MCNC: <10 MG/DL
FIO2: 40
GLUCOSE SERPL-MCNC: 131 MG/DL (ref 70–110)
GLUCOSE SERPL-MCNC: 131 MG/DL (ref 70–110)
GLUCOSE SERPL-MCNC: 95 MG/DL (ref 70–110)
GLUCOSE UR QL STRIP: NEGATIVE
HCO3 UR-SCNC: 21.5 MMOL/L (ref 24–28)
HCO3 UR-SCNC: 23.6 MMOL/L (ref 24–28)
HCT VFR BLD AUTO: 37.2 % (ref 37–48.5)
HGB BLD-MCNC: 12.5 G/DL (ref 12–16)
HGB UR QL STRIP: ABNORMAL
HYALINE CASTS UR QL AUTO: 8 /LPF
IMM GRANULOCYTES # BLD AUTO: 0.02 K/UL (ref 0–0.04)
IMM GRANULOCYTES NFR BLD AUTO: 0.4 % (ref 0–0.5)
INR PPP: 1.6 (ref 0.8–1.2)
IP: 10
KETONES UR QL STRIP: NEGATIVE
LACTATE SERPL-SCNC: 2.9 MMOL/L (ref 0.5–2.2)
LACTATE SERPL-SCNC: 3.1 MMOL/L (ref 0.5–2.2)
LDH SERPL L TO P-CCNC: 4.36 MMOL/L (ref 0.5–2.2)
LEUKOCYTE ESTERASE UR QL STRIP: ABNORMAL
LYMPHOCYTES # BLD AUTO: 2.3 K/UL (ref 1–4.8)
LYMPHOCYTES NFR BLD: 42.1 % (ref 18–48)
MAGNESIUM SERPL-MCNC: 1.7 MG/DL (ref 1.6–2.6)
MAGNESIUM SERPL-MCNC: 1.9 MG/DL (ref 1.6–2.6)
MCH RBC QN AUTO: 29.6 PG (ref 27–31)
MCHC RBC AUTO-ENTMCNC: 33.6 G/DL (ref 32–36)
MCV RBC AUTO: 88 FL (ref 82–98)
METHADONE UR QL SCN>300 NG/ML: NEGATIVE
METHADONE UR QL SCN>300 NG/ML: NEGATIVE
MICROSCOPIC COMMENT: ABNORMAL
MIN VOL: 21
MODE: ABNORMAL
MONOCYTES # BLD AUTO: 0.7 K/UL (ref 0.3–1)
MONOCYTES NFR BLD: 12 % (ref 4–15)
NEUTROPHILS # BLD AUTO: 2.5 K/UL (ref 1.8–7.7)
NEUTROPHILS NFR BLD: 44.9 % (ref 38–73)
NITRITE UR QL STRIP: NEGATIVE
NRBC BLD-RTO: 1 /100 WBC
OPIATES UR QL SCN: NEGATIVE
OPIATES UR QL SCN: NEGATIVE
PCO2 BLDA: 34.2 MMHG (ref 35–45)
PCO2 BLDA: 41.7 MMHG (ref 35–45)
PCP UR QL SCN>25 NG/ML: NEGATIVE
PCP UR QL SCN>25 NG/ML: NEGATIVE
PH SMN: 7.32 [PH] (ref 7.35–7.45)
PH SMN: 7.45 [PH] (ref 7.35–7.45)
PH UR STRIP: 5 [PH] (ref 5–8)
PLATELET # BLD AUTO: 166 K/UL (ref 150–350)
PMV BLD AUTO: 9.7 FL (ref 9.2–12.9)
PO2 BLDA: 189 MMHG (ref 80–100)
PO2 BLDA: 19 MMHG (ref 40–60)
POC BE: -5 MMOL/L
POC BE: 0 MMOL/L
POC SATURATED O2: 100 % (ref 95–100)
POC SATURATED O2: 26 % (ref 95–100)
POC TCO2: 23 MMOL/L (ref 24–29)
POC TCO2: 25 MMOL/L (ref 23–27)
POCT GLUCOSE: 86 MG/DL (ref 70–110)
POTASSIUM SERPL-SCNC: 3.9 MMOL/L (ref 3.5–5.1)
POTASSIUM SERPL-SCNC: 4.3 MMOL/L (ref 3.5–5.1)
PROCALCITONIN SERPL IA-MCNC: 0.05 NG/ML
PROT SERPL-MCNC: 6.2 G/DL (ref 6–8.4)
PROT SERPL-MCNC: 6.9 G/DL (ref 6–8.4)
PROT UR QL STRIP: ABNORMAL
PROTHROMBIN TIME: 15.7 SEC (ref 9–12.5)
RBC # BLD AUTO: 4.22 M/UL (ref 4–5.4)
RBC #/AREA URNS AUTO: 10 /HPF (ref 0–4)
SAMPLE: ABNORMAL
SAMPLE: ABNORMAL
SAMPLE: NORMAL
SITE: ABNORMAL
SITE: ABNORMAL
SODIUM SERPL-SCNC: 135 MMOL/L (ref 136–145)
SODIUM SERPL-SCNC: 135 MMOL/L (ref 136–145)
SP GR UR STRIP: 1.01 (ref 1–1.03)
SPONT RATE: 13
SQUAMOUS #/AREA URNS AUTO: 4 /HPF
T4 FREE SERPL-MCNC: 1.19 NG/DL (ref 0.71–1.51)
TOXICOLOGY INFORMATION: NORMAL
TOXICOLOGY INFORMATION: NORMAL
TROPONIN I SERPL DL<=0.01 NG/ML-MCNC: 0.04 NG/ML (ref 0–0.03)
TROPONIN I SERPL DL<=0.01 NG/ML-MCNC: 0.04 NG/ML (ref 0–0.03)
TSH SERPL DL<=0.005 MIU/L-ACNC: 0.29 UIU/ML (ref 0.4–4)
URN SPEC COLLECT METH UR: ABNORMAL
WBC # BLD AUTO: 5.49 K/UL (ref 3.9–12.7)
WBC #/AREA URNS AUTO: 17 /HPF (ref 0–5)

## 2019-06-29 PROCEDURE — 20000000 HC ICU ROOM: Mod: HCNC

## 2019-06-29 PROCEDURE — 87040 BLOOD CULTURE FOR BACTERIA: CPT | Mod: HCNC

## 2019-06-29 PROCEDURE — 27000190 HC CPAP FULL FACE MASK W/VALVE: Mod: HCNC

## 2019-06-29 PROCEDURE — 94660 CPAP INITIATION&MGMT: CPT | Mod: HCNC

## 2019-06-29 PROCEDURE — 83880 ASSAY OF NATRIURETIC PEPTIDE: CPT | Mod: HCNC

## 2019-06-29 PROCEDURE — 83605 ASSAY OF LACTIC ACID: CPT | Mod: 91,HCNC

## 2019-06-29 PROCEDURE — 93010 EKG 12-LEAD: ICD-10-PCS | Mod: HCNC,,, | Performed by: INTERNAL MEDICINE

## 2019-06-29 PROCEDURE — 25000003 PHARM REV CODE 250: Mod: HCNC | Performed by: EMERGENCY MEDICINE

## 2019-06-29 PROCEDURE — 85025 COMPLETE CBC W/AUTO DIFF WBC: CPT | Mod: HCNC

## 2019-06-29 PROCEDURE — 83735 ASSAY OF MAGNESIUM: CPT | Mod: 91,HCNC

## 2019-06-29 PROCEDURE — 82565 ASSAY OF CREATININE: CPT | Mod: HCNC

## 2019-06-29 PROCEDURE — 99223 1ST HOSP IP/OBS HIGH 75: CPT | Mod: AI,HCNC,, | Performed by: INTERNAL MEDICINE

## 2019-06-29 PROCEDURE — 81001 URINALYSIS AUTO W/SCOPE: CPT | Mod: HCNC

## 2019-06-29 PROCEDURE — 80053 COMPREHEN METABOLIC PANEL: CPT | Mod: HCNC

## 2019-06-29 PROCEDURE — 99223 PR INITIAL HOSPITAL CARE,LEVL III: ICD-10-PCS | Mod: AI,HCNC,, | Performed by: INTERNAL MEDICINE

## 2019-06-29 PROCEDURE — 93010 ELECTROCARDIOGRAM REPORT: CPT | Mod: HCNC,,, | Performed by: INTERNAL MEDICINE

## 2019-06-29 PROCEDURE — 87086 URINE CULTURE/COLONY COUNT: CPT | Mod: HCNC

## 2019-06-29 PROCEDURE — 80307 DRUG TEST PRSMV CHEM ANLYZR: CPT | Mod: HCNC

## 2019-06-29 PROCEDURE — 63600175 PHARM REV CODE 636 W HCPCS: Mod: HCNC | Performed by: STUDENT IN AN ORGANIZED HEALTH CARE EDUCATION/TRAINING PROGRAM

## 2019-06-29 PROCEDURE — 25000003 PHARM REV CODE 250: Mod: HCNC | Performed by: STUDENT IN AN ORGANIZED HEALTH CARE EDUCATION/TRAINING PROGRAM

## 2019-06-29 PROCEDURE — 94640 AIRWAY INHALATION TREATMENT: CPT | Mod: HCNC

## 2019-06-29 PROCEDURE — 36600 WITHDRAWAL OF ARTERIAL BLOOD: CPT | Mod: HCNC

## 2019-06-29 PROCEDURE — 83605 ASSAY OF LACTIC ACID: CPT | Mod: HCNC

## 2019-06-29 PROCEDURE — 82803 BLOOD GASES ANY COMBINATION: CPT | Mod: HCNC

## 2019-06-29 PROCEDURE — 94761 N-INVAS EAR/PLS OXIMETRY MLT: CPT | Mod: HCNC

## 2019-06-29 PROCEDURE — 83735 ASSAY OF MAGNESIUM: CPT | Mod: HCNC

## 2019-06-29 PROCEDURE — 84439 ASSAY OF FREE THYROXINE: CPT | Mod: HCNC

## 2019-06-29 PROCEDURE — 27000221 HC OXYGEN, UP TO 24 HOURS: Mod: HCNC

## 2019-06-29 PROCEDURE — 84145 PROCALCITONIN (PCT): CPT | Mod: HCNC

## 2019-06-29 PROCEDURE — 80053 COMPREHEN METABOLIC PANEL: CPT | Mod: 91,HCNC

## 2019-06-29 PROCEDURE — 85610 PROTHROMBIN TIME: CPT | Mod: HCNC

## 2019-06-29 PROCEDURE — 99900035 HC TECH TIME PER 15 MIN (STAT): Mod: HCNC

## 2019-06-29 PROCEDURE — 99285 EMERGENCY DEPT VISIT HI MDM: CPT | Mod: HCNC,,, | Performed by: EMERGENCY MEDICINE

## 2019-06-29 PROCEDURE — 63600175 PHARM REV CODE 636 W HCPCS: Mod: HCNC | Performed by: EMERGENCY MEDICINE

## 2019-06-29 PROCEDURE — 99285 PR EMERGENCY DEPT VISIT,LEVEL V: ICD-10-PCS | Mod: HCNC,,, | Performed by: EMERGENCY MEDICINE

## 2019-06-29 PROCEDURE — 99285 EMERGENCY DEPT VISIT HI MDM: CPT | Mod: 25,HCNC

## 2019-06-29 PROCEDURE — 84484 ASSAY OF TROPONIN QUANT: CPT | Mod: HCNC

## 2019-06-29 PROCEDURE — 25000242 PHARM REV CODE 250 ALT 637 W/ HCPCS: Mod: HCNC | Performed by: EMERGENCY MEDICINE

## 2019-06-29 PROCEDURE — 84443 ASSAY THYROID STIM HORMONE: CPT | Mod: HCNC

## 2019-06-29 PROCEDURE — 84484 ASSAY OF TROPONIN QUANT: CPT | Mod: 91,HCNC

## 2019-06-29 PROCEDURE — 63600175 PHARM REV CODE 636 W HCPCS: Mod: HCNC | Performed by: INTERNAL MEDICINE

## 2019-06-29 PROCEDURE — 93005 ELECTROCARDIOGRAM TRACING: CPT | Mod: HCNC

## 2019-06-29 RX ORDER — GLUCAGON 1 MG
1 KIT INJECTION
Status: DISCONTINUED | OUTPATIENT
Start: 2019-06-29 | End: 2019-07-03 | Stop reason: HOSPADM

## 2019-06-29 RX ORDER — NAPROXEN SODIUM 220 MG/1
162 TABLET, FILM COATED ORAL
Status: COMPLETED | OUTPATIENT
Start: 2019-06-29 | End: 2019-06-29

## 2019-06-29 RX ORDER — ALBUTEROL SULFATE 90 UG/1
2 AEROSOL, METERED RESPIRATORY (INHALATION) EVERY 6 HOURS PRN
Status: DISCONTINUED | OUTPATIENT
Start: 2019-06-29 | End: 2019-06-29

## 2019-06-29 RX ORDER — IPRATROPIUM BROMIDE AND ALBUTEROL SULFATE 2.5; .5 MG/3ML; MG/3ML
3 SOLUTION RESPIRATORY (INHALATION) EVERY 12 HOURS
Status: DISCONTINUED | OUTPATIENT
Start: 2019-06-30 | End: 2019-06-29

## 2019-06-29 RX ORDER — FUROSEMIDE 10 MG/ML
80 INJECTION INTRAMUSCULAR; INTRAVENOUS 2 TIMES DAILY
Status: DISCONTINUED | OUTPATIENT
Start: 2019-06-30 | End: 2019-06-29

## 2019-06-29 RX ORDER — SODIUM CHLORIDE 0.9 % (FLUSH) 0.9 %
10 SYRINGE (ML) INJECTION
Status: DISCONTINUED | OUTPATIENT
Start: 2019-06-29 | End: 2019-07-03 | Stop reason: HOSPADM

## 2019-06-29 RX ORDER — DIGOXIN 0.25 MG/ML
500 INJECTION INTRAMUSCULAR; INTRAVENOUS ONCE
Status: COMPLETED | OUTPATIENT
Start: 2019-06-29 | End: 2019-06-29

## 2019-06-29 RX ORDER — ASPIRIN 81 MG/1
81 TABLET ORAL DAILY
Status: DISCONTINUED | OUTPATIENT
Start: 2019-06-30 | End: 2019-07-03 | Stop reason: HOSPADM

## 2019-06-29 RX ORDER — FUROSEMIDE 10 MG/ML
80 INJECTION INTRAMUSCULAR; INTRAVENOUS
Status: COMPLETED | OUTPATIENT
Start: 2019-06-29 | End: 2019-06-29

## 2019-06-29 RX ORDER — IBUPROFEN 200 MG
24 TABLET ORAL
Status: DISCONTINUED | OUTPATIENT
Start: 2019-06-29 | End: 2019-07-03 | Stop reason: HOSPADM

## 2019-06-29 RX ORDER — AMIODARONE HYDROCHLORIDE 200 MG/1
400 TABLET ORAL DAILY
Status: DISCONTINUED | OUTPATIENT
Start: 2019-06-30 | End: 2019-06-29

## 2019-06-29 RX ORDER — ACETAMINOPHEN 325 MG/1
650 TABLET ORAL EVERY 6 HOURS PRN
Status: DISCONTINUED | OUTPATIENT
Start: 2019-06-29 | End: 2019-07-03 | Stop reason: HOSPADM

## 2019-06-29 RX ORDER — ATORVASTATIN CALCIUM 20 MG/1
40 TABLET, FILM COATED ORAL DAILY
Status: DISCONTINUED | OUTPATIENT
Start: 2019-06-30 | End: 2019-07-03 | Stop reason: HOSPADM

## 2019-06-29 RX ORDER — PREDNISONE 20 MG/1
40 TABLET ORAL DAILY
Status: DISCONTINUED | OUTPATIENT
Start: 2019-06-29 | End: 2019-06-29

## 2019-06-29 RX ORDER — IPRATROPIUM BROMIDE AND ALBUTEROL SULFATE 2.5; .5 MG/3ML; MG/3ML
3 SOLUTION RESPIRATORY (INHALATION) EVERY 6 HOURS PRN
Status: DISCONTINUED | OUTPATIENT
Start: 2019-06-29 | End: 2019-07-03 | Stop reason: HOSPADM

## 2019-06-29 RX ORDER — IPRATROPIUM BROMIDE AND ALBUTEROL SULFATE 2.5; .5 MG/3ML; MG/3ML
3 SOLUTION RESPIRATORY (INHALATION) EVERY 12 HOURS
Status: DISCONTINUED | OUTPATIENT
Start: 2019-06-30 | End: 2019-07-03 | Stop reason: HOSPADM

## 2019-06-29 RX ORDER — IBUPROFEN 200 MG
16 TABLET ORAL
Status: DISCONTINUED | OUTPATIENT
Start: 2019-06-29 | End: 2019-07-03 | Stop reason: HOSPADM

## 2019-06-29 RX ORDER — IPRATROPIUM BROMIDE AND ALBUTEROL SULFATE 2.5; .5 MG/3ML; MG/3ML
3 SOLUTION RESPIRATORY (INHALATION)
Status: DISCONTINUED | OUTPATIENT
Start: 2019-06-30 | End: 2019-06-29

## 2019-06-29 RX ORDER — IPRATROPIUM BROMIDE AND ALBUTEROL SULFATE 2.5; .5 MG/3ML; MG/3ML
3 SOLUTION RESPIRATORY (INHALATION)
Status: COMPLETED | OUTPATIENT
Start: 2019-06-29 | End: 2019-06-29

## 2019-06-29 RX ADMIN — IPRATROPIUM BROMIDE AND ALBUTEROL SULFATE 3 ML: .5; 3 SOLUTION RESPIRATORY (INHALATION) at 05:06

## 2019-06-29 RX ADMIN — AMIODARONE HYDROCHLORIDE 150 MG: 1.5 INJECTION, SOLUTION INTRAVENOUS at 08:06

## 2019-06-29 RX ADMIN — ASPIRIN 81 MG CHEWABLE TABLET 162 MG: 81 TABLET CHEWABLE at 07:06

## 2019-06-29 RX ADMIN — DIGOXIN 500 MCG: 0.25 INJECTION INTRAMUSCULAR; INTRAVENOUS at 09:06

## 2019-06-29 RX ADMIN — AMIODARONE HYDROCHLORIDE 1 MG/MIN: 1.8 INJECTION, SOLUTION INTRAVENOUS at 09:06

## 2019-06-29 RX ADMIN — FUROSEMIDE 20 MG/HR: 10 INJECTION, SOLUTION INTRAMUSCULAR; INTRAVENOUS at 11:06

## 2019-06-29 RX ADMIN — FUROSEMIDE 80 MG: 10 INJECTION, SOLUTION INTRAMUSCULAR; INTRAVENOUS at 07:06

## 2019-06-29 NOTE — ED PROVIDER NOTES
"Encounter Date: 6/29/2019    SCRIBE #1 NOTE: I, Sarah Nolen, am scribing for, and in the presence of,  Dr. Lacey. I have scribed the following portions of the note - Other sections scribed: HPI, ROS, EKG.       History     Chief Complaint   Patient presents with    Shortness of Breath     SOB, COPD, A Fib     The patient is a 59 y.o. female with a medical history of asthma, bipolar disorder, cardiomyopathy, HTN, CHF, COPD,  CAD, MI, DM, schizophrenia, PTSD and seizures presenting via EMS with a chief complaint of shortness of breath.  The patient has been experiencing shortness of breath and productive cough with clear phlegm x2 days. She has associated "tightness" under her rib cage, hot flashes, nausea, vomiting, and general weakness/fatigue. Patient denies chest pain, fevers, diarrhea, LE swelling, blood in her stool and hematemesis. She reports smoking half a cigarette today. The patient endorses being complaint with her prescribed medications, and using her inhaler at home. There is an implantable cardioverter-defibrillator in place.     The history is provided by the patient and medical records.     Review of patient's allergies indicates:  No Known Allergies  Past Medical History:   Diagnosis Date    Asthma     Bipolar 1 disorder     Cardiomyopathy     Ischemic Cardiomyopathy with EF 25-30% by echo 5/17/16    Chronic systolic (congestive) heart failure     Cocaine use     COPD (chronic obstructive pulmonary disease)     emphysema    Coronary artery disease     ROCHELLE 2006 and 2013    Defibrillator discharge     Depression     Diabetes mellitus     H/O echocardiogram 05/17/2016    EF 25-30%. LV diastolic dysfxn. Severe LAE. mod MR. PASP 86.    Hypertension     ICD (implantable cardioverter-defibrillator) in place 07/09/2013    MI (myocardial infarction)     x 3    PTSD (post-traumatic stress disorder)     Schizophrenia     Seizures      Past Surgical History:   Procedure Laterality Date    " CARDIAC CATHETERIZATION      CARDIAC DEFIBRILLATOR PLACEMENT      [place]  2006    [place]    CORONARY STENT PLACEMENT      INSERT / REPLACE / REMOVE PACEMAKER  2013     ICD [facility]     Family History   Adopted: Yes   Problem Relation Age of Onset    Melanoma Neg Hx      Social History     Tobacco Use    Smoking status: Current Some Day Smoker     Packs/day: 3.00     Years: 30.00     Pack years: 90.00    Smokeless tobacco: Current User    Tobacco comment: 1-2  cigarettes a day when stressed   Substance Use Topics    Alcohol use: No    Drug use: Yes     Types: Marijuana, Cocaine     Comment: once on wednesday     Review of Systems   Constitutional: Positive for fatigue. Negative for chills and fever.   HENT: Negative for rhinorrhea, sinus pressure, sinus pain and sore throat.    Eyes: Negative for pain, redness, itching and visual disturbance.   Respiratory: Positive for chest tightness and shortness of breath.    Cardiovascular: Negative for chest pain and leg swelling.   Gastrointestinal: Positive for nausea and vomiting. Negative for blood in stool and diarrhea.   Genitourinary: Negative for difficulty urinating, dysuria and hematuria.   Musculoskeletal: Negative for arthralgias, myalgias, neck pain and neck stiffness.   Skin: Negative for rash and wound.   Neurological: Positive for weakness.       Physical Exam     Initial Vitals   BP Pulse Resp Temp SpO2   06/29/19 1721 06/29/19 1734 06/29/19 1721 06/29/19 1721 06/29/19 1721   107/66 (!) 125 (!) 22 97.7 °F (36.5 °C) 100 %      MAP       --                Physical Exam    Nursing note and vitals reviewed.    Gen/Constitutional:  Thin, cachectic female in no acute distress  Head: Normocephalic, Atraumatic  Neck: supple, no masses or LAD, positive JVD  Eyes: PERRLA, conjunctiva clear  Ears, Nose and Throat: No rhinorrhea or stridor.  Cardiac:  Tachycardia, irregularly irregular rhythm, no murmurs or gallops, AICD in  place.  Pulmonary:  Tachypnea, no increased work of breathing, or stridor, CTA Bilat, no wheezes, rhonchi, rales.  GI: Abdomen soft, non-tender, non-distended; no rebound or guarding  : No CVA tenderness.  Musculoskeletal: Extremities warm, well perfused, no erythema, no edema  Skin: No rashes   Neuro: Alert and Oriented x 3; No focal motor or sensory deficits.    Psych: Normal affect    ED Course   Procedures  Labs Reviewed   CBC W/ AUTO DIFFERENTIAL - Abnormal; Notable for the following components:       Result Value    RDW 15.9 (*)     nRBC 1 (*)     All other components within normal limits    Narrative:     LAV SHARED   COMPREHENSIVE METABOLIC PANEL - Abnormal; Notable for the following components:    Sodium 135 (*)     CO2 21 (*)     Calcium 8.4 (*)     Albumin 3.1 (*)     Total Bilirubin 2.4 (*)     All other components within normal limits    Narrative:     LAV SHARED   TROPONIN I - Abnormal; Notable for the following components:    Troponin I 0.038 (*)     All other components within normal limits    Narrative:     LAV SHARED   B-TYPE NATRIURETIC PEPTIDE - Abnormal; Notable for the following components:    BNP 2,739 (*)     All other components within normal limits    Narrative:     LAV SHARED   PROTIME-INR - Abnormal; Notable for the following components:    Prothrombin Time 15.7 (*)     INR 1.6 (*)     All other components within normal limits    Narrative:     LAV SHARED   LACTIC ACID, PLASMA - Abnormal; Notable for the following components:    Lactate (Lactic Acid) 3.1 (*)     All other components within normal limits   ISTAT PROCEDURE - Abnormal; Notable for the following components:    POC PH 7.320 (*)     POC PO2 19 (*)     POC HCO3 21.5 (*)     POC SATURATED O2 26 (*)     POC Lactate 4.36 (*)     POC TCO2 23 (*)     All other components within normal limits   PROCALCITONIN   DRUG SCREEN PANEL, URINE EMERGENCY   POCT GLUCOSE   ISTAT CREATININE   POCT CREATININE     EKG Readings: (Independently  Interpreted)   5:55 pm-  Atrial fibrillation. RVR. Rate of 120. No STEMI.        Imaging Results          X-Ray Chest AP Portable (Final result)  Result time 06/29/19 17:54:12    Final result by El Bai MD (06/29/19 17:54:12)                 Impression:      1. Cardiomegaly, with possible early congestive change, no large focal consolidation.      Electronically signed by: El Bai MD  Date:    06/29/2019  Time:    17:54             Narrative:    EXAMINATION:  XR CHEST AP PORTABLE    CLINICAL HISTORY:  CHF;    TECHNIQUE:  Single frontal view of the chest was performed.    COMPARISON:  03/28/2019    FINDINGS:  Left chest wall pacer noted.  The cardiomediastinal silhouette is enlarged, stable..  There is slight obscuration of the right costophrenic angle, may reflect atelectasis or scarring..  The trachea is midline.  The lungs are symmetrically expanded bilaterally with mildly prominent central hilar interstitial attenuation..  No large focal consolidation seen.  There is no pneumothorax.  The osseous structures are remarkable for degenerative change..                                 Medical Decision Making:   History:   I obtained history from: EMS provider.  Old Medical Records: I decided to obtain old medical records.  Old Records Summarized: records from previous admission(s).  Initial Assessment:   59-year-old female with a history of asthma, bipolar disorder, ischemic cardiomyopathy with an EF of 8-10% on last echo, hypertension, CHF, COPD, CAD, history of MIs, DM presenting with dyspnea, and concern for volume overload.  Differential Diagnosis:   Differential diagnosis includes but is not limited to:  CHF, ACS, aortic dissection, drug abuse, PE, pneumonia, pneumothorax, pleural effusion, pericarditis, COPD exacerbation.  Independently Interpreted Test(s):   I have ordered and independently interpreted X-rays - see prior notes.  I have ordered and independently interpreted EKG Reading(s) - see  prior notes  Clinical Tests:   Lab Tests: Ordered and Reviewed  Radiological Study: Ordered and Reviewed  Medical Tests: Ordered and Reviewed    Emergent evaluation of a patient presenting with dyspnea, chest tightness, shortness of breath and evidence of volume overload.  She is afebrile, tachycardic, normotensive, with tachypnea and no hypoxemia. Physical exam findings remarkable for JVD, thin and frail female with tachypnea and irregularly irregular rhythm with known atrial fibrillation on Eliquis.  No significant abdominal pain, peritoneal signs or lower extremity edema. Will obtain broad cardiac workup and lung workup given dyspnea.  Initial venous blood gas without significant hypercarbia however lactate is elevated at 4.36.  Repeat lactate through the metabolic panel shows a lactate of 3.1.  She does have some slight elevations in her LFTs, likely from right heart decompensation.  ECG with no signs of ischemia or STEMI on my read.  She does have atrial fibrillation, rate controlled with RVR.  Placed on cardiac and telemetry monitoring with pulse oximetry.  Does not require oxygen at this time. Given her significant heart failure history, and no knee junction fraction, likely volume overloaded with elevated JVD on exam.  Chest x-ray obtained, with evidence of cardiomegaly and slight pulmonary edema on my read.  No consolidation, pneumothorax or widened mediastinum seen.  Do not suspect aortic dissection, pneumothorax or pneumonia based on exam, history and findings.  She has ischemic cardiomyopathy, with an initial elevated troponin that is elevated to 0.38.  Discussed with Cardiology, who feels this is rate related, and as patient has long history ischemic cardiomyopathy as well as EF of 8-10%, unlikely acute ACS event.  Plan is to admit to cardiology Co-Management service, trend troponins, and diurese.  Per Cardiology, recommend Lasix 80 mg IV for diuresing, and managing heart failure exacerbation.  I feel  this is reasonable, discussed case with hospital medicine team, the patient is admitted for observation status for acute heart failure exacerbation associated with dyspnea, NSTEMI with a history of ischemic cardiomyopathy.  Patient was given aspirin in the ED.  She is currently on Eliquis for her atrial fibrillation.    Complexity:  High risk - level 5            Scribe Attestation:   Scribe #1: I performed the above scribed service and the documentation accurately describes the services I performed. I attest to the accuracy of the note.    I, Dr. Jaylan Lacey, personally performed the services described in this documentation. All medical record entries made by the scribe were at my direction and in my presence.  I have reviewed the chart and agree that the record reflects my personal performance and is accurate and complete.              Clinical Impression:       ICD-10-CM ICD-9-CM   1. Acute on chronic systolic congestive heart failure I50.23 428.23     428.0   2. Shortness of breath R06.02 786.05   3. NSTEMI (non-ST elevated myocardial infarction) I21.4 410.70   4. Ischemic cardiomyopathy I25.5 414.8         Disposition:   Disposition: Placed in Observation  Condition: Fair    Jaylan Lacey DO  Dept of Emergency Medicine   [facility]  Spectralink: [id number]                      Jaylan Lacey DO  06/29/19 1918

## 2019-06-29 NOTE — ED TRIAGE NOTES
Pt. Presents to ED today from home with ems with c/o sob, productive cough with clear phlegm x2 days, generalized weakness/fatigue. Afebrile. Intermittent afib rvr. Denies cp, n/v/d, fevers.

## 2019-06-30 PROBLEM — N39.0 UTI (URINARY TRACT INFECTION): Status: ACTIVE | Noted: 2019-06-30

## 2019-06-30 LAB
ALBUMIN SERPL BCP-MCNC: 3.6 G/DL (ref 3.5–5.2)
ALLENS TEST: ABNORMAL
ALP SERPL-CCNC: 99 U/L (ref 55–135)
ALT SERPL W/O P-5'-P-CCNC: 25 U/L (ref 10–44)
ANION GAP SERPL CALC-SCNC: 10 MMOL/L (ref 8–16)
ASCENDING AORTA: 2.82 CM
AST SERPL-CCNC: 34 U/L (ref 10–40)
AV INDEX (PROSTH): 0.64
AV MEAN GRADIENT: 3 MMHG
AV PEAK GRADIENT: 4 MMHG
AV VALVE AREA: 2.1 CM2
AV VELOCITY RATIO: 0.55
BASOPHILS # BLD AUTO: 0.01 K/UL (ref 0–0.2)
BASOPHILS # BLD AUTO: 0.01 K/UL (ref 0–0.2)
BASOPHILS NFR BLD: 0.2 % (ref 0–1.9)
BASOPHILS NFR BLD: 0.2 % (ref 0–1.9)
BILIRUB SERPL-MCNC: 2.1 MG/DL (ref 0.1–1)
BILIRUB UR QL STRIP: NEGATIVE
BSA FOR ECHO PROCEDURE: 1.61 M2
BUN SERPL-MCNC: 11 MG/DL (ref 6–20)
BUN SERPL-MCNC: 17 MG/DL (ref 6–20)
BUN SERPL-MCNC: 17 MG/DL (ref 6–20)
CALCIUM SERPL-MCNC: 8.6 MG/DL (ref 8.7–10.5)
CALCIUM SERPL-MCNC: 8.8 MG/DL (ref 8.7–10.5)
CALCIUM SERPL-MCNC: 8.8 MG/DL (ref 8.7–10.5)
CHLORIDE SERPL-SCNC: 94 MMOL/L (ref 95–110)
CHLORIDE SERPL-SCNC: 97 MMOL/L (ref 95–110)
CHLORIDE SERPL-SCNC: 97 MMOL/L (ref 95–110)
CLARITY UR REFRACT.AUTO: CLEAR
CO2 SERPL-SCNC: 29 MMOL/L (ref 23–29)
CO2 SERPL-SCNC: 29 MMOL/L (ref 23–29)
CO2 SERPL-SCNC: 32 MMOL/L (ref 23–29)
COLOR UR AUTO: YELLOW
CREAT SERPL-MCNC: 0.8 MG/DL (ref 0.5–1.4)
CREAT SERPL-MCNC: 1 MG/DL (ref 0.5–1.4)
CREAT SERPL-MCNC: 1 MG/DL (ref 0.5–1.4)
CV ECHO LV RWT: 0.31 CM
DELSYS: ABNORMAL
DIFFERENTIAL METHOD: ABNORMAL
DIFFERENTIAL METHOD: ABNORMAL
DOP CALC AO PEAK VEL: 1.05 M/S
DOP CALC AO VTI: 13.73 CM
DOP CALC LVOT AREA: 3.3 CM2
DOP CALC LVOT DIAMETER: 2.04 CM
DOP CALC LVOT PEAK VEL: 0.58 M/S
DOP CALC LVOT STROKE VOLUME: 28.85 CM3
DOP CALCLVOT PEAK VEL VTI: 8.83 CM
E WAVE DECELERATION TIME: 110.79 MSEC
E/A RATIO: 3
E/E' RATIO: 13.5 M/S
ECHO LV POSTERIOR WALL: 1.04 CM (ref 0.6–1.1)
EOSINOPHIL # BLD AUTO: 0 K/UL (ref 0–0.5)
EOSINOPHIL # BLD AUTO: 0 K/UL (ref 0–0.5)
EOSINOPHIL NFR BLD: 0 % (ref 0–8)
EOSINOPHIL NFR BLD: 0 % (ref 0–8)
ERYTHROCYTE [DISTWIDTH] IN BLOOD BY AUTOMATED COUNT: 15.6 % (ref 11.5–14.5)
ERYTHROCYTE [DISTWIDTH] IN BLOOD BY AUTOMATED COUNT: 15.6 % (ref 11.5–14.5)
EST. GFR  (AFRICAN AMERICAN): >60 ML/MIN/1.73 M^2
EST. GFR  (NON AFRICAN AMERICAN): >60 ML/MIN/1.73 M^2
ESTIMATED AVG GLUCOSE: 126 MG/DL (ref 68–131)
FRACTIONAL SHORTENING: 4 % (ref 28–44)
GLUCOSE SERPL-MCNC: 133 MG/DL (ref 70–110)
GLUCOSE SERPL-MCNC: 133 MG/DL (ref 70–110)
GLUCOSE SERPL-MCNC: 93 MG/DL (ref 70–110)
GLUCOSE UR QL STRIP: NEGATIVE
HBA1C MFR BLD HPLC: 6 % (ref 4–5.6)
HCO3 UR-SCNC: 28 MMOL/L (ref 24–28)
HCO3 UR-SCNC: 28.3 MMOL/L (ref 24–28)
HCO3 UR-SCNC: 29.8 MMOL/L (ref 24–28)
HCO3 UR-SCNC: 32 MMOL/L (ref 24–28)
HCT VFR BLD AUTO: 33.8 % (ref 37–48.5)
HCT VFR BLD AUTO: 33.8 % (ref 37–48.5)
HGB BLD-MCNC: 11.2 G/DL (ref 12–16)
HGB BLD-MCNC: 11.2 G/DL (ref 12–16)
HGB UR QL STRIP: ABNORMAL
HYALINE CASTS UR QL AUTO: 1 /LPF
IMM GRANULOCYTES # BLD AUTO: 0.02 K/UL (ref 0–0.04)
IMM GRANULOCYTES # BLD AUTO: 0.02 K/UL (ref 0–0.04)
IMM GRANULOCYTES NFR BLD AUTO: 0.4 % (ref 0–0.5)
IMM GRANULOCYTES NFR BLD AUTO: 0.4 % (ref 0–0.5)
INTERVENTRICULAR SEPTUM: 0.73 CM (ref 0.6–1.1)
KETONES UR QL STRIP: NEGATIVE
LA MAJOR: 7.46 CM
LA MINOR: 7.5 CM
LA WIDTH: 5.98 CM
LACTATE SERPL-SCNC: 1.3 MMOL/L (ref 0.5–2.2)
LEFT ATRIUM SIZE: 4.99 CM
LEFT ATRIUM VOLUME INDEX: 119.2 ML/M2
LEFT ATRIUM VOLUME: 189.72 CM3
LEFT INTERNAL DIMENSION IN SYSTOLE: 6.45 CM (ref 2.1–4)
LEFT VENTRICLE DIASTOLIC VOLUME INDEX: 145.83 ML/M2
LEFT VENTRICLE DIASTOLIC VOLUME: 232.12 ML
LEFT VENTRICLE MASS INDEX: 161 G/M2
LEFT VENTRICLE SYSTOLIC VOLUME INDEX: 133.4 ML/M2
LEFT VENTRICLE SYSTOLIC VOLUME: 212.32 ML
LEFT VENTRICULAR INTERNAL DIMENSION IN DIASTOLE: 6.71 CM (ref 3.5–6)
LEFT VENTRICULAR MASS: 256.6 G
LEUKOCYTE ESTERASE UR QL STRIP: ABNORMAL
LV LATERAL E/E' RATIO: 9.82 M/S
LV SEPTAL E/E' RATIO: 21.6 M/S
LYMPHOCYTES # BLD AUTO: 1.5 K/UL (ref 1–4.8)
LYMPHOCYTES # BLD AUTO: 1.5 K/UL (ref 1–4.8)
LYMPHOCYTES NFR BLD: 33.5 % (ref 18–48)
LYMPHOCYTES NFR BLD: 33.5 % (ref 18–48)
MAGNESIUM SERPL-MCNC: 1.8 MG/DL (ref 1.6–2.6)
MAGNESIUM SERPL-MCNC: 1.8 MG/DL (ref 1.6–2.6)
MAGNESIUM SERPL-MCNC: 1.9 MG/DL (ref 1.6–2.6)
MCH RBC QN AUTO: 29.6 PG (ref 27–31)
MCH RBC QN AUTO: 29.6 PG (ref 27–31)
MCHC RBC AUTO-ENTMCNC: 33.1 G/DL (ref 32–36)
MCHC RBC AUTO-ENTMCNC: 33.1 G/DL (ref 32–36)
MCV RBC AUTO: 89 FL (ref 82–98)
MCV RBC AUTO: 89 FL (ref 82–98)
MICROSCOPIC COMMENT: ABNORMAL
MONOCYTES # BLD AUTO: 0.5 K/UL (ref 0.3–1)
MONOCYTES # BLD AUTO: 0.5 K/UL (ref 0.3–1)
MONOCYTES NFR BLD: 9.8 % (ref 4–15)
MONOCYTES NFR BLD: 9.8 % (ref 4–15)
MV PEAK A VEL: 0.36 M/S
MV PEAK E VEL: 1.08 M/S
NEUTROPHILS # BLD AUTO: 2.6 K/UL (ref 1.8–7.7)
NEUTROPHILS # BLD AUTO: 2.6 K/UL (ref 1.8–7.7)
NEUTROPHILS NFR BLD: 56.1 % (ref 38–73)
NEUTROPHILS NFR BLD: 56.1 % (ref 38–73)
NITRITE UR QL STRIP: NEGATIVE
NRBC BLD-RTO: 1 /100 WBC
NRBC BLD-RTO: 1 /100 WBC
PCO2 BLDA: 45.2 MMHG (ref 35–45)
PCO2 BLDA: 48.1 MMHG (ref 35–45)
PCO2 BLDA: 49.9 MMHG (ref 35–45)
PCO2 BLDA: 51.5 MMHG (ref 35–45)
PH SMN: 7.36 [PH] (ref 7.35–7.45)
PH SMN: 7.37 [PH] (ref 7.35–7.45)
PH SMN: 7.4 [PH] (ref 7.35–7.45)
PH SMN: 7.43 [PH] (ref 7.35–7.45)
PH UR STRIP: 7 [PH] (ref 5–8)
PISA TR MAX VEL: 3.6 M/S
PLATELET # BLD AUTO: 185 K/UL (ref 150–350)
PLATELET # BLD AUTO: 185 K/UL (ref 150–350)
PMV BLD AUTO: 10.2 FL (ref 9.2–12.9)
PMV BLD AUTO: 10.2 FL (ref 9.2–12.9)
PO2 BLDA: 27 MMHG (ref 40–60)
PO2 BLDA: 28 MMHG (ref 40–60)
PO2 BLDA: 30 MMHG (ref 40–60)
PO2 BLDA: 33 MMHG (ref 40–60)
POC BE: 3 MMOL/L
POC BE: 3 MMOL/L
POC BE: 5 MMOL/L
POC BE: 7 MMOL/L
POC SATURATED O2: 47 % (ref 95–100)
POC SATURATED O2: 52 % (ref 95–100)
POC SATURATED O2: 53 % (ref 95–100)
POC SATURATED O2: 64 % (ref 95–100)
POC TCO2: 29 MMOL/L (ref 24–29)
POC TCO2: 30 MMOL/L (ref 24–29)
POC TCO2: 31 MMOL/L (ref 24–29)
POC TCO2: 34 MMOL/L (ref 24–29)
POCT GLUCOSE: 117 MG/DL (ref 70–110)
POCT GLUCOSE: 128 MG/DL (ref 70–110)
POCT GLUCOSE: 157 MG/DL (ref 70–110)
POCT GLUCOSE: 93 MG/DL (ref 70–110)
POTASSIUM SERPL-SCNC: 3.2 MMOL/L (ref 3.5–5.1)
POTASSIUM SERPL-SCNC: 3.4 MMOL/L (ref 3.5–5.1)
POTASSIUM SERPL-SCNC: 3.4 MMOL/L (ref 3.5–5.1)
PROT SERPL-MCNC: 6.7 G/DL (ref 6–8.4)
PROT UR QL STRIP: NEGATIVE
RA MAJOR: 7.15 CM
RA PRESSURE: 3 MMHG
RA WIDTH: 5 CM
RBC # BLD AUTO: 3.78 M/UL (ref 4–5.4)
RBC # BLD AUTO: 3.78 M/UL (ref 4–5.4)
RBC #/AREA URNS AUTO: 7 /HPF (ref 0–4)
RIGHT VENTRICULAR END-DIASTOLIC DIMENSION: 4.41 CM
RV TISSUE DOPPLER FREE WALL SYSTOLIC VELOCITY 1 (APICAL 4 CHAMBER VIEW): 8.51 CM/S
SAMPLE: ABNORMAL
SINUS: 2.75 CM
SITE: ABNORMAL
SODIUM SERPL-SCNC: 136 MMOL/L (ref 136–145)
SP GR UR STRIP: 1 (ref 1–1.03)
STJ: 2.48 CM
TDI LATERAL: 0.11 M/S
TDI SEPTAL: 0.05 M/S
TDI: 0.08 M/S
TIME NOTIFIED: 1010
TIME NOTIFIED: 1511
TR MAX PG: 52 MMHG
TRICUSPID ANNULAR PLANE SYSTOLIC EXCURSION: 1.43 CM
TV REST PULMONARY ARTERY PRESSURE: 55 MMHG
URN SPEC COLLECT METH UR: ABNORMAL
WBC # BLD AUTO: 4.57 K/UL (ref 3.9–12.7)
WBC # BLD AUTO: 4.57 K/UL (ref 3.9–12.7)
WBC #/AREA URNS AUTO: 7 /HPF (ref 0–5)

## 2019-06-30 PROCEDURE — 63600175 PHARM REV CODE 636 W HCPCS: Mod: HCNC | Performed by: INTERNAL MEDICINE

## 2019-06-30 PROCEDURE — 36415 COLL VENOUS BLD VENIPUNCTURE: CPT | Mod: HCNC

## 2019-06-30 PROCEDURE — 94640 AIRWAY INHALATION TREATMENT: CPT | Mod: HCNC

## 2019-06-30 PROCEDURE — 82800 BLOOD PH: CPT | Mod: HCNC

## 2019-06-30 PROCEDURE — 80053 COMPREHEN METABOLIC PANEL: CPT | Mod: HCNC

## 2019-06-30 PROCEDURE — 25000003 PHARM REV CODE 250: Mod: HCNC | Performed by: STUDENT IN AN ORGANIZED HEALTH CARE EDUCATION/TRAINING PROGRAM

## 2019-06-30 PROCEDURE — 63600175 PHARM REV CODE 636 W HCPCS: Mod: HCNC | Performed by: STUDENT IN AN ORGANIZED HEALTH CARE EDUCATION/TRAINING PROGRAM

## 2019-06-30 PROCEDURE — 83735 ASSAY OF MAGNESIUM: CPT | Mod: 91,HCNC

## 2019-06-30 PROCEDURE — 25000242 PHARM REV CODE 250 ALT 637 W/ HCPCS: Mod: HCNC | Performed by: INTERNAL MEDICINE

## 2019-06-30 PROCEDURE — 80048 BASIC METABOLIC PNL TOTAL CA: CPT | Mod: HCNC

## 2019-06-30 PROCEDURE — 99223 PR INITIAL HOSPITAL CARE,LEVL III: ICD-10-PCS | Mod: AI,HCNC,GC, | Performed by: INTERNAL MEDICINE

## 2019-06-30 PROCEDURE — 51702 INSERT TEMP BLADDER CATH: CPT | Mod: HCNC

## 2019-06-30 PROCEDURE — 94660 CPAP INITIATION&MGMT: CPT | Mod: HCNC

## 2019-06-30 PROCEDURE — 85025 COMPLETE CBC W/AUTO DIFF WBC: CPT | Mod: HCNC

## 2019-06-30 PROCEDURE — 36556 INSERT NON-TUNNEL CV CATH: CPT | Mod: HCNC

## 2019-06-30 PROCEDURE — 25000003 PHARM REV CODE 250: Mod: HCNC | Performed by: INTERNAL MEDICINE

## 2019-06-30 PROCEDURE — 99900035 HC TECH TIME PER 15 MIN (STAT): Mod: HCNC

## 2019-06-30 PROCEDURE — 99223 1ST HOSP IP/OBS HIGH 75: CPT | Mod: AI,HCNC,GC, | Performed by: INTERNAL MEDICINE

## 2019-06-30 PROCEDURE — 27000221 HC OXYGEN, UP TO 24 HOURS: Mod: HCNC

## 2019-06-30 PROCEDURE — 81001 URINALYSIS AUTO W/SCOPE: CPT | Mod: HCNC

## 2019-06-30 PROCEDURE — 82803 BLOOD GASES ANY COMBINATION: CPT | Mod: HCNC

## 2019-06-30 PROCEDURE — 83735 ASSAY OF MAGNESIUM: CPT | Mod: HCNC

## 2019-06-30 PROCEDURE — 83605 ASSAY OF LACTIC ACID: CPT | Mod: HCNC

## 2019-06-30 PROCEDURE — 94761 N-INVAS EAR/PLS OXIMETRY MLT: CPT | Mod: HCNC

## 2019-06-30 PROCEDURE — 63600175 PHARM REV CODE 636 W HCPCS: Mod: HCNC

## 2019-06-30 PROCEDURE — 20000000 HC ICU ROOM: Mod: HCNC

## 2019-06-30 PROCEDURE — 83036 HEMOGLOBIN GLYCOSYLATED A1C: CPT | Mod: HCNC

## 2019-06-30 RX ORDER — CEFTRIAXONE 1 G/1
1 INJECTION, POWDER, FOR SOLUTION INTRAMUSCULAR; INTRAVENOUS
Status: DISCONTINUED | OUTPATIENT
Start: 2019-06-30 | End: 2019-07-01

## 2019-06-30 RX ORDER — MAGNESIUM SULFATE HEPTAHYDRATE 40 MG/ML
4 INJECTION, SOLUTION INTRAVENOUS
Status: DISCONTINUED | OUTPATIENT
Start: 2019-06-30 | End: 2019-07-03 | Stop reason: HOSPADM

## 2019-06-30 RX ORDER — DOBUTAMINE HYDROCHLORIDE 400 MG/100ML
2.5 INJECTION, SOLUTION INTRAVENOUS CONTINUOUS
Status: DISCONTINUED | OUTPATIENT
Start: 2019-06-30 | End: 2019-07-01

## 2019-06-30 RX ORDER — POTASSIUM CHLORIDE 14.9 MG/ML
40 INJECTION INTRAVENOUS
Status: DISCONTINUED | OUTPATIENT
Start: 2019-06-30 | End: 2019-07-03 | Stop reason: HOSPADM

## 2019-06-30 RX ORDER — MAGNESIUM SULFATE HEPTAHYDRATE 40 MG/ML
2 INJECTION, SOLUTION INTRAVENOUS ONCE
Status: COMPLETED | OUTPATIENT
Start: 2019-06-30 | End: 2019-06-30

## 2019-06-30 RX ORDER — ALBUTEROL SULFATE 90 UG/1
2 AEROSOL, METERED RESPIRATORY (INHALATION) EVERY 6 HOURS PRN
Status: DISCONTINUED | OUTPATIENT
Start: 2019-06-30 | End: 2019-07-03 | Stop reason: HOSPADM

## 2019-06-30 RX ORDER — POTASSIUM CHLORIDE 29.8 MG/ML
40 INJECTION INTRAVENOUS
Status: DISCONTINUED | OUTPATIENT
Start: 2019-06-30 | End: 2019-07-03 | Stop reason: HOSPADM

## 2019-06-30 RX ORDER — LIDOCAINE HYDROCHLORIDE AND EPINEPHRINE 10; 10 MG/ML; UG/ML
1 INJECTION, SOLUTION INFILTRATION; PERINEURAL ONCE
Status: COMPLETED | OUTPATIENT
Start: 2019-06-30 | End: 2019-06-30

## 2019-06-30 RX ORDER — IBUPROFEN 200 MG
24 TABLET ORAL
Status: DISCONTINUED | OUTPATIENT
Start: 2019-06-30 | End: 2019-07-03 | Stop reason: HOSPADM

## 2019-06-30 RX ORDER — GLUCAGON 1 MG
1 KIT INJECTION
Status: DISCONTINUED | OUTPATIENT
Start: 2019-06-30 | End: 2019-07-03 | Stop reason: HOSPADM

## 2019-06-30 RX ORDER — DIGOXIN 0.25 MG/ML
500 INJECTION INTRAMUSCULAR; INTRAVENOUS ONCE
Status: COMPLETED | OUTPATIENT
Start: 2019-06-30 | End: 2019-06-30

## 2019-06-30 RX ORDER — INSULIN ASPART 100 [IU]/ML
0-5 INJECTION, SOLUTION INTRAVENOUS; SUBCUTANEOUS
Status: DISCONTINUED | OUTPATIENT
Start: 2019-06-30 | End: 2019-07-03 | Stop reason: HOSPADM

## 2019-06-30 RX ORDER — IBUPROFEN 200 MG
16 TABLET ORAL
Status: DISCONTINUED | OUTPATIENT
Start: 2019-06-30 | End: 2019-07-03 | Stop reason: HOSPADM

## 2019-06-30 RX ORDER — POTASSIUM CHLORIDE 29.8 MG/ML
40 INJECTION INTRAVENOUS ONCE
Status: COMPLETED | OUTPATIENT
Start: 2019-06-30 | End: 2019-06-30

## 2019-06-30 RX ORDER — PANTOPRAZOLE SODIUM 40 MG/1
40 TABLET, DELAYED RELEASE ORAL DAILY
Status: DISCONTINUED | OUTPATIENT
Start: 2019-06-30 | End: 2019-07-03 | Stop reason: HOSPADM

## 2019-06-30 RX ORDER — MAGNESIUM SULFATE HEPTAHYDRATE 40 MG/ML
2 INJECTION, SOLUTION INTRAVENOUS
Status: DISCONTINUED | OUTPATIENT
Start: 2019-06-30 | End: 2019-07-03 | Stop reason: HOSPADM

## 2019-06-30 RX ORDER — DOBUTAMINE HYDROCHLORIDE 200 MG/100ML
INJECTION INTRAVENOUS
Status: COMPLETED
Start: 2019-06-30 | End: 2019-06-30

## 2019-06-30 RX ADMIN — CEFTRIAXONE SODIUM 1 G: 1 INJECTION, POWDER, FOR SOLUTION INTRAMUSCULAR; INTRAVENOUS at 11:06

## 2019-06-30 RX ADMIN — AMIODARONE HYDROCHLORIDE 0.5 MG/MIN: 1.8 INJECTION, SOLUTION INTRAVENOUS at 08:06

## 2019-06-30 RX ADMIN — DIGOXIN 500 MCG: 0.25 INJECTION INTRAMUSCULAR; INTRAVENOUS at 04:06

## 2019-06-30 RX ADMIN — APIXABAN 5 MG: 5 TABLET, FILM COATED ORAL at 08:06

## 2019-06-30 RX ADMIN — DOBUTAMINE HYDROCHLORIDE 2.5 MCG/KG/MIN: 400 INJECTION, SOLUTION INTRAVENOUS at 01:06

## 2019-06-30 RX ADMIN — POTASSIUM CHLORIDE 40 MEQ: 400 INJECTION, SOLUTION INTRAVENOUS at 04:06

## 2019-06-30 RX ADMIN — ATORVASTATIN CALCIUM 40 MG: 20 TABLET, FILM COATED ORAL at 09:06

## 2019-06-30 RX ADMIN — FUROSEMIDE 20 MG/HR: 10 INJECTION, SOLUTION INTRAMUSCULAR; INTRAVENOUS at 10:06

## 2019-06-30 RX ADMIN — APIXABAN 5 MG: 5 TABLET, FILM COATED ORAL at 09:06

## 2019-06-30 RX ADMIN — PANTOPRAZOLE SODIUM 40 MG: 40 TABLET, DELAYED RELEASE ORAL at 09:06

## 2019-06-30 RX ADMIN — FUROSEMIDE 20 MG/HR: 10 INJECTION, SOLUTION INTRAMUSCULAR; INTRAVENOUS at 07:06

## 2019-06-30 RX ADMIN — PIPERACILLIN AND TAZOBACTAM 4.5 G: 4; .5 INJECTION, POWDER, LYOPHILIZED, FOR SOLUTION INTRAVENOUS; PARENTERAL at 01:06

## 2019-06-30 RX ADMIN — AMIODARONE HYDROCHLORIDE 0.5 MG/MIN: 1.8 INJECTION, SOLUTION INTRAVENOUS at 06:06

## 2019-06-30 RX ADMIN — PIPERACILLIN AND TAZOBACTAM 4.5 G: 4; .5 INJECTION, POWDER, LYOPHILIZED, FOR SOLUTION INTRAVENOUS; PARENTERAL at 09:06

## 2019-06-30 RX ADMIN — MAGNESIUM SULFATE IN WATER 2 G: 40 INJECTION, SOLUTION INTRAVENOUS at 04:06

## 2019-06-30 RX ADMIN — IPRATROPIUM BROMIDE AND ALBUTEROL SULFATE 3 ML: .5; 3 SOLUTION RESPIRATORY (INHALATION) at 07:06

## 2019-06-30 RX ADMIN — AMIODARONE HYDROCHLORIDE 1 MG/MIN: 1.8 INJECTION, SOLUTION INTRAVENOUS at 12:06

## 2019-06-30 RX ADMIN — POTASSIUM CHLORIDE 40 MEQ: 200 INJECTION, SOLUTION INTRAVENOUS at 08:06

## 2019-06-30 RX ADMIN — DOBUTAMINE IN DEXTROSE 2.5 MCG/KG/MIN: 200 INJECTION, SOLUTION INTRAVENOUS at 01:06

## 2019-06-30 RX ADMIN — VANCOMYCIN HYDROCHLORIDE 750 MG: 750 INJECTION, POWDER, LYOPHILIZED, FOR SOLUTION INTRAVENOUS at 01:06

## 2019-06-30 RX ADMIN — FUROSEMIDE 20 MG/HR: 10 INJECTION, SOLUTION INTRAMUSCULAR; INTRAVENOUS at 06:06

## 2019-06-30 RX ADMIN — ASPIRIN 81 MG: 81 TABLET, COATED ORAL at 09:06

## 2019-06-30 RX ADMIN — LIDOCAINE HYDROCHLORIDE AND EPINEPHRINE 3 ML: 10; 10 INJECTION, SOLUTION INFILTRATION; PERINEURAL at 12:06

## 2019-06-30 RX ADMIN — FUROSEMIDE 20 MG/HR: 10 INJECTION, SOLUTION INTRAMUSCULAR; INTRAVENOUS at 03:06

## 2019-06-30 NOTE — PROGRESS NOTES
[id number] patient transferred to bed [id number] via 4lpm nc by RN, Shaista. Bipap equipment sent upstairs with patient. Report and care of patient called to RT Susannah.

## 2019-06-30 NOTE — SUBJECTIVE & OBJECTIVE
Past Medical History:   Diagnosis Date    Asthma     Bipolar 1 disorder     Cardiomyopathy     Ischemic Cardiomyopathy with EF 25-30% by echo 5/17/16    Chronic systolic (congestive) heart failure     Cocaine use     COPD (chronic obstructive pulmonary disease)     emphysema    Coronary artery disease     ROCHELLE 2006 and 2013    Defibrillator discharge     Depression     Diabetes mellitus     H/O echocardiogram 05/17/2016    EF 25-30%. LV diastolic dysfxn. Severe LAE. mod MR. PASP 86.    Hypertension     ICD (implantable cardioverter-defibrillator) in place 07/09/2013    MI (myocardial infarction)     x 3    PTSD (post-traumatic stress disorder)     Schizophrenia     Seizures        Past Surgical History:   Procedure Laterality Date    CARDIAC CATHETERIZATION      CARDIAC DEFIBRILLATOR PLACEMENT      [place]  2006    [place]    CORONARY STENT PLACEMENT      INSERT / REPLACE / REMOVE PACEMAKER  2013     ICD [facility]       Review of patient's allergies indicates:  No Known Allergies    No current facility-administered medications on file prior to encounter.      Current Outpatient Medications on File Prior to Encounter   Medication Sig    albuterol (VENTOLIN HFA) 90 mcg/actuation inhaler INHALE 2 PUFFS INTO THE LUNGS EVERY 6 HOURS AS NEEDED FOR WHEEZING OR SHORTNESS OF BREATH    amiodarone (PACERONE) 400 MG tablet TAKE 1 TABLET (400 MG TOTAL) BY MOUTH ONCE DAILY.    ammonium lactate 12 % Crea Apply twice daily to affected parts both feet as needed.    apixaban (ELIQUIS) 5 mg Tab Take 1 tablet (5 mg total) by mouth 2 (two) times daily.    aspirin (ECOTRIN) 81 MG EC tablet TAKE 1 TABLET BY MOUTH EVERY DAY    atorvastatin (LIPITOR) 40 MG tablet TAKE 1 TABLET (40 MG TOTAL) BY MOUTH ONCE DAILY.    baclofen (LIORESAL) 10 MG tablet take 1 to 2 TABLETS BY MOUTH every 6 hours as needed for muscle spasms    ciclopirox (CICLODAN) 8 % Soln Apply topically nightly.    clobetasol  (TEMOVATE) 0.05 % external solution Use on scalp one - two times daily as needed for scaling or itching    cyproheptadine (PERIACTIN) 4 mg tablet Take 1 tablet (4 mg total) by mouth 2 (two) times daily as needed.    fluticasone furoate (ARNUITY ELLIPTA) 100 mcg/actuation DsDv Inhale 100 mcg into the lungs once daily. Controller    furosemide (LASIX) 80 MG tablet Take 1 tablet (80 mg total) by mouth 2 (two) times daily.    gabapentin (NEURONTIN) 300 MG capsule Take 1 capsule (300 mg total) by mouth 2 (two) times daily.    irbesartan (AVAPRO) 75 MG tablet Take 0.5 tablets (37.5 mg total) by mouth every evening.    loratadine (CLARITIN) 10 mg tablet Take 1 tablet (10 mg total) by mouth once daily.    magnesium oxide (MAG-OX) 400 mg tablet Take 0.5 tablets (200 mg total) by mouth once daily.    metoprolol succinate (TOPROL-XL) 25 MG 24 hr tablet Take 1 tablet (25 mg total) by mouth once daily.    nitroGLYCERIN (NITROSTAT) 0.4 MG SL tablet PLACE 1 TABLET (0.4 MG TOTAL) UNDER THE TONGUE EVERY 5 (FIVE) MINUTES AS NEEDED FOR CHEST PAIN.    potassium chloride SA (K-DUR,KLOR-CON) 20 MEQ tablet Take 1 tablet (20 mEq total) by mouth once daily.    traZODone (DESYREL) 150 MG tablet Take 1 tablet (150 mg total) by mouth every evening.    triamcinolone acetonide 0.1% (KENALOG) 0.1 % ointment AAA bid    VENTOLIN HFA 90 mcg/actuation inhaler INHALE 2 PUFFS INTO THE LUNGS EVERY 6 HOURS AS NEEDED FOR WHEEZING OR SHORTNESS OF BREATH     Family History     None        Tobacco Use    Smoking status: Current Some Day Smoker     Packs/day: 3.00     Years: 30.00     Pack years: 90.00    Smokeless tobacco: Current User    Tobacco comment: 1-2  cigarettes a day when stressed   Substance and Sexual Activity    Alcohol use: No    Drug use: Yes     Types: Marijuana, Cocaine     Comment: once on wednesday    Sexual activity: Not Currently     Review of Systems   Constitution: Negative for chills, decreased appetite and  diaphoresis.   HENT: Negative for congestion and ear discharge.    Eyes: Negative for blurred vision and discharge.   Cardiovascular: Positive for dyspnea on exertion and paroxysmal nocturnal dyspnea. Negative for chest pain, irregular heartbeat and leg swelling.   Respiratory: Positive for cough and shortness of breath. Negative for hemoptysis.    Gastrointestinal: Positive for bloating. Negative for abdominal pain.     Objective:     Vital Signs (Most Recent):  Temp: 96.8 °F (36 °C) (06/30/19 0000)  Pulse: 83 (06/30/19 0100)  Resp: 19 (06/30/19 0115)  BP: (!) 118/98 (06/30/19 0100)  SpO2: 100 % (06/30/19 0115) Vital Signs (24h Range):  Temp:  [96.8 °F (36 °C)-97.7 °F (36.5 °C)] 96.8 °F (36 °C)  Pulse:  [] 83  Resp:  [16-32] 19  SpO2:  [58 %-100 %] 100 %  BP: ()/(59-98) 118/98     Weight: 57.2 kg (126 lb)  Body mass index is 23.05 kg/m².    SpO2: 100 %  O2 Device (Oxygen Therapy): BiPAP      Intake/Output Summary (Last 24 hours) at 6/30/2019 0153  Last data filed at 6/30/2019 0120  Gross per 24 hour   Intake 100 ml   Output 225 ml   Net -125 ml       Lines/Drains/Airways     Central Venous Catheter Line                 Percutaneous Central Line Insertion/Assessment - triple lumen  06/29/19 2358 right internal jugular less than 1 day          Drain                 Urethral Catheter 06/30/19 0100 16 Fr. less than 1 day          Peripheral Intravenous Line                 Peripheral IV - Single Lumen 06/29/19 18 G Left Forearm 1 day         Peripheral IV - Single Lumen 06/29/19 2131 18 G Right Antecubital less than 1 day                Physical Exam   Constitutional: She is oriented to person, place, and time. She appears well-developed and well-nourished. No distress.   HENT:   Head: Normocephalic and atraumatic.   Eyes: Pupils are equal, round, and reactive to light. Conjunctivae are normal.   Neck: JVD present. No tracheal deviation present. No thyromegaly present.   Cardiovascular: Normal rate, regular  rhythm and intact distal pulses. Exam reveals no gallop and no friction rub.   Murmur heard.  Pulses:       Radial pulses are 2+ on the right side, and 2+ on the left side.        Femoral pulses are 2+ on the right side, and 2+ on the left side.  Pulmonary/Chest: Effort normal. No respiratory distress. She has no wheezes. She has rales.   Abdominal: Soft. Bowel sounds are normal. She exhibits distension. There is no tenderness.   Musculoskeletal: She exhibits no edema or deformity.   Neurological: She is alert and oriented to person, place, and time. No cranial nerve deficit. Coordination normal.   Skin: Skin is warm and dry. She is not diaphoretic.   Psychiatric: She has a normal mood and affect. Her behavior is normal.       Significant Labs:   BMP:   Recent Labs   Lab 06/29/19  1732 06/29/19  2205   GLU 95 131*   * 135*   K 4.3 3.9    95   CO2 21* 28   BUN 15 17   CREATININE 0.8 1.0   CALCIUM 8.4* 9.3   MG 1.7 1.9       Significant Imaging: Echocardiogram: Transthoracic echo (TTE) complete (Cupid Only): No results found for this or any previous visit.

## 2019-06-30 NOTE — PLAN OF CARE
Problem: Adult Inpatient Plan of Care  Goal: Plan of Care Review  Outcome: Ongoing (interventions implemented as appropriate)  23:15-Admitted 60 y/o female from ED diagnosed with heart failure. With 2 PIV, with ongoing Amiodorane  drip at 1 mg/min, on NC and hooked to BIPAP per RT.  Pt lives with fiance and said no need to inform him, pt already informed his fiance of her admission.    Pt  GCS 15, OX4, drowsy, follows command , PERRLA. Afebrile.   HR on AFIb at 70-low 100's. BP MAP above 65.  Remains on BIPAP at 40% I-10, E -5, sats 95% above, no SOB.  NPO. No BM.  Lasix started  at 20 mg/hr. Grewal inserted and draining large amount of clear yellow UO.  Right IJ triple lumen inserted per MD Luis PONCE,  initial SVO2 47%, Dobutamine started at 2.5 mcg/kg/min. Repeat SVO2-53%.  CVP 14, 11.  Vancomycin and Zosyn started.  Ongoing replacement of Magnesium and potassium.  Remains on Lasix, Dobutamine and Amiodarone drip.  Plan of care reviewed with Polly Kwan. All concerns and questions addressed. WCTM

## 2019-06-30 NOTE — PROCEDURES
"Polly Kwan is a 59 y.o. female patient.    Temp: 97.7 °F (36.5 °C) (06/29/19 1721)  Pulse: 81 (06/30/19 0002)  Resp: (!) 29 (06/30/19 0002)  BP: 108/63 (06/30/19 0002)  SpO2: (!) 86 % (06/30/19 0002)  Weight: 57.2 kg (126 lb) (06/29/19 1721)  Height: 5' 2" (157.5 cm) (06/29/19 1721)    Central Line  Date/Time: 6/30/2019 1:22 AM  Performed by: Gina Mayfield MD  Consent Done: Yes  Time out: Immediately prior to procedure a "time out" was called to verify the correct patient, procedure, equipment, support staff and site/side marked as required.  Indications: med administration, vascular access and hemodynamic monitoring  Anesthesia: local infiltration    Anesthesia:  Local Anesthetic: lidocaine 1% with epinephrine  Preparation: skin prepped with ChloraPrep  Location details: right internal jugular  Catheter type: triple lumen  Catheter size: 8 Fr  Ultrasound guidance: yes  Vessel Caliber: large, patentNeedle advanced into vessel with real time Ultrasound guidance.  Guidewire confirmed in vessel.  Manometry: Yes  Number of attempts: 1  Assessment: placement verified by x-ray  Complications: none  Specimens: No  Implants: No  Post-procedure: line sutured  Complications: No          No flowsheet data found.    Gina Mayfield  6/30/2019  "

## 2019-06-30 NOTE — ASSESSMENT & PLAN NOTE
- UDS positive for cocaine when patient more stable need to discuss last use  - Poor prognostic indicator...

## 2019-06-30 NOTE — H&P
[facility]  Cardiology  History and Physical     Patient Name: Polly Kwan  MRN: [id number]  Admission Date: 6/29/2019  Code Status: Full Code   Attending Provider: Christen Rojas MD   Primary Care Physician: Iza Kwan MD  Principal Problem:Acute on chronic combined systolic and diastolic congestive heart failure    Patient information was obtained from patient and ER records.     Subjective:     Chief Complaint:  Shortness of breath     HPI:  Ms. Polly Kwan is a 59 yo F with PMH of Bipolar disorder, polysubstance abuse (tobacco, cocaine), COPD, CAD s/p PCI in 2006, 2013, ICM (EF 20%), HTN, T2DM, VT on amiodarone, paroxysmal AF on eliquis who presented to the ED today with c/o progressively worsening SOB, abdominal distention, LE swelling for 2 days. Associated with dry cough, orthopnea and PND episodes.     Her symptoms started late Thursday evening with lightheadedness. Throughout Friday she experienced progressively worsening exertional dyspnea, relieved only by rest and putting her face in front of the AC. The dyspnea is associated with a cough productive of thick white sputum, new for her, as well as chest tightness. No palpitations or syncope. No fevers, chills, or night sweats. She had some relief last night and was able to sleep, but this morning the symptoms were more severe prompting her arrival here.     In the ED her workup was significant for lactate 3.1, trop 0.038, BNP 2739, total bili 2.4, CXR with early congestive changes. ECG shows AF RVR. HR variable but mostly 130-140 on tele. The ED administered 80 mg IV lasix, three duonebs, and requested admission for HF.    Past Medical History:   Diagnosis Date    Asthma     Bipolar 1 disorder     Cardiomyopathy     Ischemic Cardiomyopathy with EF 25-30% by echo 5/17/16    Chronic systolic (congestive) heart failure     Cocaine use     COPD (chronic obstructive pulmonary disease)     emphysema    Coronary artery disease      ROCHELLE 2006 and 2013    Defibrillator discharge     Depression     Diabetes mellitus     H/O echocardiogram 05/17/2016    EF 25-30%. LV diastolic dysfxn. Severe LAE. mod MR. PASP 86.    Hypertension     ICD (implantable cardioverter-defibrillator) in place 07/09/2013    MI (myocardial infarction)     x 3    PTSD (post-traumatic stress disorder)     Schizophrenia     Seizures        Past Surgical History:   Procedure Laterality Date    CARDIAC CATHETERIZATION      CARDIAC DEFIBRILLATOR PLACEMENT      [place]    CORONARY STENT PLACEMENT      INSERT / REPLACE / REMOVE PACEMAKER  2013     ICD [facility]       Review of patient's allergies indicates:  No Known Allergies    No current facility-administered medications on file prior to encounter.      Current Outpatient Medications on File Prior to Encounter   Medication Sig    albuterol (VENTOLIN HFA) 90 mcg/actuation inhaler INHALE 2 PUFFS INTO THE LUNGS EVERY 6 HOURS AS NEEDED FOR WHEEZING OR SHORTNESS OF BREATH    amiodarone (PACERONE) 400 MG tablet TAKE 1 TABLET (400 MG TOTAL) BY MOUTH ONCE DAILY.    ammonium lactate 12 % Crea Apply twice daily to affected parts both feet as needed.    apixaban (ELIQUIS) 5 mg Tab Take 1 tablet (5 mg total) by mouth 2 (two) times daily.    aspirin (ECOTRIN) 81 MG EC tablet TAKE 1 TABLET BY MOUTH EVERY DAY    atorvastatin (LIPITOR) 40 MG tablet TAKE 1 TABLET (40 MG TOTAL) BY MOUTH ONCE DAILY.    baclofen (LIORESAL) 10 MG tablet take 1 to 2 TABLETS BY MOUTH every 6 hours as needed for muscle spasms    ciclopirox (CICLODAN) 8 % Soln Apply topically nightly.    clobetasol (TEMOVATE) 0.05 % external solution Use on scalp one - two times daily as needed for scaling or itching    cyproheptadine (PERIACTIN) 4 mg tablet Take 1 tablet (4 mg total) by mouth 2 (two) times daily as needed.    fluticasone furoate (ARNUITY ELLIPTA) 100 mcg/actuation DsDv Inhale 100 mcg into the lungs once  daily. Controller    furosemide (LASIX) 80 MG tablet Take 1 tablet (80 mg total) by mouth 2 (two) times daily.    gabapentin (NEURONTIN) 300 MG capsule Take 1 capsule (300 mg total) by mouth 2 (two) times daily.    irbesartan (AVAPRO) 75 MG tablet Take 0.5 tablets (37.5 mg total) by mouth every evening.    loratadine (CLARITIN) 10 mg tablet Take 1 tablet (10 mg total) by mouth once daily.    magnesium oxide (MAG-OX) 400 mg tablet Take 0.5 tablets (200 mg total) by mouth once daily.    metoprolol succinate (TOPROL-XL) 25 MG 24 hr tablet Take 1 tablet (25 mg total) by mouth once daily.    nitroGLYCERIN (NITROSTAT) 0.4 MG SL tablet PLACE 1 TABLET (0.4 MG TOTAL) UNDER THE TONGUE EVERY 5 (FIVE) MINUTES AS NEEDED FOR CHEST PAIN.    potassium chloride SA (K-DUR,KLOR-CON) 20 MEQ tablet Take 1 tablet (20 mEq total) by mouth once daily.    traZODone (DESYREL) 150 MG tablet Take 1 tablet (150 mg total) by mouth every evening.    triamcinolone acetonide 0.1% (KENALOG) 0.1 % ointment AAA bid    VENTOLIN HFA 90 mcg/actuation inhaler INHALE 2 PUFFS INTO THE LUNGS EVERY 6 HOURS AS NEEDED FOR WHEEZING OR SHORTNESS OF BREATH     Family History     None        Tobacco Use    Smoking status: Current Some Day Smoker     Packs/day: 3.00     Years: 30.00     Pack years: 90.00    Smokeless tobacco: Current User    Tobacco comment: 1-2  cigarettes a day when stressed   Substance and Sexual Activity    Alcohol use: No    Drug use: Yes     Types: Marijuana, Cocaine     Comment: once on wednesday    Sexual activity: Not Currently     Review of Systems   Constitution: Negative for chills, decreased appetite and diaphoresis.   HENT: Negative for congestion and ear discharge.    Eyes: Negative for blurred vision and discharge.   Cardiovascular: Positive for dyspnea on exertion and paroxysmal nocturnal dyspnea. Negative for chest pain, irregular heartbeat and leg swelling.   Respiratory: Positive for cough and shortness of breath.  Negative for hemoptysis.    Gastrointestinal: Positive for bloating. Negative for abdominal pain.     Objective:     Vital Signs (Most Recent):  Temp: 96.8 °F (36 °C) (06/30/19 0000)  Pulse: 83 (06/30/19 0100)  Resp: 19 (06/30/19 0115)  BP: (!) 118/98 (06/30/19 0100)  SpO2: 100 % (06/30/19 0115) Vital Signs (24h Range):  Temp:  [96.8 °F (36 °C)-97.7 °F (36.5 °C)] 96.8 °F (36 °C)  Pulse:  [] 83  Resp:  [16-32] 19  SpO2:  [58 %-100 %] 100 %  BP: ()/(59-98) 118/98     Weight: 57.2 kg (126 lb)  Body mass index is 23.05 kg/m².    SpO2: 100 %  O2 Device (Oxygen Therapy): BiPAP      Intake/Output Summary (Last 24 hours) at 6/30/2019 0153  Last data filed at 6/30/2019 0120  Gross per 24 hour   Intake 100 ml   Output 225 ml   Net -125 ml       Lines/Drains/Airways     Central Venous Catheter Line                 Percutaneous Central Line Insertion/Assessment - triple lumen  06/29/19 2358 right internal jugular less than 1 day          Drain                 Urethral Catheter 06/30/19 0100 16 Fr. less than 1 day          Peripheral Intravenous Line                 Peripheral IV - Single Lumen 06/29/19 18 G Left Forearm 1 day         Peripheral IV - Single Lumen 06/29/19 2131 18 G Right Antecubital less than 1 day                Physical Exam   Constitutional: She is oriented to person, place, and time. She appears well-developed and well-nourished. No distress.   HENT:   Head: Normocephalic and atraumatic.   Eyes: Pupils are equal, round, and reactive to light. Conjunctivae are normal.   Neck: JVD present. No tracheal deviation present. No thyromegaly present.   Cardiovascular: Normal rate, regular rhythm and intact distal pulses. Exam reveals no gallop and no friction rub.   Murmur heard.  Pulses:       Radial pulses are 2+ on the right side, and 2+ on the left side.        Femoral pulses are 2+ on the right side, and 2+ on the left side.  Pulmonary/Chest: Effort normal. No respiratory distress. She has no  wheezes. She has rales.   Abdominal: Soft. Bowel sounds are normal. She exhibits distension. There is no tenderness.   Musculoskeletal: She exhibits no edema or deformity.   Neurological: She is alert and oriented to person, place, and time. No cranial nerve deficit. Coordination normal.   Skin: Skin is warm and dry. She is not diaphoretic.   Psychiatric: She has a normal mood and affect. Her behavior is normal.       Significant Labs:   BMP:   Recent Labs   Lab 06/29/19  1732 06/29/19  2205   GLU 95 131*   * 135*   K 4.3 3.9    95   CO2 21* 28   BUN 15 17   CREATININE 0.8 1.0   CALCIUM 8.4* 9.3   MG 1.7 1.9       Significant Imaging: Echocardiogram: Transthoracic echo (TTE) complete (Cupid Only): No results found for this or any previous visit.    Assessment and Plan:     * Acute on chronic combined systolic and diastolic congestive heart failure  - Severe ICM EF <10%  - Insert RIJ TLC  - See Ficks from note below (CI 1.4 approx)  - 2.5 , 20 Lasix gtt  - Overall poor prognosis, needs goals of care    Acute and chronic respiratory failure with hypoxia  - Hypoxic Resp failure likely 2/2 Pulm edema  - ABG  - BIPAP  - IV diuresis    Cocaine use disorder, mild, abuse  - UDS positive for cocaine when patient more stable need to discuss last use  - Poor prognostic indicator...    Atrial fibrillation with RVR  - Known pAF, likely triggered HF or vice versa  - Amio load and gtt, Dig bolus x 2  - Anticoagulation (restart in AM, Heparin gtt) give night for RIJ to heal (some oozing)        VTE Risk Mitigation (From admission, onward)    None          Gina Mayfield MD  Cardiology   [facility]

## 2019-06-30 NOTE — H&P
Hospital Medicine  History and Physical      Patient Name: Polly Kwan  MRN: [id number]  Date of Admission: 6/29/2019     Principal Problem: Acute on chronic combined systolic and diastolic congestive heart failure     Chief Complaint     Shortness of breath    History of Present Illness    Ms. Kwan is a 59-year-old woman with COPD, CAD, and chronic combined systolic and diastolic heart failure with LVEF < 10%, biateral enlargements, and pHTN with estimated PA systolic pressure 76 who presents with shortness of breath.    Her symptoms started late Thursday evening with lightheadedness. Throughout Friday she experienced progressively worsening exertional dyspnea, relieved only by rest and putting her face in front of the AC. The dyspnea is associated with a cough productive of thick white sputum, new for her, as well as chest tightness. No palpitations or syncope. No fevers, chills, or night sweats. She had some relief last night and was able to sleep, but this morning the symptoms were more severe prompting her arrival here.    In the ED her workup was significant for lactate 3.1, trop 0.038, BNP 2739, total bili 2.4, CXR with early congestive changes. ECG shows AF RVR. HR variable but mostly 130-140 on tele. The ED administered 80 mg IV lasix, three duonebs, and requested admission for HF.    Review of Systems    Constitutional: +fatigue; Negative for chills, fever.   HENT: Negative for sore throat, trouble swallowing.    Eyes: Negative for photophobia, visual disturbance.   Respiratory: +cough, shortness of breath.    Cardiovascular: +chest pain, SOB, leg swellign; Negative for palpitations  Gastrointestinal: Negative for abdominal pain, constipation, diarrhea, nausea, vomiting.   Endocrine: Negative for cold intolerance, heat intolerance.   Genitourinary: Negative for dysuria, frequency.   Musculoskeletal: Negative for arthralgias, myalgias.   Skin: Negative for rash, wound, erythema   Neurological: +weakness  (diffuse); Negative for dizziness, syncope, light-headedness.   Psychiatric/Behavioral: Negative for confusion, hallucinations, anxiety    Past Medical History:   Diagnosis Date    Asthma     Bipolar 1 disorder     Cardiomyopathy     Ischemic Cardiomyopathy with EF 25-30% by echo 5/17/16    Chronic systolic (congestive) heart failure     Cocaine use     COPD (chronic obstructive pulmonary disease)     emphysema    Coronary artery disease     ROCHELLE 2006 and 2013    Defibrillator discharge     Depression     Diabetes mellitus     H/O echocardiogram 05/17/2016    EF 25-30%. LV diastolic dysfxn. Severe LAE. mod MR. PASP 86.    Hypertension     ICD (implantable cardioverter-defibrillator) in place 07/09/2013    MI (myocardial infarction)     x 3    PTSD (post-traumatic stress disorder)     Schizophrenia     Seizures      Past Surgical History:   Procedure Laterality Date    CARDIAC CATHETERIZATION      CARDIAC DEFIBRILLATOR PLACEMENT      [place]  2006    [place]    CORONARY STENT PLACEMENT      INSERT / REPLACE / REMOVE PACEMAKER  2013     ICD [facility]     Family History   Adopted: Yes   Problem Relation Age of Onset    Melanoma Neg Hx      Social History     Socioeconomic History    Marital status:      Spouse name: Not on file    Number of children: 4    Years of education: Not on file    Highest education level: Not on file   Occupational History    Occupation: On disability   Social Needs    Financial resource strain: Not on file    Food insecurity:     Worry: Not on file     Inability: Not on file    Transportation needs:     Medical: Not on file     Non-medical: Not on file   Tobacco Use    Smoking status: Current Some Day Smoker     Packs/day: 3.00     Years: 30.00     Pack years: 90.00    Smokeless tobacco: Current User    Tobacco comment: 1-2  cigarettes a day when stressed   Substance and Sexual Activity    Alcohol use: No    Drug use: Yes     Types:  Marijuana, Cocaine     Comment: once on wednesday    Sexual activity: Not Currently   Lifestyle    Physical activity:     Days per week: Not on file     Minutes per session: Not on file    Stress: Not on file   Relationships    Social connections:     Talks on phone: Not on file     Gets together: Not on file     Attends Taoism service: Not on file     Active member of club or organization: Not on file     Attends meetings of clubs or organizations: Not on file     Relationship status: Not on file   Other Topics Concern    Patient feels they ought to cut down on drinking/drug use Not Asked    Patient annoyed by others criticizing their drinking/drug use Not Asked    Patient has felt bad or guilty about drinking/drug use Not Asked    Patient has had a drink/used drugs as an eye opener in the AM Not Asked    Are you pregnant or think you may be? Not Asked    Breast-feeding Not Asked   Social History Narrative    Not on file     Medications  Scheduled Meds:   [START ON 6/30/2019] amiodarone  400 mg Oral Daily    apixaban  5 mg Oral BID    [START ON 6/30/2019] aspirin  81 mg Oral Daily    [START ON 6/30/2019] atorvastatin  40 mg Oral Daily    [START ON 6/30/2019] furosemide  80 mg Intravenous BID     Continuous Infusions:  PRN Meds:.acetaminophen, albuterol, sodium chloride 0.9%    Allergies  Patient has no known allergies.    Physical Examination    Temp:  [97.7 °F (36.5 °C)]   Pulse:  [110-126]   Resp:  [22]   BP: ()/(60-84)   SpO2:  [97 %-100 %]     Gen: NAD, chronically ill appearing woman  Head: NC, AT, NC in place on 4L  Eyes: PERRLA, EOMI  Throat: MMM, OP clear  CV: RRR, no M/R/G, trace bilateral lower extremity edema, +JVD  Resp: Bibasilar crackles, no increased work of breathing on 3L via NC  GI: Thin, soft, NT, ND, +BS  Ext: MAEW, no c/c/e, fingers and toes cool to touch (with exception of right hand which was on a heating pad under her blanket)  Neuro: AAOx3, CN grossly intact, no  focal neurologic deficits  Psychiatry: Normal mood, normal affect    CBC  Recent Labs   Lab 06/29/19  1732   WBC 5.49   HGB 12.5   HCT 37.2        CMP  Recent Labs   Lab 06/29/19  1732   *   K 4.3      CO2 21*   BUN 15   CREATININE 0.8   GLU 95   CALCIUM 8.4*   ALKPHOS 94   ALT 18   AST 25   ALBUMIN 3.1*   PROT 6.2   BILITOT 2.4*   INR 1.6*       Assessment and Plan:    Acute on chronic combined systolic and diastolic congestive heart failure  Ischemic cardiomyopathy  ICD (implantable cardioverter-defibrillator) in place  Acute and chronic respiratory failure with hypoxia  Pulmonary hypertension    - CXR showing early congestive changes  - Troponin 0.038, ECG showing AF RVR. Repeat troponin ordered*  - Already received lasix 80 mg IV  - Continue diuresis with lasix 80 mg IV BID; will trend BMP and Mg daily and replace to maintain K 4-5 and Mg 2-3  - Counseled regarding lifestyle changes including medication compliance and diet  - TTE ordered  - Concern for hypoperfusion given lactate. Consulted cardiology for further assistance including CCU evaluation  - Will follow with daily standing weights, strict intake and output, and telemetry    Atrial fibrillation with RVR    - HR ranging in 130s-150s. BP holding   - Hesitant to administer beta blockade given CHF with hypoperfusion and COPD. Discussed with cardiology, will opt for reloading amiodarone  - Continue apixaban for anticoagulation  - Will resume metoprolol as soon as possible  - Tele, electrolytes as above    Chronic obstructive pulmonary disease with acute exacerbation    - Possibly the precipitant of all the above  - She is maintaining goal SpO2 on 3L  - Hesitant to add too much beta agonist given AF RVR, will opt for duoneb Q12H and prednisone to treat    Coronary artery disease involving native coronary artery of native heart without angina pectoris    - Low suspicion for ACS as precipitant for heart failure. Trending troponin in  caution  - Continue     Diabetes mellitus with diabetic neuropathy    - Stable  - HbA1c  - Will hold home oral antihyperglycemic agents in favor of aspart SSI + POCT glucose Q6H    Cocaine use disorder, mild, abuse    - UDS pending    Essential hypertension    - Holding antihypertensives while evaluating for hypoperfusion    Diet: DM 2000 hermila + cardiac + 1500 cc restriction  VTE PPX: Apixaban    Goals of care: Curative, full code. Needs hospice bozena Sandoval M.D.  Department of Hospital Medicine  [facility] [address]  [phone] (pager)

## 2019-06-30 NOTE — PROGRESS NOTES
Pharmacokinetic Initial Assessment: IV Vancomycin    Assessment/Plan:    Initiate intravenous vancomycin with  followed by a maintenance dose of vancomycin 750mg IV every 12 hours  Desired empiric serum trough concentration is 10 to 20 mcg/mL.  Draw vancomycin trough level 30 min prior to fourth dose on 07/01 at approximately 1230  Pharmacy will continue to follow and monitor vancomycin.      Please contact pharmacy at extension [phone] with any questions regarding this assessment.     Thank you for the consult,   Lenin Castillo     Patient brief summary:  Polly Kwan is a 59 y.o. female initiated on antimicrobial therapy with IV Vancomycin for treatment of suspected lower respiratory infection    Drug Allergies:   Review of patient's allergies indicates:  No Known Allergies    Actual Body Weight:   57.2 kg    Renal Function:   Estimated Creatinine Clearance: 47.9 mL/min (based on SCr of 1 mg/dL).,       CBC (last 72 hours):  Recent Labs   Lab Result Units 06/29/19  1732   WBC K/uL 5.49   Hemoglobin g/dL 12.5   Hematocrit % 37.2   Platelets K/uL 166   Gran% % 44.9   Lymph% % 42.1   Mono% % 12.0   Eosinophil% % 0.4   Basophil% % 0.2   Differential Method  Automated       Metabolic Panel (last 72 hours):  Recent Labs   Lab Result Units 06/29/19  1732 06/29/19  1936 06/29/19  2205   Sodium mmol/L 135*  --  135*   Potassium mmol/L 4.3  --  3.9   Chloride mmol/L 103  --  95   CO2 mmol/L 21*  --  28   Glucose mg/dL 95  --  131*   Glucose, UA   --  Negative  --    BUN, Bld mg/dL 15  --  17   Creatinine mg/dL 0.8  --  1.0   Creatinine, Random Ur mg/dL  --  115.0  115.0  --    Albumin g/dL 3.1*  --  3.6   Total Bilirubin mg/dL 2.4*  --  2.6*   Alkaline Phosphatase U/L 94  --  103   AST U/L 25  --  27   ALT U/L 18  --  20   Magnesium mg/dL 1.7  --  1.9       Drug levels (last 3 results):  No results for input(s): VANCOMYCINRA, VANCOMYCINPE, VANCOMYCINTR in the last 72 hours.    Microbiologic Results:  Microbiology Results (last  7 days)     Procedure Component Value Units Date/Time    Blood culture [[id number]] Collected:  06/29/19 2205    Order Status:  Sent Specimen:  Blood from Peripheral, Antecubital, Right Updated:  06/29/19 2228    Blood culture [[id number]] Collected:  06/29/19 2205    Order Status:  Sent Specimen:  Blood from Peripheral, Forearm, Left Updated:  06/29/19 2226    Urine culture [[id number]] Collected:  06/29/19 1936    Order Status:  No result Specimen:  Urine Updated:  06/29/19 2015

## 2019-06-30 NOTE — CARE UPDATE
Discussed further with cardiology fellow, who recommended CCU admission and is facilitating transfer. Assistance appreciated

## 2019-06-30 NOTE — PLAN OF CARE
Problem: Adult Inpatient Plan of Care  Goal: Patient-Specific Goal (Individualization)    No acute events throughout day. See vital signs and assessments in flowsheets. See below for updates on today's progress.     Pulmonary: RA 92-96%. Clear breath sounds, diminished in bases    Cardiovascular: Afib 70-110s. BP 90-100s/50-60s. Afebrile. Palpable peripheral pulses. CVP 10,10,7. Svo2 53, 62.     Neurological: AAOx4. Generalized weakness. PERRLA, 3 mm.     Gastrointestinal: No BM this shift. Cardiac Diet with 1500 cc FR.     Genitourinary: Hua in place, diurese 200-400cc/hr clear yellow urine.     Endocrine: AC/HS.    Skin/Bath: no issues  Date of last CHG bath given: 6/30/19 AM shift.     Infusions:  @ 2.5 mcg/kg/min, Lasix @ 20 mg/hr, Amiodarone @ 0.5 mg/min  Patient progressing towards goals as tolerated, plan of care communicated and reviewed with Polly Kwan and family. All concerns addressed. Will continue to monitor.

## 2019-06-30 NOTE — PLAN OF CARE
HEMODYNAMIC UPDATE    RIJ TLC Inserted:    SVO2 47 CVP 14 MAP 87    FICKS: 2.3/1.4/2815     HR 93    PLAN    Add  2.5  Cont Lasix 20 gtt  Repeat labs @ 3  If becomes incessant tachy switch to Milrinone    Gina Molina MD (Luis)  Cardiology Fellow  PGY 4  [facility]

## 2019-06-30 NOTE — HPI
Ms. Polly Kwan is a 58 yoF, planned for MARY / DCCV for afib. PMHx of Bipolar disorder, polysubstance abuse (tobacco, cocaine), COPD, CAD s/p PCI in 2006, 2013, ICM (EF 15%), HTN, T2DM, VT on amiodarone, paroxysmal AF on eliquis presented with ADHF symptoms found to be in cardiogenic shock (lactate 3.1, trop 0.038, BNP 2739, total bili 2.4), ECG shows AF RVR, needing . Patient admitted to CCU, completed MARY found to be in Afib with RVR, EF 15% with diffuse akinesis / hypokinesis, overall diuresed and  weaned off CVP: 7 CO: 4.0  CI: 2.5  SVR: 1535  SVO2 = 67. Case discussed with EP by CCU team and recommended MARY / DCCV to allow improve atrial filling. In meantime, transition amio to PO 400mg qD with eliquis 5mg BID. Device interrogation noted increased Afib burden.     Significant Imaging:   ECHO:  · Irregular rythm  · Severe left ventricular enlargement.  · Severely decreased left ventricular systolic function. The estimated ejection fraction is 15%  · The following segments are akinetic: basal inferoseptal, mid inferoseptal, apical septal, apical inferior, apical lateral and apex. rest of the walls are hypokinetic.  · Eccentric left ventricular hypertrophy.  · Indeterminate left ventricular diastolic function.  · Normal right ventricular systolic function.  · Moderate right atrial enlargement.  · Severe left atrial enlargement.  · Moderate mitral regurgitation.  · Moderate tricuspid regurgitation.  · The estimated PA systolic pressure is 55 mm Hg. Pulmonary hypertension present.  · Normal central venous pressure (3 mm Hg).    Cr 0.9   CBC unremarkable  Bili 1.3  No MARY on file

## 2019-06-30 NOTE — PROGRESS NOTES
Therapy with vancomycin discontinued by provider.  Pharmacy will sign off, please re-consult as needed.

## 2019-06-30 NOTE — ASSESSMENT & PLAN NOTE
- Known pAF, likely triggered HF or vice versa  - Amio load and gtt, Dig bolus x 2  - Anticoagulation (restart in AM, Heparin gtt) give night for RIJ to heal (some oozing)

## 2019-07-01 LAB
ALBUMIN SERPL BCP-MCNC: 3.7 G/DL (ref 3.5–5.2)
ALLENS TEST: ABNORMAL
ALLENS TEST: NORMAL
ALP SERPL-CCNC: 110 U/L (ref 55–135)
ALT SERPL W/O P-5'-P-CCNC: 30 U/L (ref 10–44)
ANION GAP SERPL CALC-SCNC: 12 MMOL/L (ref 8–16)
AST SERPL-CCNC: 38 U/L (ref 10–40)
BACTERIA UR CULT: NORMAL
BACTERIA UR CULT: NORMAL
BASOPHILS # BLD AUTO: 0.01 K/UL (ref 0–0.2)
BASOPHILS NFR BLD: 0.2 % (ref 0–1.9)
BILIRUB SERPL-MCNC: 1.7 MG/DL (ref 0.1–1)
BUN SERPL-MCNC: 9 MG/DL (ref 6–20)
CALCIUM SERPL-MCNC: 8.8 MG/DL (ref 8.7–10.5)
CHLORIDE SERPL-SCNC: 96 MMOL/L (ref 95–110)
CO2 SERPL-SCNC: 29 MMOL/L (ref 23–29)
CREAT SERPL-MCNC: 0.8 MG/DL (ref 0.5–1.4)
DELSYS: ABNORMAL
DIFFERENTIAL METHOD: ABNORMAL
EOSINOPHIL # BLD AUTO: 0 K/UL (ref 0–0.5)
EOSINOPHIL NFR BLD: 0.7 % (ref 0–8)
ERYTHROCYTE [DISTWIDTH] IN BLOOD BY AUTOMATED COUNT: 15.8 % (ref 11.5–14.5)
ERYTHROCYTE [SEDIMENTATION RATE] IN BLOOD BY WESTERGREN METHOD: 24 MM/H
EST. GFR  (AFRICAN AMERICAN): >60 ML/MIN/1.73 M^2
EST. GFR  (NON AFRICAN AMERICAN): >60 ML/MIN/1.73 M^2
FIO2: 21
GLUCOSE SERPL-MCNC: 109 MG/DL (ref 70–110)
HCO3 UR-SCNC: 30.1 MMOL/L (ref 24–28)
HCO3 UR-SCNC: 30.6 MMOL/L (ref 24–28)
HCO3 UR-SCNC: 31.8 MMOL/L (ref 24–28)
HCO3 UR-SCNC: 31.9 MMOL/L (ref 24–28)
HCO3 UR-SCNC: 34.2 MMOL/L (ref 24–28)
HCT VFR BLD AUTO: 37 % (ref 37–48.5)
HCT VFR BLD CALC: 43 %PCV (ref 36–54)
HGB BLD-MCNC: 12.3 G/DL (ref 12–16)
IMM GRANULOCYTES # BLD AUTO: 0.02 K/UL (ref 0–0.04)
IMM GRANULOCYTES NFR BLD AUTO: 0.4 % (ref 0–0.5)
LDH SERPL L TO P-CCNC: 1.29 MMOL/L (ref 0.5–2.2)
LYMPHOCYTES # BLD AUTO: 1.9 K/UL (ref 1–4.8)
LYMPHOCYTES NFR BLD: 33.2 % (ref 18–48)
MAGNESIUM SERPL-MCNC: 2 MG/DL (ref 1.6–2.6)
MAGNESIUM SERPL-MCNC: 2 MG/DL (ref 1.6–2.6)
MAGNESIUM SERPL-MCNC: 2.1 MG/DL (ref 1.6–2.6)
MCH RBC QN AUTO: 29.6 PG (ref 27–31)
MCHC RBC AUTO-ENTMCNC: 33.2 G/DL (ref 32–36)
MCV RBC AUTO: 89 FL (ref 82–98)
MODE: ABNORMAL
MONOCYTES # BLD AUTO: 0.5 K/UL (ref 0.3–1)
MONOCYTES NFR BLD: 8.8 % (ref 4–15)
NEUTROPHILS # BLD AUTO: 3.2 K/UL (ref 1.8–7.7)
NEUTROPHILS NFR BLD: 56.7 % (ref 38–73)
NRBC BLD-RTO: 0 /100 WBC
PCO2 BLDA: 49 MMHG (ref 35–45)
PCO2 BLDA: 50.1 MMHG (ref 35–45)
PCO2 BLDA: 50.6 MMHG (ref 35–45)
PCO2 BLDA: 52.6 MMHG (ref 35–45)
PCO2 BLDA: 52.8 MMHG (ref 35–45)
PH SMN: 7.37 [PH] (ref 7.35–7.45)
PH SMN: 7.4 [PH] (ref 7.35–7.45)
PH SMN: 7.41 [PH] (ref 7.35–7.45)
PH SMN: 7.41 [PH] (ref 7.35–7.45)
PH SMN: 7.42 [PH] (ref 7.35–7.45)
PLATELET # BLD AUTO: 200 K/UL (ref 150–350)
PMV BLD AUTO: 10.2 FL (ref 9.2–12.9)
PO2 BLDA: 23 MMHG (ref 40–60)
PO2 BLDA: 25 MMHG (ref 40–60)
PO2 BLDA: 27 MMHG (ref 40–60)
PO2 BLDA: 28 MMHG (ref 40–60)
PO2 BLDA: 35 MMHG (ref 40–60)
POC BE: 10 MMOL/L
POC BE: 5 MMOL/L
POC BE: 6 MMOL/L
POC BE: 7 MMOL/L
POC BE: 7 MMOL/L
POC IONIZED CALCIUM: 1.06 MMOL/L (ref 1.06–1.42)
POC SATURATED O2: 40 % (ref 95–100)
POC SATURATED O2: 46 % (ref 95–100)
POC SATURATED O2: 49 % (ref 95–100)
POC SATURATED O2: 50 % (ref 95–100)
POC SATURATED O2: 67 % (ref 95–100)
POC TCO2: 32 MMOL/L (ref 24–29)
POC TCO2: 32 MMOL/L (ref 24–29)
POC TCO2: 33 MMOL/L (ref 24–29)
POC TCO2: 33 MMOL/L (ref 24–29)
POC TCO2: 36 MMOL/L (ref 24–29)
POCT GLUCOSE: 110 MG/DL (ref 70–110)
POCT GLUCOSE: 115 MG/DL (ref 70–110)
POCT GLUCOSE: 128 MG/DL (ref 70–110)
POCT GLUCOSE: 131 MG/DL (ref 70–110)
POCT GLUCOSE: 94 MG/DL (ref 70–110)
POTASSIUM BLD-SCNC: 2.9 MMOL/L (ref 3.5–5.1)
POTASSIUM SERPL-SCNC: 3.1 MMOL/L (ref 3.5–5.1)
POTASSIUM SERPL-SCNC: 3.8 MMOL/L (ref 3.5–5.1)
PROT SERPL-MCNC: 7.1 G/DL (ref 6–8.4)
RBC # BLD AUTO: 4.16 M/UL (ref 4–5.4)
SAMPLE: ABNORMAL
SAMPLE: NORMAL
SITE: ABNORMAL
SITE: NORMAL
SODIUM BLD-SCNC: 137 MMOL/L (ref 136–145)
SODIUM SERPL-SCNC: 137 MMOL/L (ref 136–145)
SP02: 96
WBC # BLD AUTO: 5.69 K/UL (ref 3.9–12.7)

## 2019-07-01 PROCEDURE — 85025 COMPLETE CBC W/AUTO DIFF WBC: CPT | Mod: HCNC

## 2019-07-01 PROCEDURE — 25000003 PHARM REV CODE 250: Mod: HCNC | Performed by: INTERNAL MEDICINE

## 2019-07-01 PROCEDURE — 20000000 HC ICU ROOM: Mod: HCNC

## 2019-07-01 PROCEDURE — 82803 BLOOD GASES ANY COMBINATION: CPT | Mod: HCNC

## 2019-07-01 PROCEDURE — 25000003 PHARM REV CODE 250: Mod: HCNC

## 2019-07-01 PROCEDURE — 63600175 PHARM REV CODE 636 W HCPCS: Mod: HCNC | Performed by: STUDENT IN AN ORGANIZED HEALTH CARE EDUCATION/TRAINING PROGRAM

## 2019-07-01 PROCEDURE — 63600175 PHARM REV CODE 636 W HCPCS: Mod: HCNC

## 2019-07-01 PROCEDURE — 99900035 HC TECH TIME PER 15 MIN (STAT): Mod: HCNC

## 2019-07-01 PROCEDURE — 83735 ASSAY OF MAGNESIUM: CPT | Mod: HCNC

## 2019-07-01 PROCEDURE — 99232 PR SUBSEQUENT HOSPITAL CARE,LEVL II: ICD-10-PCS | Mod: HCNC,GC,, | Performed by: INTERNAL MEDICINE

## 2019-07-01 PROCEDURE — 25000242 PHARM REV CODE 250 ALT 637 W/ HCPCS: Mod: HCNC | Performed by: INTERNAL MEDICINE

## 2019-07-01 PROCEDURE — 80053 COMPREHEN METABOLIC PANEL: CPT | Mod: HCNC

## 2019-07-01 PROCEDURE — 25000003 PHARM REV CODE 250: Mod: HCNC | Performed by: STUDENT IN AN ORGANIZED HEALTH CARE EDUCATION/TRAINING PROGRAM

## 2019-07-01 PROCEDURE — 94761 N-INVAS EAR/PLS OXIMETRY MLT: CPT | Mod: HCNC

## 2019-07-01 PROCEDURE — 83605 ASSAY OF LACTIC ACID: CPT | Mod: HCNC

## 2019-07-01 PROCEDURE — 83735 ASSAY OF MAGNESIUM: CPT | Mod: 91,HCNC

## 2019-07-01 PROCEDURE — 99232 SBSQ HOSP IP/OBS MODERATE 35: CPT | Mod: HCNC,GC,, | Performed by: INTERNAL MEDICINE

## 2019-07-01 PROCEDURE — 84132 ASSAY OF SERUM POTASSIUM: CPT | Mod: HCNC

## 2019-07-01 PROCEDURE — 63600175 PHARM REV CODE 636 W HCPCS: Mod: HCNC | Performed by: INTERNAL MEDICINE

## 2019-07-01 PROCEDURE — A4216 STERILE WATER/SALINE, 10 ML: HCPCS | Mod: HCNC | Performed by: STUDENT IN AN ORGANIZED HEALTH CARE EDUCATION/TRAINING PROGRAM

## 2019-07-01 PROCEDURE — 94640 AIRWAY INHALATION TREATMENT: CPT | Mod: HCNC

## 2019-07-01 RX ORDER — POTASSIUM CHLORIDE 20 MEQ/1
40 TABLET, EXTENDED RELEASE ORAL ONCE
Status: DISCONTINUED | OUTPATIENT
Start: 2019-07-01 | End: 2019-07-01

## 2019-07-01 RX ORDER — FUROSEMIDE 10 MG/ML
40 INJECTION INTRAMUSCULAR; INTRAVENOUS 2 TIMES DAILY
Status: DISCONTINUED | OUTPATIENT
Start: 2019-07-01 | End: 2019-07-01

## 2019-07-01 RX ORDER — DIGOXIN 0.25 MG/ML
500 INJECTION INTRAMUSCULAR; INTRAVENOUS ONCE
Status: COMPLETED | OUTPATIENT
Start: 2019-07-01 | End: 2019-07-01

## 2019-07-01 RX ORDER — CANDESARTAN 4 MG/1
4 TABLET ORAL DAILY
Status: DISCONTINUED | OUTPATIENT
Start: 2019-07-01 | End: 2019-07-03 | Stop reason: HOSPADM

## 2019-07-01 RX ORDER — AMIODARONE HYDROCHLORIDE 200 MG/1
400 TABLET ORAL DAILY
Status: DISCONTINUED | OUTPATIENT
Start: 2019-07-01 | End: 2019-07-03 | Stop reason: HOSPADM

## 2019-07-01 RX ORDER — FUROSEMIDE 10 MG/ML
40 INJECTION INTRAMUSCULAR; INTRAVENOUS ONCE
Status: COMPLETED | OUTPATIENT
Start: 2019-07-01 | End: 2019-07-01

## 2019-07-01 RX ORDER — DIGOXIN 0.25 MG/ML
250 INJECTION INTRAMUSCULAR; INTRAVENOUS EVERY 6 HOURS
Status: COMPLETED | OUTPATIENT
Start: 2019-07-01 | End: 2019-07-02

## 2019-07-01 RX ORDER — FUROSEMIDE 40 MG/1
40 TABLET ORAL 2 TIMES DAILY
Status: DISCONTINUED | OUTPATIENT
Start: 2019-07-01 | End: 2019-07-01

## 2019-07-01 RX ADMIN — POTASSIUM CHLORIDE 20 MEQ: 200 INJECTION, SOLUTION INTRAVENOUS at 07:07

## 2019-07-01 RX ADMIN — APIXABAN 5 MG: 5 TABLET, FILM COATED ORAL at 09:07

## 2019-07-01 RX ADMIN — PANTOPRAZOLE SODIUM 40 MG: 40 TABLET, DELAYED RELEASE ORAL at 09:07

## 2019-07-01 RX ADMIN — CANDESARTAN CILEXETIL 4 MG: 4 TABLET ORAL at 02:07

## 2019-07-01 RX ADMIN — AMIODARONE HYDROCHLORIDE 400 MG: 200 TABLET ORAL at 11:07

## 2019-07-01 RX ADMIN — FUROSEMIDE 20 MG/HR: 10 INJECTION, SOLUTION INTRAMUSCULAR; INTRAVENOUS at 08:07

## 2019-07-01 RX ADMIN — FUROSEMIDE 40 MG: 10 INJECTION, SOLUTION INTRAMUSCULAR; INTRAVENOUS at 04:07

## 2019-07-01 RX ADMIN — POTASSIUM CHLORIDE 40 MEQ: 200 INJECTION, SOLUTION INTRAVENOUS at 04:07

## 2019-07-01 RX ADMIN — Medication 10 ML: at 04:07

## 2019-07-01 RX ADMIN — IPRATROPIUM BROMIDE AND ALBUTEROL SULFATE 3 ML: .5; 3 SOLUTION RESPIRATORY (INHALATION) at 07:07

## 2019-07-01 RX ADMIN — DIGOXIN 500 MCG: 0.25 INJECTION INTRAMUSCULAR; INTRAVENOUS at 04:07

## 2019-07-01 RX ADMIN — POTASSIUM CHLORIDE 40 MEQ: 400 INJECTION, SOLUTION INTRAVENOUS at 01:07

## 2019-07-01 RX ADMIN — ASPIRIN 81 MG: 81 TABLET, COATED ORAL at 09:07

## 2019-07-01 RX ADMIN — AMIODARONE HYDROCHLORIDE 0.5 MG/MIN: 1.8 INJECTION, SOLUTION INTRAVENOUS at 03:07

## 2019-07-01 RX ADMIN — ATORVASTATIN CALCIUM 40 MG: 20 TABLET, FILM COATED ORAL at 09:07

## 2019-07-01 RX ADMIN — DIGOXIN 250 MCG: 0.25 INJECTION INTRAMUSCULAR; INTRAVENOUS at 09:07

## 2019-07-01 NOTE — PROGRESS NOTES
[facility]  Cardiology  Progress Note    Patient Name: Polly Kwan  MRN: [id number]  Admission Date: 6/29/2019  Hospital Length of Stay: 2 days  Code Status: Full Code   Attending Physician: Christen Rojas MD   Primary Care Physician: Iza Kwan MD  Expected Discharge Date:   Principal Problem:Acute on chronic combined systolic and diastolic congestive heart failure    Subjective:     Hospital Course:   No notes on file    Interval History: Diuresed well overnight, no events aside from a transient SVO2 drop that was not intervened on due to high probability of it being an artifact.    ROS  Objective:     Vital Signs (Most Recent):  Temp: 98 °F (36.7 °C) (07/01/19 1100)  Pulse: 109 (07/01/19 1300)  Resp: (!) 45 (07/01/19 1300)  BP: 105/76 (07/01/19 1300)  SpO2: 98 % (07/01/19 1300) Vital Signs (24h Range):  Temp:  [97.5 °F (36.4 °C)-99.1 °F (37.3 °C)] 98 °F (36.7 °C)  Pulse:  [] 109  Resp:  [15-45] 45  SpO2:  [82 %-100 %] 98 %  BP: ()/(53-79) 105/76     Weight: 59 kg (130 lb 1.1 oz)  Body mass index is 23.79 kg/m².     SpO2: 98 %  O2 Device (Oxygen Therapy): room air      Intake/Output Summary (Last 24 hours) at 7/1/2019 1330  Last data filed at 7/1/2019 1319  Gross per 24 hour   Intake 2423.23 ml   Output 5635 ml   Net -3211.77 ml       Lines/Drains/Airways     Central Venous Catheter Line                 Percutaneous Central Line Insertion/Assessment - triple lumen  06/29/19 2358 right internal jugular 1 day          Peripheral Intravenous Line                 Peripheral IV - Single Lumen 06/29/19 18 G Left Forearm 2 days         Peripheral IV - Single Lumen 06/29/19 2131 18 G Right Antecubital 1 day                Physical Exam    Significant Labs:   ABG:   Recent Labs   Lab 07/01/19  0332 07/01/19  0343 07/01/19  0546   PH 7.408 7.419 7.404   PCO2 50.6* 52.8* 49.0*   HCO3 31.9* 34.2* 30.6*   POCSATURATED 46* 40* 67*   BE 7 10 6   , CMP   Recent Labs   Lab 06/29/19  3500  06/30/19  0308 06/30/19  1930 07/01/19  0300 07/01/19  1131   * 136  136 136 137  --    K 3.9 3.4*  3.4* 3.2* 3.1* 3.8   CL 95 97  97 94* 96  --    CO2 28 29  29 32* 29  --    * 133*  133* 93 109  --    BUN 17 17  17 11 9  --    CREATININE 1.0 1.0  1.0 0.8 0.8  --    CALCIUM 9.3 8.8  8.8 8.6* 8.8  --    PROT 6.9 6.7  --  7.1  --    ALBUMIN 3.6 3.6  --  3.7  --    BILITOT 2.6* 2.1*  --  1.7*  --    ALKPHOS 103 99  --  110  --    AST 27 34  --  38  --    ALT 20 25  --  30  --    ANIONGAP 12 10  10 10 12  --    ESTGFRAFRICA >60.0 >60.0  >60.0 >60.0 >60.0  --    EGFRNONAA >60.0 >60.0  >60.0 >60.0 >60.0  --    , CBC   Recent Labs   Lab 06/29/19  1732 06/30/19  0308 07/01/19  0300 07/01/19  0343   WBC 5.49 4.57  4.57 5.69  --    HGB 12.5 11.2*  11.2* 12.3  --    HCT 37.2 33.8*  33.8* 37.0 43    185  185 200  --    , INR   Recent Labs   Lab 06/29/19  1732   INR 1.6*    and Troponin   Recent Labs   Lab 06/29/19 1732 06/29/19  2205   TROPONINI 0.038* 0.044*       Significant Imaging:   ECHO:  · Irregular rythm  · Severe left ventricular enlargement.  · Severely decreased left ventricular systolic function. The estimated ejection fraction is 15%  · The following segments are akinetic: basal inferoseptal, mid inferoseptal, apical septal, apical inferior, apical lateral and apex. rest of the walls are hypokinetic.  · Eccentric left ventricular hypertrophy.  · Indeterminate left ventricular diastolic function.  · Normal right ventricular systolic function.  · Moderate right atrial enlargement.  · Severe left atrial enlargement.  · Moderate mitral regurgitation.  · Moderate tricuspid regurgitation.  · The estimated PA systolic pressure is 55 mm Hg. Pulmonary hypertension present.  · Normal central venous pressure (3 mm Hg).    Assessment and Plan:       * Acute on chronic combined systolic and diastolic congestive heart failure  Severe ICM EF <10%, doing well with diuresis on  2.5    Oxygen  consumption: 3.5 * 59kg  CVP: 7  CO: 4.0  CI: 2.5  SVR: 1535  SVO2 = 67    - stop , trend SVO2  - start candesartan 4qD for afterload reduction (on irbesartan 37.5 at home)  - transitioned to lasix PO 40 BID  - Overall poor prognosis, discussed goals of care, but still limited insight. Palliative care consulted for assistance    Acute and chronic respiratory failure with hypoxia  - Hypoxic Resp failure likely 2/2 Pulm edema, now resolved      Cocaine use disorder, mild, abuse  UDS positive for cocaine, patient uses and has poor insight despite re-iteration of its effects on her health  - Poor prognostic indicator, have counseled extensively on cessation    Atrial fibrillation with RVR   Known pAF, likely triggered HF or vice versa. Persisting throughout admission (although patient has been on )  - transition amio to PO 400mg qD  - will interrogate device to determine AF burden, if low, will plan on DCCV in AM  - Continuing eliquis 5mg BID        VTE Risk Mitigation (From admission, onward)        Ordered     apixaban tablet 5 mg  2 times daily      06/30/19 0805          Yair Carlson MD  Cardiology  [facility]

## 2019-07-01 NOTE — PLAN OF CARE
Problem: Adult Inpatient Plan of Care  Goal: Plan of Care Review  Need for possible cardioversion tomorrow,verbal and written information given.

## 2019-07-01 NOTE — PLAN OF CARE
Extended Emergency Contact Information  Primary Emergency Contact: George Red  Address: [address] [place] [id number] [facility]  Home Phone: [phone]  Relation: Spouse    Iza Kwan MD  [address] [place] [address] / [place] [address]    Future Appointments   Date Time Provider Department Center   8/20/2019  4:00 PM Enrico Buck MD [facility] [address]   8/26/2019  8:00 AM HOME MONITOR DEVICE CHECK, [facility] [address]   11/25/2019  8:00 AM HOME MONITOR DEVICE CHECK, [facility] [address]   2/24/2020  8:00 AM HOME MONITOR DEVICE CHECK, [facility] [address]     Payor: HU[employer] MANAGED MEDICARE / Plan: [employer] SNP (SPECIAL NEEDS PLAN) / Product Type: Medicare Advantage /       [id number] - [place], [place] - [address]  [address]  [facility] [address]  Phone: [phone] Fax: [phone]       07/01/19 1620   Discharge Assessment   Assessment Type Discharge Planning Assessment   Confirmed/corrected address and phone number on facesheet? Yes   Assessment information obtained from? Patient;Medical Record   Expected Length of Stay (days) 3   Communicated expected length of stay with patient/caregiver yes   Prior to hospitilization cognitive status: Alert/Oriented   Prior to hospitalization functional status: Independent;Assistive Equipment   Current cognitive status: Alert/Oriented   Current Functional Status: Assistive Equipment;Needs Assistance   Lives With significant other   Able to Return to Prior Arrangements yes   Is patient able to care for self after discharge? Yes   Patient's perception of discharge disposition home or selfcare   Readmission Within the Last 30 Days no previous admission in last 30 days   Patient currently being followed by outpatient case management? No   Patient currently receives any other outside agency services? No   Equipment Currently Used at Home walker, standard   Do you have  any problems affording any of your prescribed medications? No   Is the patient taking medications as prescribed? yes   Does the patient have transportation home? Yes   Transportation Anticipated family or friend will provide   Discharge Plan A Home   Discharge Plan B Home   Patient/Family in Agreement with Plan yes

## 2019-07-01 NOTE — SUBJECTIVE & OBJECTIVE
Interval History: Diuresed well overnight, no events aside from a transient SVO2 drop that was not intervened on due to high probability of it being an artifact.    ROS  Objective:     Vital Signs (Most Recent):  Temp: 98 °F (36.7 °C) (07/01/19 1100)  Pulse: 109 (07/01/19 1300)  Resp: (!) 45 (07/01/19 1300)  BP: 105/76 (07/01/19 1300)  SpO2: 98 % (07/01/19 1300) Vital Signs (24h Range):  Temp:  [97.5 °F (36.4 °C)-99.1 °F (37.3 °C)] 98 °F (36.7 °C)  Pulse:  [] 109  Resp:  [15-45] 45  SpO2:  [82 %-100 %] 98 %  BP: ()/(53-79) 105/76     Weight: 59 kg (130 lb 1.1 oz)  Body mass index is 23.79 kg/m².     SpO2: 98 %  O2 Device (Oxygen Therapy): room air      Intake/Output Summary (Last 24 hours) at 7/1/2019 1330  Last data filed at 7/1/2019 1319  Gross per 24 hour   Intake 2423.23 ml   Output 5635 ml   Net -3211.77 ml       Lines/Drains/Airways     Central Venous Catheter Line                 Percutaneous Central Line Insertion/Assessment - triple lumen  06/29/19 2358 right internal jugular 1 day          Peripheral Intravenous Line                 Peripheral IV - Single Lumen 06/29/19 18 G Left Forearm 2 days         Peripheral IV - Single Lumen 06/29/19 2131 18 G Right Antecubital 1 day                Physical Exam    Significant Labs:   ABG:   Recent Labs   Lab 07/01/19  0332 07/01/19  0343 07/01/19  0546   PH 7.408 7.419 7.404   PCO2 50.6* 52.8* 49.0*   HCO3 31.9* 34.2* 30.6*   POCSATURATED 46* 40* 67*   BE 7 10 6   , CMP   Recent Labs   Lab 06/29/19  2205 06/30/19  0308 06/30/19  1930 07/01/19  0300 07/01/19  1131   * 136  136 136 137  --    K 3.9 3.4*  3.4* 3.2* 3.1* 3.8   CL 95 97  97 94* 96  --    CO2 28 29  29 32* 29  --    * 133*  133* 93 109  --    BUN 17 17  17 11 9  --    CREATININE 1.0 1.0  1.0 0.8 0.8  --    CALCIUM 9.3 8.8  8.8 8.6* 8.8  --    PROT 6.9 6.7  --  7.1  --    ALBUMIN 3.6 3.6  --  3.7  --    BILITOT 2.6* 2.1*  --  1.7*  --    ALKPHOS 103 99  --  110  --    AST  27 34  --  38  --    ALT 20 25  --  30  --    ANIONGAP 12 10  10 10 12  --    ESTGFRAFRICA >60.0 >60.0  >60.0 >60.0 >60.0  --    EGFRNONAA >60.0 >60.0  >60.0 >60.0 >60.0  --    , CBC   Recent Labs   Lab 06/29/19  1732 06/30/19  0308 07/01/19  0300 07/01/19  0343   WBC 5.49 4.57  4.57 5.69  --    HGB 12.5 11.2*  11.2* 12.3  --    HCT 37.2 33.8*  33.8* 37.0 43    185  185 200  --    , INR   Recent Labs   Lab 06/29/19  1732   INR 1.6*    and Troponin   Recent Labs   Lab 06/29/19 1732 06/29/19  2205   TROPONINI 0.038* 0.044*       Significant Imaging:   ECHO:  · Irregular rythm  · Severe left ventricular enlargement.  · Severely decreased left ventricular systolic function. The estimated ejection fraction is 15%  · The following segments are akinetic: basal inferoseptal, mid inferoseptal, apical septal, apical inferior, apical lateral and apex. rest of the walls are hypokinetic.  · Eccentric left ventricular hypertrophy.  · Indeterminate left ventricular diastolic function.  · Normal right ventricular systolic function.  · Moderate right atrial enlargement.  · Severe left atrial enlargement.  · Moderate mitral regurgitation.  · Moderate tricuspid regurgitation.  · The estimated PA systolic pressure is 55 mm Hg. Pulmonary hypertension present.  · Normal central venous pressure (3 mm Hg).

## 2019-07-01 NOTE — ASSESSMENT & PLAN NOTE
Severe ICM EF <10%, doing well with diuresis on  2.5    Oxygen consumption: 3.5 * 59kg  CVP: 7  CO: 4.0  CI: 2.5  SVR: 1535  SVO2 = 67    - stop , trend SVO2  - start candesartan 4qD for afterload reduction (on irbesartan 37.5 at home)  - transitioned to lasix PO 40 BID  - Overall poor prognosis, discussed goals of care, but still limited insight. Palliative care consulted for assistance

## 2019-07-01 NOTE — NURSING
Informed Dr. Soto of SVO2-49% and repeat 46%.  CVP-5. Pt awake and alert. -300ml/hr.  POCT lactate and Hct ordered.  Repeat SVO2 after and hour. WCTM

## 2019-07-01 NOTE — ASSESSMENT & PLAN NOTE
UDS positive for cocaine, patient uses and has poor insight despite re-iteration of its effects on her health  - Poor prognostic indicator, have counseled extensively on cessation

## 2019-07-01 NOTE — PLAN OF CARE
Problem: Adult Inpatient Plan of Care  Goal: Plan of Care Review  Outcome: Ongoing (interventions implemented as appropriate)  No acute events noted within the shift.  Pt  GCS 15, OX4, follows command , PERRLA. Afebrile.   HR on AFIb at 70-low 100's. BP MAP above 65.  CVP 7, 6, 5. Latest SVO2-67%  On room air, sats 95% above, no SOB.  On cardiac diet , with FR of 1500ml.   No BM.  Complete chlorhexidine bath given.  Remains on Lasix , Dobutamine and Amiodarone drip.  Ongoing replacement of potassium.   Plan of care reviewed with Polly Kwan. All concerns and questions addressed. WCTM

## 2019-07-01 NOTE — ASSESSMENT & PLAN NOTE
Known pAF, likely triggered HF or vice versa. Persisting throughout admission (although patient has been on )  - transition amio to PO 400mg qD  - will interrogate device to determine AF burden, if low, will plan on DCCV in AM  - Continuing eliquis 5mg BID

## 2019-07-02 ENCOUNTER — ANESTHESIA (OUTPATIENT)
Dept: MEDSURG UNIT | Facility: HOSPITAL | Age: 59
DRG: 291 | End: 2019-07-02
Payer: MEDICARE

## 2019-07-02 ENCOUNTER — ANESTHESIA EVENT (OUTPATIENT)
Dept: MEDSURG UNIT | Facility: HOSPITAL | Age: 59
DRG: 291 | End: 2019-07-02
Payer: MEDICARE

## 2019-07-02 PROBLEM — J96.21 ACUTE AND CHRONIC RESPIRATORY FAILURE WITH HYPOXIA: Status: RESOLVED | Noted: 2019-06-29 | Resolved: 2019-07-02

## 2019-07-02 PROBLEM — Z51.5 PALLIATIVE CARE ENCOUNTER: Status: ACTIVE | Noted: 2019-07-02

## 2019-07-02 LAB
ALBUMIN SERPL BCP-MCNC: 3.5 G/DL (ref 3.5–5.2)
ALLENS TEST: ABNORMAL
ALLENS TEST: ABNORMAL
ALP SERPL-CCNC: 115 U/L (ref 55–135)
ALT SERPL W/O P-5'-P-CCNC: 29 U/L (ref 10–44)
ANION GAP SERPL CALC-SCNC: 9 MMOL/L (ref 8–16)
AST SERPL-CCNC: 31 U/L (ref 10–40)
BASOPHILS # BLD AUTO: 0.02 K/UL (ref 0–0.2)
BASOPHILS NFR BLD: 0.3 % (ref 0–1.9)
BILIRUB SERPL-MCNC: 1.3 MG/DL (ref 0.1–1)
BSA FOR ECHO PROCEDURE: 1.58 M2
BUN SERPL-MCNC: 11 MG/DL (ref 6–20)
CALCIUM SERPL-MCNC: 9.9 MG/DL (ref 8.7–10.5)
CHLORIDE SERPL-SCNC: 98 MMOL/L (ref 95–110)
CO2 SERPL-SCNC: 29 MMOL/L (ref 23–29)
CREAT SERPL-MCNC: 0.9 MG/DL (ref 0.5–1.4)
DELSYS: ABNORMAL
DELSYS: ABNORMAL
DIFFERENTIAL METHOD: ABNORMAL
EOSINOPHIL # BLD AUTO: 0.1 K/UL (ref 0–0.5)
EOSINOPHIL NFR BLD: 0.8 % (ref 0–8)
ERYTHROCYTE [DISTWIDTH] IN BLOOD BY AUTOMATED COUNT: 16.1 % (ref 11.5–14.5)
EST. GFR  (AFRICAN AMERICAN): >60 ML/MIN/1.73 M^2
EST. GFR  (NON AFRICAN AMERICAN): >60 ML/MIN/1.73 M^2
GLUCOSE SERPL-MCNC: 96 MG/DL (ref 70–110)
HCO3 UR-SCNC: 28.9 MMOL/L (ref 24–28)
HCO3 UR-SCNC: 29.8 MMOL/L (ref 24–28)
HCT VFR BLD AUTO: 39.6 % (ref 37–48.5)
HGB BLD-MCNC: 13.1 G/DL (ref 12–16)
IMM GRANULOCYTES # BLD AUTO: 0.02 K/UL (ref 0–0.04)
IMM GRANULOCYTES NFR BLD AUTO: 0.3 % (ref 0–0.5)
LYMPHOCYTES # BLD AUTO: 2 K/UL (ref 1–4.8)
LYMPHOCYTES NFR BLD: 32.4 % (ref 18–48)
MAGNESIUM SERPL-MCNC: 2.1 MG/DL (ref 1.6–2.6)
MAGNESIUM SERPL-MCNC: 2.1 MG/DL (ref 1.6–2.6)
MCH RBC QN AUTO: 29.4 PG (ref 27–31)
MCHC RBC AUTO-ENTMCNC: 33.1 G/DL (ref 32–36)
MCV RBC AUTO: 89 FL (ref 82–98)
MODE: ABNORMAL
MONOCYTES # BLD AUTO: 0.6 K/UL (ref 0.3–1)
MONOCYTES NFR BLD: 10 % (ref 4–15)
NEUTROPHILS # BLD AUTO: 3.5 K/UL (ref 1.8–7.7)
NEUTROPHILS NFR BLD: 56.2 % (ref 38–73)
NRBC BLD-RTO: 0 /100 WBC
PCO2 BLDA: 49.7 MMHG (ref 35–45)
PCO2 BLDA: 52.1 MMHG (ref 35–45)
PH SMN: 7.35 [PH] (ref 7.35–7.45)
PH SMN: 7.39 [PH] (ref 7.35–7.45)
PLATELET # BLD AUTO: 242 K/UL (ref 150–350)
PMV BLD AUTO: 10.2 FL (ref 9.2–12.9)
PO2 BLDA: 31 MMHG (ref 40–60)
PO2 BLDA: 31 MMHG (ref 40–60)
POC BE: 3 MMOL/L
POC BE: 5 MMOL/L
POC SATURATED O2: 56 % (ref 95–100)
POC SATURATED O2: 57 % (ref 95–100)
POC TCO2: 30 MMOL/L (ref 24–29)
POC TCO2: 31 MMOL/L (ref 24–29)
POCT GLUCOSE: 108 MG/DL (ref 70–110)
POCT GLUCOSE: 125 MG/DL (ref 70–110)
POCT GLUCOSE: 144 MG/DL (ref 70–110)
POCT GLUCOSE: <20 MG/DL (ref 70–110)
POTASSIUM SERPL-SCNC: 4.5 MMOL/L (ref 3.5–5.1)
PROT SERPL-MCNC: 7.1 G/DL (ref 6–8.4)
RBC # BLD AUTO: 4.45 M/UL (ref 4–5.4)
SAMPLE: ABNORMAL
SAMPLE: ABNORMAL
SITE: ABNORMAL
SITE: ABNORMAL
SODIUM SERPL-SCNC: 136 MMOL/L (ref 136–145)
WBC # BLD AUTO: 6.27 K/UL (ref 3.9–12.7)

## 2019-07-02 PROCEDURE — 92960 CARDIOVERSION ELECTRIC EXT: CPT | Mod: HCNC | Performed by: INTERNAL MEDICINE

## 2019-07-02 PROCEDURE — 37000008 HC ANESTHESIA 1ST 15 MINUTES: Mod: HCNC | Performed by: INTERNAL MEDICINE

## 2019-07-02 PROCEDURE — 25000003 PHARM REV CODE 250: Mod: HCNC | Performed by: INTERNAL MEDICINE

## 2019-07-02 PROCEDURE — 25000003 PHARM REV CODE 250: Mod: HCNC

## 2019-07-02 PROCEDURE — 99900035 HC TECH TIME PER 15 MIN (STAT): Mod: HCNC

## 2019-07-02 PROCEDURE — 93010 ELECTROCARDIOGRAM REPORT: CPT | Mod: HCNC,,, | Performed by: INTERNAL MEDICINE

## 2019-07-02 PROCEDURE — 99223 1ST HOSP IP/OBS HIGH 75: CPT | Mod: HCNC,,, | Performed by: INTERNAL MEDICINE

## 2019-07-02 PROCEDURE — 94640 AIRWAY INHALATION TREATMENT: CPT | Mod: HCNC

## 2019-07-02 PROCEDURE — 25000003 PHARM REV CODE 250: Mod: HCNC | Performed by: STUDENT IN AN ORGANIZED HEALTH CARE EDUCATION/TRAINING PROGRAM

## 2019-07-02 PROCEDURE — 99223 1ST HOSP IP/OBS HIGH 75: CPT | Mod: HCNC,,, | Performed by: CLINICAL NURSE SPECIALIST

## 2019-07-02 PROCEDURE — 93005 ELECTROCARDIOGRAM TRACING: CPT | Mod: HCNC

## 2019-07-02 PROCEDURE — D9220A PRA ANESTHESIA: Mod: HCNC,ANES,, | Performed by: ANESTHESIOLOGY

## 2019-07-02 PROCEDURE — 25000003 PHARM REV CODE 250: Mod: HCNC | Performed by: NURSE ANESTHETIST, CERTIFIED REGISTERED

## 2019-07-02 PROCEDURE — 63600175 PHARM REV CODE 636 W HCPCS: Mod: HCNC | Performed by: NURSE ANESTHETIST, CERTIFIED REGISTERED

## 2019-07-02 PROCEDURE — 93010 EKG 12-LEAD: ICD-10-PCS | Mod: HCNC,,, | Performed by: INTERNAL MEDICINE

## 2019-07-02 PROCEDURE — 63600175 PHARM REV CODE 636 W HCPCS: Mod: HCNC

## 2019-07-02 PROCEDURE — 99223 PR INITIAL HOSPITAL CARE,LEVL III: ICD-10-PCS | Mod: HCNC,,, | Performed by: INTERNAL MEDICINE

## 2019-07-02 PROCEDURE — 37000009 HC ANESTHESIA EA ADD 15 MINS: Mod: HCNC | Performed by: INTERNAL MEDICINE

## 2019-07-02 PROCEDURE — 94761 N-INVAS EAR/PLS OXIMETRY MLT: CPT | Mod: HCNC

## 2019-07-02 PROCEDURE — 85025 COMPLETE CBC W/AUTO DIFF WBC: CPT | Mod: HCNC

## 2019-07-02 PROCEDURE — 99223 PR INITIAL HOSPITAL CARE,LEVL III: ICD-10-PCS | Mod: HCNC,,, | Performed by: CLINICAL NURSE SPECIALIST

## 2019-07-02 PROCEDURE — 82803 BLOOD GASES ANY COMBINATION: CPT | Mod: HCNC

## 2019-07-02 PROCEDURE — D9220A PRA ANESTHESIA: ICD-10-PCS | Mod: HCNC,ANES,, | Performed by: ANESTHESIOLOGY

## 2019-07-02 PROCEDURE — 83735 ASSAY OF MAGNESIUM: CPT | Mod: HCNC

## 2019-07-02 PROCEDURE — 27000221 HC OXYGEN, UP TO 24 HOURS: Mod: HCNC

## 2019-07-02 PROCEDURE — 80053 COMPREHEN METABOLIC PANEL: CPT | Mod: HCNC

## 2019-07-02 PROCEDURE — 92960 PR CARDIOVERSION, ELECTIVE;EXTERN: ICD-10-PCS | Mod: HCNC,,, | Performed by: INTERNAL MEDICINE

## 2019-07-02 PROCEDURE — 20000000 HC ICU ROOM: Mod: HCNC

## 2019-07-02 PROCEDURE — 25000242 PHARM REV CODE 250 ALT 637 W/ HCPCS: Mod: HCNC | Performed by: INTERNAL MEDICINE

## 2019-07-02 PROCEDURE — 92960 CARDIOVERSION ELECTRIC EXT: CPT | Mod: HCNC,,, | Performed by: INTERNAL MEDICINE

## 2019-07-02 RX ORDER — FENTANYL CITRATE 50 UG/ML
INJECTION, SOLUTION INTRAMUSCULAR; INTRAVENOUS
Status: DISCONTINUED | OUTPATIENT
Start: 2019-07-02 | End: 2019-07-02

## 2019-07-02 RX ORDER — ETOMIDATE 2 MG/ML
INJECTION INTRAVENOUS
Status: DISCONTINUED | OUTPATIENT
Start: 2019-07-02 | End: 2019-07-02

## 2019-07-02 RX ORDER — LIDOCAINE HYDROCHLORIDE 20 MG/ML
SOLUTION OROPHARYNGEAL
Status: DISCONTINUED | OUTPATIENT
Start: 2019-07-02 | End: 2019-07-02

## 2019-07-02 RX ORDER — GLYCOPYRROLATE 0.2 MG/ML
INJECTION INTRAMUSCULAR; INTRAVENOUS
Status: DISCONTINUED | OUTPATIENT
Start: 2019-07-02 | End: 2019-07-02

## 2019-07-02 RX ORDER — SILVER SULFADIAZINE 10 G/1000G
CREAM TOPICAL
Status: DISCONTINUED | OUTPATIENT
Start: 2019-07-02 | End: 2019-07-03 | Stop reason: HOSPADM

## 2019-07-02 RX ORDER — PHENYLEPHRINE HYDROCHLORIDE 10 MG/ML
INJECTION INTRAVENOUS
Status: DISCONTINUED | OUTPATIENT
Start: 2019-07-02 | End: 2019-07-02

## 2019-07-02 RX ADMIN — LIDOCAINE HYDROCHLORIDE 4 ML: 20 SOLUTION OROPHARYNGEAL at 03:07

## 2019-07-02 RX ADMIN — ATORVASTATIN CALCIUM 40 MG: 20 TABLET, FILM COATED ORAL at 08:07

## 2019-07-02 RX ADMIN — PANTOPRAZOLE SODIUM 40 MG: 40 TABLET, DELAYED RELEASE ORAL at 08:07

## 2019-07-02 RX ADMIN — CANDESARTAN CILEXETIL 4 MG: 4 TABLET ORAL at 08:07

## 2019-07-02 RX ADMIN — IPRATROPIUM BROMIDE AND ALBUTEROL SULFATE 3 ML: .5; 3 SOLUTION RESPIRATORY (INHALATION) at 07:07

## 2019-07-02 RX ADMIN — ETOMIDATE 4 MG: 2 INJECTION, SOLUTION INTRAVENOUS at 04:07

## 2019-07-02 RX ADMIN — ETOMIDATE 4 MG: 2 INJECTION, SOLUTION INTRAVENOUS at 03:07

## 2019-07-02 RX ADMIN — FENTANYL CITRATE 25 MCG: 50 INJECTION, SOLUTION INTRAMUSCULAR; INTRAVENOUS at 03:07

## 2019-07-02 RX ADMIN — APIXABAN 5 MG: 5 TABLET, FILM COATED ORAL at 08:07

## 2019-07-02 RX ADMIN — GLYCOPYRROLATE 0.2 MG: 0.2 INJECTION, SOLUTION INTRAMUSCULAR; INTRAVENOUS at 03:07

## 2019-07-02 RX ADMIN — AMIODARONE HYDROCHLORIDE 400 MG: 200 TABLET ORAL at 08:07

## 2019-07-02 RX ADMIN — PHENYLEPHRINE HYDROCHLORIDE 200 MCG: 10 INJECTION INTRAVENOUS at 03:07

## 2019-07-02 RX ADMIN — ASPIRIN 81 MG: 81 TABLET, COATED ORAL at 08:07

## 2019-07-02 RX ADMIN — ETOMIDATE 2 MG: 2 INJECTION, SOLUTION INTRAVENOUS at 03:07

## 2019-07-02 RX ADMIN — SODIUM CHLORIDE, SODIUM GLUCONATE, SODIUM ACETATE, POTASSIUM CHLORIDE, MAGNESIUM CHLORIDE, SODIUM PHOSPHATE, DIBASIC, AND POTASSIUM PHOSPHATE: .53; .5; .37; .037; .03; .012; .00082 INJECTION, SOLUTION INTRAVENOUS at 03:07

## 2019-07-02 RX ADMIN — APIXABAN 5 MG: 5 TABLET, FILM COATED ORAL at 09:07

## 2019-07-02 RX ADMIN — PHENYLEPHRINE HYDROCHLORIDE 200 MCG: 10 INJECTION INTRAVENOUS at 04:07

## 2019-07-02 RX ADMIN — DIGOXIN 250 MCG: 0.25 INJECTION INTRAMUSCULAR; INTRAVENOUS at 05:07

## 2019-07-02 NOTE — ASSESSMENT & PLAN NOTE
Known pAF ([id number] = 3), likely triggered HF or vice versa. Persisting throughout admission (although patient has been on ). Telemetry shows persisting AF/AFL overnight.   - plan on MARY/DCCV today  - continue amio to PO 400mg qD  - Continuing eliquis 5mg BID

## 2019-07-02 NOTE — HPI
Pt is a 59 y/o female with PMH of Bipolar disorder, polysubstance abuse (tobacco, cocaine), COPD, CAD s/p PCI in 2006, 2013, ICM (EF 20%), HTN, T2 DM. Pt has  VT on amiodarone, paroxysmal AF on eliquis who presented to the ED today with c/o progressively worsening SOB, abdominal distention, LE swelling for 2 days. Associated with dry cough, orthopnea and PND episodes.     Per chart review, pt's symptoms started late Thursday evening with lightheadedness. Throughout Friday, she experienced progressively worsening exertional dyspnea, relieved only by rest and putting her face in front of the AC. The dyspnea is associated with a cough that was productive with thick white sputum, new for her, as well as chest tightness. No palpitations or syncope. No fevers, chills, or night sweats. She had some relief last night and was able to sleep, but this morning the symptoms were more severe prompting her arrival here.     Per chart review, in the ED her workup was significant for lactate 3.1, trop 0.038, BNP 2739, total bili 2.4, CXR with early congestive changes. ECG shows AF RVR. HR variable but mostly 130-140 on tele. The ED administered 80 mg IV lasix, three duonebs, and requested admission for HF.

## 2019-07-02 NOTE — SUBJECTIVE & OBJECTIVE
Past Medical History:   Diagnosis Date    Asthma     Bipolar 1 disorder     Cardiomyopathy     Ischemic Cardiomyopathy with EF 25-30% by echo 5/17/16    Chronic systolic (congestive) heart failure     Cocaine use     COPD (chronic obstructive pulmonary disease)     emphysema    Coronary artery disease     ROCHELLE 2006 and 2013    Defibrillator discharge     Depression     Diabetes mellitus     H/O echocardiogram 05/17/2016    EF 25-30%. LV diastolic dysfxn. Severe LAE. mod MR. PASP 86.    Hypertension     ICD (implantable cardioverter-defibrillator) in place 07/09/2013    MI (myocardial infarction)     x 3    PTSD (post-traumatic stress disorder)     Schizophrenia     Seizures        Past Surgical History:   Procedure Laterality Date    CARDIAC CATHETERIZATION      CARDIAC DEFIBRILLATOR PLACEMENT      [place]    CORONARY STENT PLACEMENT      INSERT / REPLACE / REMOVE PACEMAKER  2013     ICD [facility]       Review of patient's allergies indicates:  No Known Allergies    No current facility-administered medications on file prior to encounter.      Current Outpatient Medications on File Prior to Encounter   Medication Sig    albuterol (VENTOLIN HFA) 90 mcg/actuation inhaler INHALE 2 PUFFS INTO THE LUNGS EVERY 6 HOURS AS NEEDED FOR WHEEZING OR SHORTNESS OF BREATH    amiodarone (PACERONE) 400 MG tablet TAKE 1 TABLET (400 MG TOTAL) BY MOUTH ONCE DAILY.    ammonium lactate 12 % Crea Apply twice daily to affected parts both feet as needed.    apixaban (ELIQUIS) 5 mg Tab Take 1 tablet (5 mg total) by mouth 2 (two) times daily.    aspirin (ECOTRIN) 81 MG EC tablet TAKE 1 TABLET BY MOUTH EVERY DAY    atorvastatin (LIPITOR) 40 MG tablet TAKE 1 TABLET (40 MG TOTAL) BY MOUTH ONCE DAILY.    baclofen (LIORESAL) 10 MG tablet take 1 to 2 TABLETS BY MOUTH every 6 hours as needed for muscle spasms    ciclopirox (CICLODAN) 8 % Soln Apply topically nightly.    clobetasol  (TEMOVATE) 0.05 % external solution Use on scalp one - two times daily as needed for scaling or itching    cyproheptadine (PERIACTIN) 4 mg tablet Take 1 tablet (4 mg total) by mouth 2 (two) times daily as needed.    fluticasone furoate (ARNUITY ELLIPTA) 100 mcg/actuation DsDv Inhale 100 mcg into the lungs once daily. Controller    furosemide (LASIX) 80 MG tablet Take 1 tablet (80 mg total) by mouth 2 (two) times daily.    gabapentin (NEURONTIN) 300 MG capsule Take 1 capsule (300 mg total) by mouth 2 (two) times daily.    irbesartan (AVAPRO) 75 MG tablet Take 0.5 tablets (37.5 mg total) by mouth every evening.    loratadine (CLARITIN) 10 mg tablet Take 1 tablet (10 mg total) by mouth once daily.    magnesium oxide (MAG-OX) 400 mg tablet Take 0.5 tablets (200 mg total) by mouth once daily.    metoprolol succinate (TOPROL-XL) 25 MG 24 hr tablet Take 1 tablet (25 mg total) by mouth once daily.    nitroGLYCERIN (NITROSTAT) 0.4 MG SL tablet PLACE 1 TABLET (0.4 MG TOTAL) UNDER THE TONGUE EVERY 5 (FIVE) MINUTES AS NEEDED FOR CHEST PAIN.    potassium chloride SA (K-DUR,KLOR-CON) 20 MEQ tablet Take 1 tablet (20 mEq total) by mouth once daily.    traZODone (DESYREL) 150 MG tablet Take 1 tablet (150 mg total) by mouth every evening.    triamcinolone acetonide 0.1% (KENALOG) 0.1 % ointment AAA bid    VENTOLIN HFA 90 mcg/actuation inhaler INHALE 2 PUFFS INTO THE LUNGS EVERY 6 HOURS AS NEEDED FOR WHEEZING OR SHORTNESS OF BREATH     Family History     None        Tobacco Use    Smoking status: Current Some Day Smoker     Packs/day: 3.00     Years: 30.00     Pack years: 90.00    Smokeless tobacco: Current User    Tobacco comment: 1-2  cigarettes a day when stressed   Substance and Sexual Activity    Alcohol use: No    Drug use: Yes     Types: Marijuana, Cocaine     Comment: once on wednesday    Sexual activity: Not Currently     Review of Systems   Constitution: Negative for chills, decreased appetite,  diaphoresis, fever, weight gain and weight loss.   Eyes: Negative for blurred vision.   Cardiovascular: Negative for chest pain, dyspnea on exertion, irregular heartbeat, leg swelling, near-syncope, orthopnea and palpitations.   Respiratory: Negative for cough, shortness of breath, snoring and wheezing.    Gastrointestinal: Negative for abdominal pain, nausea and vomiting.   Genitourinary: Negative for bladder incontinence and urgency.     Objective:     Vital Signs (Most Recent):  Temp: 98.5 °F (36.9 °C) (07/02/19 0700)  Pulse: 71 (07/02/19 0900)  Resp: (!) 31 (07/02/19 0900)  BP: (!) 113/53 (07/02/19 0900)  SpO2: 98 % (07/02/19 0900) Vital Signs (24h Range):  Temp:  [97.7 °F (36.5 °C)-98.5 °F (36.9 °C)] 98.5 °F (36.9 °C)  Pulse:  [] 71  Resp:  [12-55] 31  SpO2:  [95 %-100 %] 98 %  BP: ()/(53-77) 113/53     Weight: 57 kg (125 lb 10.6 oz)  Body mass index is 22.98 kg/m².    SpO2: 98 %  O2 Device (Oxygen Therapy): room air      Intake/Output Summary (Last 24 hours) at 7/2/2019 1026  Last data filed at 7/2/2019 0753  Gross per 24 hour   Intake 1197.03 ml   Output 1070 ml   Net 127.03 ml       Lines/Drains/Airways     Central Venous Catheter Line                 Percutaneous Central Line Insertion/Assessment - triple lumen  06/29/19 2358 right internal jugular 2 days          Peripheral Intravenous Line                 Peripheral IV - Single Lumen 06/29/19 18 G Left Forearm 3 days         Peripheral IV - Single Lumen 06/29/19 2131 18 G Right Antecubital 2 days                Physical Exam   Constitutional: She is oriented to person, place, and time. She appears well-developed and well-nourished. No distress.   HENT:   Head: Normocephalic and atraumatic.   Mouth/Throat: Abnormal dentition. Dental caries present.   Eyes: Pupils are equal, round, and reactive to light. Conjunctivae are normal.   Neck: Normal range of motion. Neck supple. No thyromegaly present.   Cardiovascular: Normal heart sounds and intact  distal pulses. An irregularly irregular rhythm present. Tachycardia present. Exam reveals no gallop and no friction rub.   No murmur heard.  Pulses:       Carotid pulses are 2+ on the right side, and 2+ on the left side.       Radial pulses are 2+ on the right side, and 2+ on the left side.   Pulmonary/Chest: Effort normal and breath sounds normal. No respiratory distress. She has no wheezes.   Abdominal: Soft. Bowel sounds are normal. She exhibits no distension. There is no tenderness.   Neurological: She is alert and oriented to person, place, and time.   Skin: She is not diaphoretic.       Significant Labs:   CMP   Recent Labs   Lab 06/30/19  1930 07/01/19  0300 07/01/19  1131 07/02/19  0206    137  --  136   K 3.2* 3.1* 3.8 4.5   CL 94* 96  --  98   CO2 32* 29  --  29   GLU 93 109  --  96   BUN 11 9  --  11   CREATININE 0.8 0.8  --  0.9   CALCIUM 8.6* 8.8  --  9.9   PROT  --  7.1  --  7.1   ALBUMIN  --  3.7  --  3.5   BILITOT  --  1.7*  --  1.3*   ALKPHOS  --  110  --  115   AST  --  38  --  31   ALT  --  30  --  29   ANIONGAP 10 12  --  9   ESTGFRAFRICA >60.0 >60.0  --  >60.0   EGFRNONAA >60.0 >60.0  --  >60.0   , CBC   Recent Labs   Lab 07/01/19  0300  07/02/19  0206   WBC 5.69  --  6.27   HGB 12.3  --  13.1   HCT 37.0   < > 39.6     --  242    < > = values in this interval not displayed.   , INR No results for input(s): INR, PROTIME in the last 48 hours. and Lipid Panel No results for input(s): CHOL, HDL, LDLCALC, TRIG, CHOLHDL in the last 48 hours.    Significant Imaging: Echocardiogram:   Transthoracic echo (TTE) complete (Cupid Only):   Results for orders placed or performed during the hospital encounter of 06/29/19   Transthoracic echo (TTE) 2D with Color Flow   Result Value Ref Range    Ascending aorta 2.82 cm    STJ 2.48 cm    AV mean gradient 3 mmHg    Ao peak johann 1.05 m/s    Ao VTI 13.73 cm    IVS 0.73 0.6 - 1.1 cm    LA size 4.99 cm    Left Atrium Major Axis 7.46 cm    Left Atrium Minor  Axis 7.50 cm    LVIDD 6.71 (A) 3.5 - 6.0 cm    LVIDS 6.45 (A) 2.1 - 4.0 cm    LVOT diameter 2.04 cm    LVOT peak VTI 8.83 cm    PW 1.04 0.6 - 1.1 cm    MV Peak A Tyrel 0.36 m/s    E wave decelartion time 110.79 msec    MV Peak E Tyrel 1.08 m/s    RA Major Axis 7.15 cm    RA Width 5.00 cm    RVDD 4.41 cm    Sinus 2.75 cm    TAPSE 1.43 cm    TR Max Tyrel 3.60 m/s    TDI LATERAL 0.11 m/s    TDI SEPTAL 0.05 m/s    LA WIDTH 5.98 cm    LV Diastolic Volume 232.12 mL    LV Systolic Volume 212.32 mL    RV S' 8.51 cm/s    LVOT peak tyrel 0.58 m/s    LV LATERAL E/E' RATIO 9.82 m/s    LV SEPTAL E/E' RATIO 21.60 m/s    FS 4 %    LA volume 189.72 cm3    LV mass 256.60 g    Left Ventricle Relative Wall Thickness 0.31 cm    AV valve area 2.10 cm2    AV Velocity Ratio 0.55     AV index (prosthetic) 0.64     E/A ratio 3.00     Mean e' 0.08 m/s    LVOT area 3.3 cm2    LVOT stroke volume 28.85 cm3    AV peak gradient 4 mmHg    E/E' ratio 13.50 m/s    LV Systolic Volume Index 133.4 mL/m2    LV Diastolic Volume Index 145.83 mL/m2    LA Volume Index 119.2 mL/m2    LV Mass Index 161 g/m2    Triscuspid Valve Regurgitation Peak Gradient 52 mmHg    BSA 1.61 m2    Right Atrial Pressure (from IVC) 3 mmHg    TV rest pulmonary artery pressure 55 mmHg    Narrative    · Irregular rythm  · Severe left ventricular enlargement.  · Severely decreased left ventricular systolic function. The estimated   ejection fraction is 15%  · The following segments are akinetic: basal inferoseptal, mid   inferoseptal, apical septal, apical inferior, apical lateral and apex.   rest of the walls are hypokinetic.  · Eccentric left ventricular hypertrophy.  · Indeterminate left ventricular diastolic function.  · Normal right ventricular systolic function.  · Moderate right atrial enlargement.  · Severe left atrial enlargement.  · Moderate mitral regurgitation.  · Moderate tricuspid regurgitation.  · The estimated PA systolic pressure is 55 mm Hg. Pulmonary hypertension    present.  · Normal central venous pressure (3 mm Hg).

## 2019-07-02 NOTE — SUBJECTIVE & OBJECTIVE
Interval History: Pt to have cardioversion today    Past Medical History:   Diagnosis Date    Asthma     Bipolar 1 disorder     Cardiomyopathy     Ischemic Cardiomyopathy with EF 25-30% by echo 5/17/16    Chronic systolic (congestive) heart failure     Cocaine use     COPD (chronic obstructive pulmonary disease)     emphysema    Coronary artery disease     ROCHELLE 2006 and 2013    Defibrillator discharge     Depression     Diabetes mellitus     H/O echocardiogram 05/17/2016    EF 25-30%. LV diastolic dysfxn. Severe LAE. mod MR. PASP 86.    Hypertension     ICD (implantable cardioverter-defibrillator) in place 07/09/2013    MI (myocardial infarction)     x 3    PTSD (post-traumatic stress disorder)     Schizophrenia     Seizures        Past Surgical History:   Procedure Laterality Date    CARDIAC CATHETERIZATION      CARDIAC DEFIBRILLATOR PLACEMENT      [place]  2006    [place]    CORONARY STENT PLACEMENT      INSERT / REPLACE / REMOVE PACEMAKER  2013     ICD [facility]       Review of patient's allergies indicates:  No Known Allergies    Medications:  Continuous Infusions:  Scheduled Meds:   albuterol-ipratropium  3 mL Nebulization Q12H    amiodarone  400 mg Oral Daily    apixaban  5 mg Oral BID    aspirin  81 mg Oral Daily    atorvastatin  40 mg Oral Daily    candesartan  4 mg Oral Daily    pantoprazole  40 mg Oral Daily     PRN Meds:acetaminophen, albuterol, albuterol-ipratropium, calcium gluconate IVPB, calcium gluconate IVPB, calcium gluconate IVPB, Dextrose 10% Bolus, Dextrose 10% Bolus, glucagon (human recombinant), glucagon (human recombinant), glucose, glucose, glucose, glucose, insulin aspart U-100, magnesium sulfate IVPB, magnesium sulfate IVPB, potassium chloride in water **AND** potassium chloride in water **AND** potassium chloride in water, sodium chloride 0.9%, sodium chloride 0.9%, sodium phosphate IVPB, sodium phosphate IVPB, sodium phosphate IVPB    Family  History     None        Tobacco Use    Smoking status: Current Some Day Smoker     Packs/day: 3.00     Years: 30.00     Pack years: 90.00    Smokeless tobacco: Current User    Tobacco comment: 1-2  cigarettes a day when stressed   Substance and Sexual Activity    Alcohol use: No    Drug use: Yes     Types: Marijuana, Cocaine     Comment: once on wednesday    Sexual activity: Not Currently       Review of Systems   Constitutional: Positive for activity change and fatigue.   Respiratory: Positive for cough.    Cardiovascular: Positive for palpitations. Negative for chest pain.   Neurological: Positive for weakness.     Objective:     Vital Signs (Most Recent):  Temp: 98 °F (36.7 °C) (07/02/19 1100)  Pulse: 69 (07/02/19 1300)  Resp: (!) 24 (07/02/19 1300)  BP: 101/65 (07/02/19 1300)  SpO2: 100 % (07/02/19 1300) Vital Signs (24h Range):  Temp:  [97.7 °F (36.5 °C)-98.5 °F (36.9 °C)] 98 °F (36.7 °C)  Pulse:  [] 69  Resp:  [12-55] 24  SpO2:  [95 %-100 %] 100 %  BP: ()/(53-77) 101/65     Weight: 57 kg (125 lb 10.6 oz)  Body mass index is 22.98 kg/m².    Review of Symptoms  Symptom Assessment (ESAS 0-10 scale)   ESAS 0 1 2 3 4 5 6 7 8 9 10   Pain X             Dyspnea X             Anxiety              Nausea X             Depression               Anorexia              Fatigue    X          Insomnia              Restlessness               Agitation              CAM / Delirium __ --  ___+   Constipation     __ --  ___+   Diarrhea           __ --  ___+  Bowel Management Plan (BMP): No    Pain Assessment: No pain noted per pt.     OME in 24 hours: 0    Performance Status: 60    ECOG Performance Status Grade: 2 - Ambulates, capable of self care only    Physical Exam   Constitutional: She is oriented to person, place, and time. She is cooperative.   HENT:   Head: Normocephalic.   Pulmonary/Chest: Effort normal.   Neurological: She is alert and oriented to person, place, and time.   Skin: Skin is warm and dry.    Psychiatric: She has a normal mood and affect. Her speech is normal and behavior is normal. She is attentive.       Significant Labs: All pertinent labs within the past 24 hours have been reviewed.  CBC:   Recent Labs   Lab 07/02/19 0206   WBC 6.27   HGB 13.1   HCT 39.6   MCV 89        BMP:  Recent Labs   Lab 07/02/19 0206   GLU 96      K 4.5   CL 98   CO2 29   BUN 11   CREATININE 0.9   CALCIUM 9.9   MG 2.1  2.1     LFT:  Lab Results   Component Value Date    AST 31 07/02/2019    ALKPHOS 115 07/02/2019    BILITOT 1.3 (H) 07/02/2019     Albumin:   Albumin   Date Value Ref Range Status   07/02/2019 3.5 3.5 - 5.2 g/dL Final     Protein:   Total Protein   Date Value Ref Range Status   07/02/2019 7.1 6.0 - 8.4 g/dL Final     Lactic acid:   Lab Results   Component Value Date    LACTATE 1.3 06/30/2019    LACTATE 2.9 (H) 06/29/2019       Significant Imaging: I have reviewed all pertinent imaging results/findings within the past 24 hours.    Advance Care Planning   Advanced Directives::  Living Will: No  LaPOST: No  Do Not Resuscitate Status: No  Medical Power of : Pt's adult children are next of kin.     Decision-Making Capacity: Patient answered questions       Living Arrangements: Lives with significant otherGeorge for past 26 years. Pt has four adult children. Pt is not .     Psychosocial/Cultural:  Pt lives with George significant other of 26 years. Pt has three adult children. Per pt, she has been using illegal drugs since a teenager that includes weed, cocaine, acid.     Spiritual: somewhat    F- Renate and Belief: Sheela    I - Importance:   .  C - Community:     A - Address in Care: pt does not want  visit.

## 2019-07-02 NOTE — ANESTHESIA PREPROCEDURE EVALUATION
07/02/2019  Polly Kwan is a 59 y.o., female presenting for MARY/DCCV if clear.  Severely depressed LV systolic function.    Past Medical History:   Diagnosis Date    Asthma     Bipolar 1 disorder     Cardiomyopathy     Ischemic Cardiomyopathy with EF 25-30% by echo 5/17/16    Chronic systolic (congestive) heart failure     Cocaine use     COPD (chronic obstructive pulmonary disease)     emphysema    Coronary artery disease     ROCHELLE 2006 and 2013    Defibrillator discharge     Depression     Diabetes mellitus     H/O echocardiogram 05/17/2016    EF 25-30%. LV diastolic dysfxn. Severe LAE. mod MRCynthia PASP 86.    Hypertension     ICD (implantable cardioverter-defibrillator) in place 07/09/2013    MI (myocardial infarction)     x 3    PTSD (post-traumatic stress disorder)     Schizophrenia     Seizures      Past Surgical History:   Procedure Laterality Date    CARDIAC CATHETERIZATION      CARDIAC DEFIBRILLATOR PLACEMENT      [place]  2006    [place]    CORONARY STENT PLACEMENT      INSERT / REPLACE / REMOVE PACEMAKER  2013     ICD [facility]     Review of patient's allergies indicates:  No Known Allergies  No current facility-administered medications on file prior to encounter.      Current Outpatient Medications on File Prior to Encounter   Medication Sig Dispense Refill    albuterol (VENTOLIN HFA) 90 mcg/actuation inhaler INHALE 2 PUFFS INTO THE LUNGS EVERY 6 HOURS AS NEEDED FOR WHEEZING OR SHORTNESS OF BREATH 18 Inhaler 0    amiodarone (PACERONE) 400 MG tablet TAKE 1 TABLET (400 MG TOTAL) BY MOUTH ONCE DAILY. 30 tablet 0    ammonium lactate 12 % Crea Apply twice daily to affected parts both feet as needed. 140 g 11    apixaban (ELIQUIS) 5 mg Tab Take 1 tablet (5 mg total) by mouth 2 (two) times daily. 60 tablet 0    aspirin (ECOTRIN) 81 MG EC tablet TAKE 1 TABLET BY  MOUTH EVERY DAY 90 tablet 0    atorvastatin (LIPITOR) 40 MG tablet TAKE 1 TABLET (40 MG TOTAL) BY MOUTH ONCE DAILY. 90 tablet 1    baclofen (LIORESAL) 10 MG tablet take 1 to 2 TABLETS BY MOUTH every 6 hours as needed for muscle spasms  0    ciclopirox (CICLODAN) 8 % Soln Apply topically nightly. 6.6 mL 11    clobetasol (TEMOVATE) 0.05 % external solution Use on scalp one - two times daily as needed for scaling or itching 60 mL 3    cyproheptadine (PERIACTIN) 4 mg tablet Take 1 tablet (4 mg total) by mouth 2 (two) times daily as needed. 60 tablet 6    fluticasone furoate (ARNUITY ELLIPTA) 100 mcg/actuation DsDv Inhale 100 mcg into the lungs once daily. Controller 30 each 11    furosemide (LASIX) 80 MG tablet Take 1 tablet (80 mg total) by mouth 2 (two) times daily. 180 tablet 3    gabapentin (NEURONTIN) 300 MG capsule Take 1 capsule (300 mg total) by mouth 2 (two) times daily. 60 capsule 2    irbesartan (AVAPRO) 75 MG tablet Take 0.5 tablets (37.5 mg total) by mouth every evening. 45 tablet 3    loratadine (CLARITIN) 10 mg tablet Take 1 tablet (10 mg total) by mouth once daily.      magnesium oxide (MAG-OX) 400 mg tablet Take 0.5 tablets (200 mg total) by mouth once daily.  0    metoprolol succinate (TOPROL-XL) 25 MG 24 hr tablet Take 1 tablet (25 mg total) by mouth once daily. 30 tablet 11    nitroGLYCERIN (NITROSTAT) 0.4 MG SL tablet PLACE 1 TABLET (0.4 MG TOTAL) UNDER THE TONGUE EVERY 5 (FIVE) MINUTES AS NEEDED FOR CHEST PAIN. 100 tablet 3    potassium chloride SA (K-DUR,KLOR-CON) 20 MEQ tablet Take 1 tablet (20 mEq total) by mouth once daily. 90 tablet 1    traZODone (DESYREL) 150 MG tablet Take 1 tablet (150 mg total) by mouth every evening. 90 tablet 1    triamcinolone acetonide 0.1% (KENALOG) 0.1 % ointment AAA bid 60 g 3    VENTOLIN HFA 90 mcg/actuation inhaler INHALE 2 PUFFS INTO THE LUNGS EVERY 6 HOURS AS NEEDED FOR WHEEZING OR SHORTNESS OF BREATH 18 Inhaler 0     Lab Results   Component  Value Date    WBC 6.27 07/02/2019    HGB 13.1 07/02/2019    HCT 39.6 07/02/2019    MCV 89 07/02/2019     07/02/2019     BMP  Lab Results   Component Value Date     07/02/2019    K 4.5 07/02/2019    CL 98 07/02/2019    CO2 29 07/02/2019    BUN 11 07/02/2019    CREATININE 0.9 07/02/2019    CALCIUM 9.9 07/02/2019    ANIONGAP 9 07/02/2019    ESTGFRAFRICA >60.0 07/02/2019    EGFRNONAA >60.0 07/02/2019         Anesthesia Evaluation    I have reviewed the Patient Summary Reports.     I have reviewed the Medications.     Review of Systems  Anesthesia Hx:   Denies Personal Hx of Anesthesia complications.          Anesthesia Plan  Type of Anesthesia, risks & benefits discussed:  Anesthesia Type:  general  Patient's Preference:   Intra-op Monitoring Plan: standard ASA monitors  Intra-op Monitoring Plan Comments:   Post Op Pain Control Plan: per primary service following discharge from PACU and IV/PO Opioids PRN  Post Op Pain Control Plan Comments:   Induction:   IV  Beta Blocker:  Patient is not currently on a Beta-Blocker (No further documentation required).       Informed Consent: Patient understands risks and agrees with Anesthesia plan.  Questions answered. Anesthesia consent signed with patient.  ASA Score: 4     Day of Surgery Review of History & Physical:    H&P update referred to the surgeon.         Ready For Surgery From Anesthesia Perspective.

## 2019-07-02 NOTE — CONSULTS
[facility]  Palliative Medicine  Consult Note    Patient Name: Polly Kwan  MRN: [id number]  Admission Date: 6/29/2019  Hospital Length of Stay: 3 days  Code Status: Full Code   Attending Provider: Christen Rojas MD  Consulting Provider: ERIN Andujar  Primary Care Physician: Iza Kwan MD  Principal Problem:Acute on chronic combined systolic and diastolic congestive heart failure    Patient information was obtained from patient and ER records.      Inpatient consult to Palliative Care  Consult performed by: ERIN Laboy  Consult ordered by: Yair Carlson MD        Assessment/Plan:     Palliative care encounter  Impression: Pt is a 58 y/o female with PMH of Bipolar disorder, polysubstance abuse (tobacco, cocaine), COPD, CAD s/p PCI in 2006, 2013, ICM (EF 20%), HTN, T2 DM, VT on amiodarone, paroxysmal AF on eliquis who presented to the ED today c/o progressively worsening SOB, abdominal distention. Per pt, she reported cocaine use Thursday prior to admit. Pt is a Full code. Pt is alert, oriented to person, place, time, and situation. Pt has AICD in place.     Goals of care: Met with pt and pt's significant other. Spoke to pt about her understanding of her current medical issues. Pt has limited to moderate understanding of her heart issues. Spoke to pt about her advanced heart issues. Pt is aware that there is no cure for her heart failure but symptoms can be managed for her heart failure. Per pt, she uses weed and cocaine. Pt is aware cocaine use can add to heart failure issues. Pt does not want to get assistance with stopping drug use.     Pt reports she had hospice care in 2018 with [facility]. Pt not on hospice anymore.  Pt not open to hospice care at this time. Pt open to home health and home-based pal care visits. Per Dr. Rojas, pt not ready for hospice care.     Code status: Per pt, at this time she wants to remain a full code. Pt has AICD in place.    Discussion on code status.     Care Planning     Power of   I initiated the process of advance care planning today and explained the importance of this process to the patient.  I introduced the concept of advance directives to the patient, as well. Then the patient received detailed information about the importance of designating a Health Care Power of  (HCPOA). She was also instructed to communicate with this person about their wishes for future healthcare, should she become sick and lose decision-making capacity. The patient has not previously appointed a HCPOA. After our discussion, the patient has not decided to complete a HCPOA.. Pt wants to look over forms tonight. Per pt, she will end up designating her daughter, Rigoberto TOWNSEND. Aotal time of 20 minutes discussing this issue with the patient. Will see pt in morning to help with completion of form.     Plan:   Pt to have cardioversion today.   Per Dr. Rojas, possible d/c tomorrow with home health and home-based pal care.   Will see pt tomorrow about completing MPOA forms.   Will follow.         Thank you for your consult. I will follow-up with patient. Please contact us if you have any additional questions.    Subjective:     HPI:   Pt is a 57 y/o female with PMH of Bipolar disorder, polysubstance abuse (tobacco, cocaine), COPD, CAD s/p PCI in 2006, 2013, ICM (EF 20%), HTN, T2 DM. Pt has  VT on amiodarone, paroxysmal AF on eliquis who presented to the ED today with c/o progressively worsening SOB, abdominal distention, LE swelling for 2 days. Associated with dry cough, orthopnea and PND episodes.     Per chart review, pt's symptoms started late Thursday evening with lightheadedness. Throughout Friday, she experienced progressively worsening exertional dyspnea, relieved only by rest and putting her face in front of the AC. The dyspnea is associated with a cough that was productive with thick white sputum, new for her, as well as  chest tightness. No palpitations or syncope. No fevers, chills, or night sweats. She had some relief last night and was able to sleep, but this morning the symptoms were more severe prompting her arrival here.     Per chart review, in the ED her workup was significant for lactate 3.1, trop 0.038, BNP 2739, total bili 2.4, CXR with early congestive changes. ECG shows AF RVR. HR variable but mostly 130-140 on tele. The ED administered 80 mg IV lasix, three duonebs, and requested admission for HF.    Hospital Course:  No notes on file    Interval History: Pt to have cardioversion today    Past Medical History:   Diagnosis Date    Asthma     Bipolar 1 disorder     Cardiomyopathy     Ischemic Cardiomyopathy with EF 25-30% by echo 5/17/16    Chronic systolic (congestive) heart failure     Cocaine use     COPD (chronic obstructive pulmonary disease)     emphysema    Coronary artery disease     ROCHELLE 2006 and 2013    Defibrillator discharge     Depression     Diabetes mellitus     H/O echocardiogram 05/17/2016    EF 25-30%. LV diastolic dysfxn. Severe LAE. mod MR. PASP 86.    Hypertension     ICD (implantable cardioverter-defibrillator) in place 07/09/2013    MI (myocardial infarction)     x 3    PTSD (post-traumatic stress disorder)     Schizophrenia     Seizures        Past Surgical History:   Procedure Laterality Date    CARDIAC CATHETERIZATION      CARDIAC DEFIBRILLATOR PLACEMENT      [place]    CORONARY STENT PLACEMENT      INSERT / REPLACE / REMOVE PACEMAKER  2013     [facility]       Review of patient's allergies indicates:  No Known Allergies    Medications:  Continuous Infusions:  Scheduled Meds:   albuterol-ipratropium  3 mL Nebulization Q12H    amiodarone  400 mg Oral Daily    apixaban  5 mg Oral BID    aspirin  81 mg Oral Daily    atorvastatin  40 mg Oral Daily    candesartan  4 mg Oral Daily    pantoprazole  40 mg Oral Daily     PRN  Meds:acetaminophen, albuterol, albuterol-ipratropium, calcium gluconate IVPB, calcium gluconate IVPB, calcium gluconate IVPB, Dextrose 10% Bolus, Dextrose 10% Bolus, glucagon (human recombinant), glucagon (human recombinant), glucose, glucose, glucose, glucose, insulin aspart U-100, magnesium sulfate IVPB, magnesium sulfate IVPB, potassium chloride in water **AND** potassium chloride in water **AND** potassium chloride in water, sodium chloride 0.9%, sodium chloride 0.9%, sodium phosphate IVPB, sodium phosphate IVPB, sodium phosphate IVPB    Family History     None        Tobacco Use    Smoking status: Current Some Day Smoker     Packs/day: 3.00     Years: 30.00     Pack years: 90.00    Smokeless tobacco: Current User    Tobacco comment: 1-2  cigarettes a day when stressed   Substance and Sexual Activity    Alcohol use: No    Drug use: Yes     Types: Marijuana, Cocaine     Comment: once on wednesday    Sexual activity: Not Currently       Review of Systems   Constitutional: Positive for activity change and fatigue.   Respiratory: Positive for cough.    Cardiovascular: Positive for palpitations. Negative for chest pain.   Neurological: Positive for weakness.     Objective:     Vital Signs (Most Recent):  Temp: 98 °F (36.7 °C) (07/02/19 1100)  Pulse: 69 (07/02/19 1300)  Resp: (!) 24 (07/02/19 1300)  BP: 101/65 (07/02/19 1300)  SpO2: 100 % (07/02/19 1300) Vital Signs (24h Range):  Temp:  [97.7 °F (36.5 °C)-98.5 °F (36.9 °C)] 98 °F (36.7 °C)  Pulse:  [] 69  Resp:  [12-55] 24  SpO2:  [95 %-100 %] 100 %  BP: ()/(53-77) 101/65     Weight: 57 kg (125 lb 10.6 oz)  Body mass index is 22.98 kg/m².    Review of Symptoms  Symptom Assessment (ESAS 0-10 scale)   ESAS 0 1 2 3 4 5 6 7 8 9 10   Pain X             Dyspnea X             Anxiety              Nausea X             Depression               Anorexia              Fatigue    X          Insomnia              Restlessness               Agitation               CAM / Delirium __ --  ___+   Constipation     __ --  ___+   Diarrhea           __ --  ___+  Bowel Management Plan (BMP): No    Pain Assessment: No pain noted per pt.     OME in 24 hours: 0    Performance Status: 60    ECOG Performance Status Grade: 2 - Ambulates, capable of self care only    Physical Exam   Constitutional: She is oriented to person, place, and time. She is cooperative.   HENT:   Head: Normocephalic.   Pulmonary/Chest: Effort normal.   Neurological: She is alert and oriented to person, place, and time.   Skin: Skin is warm and dry.   Psychiatric: She has a normal mood and affect. Her speech is normal and behavior is normal. She is attentive.       Significant Labs: All pertinent labs within the past 24 hours have been reviewed.  CBC:   Recent Labs   Lab 07/02/19  0206   WBC 6.27   HGB 13.1   HCT 39.6   MCV 89        BMP:  Recent Labs   Lab 07/02/19  0206   GLU 96      K 4.5   CL 98   CO2 29   BUN 11   CREATININE 0.9   CALCIUM 9.9   MG 2.1  2.1     LFT:  Lab Results   Component Value Date    AST 31 07/02/2019    ALKPHOS 115 07/02/2019    BILITOT 1.3 (H) 07/02/2019     Albumin:   Albumin   Date Value Ref Range Status   07/02/2019 3.5 3.5 - 5.2 g/dL Final     Protein:   Total Protein   Date Value Ref Range Status   07/02/2019 7.1 6.0 - 8.4 g/dL Final     Lactic acid:   Lab Results   Component Value Date    LACTATE 1.3 06/30/2019    LACTATE 2.9 (H) 06/29/2019       Significant Imaging: I have reviewed all pertinent imaging results/findings within the past 24 hours.    Advance Care Planning   Advanced Directives::  Living Will: No  LaPOST: No  Do Not Resuscitate Status: No  Medical Power of : Pt's adult children are next of kin.     Decision-Making Capacity: Patient answered questions       Living Arrangements: Lives with significant otherGeorge for past 26 years. Pt has four adult children. Pt is not .     Psychosocial/Cultural:  Pt lives with George, significant other  of 26 years. Pt has three adult children. Per pt, she has been using illegal drugs since a teenager that includes weed, cocaine, acid.     Spiritual: somewhat    F- Renate and Belief: Sheela    I - Importance:   .  C - Community:     A - Address in Care: pt does not want  visit.       > 50% of 70 min visit spent in chart review, face to face discussion of goals of care,  symptom assessment, coordination of care and emotional support.    20 minutes spent with pt in addition to 70 minutes from above for advance care planning.     Anjelica Casanova, CNS  Palliative Medicine  [facility]

## 2019-07-02 NOTE — SUBJECTIVE & OBJECTIVE
Interval History: No events overnight. Heart rate slowed down, patient diuresed well    ROS  Objective:     Vital Signs (Most Recent):  Temp: 98.7 °F (37.1 °C) (07/02/19 1429)  Pulse: 70 (07/02/19 1429)  Resp: (!) 28 (07/02/19 1429)  BP: 99/60 (07/02/19 1400)  SpO2: 100 % (07/02/19 1429) Vital Signs (24h Range):  Temp:  [97.7 °F (36.5 °C)-98.7 °F (37.1 °C)] 98.7 °F (37.1 °C)  Pulse:  [] 70  Resp:  [12-55] 28  SpO2:  [95 %-100 %] 100 %  BP: ()/(53-77) 99/60     Weight: 57 kg (125 lb 10.6 oz)  Body mass index is 22.98 kg/m².     SpO2: 100 %  O2 Device (Oxygen Therapy): room air      Intake/Output Summary (Last 24 hours) at 7/2/2019 1449  Last data filed at 7/2/2019 1200  Gross per 24 hour   Intake 535 ml   Output 1000 ml   Net -465 ml       Lines/Drains/Airways     Central Venous Catheter Line                 Percutaneous Central Line Insertion/Assessment - triple lumen  06/29/19 2358 right internal jugular 2 days          Peripheral Intravenous Line                 Peripheral IV - Single Lumen 06/29/19 18 G Left Forearm 3 days         Peripheral IV - Single Lumen 06/29/19 2131 18 G Right Antecubital 2 days                Physical Exam   Constitutional: She is oriented to person, place, and time. She appears well-developed and well-nourished. No distress.   Cachectic   HENT:   Head: Normocephalic and atraumatic.   Eyes: Pupils are equal, round, and reactive to light. Conjunctivae are normal.   Neck: No JVD present. No tracheal deviation present. No thyromegaly present.   Cardiovascular: Normal rate, regular rhythm and intact distal pulses. Exam reveals no gallop and no friction rub.   Murmur heard.  Pulses:       Radial pulses are 2+ on the right side, and 2+ on the left side.   Pulmonary/Chest: Effort normal. No respiratory distress. She has no wheezes. She has no rales.   Abdominal: Soft. Bowel sounds are normal. She exhibits no distension. There is no tenderness.   Musculoskeletal: She exhibits no edema  or deformity.   Neurological: She is alert and oriented to person, place, and time. No cranial nerve deficit. Coordination normal.   Skin: Skin is warm and dry. She is not diaphoretic.   Psychiatric: She has a normal mood and affect. Her behavior is normal.       Significant Labs:   CMP   Recent Labs   Lab 06/30/19  1930 07/01/19  0300 07/01/19  1131 07/02/19  0206    137  --  136   K 3.2* 3.1* 3.8 4.5   CL 94* 96  --  98   CO2 32* 29  --  29   GLU 93 109  --  96   BUN 11 9  --  11   CREATININE 0.8 0.8  --  0.9   CALCIUM 8.6* 8.8  --  9.9   PROT  --  7.1  --  7.1   ALBUMIN  --  3.7  --  3.5   BILITOT  --  1.7*  --  1.3*   ALKPHOS  --  110  --  115   AST  --  38  --  31   ALT  --  30  --  29   ANIONGAP 10 12  --  9   ESTGFRAFRICA >60.0 >60.0  --  >60.0   EGFRNONAA >60.0 >60.0  --  >60.0   , CBC   Recent Labs   Lab 07/01/19  0300 07/02/19  0206   WBC 5.69  --  6.27   HGB 12.3  --  13.1   HCT 37.0   < > 39.6     --  242    < > = values in this interval not displayed.   , Lipid Panel No results for input(s): CHOL, HDL, LDLCALC, TRIG, CHOLHDL in the last 48 hours. and Troponin No results for input(s): TROPONINI in the last 48 hours.

## 2019-07-02 NOTE — TRANSFER OF CARE
"Anesthesia Transfer of Care Note    Patient: Polly Kwan    Procedure(s) Performed: Procedure(s) (LRB):  CARDIOVERSION (N/A)  ECHOCARDIOGRAM, TRANSESOPHAGEAL (N/A)    Patient location: ICU    Anesthesia Type: general    Transport from OR: Transported from OR on 6-10 L/min O2 by face mask with adequate spontaneous ventilation. Continuous SpO2 monitoring in transport. Continuous ECG monitoring in transport    Post pain: adequate analgesia    Post assessment: no apparent anesthetic complications    Post vital signs: stable    Level of consciousness: awake    Nausea/Vomiting: no nausea/vomiting    Complications: none    Transfer of care protocol was followed      Last vitals:   Visit Vitals  /63   Pulse 60   Temp 36.7 °C (98 °F) (Oral)   Resp 19   Ht 5' 1.81" (1.57 m)   Wt 57 kg (125 lb 10.6 oz)   SpO2 98%   Breastfeeding? No   BMI 23.12 kg/m²     "

## 2019-07-02 NOTE — CONSULTS
[facility]  Cardiology  Consult Note    Patient Name: Polly Kwan  MRN: [id number]  Admission Date: 6/29/2019  Hospital Length of Stay: 3 days  Code Status: Full Code   Attending Provider: Christen Rojas MD   Consulting Provider: Ameya Armstrong MD  Primary Care Physician: Iza Kwan MD  Principal Problem:Acute on chronic combined systolic and diastolic congestive heart failure    Patient information was obtained from patient and ER records.     Consults  Subjective:     Chief Complaint:  afib     HPI:   Ms. Polly Kwan is a 58 yoF, planned for MARY / DCCV for afib. PMHx of Bipolar disorder, polysubstance abuse (tobacco, cocaine), COPD, CAD s/p PCI in 2006, 2013, ICM (EF 15%), HTN, T2DM, VT on amiodarone, paroxysmal AF on eliquis presented with ADHF symptoms found to be in cardiogenic shock (lactate 3.1, trop 0.038, BNP 2739, total bili 2.4), ECG shows AF RVR, needing . Patient admitted to CCU, completed MARY found to be in Afib with RVR, EF 15% with diffuse akinesis / hypokinesis, overall diuresed and  weaned off CVP: 7 CO: 4.0  CI: 2.5  SVR: 1535  SVO2 = 67. Case discussed with EP by CCU team and recommended MARY / DCCV to allow improve atrial filling. In meantime, transition amio to PO 400mg qD with eliquis 5mg BID. Device interrogation noted increased Afib burden.     Significant Imaging:   ECHO:  · Irregular rythm  · Severe left ventricular enlargement.  · Severely decreased left ventricular systolic function. The estimated ejection fraction is 15%  · The following segments are akinetic: basal inferoseptal, mid inferoseptal, apical septal, apical inferior, apical lateral and apex. rest of the walls are hypokinetic.  · Eccentric left ventricular hypertrophy.  · Indeterminate left ventricular diastolic function.  · Normal right ventricular systolic function.  · Moderate right atrial enlargement.  · Severe left atrial enlargement.  · Moderate mitral regurgitation.  · Moderate  tricuspid regurgitation.  · The estimated PA systolic pressure is 55 mm Hg. Pulmonary hypertension present.  · Normal central venous pressure (3 mm Hg).    Cr 0.9   CBC unremarkable  Bili 1.3  No MARY on file      Past Medical History:   Diagnosis Date    Asthma     Bipolar 1 disorder     Cardiomyopathy     Ischemic Cardiomyopathy with EF 25-30% by echo 5/17/16    Chronic systolic (congestive) heart failure     Cocaine use     COPD (chronic obstructive pulmonary disease)     emphysema    Coronary artery disease     ROCHELLE 2006 and 2013    Defibrillator discharge     Depression     Diabetes mellitus     H/O echocardiogram 05/17/2016    EF 25-30%. LV diastolic dysfxn. Severe LAE. mod MR. PASP 86.    Hypertension     ICD (implantable cardioverter-defibrillator) in place 07/09/2013    MI (myocardial infarction)     x 3    PTSD (post-traumatic stress disorder)     Schizophrenia     Seizures        Past Surgical History:   Procedure Laterality Date    CARDIAC CATHETERIZATION      CARDIAC DEFIBRILLATOR PLACEMENT      [place]    CORONARY STENT PLACEMENT      INSERT / REPLACE / REMOVE PACEMAKER  2013     [facility]       Review of patient's allergies indicates:  No Known Allergies    No current facility-administered medications on file prior to encounter.      Current Outpatient Medications on File Prior to Encounter   Medication Sig    albuterol (VENTOLIN HFA) 90 mcg/actuation inhaler INHALE 2 PUFFS INTO THE LUNGS EVERY 6 HOURS AS NEEDED FOR WHEEZING OR SHORTNESS OF BREATH    amiodarone (PACERONE) 400 MG tablet TAKE 1 TABLET (400 MG TOTAL) BY MOUTH ONCE DAILY.    ammonium lactate 12 % Crea Apply twice daily to affected parts both feet as needed.    apixaban (ELIQUIS) 5 mg Tab Take 1 tablet (5 mg total) by mouth 2 (two) times daily.    aspirin (ECOTRIN) 81 MG EC tablet TAKE 1 TABLET BY MOUTH EVERY DAY    atorvastatin (LIPITOR) 40 MG tablet TAKE 1 TABLET (40 MG  TOTAL) BY MOUTH ONCE DAILY.    baclofen (LIORESAL) 10 MG tablet take 1 to 2 TABLETS BY MOUTH every 6 hours as needed for muscle spasms    ciclopirox (CICLODAN) 8 % Soln Apply topically nightly.    clobetasol (TEMOVATE) 0.05 % external solution Use on scalp one - two times daily as needed for scaling or itching    cyproheptadine (PERIACTIN) 4 mg tablet Take 1 tablet (4 mg total) by mouth 2 (two) times daily as needed.    fluticasone furoate (ARNUITY ELLIPTA) 100 mcg/actuation DsDv Inhale 100 mcg into the lungs once daily. Controller    furosemide (LASIX) 80 MG tablet Take 1 tablet (80 mg total) by mouth 2 (two) times daily.    gabapentin (NEURONTIN) 300 MG capsule Take 1 capsule (300 mg total) by mouth 2 (two) times daily.    irbesartan (AVAPRO) 75 MG tablet Take 0.5 tablets (37.5 mg total) by mouth every evening.    loratadine (CLARITIN) 10 mg tablet Take 1 tablet (10 mg total) by mouth once daily.    magnesium oxide (MAG-OX) 400 mg tablet Take 0.5 tablets (200 mg total) by mouth once daily.    metoprolol succinate (TOPROL-XL) 25 MG 24 hr tablet Take 1 tablet (25 mg total) by mouth once daily.    nitroGLYCERIN (NITROSTAT) 0.4 MG SL tablet PLACE 1 TABLET (0.4 MG TOTAL) UNDER THE TONGUE EVERY 5 (FIVE) MINUTES AS NEEDED FOR CHEST PAIN.    potassium chloride SA (K-DUR,KLOR-CON) 20 MEQ tablet Take 1 tablet (20 mEq total) by mouth once daily.    traZODone (DESYREL) 150 MG tablet Take 1 tablet (150 mg total) by mouth every evening.    triamcinolone acetonide 0.1% (KENALOG) 0.1 % ointment AAA bid    VENTOLIN HFA 90 mcg/actuation inhaler INHALE 2 PUFFS INTO THE LUNGS EVERY 6 HOURS AS NEEDED FOR WHEEZING OR SHORTNESS OF BREATH     Family History     None        Tobacco Use    Smoking status: Current Some Day Smoker     Packs/day: 3.00     Years: 30.00     Pack years: 90.00    Smokeless tobacco: Current User    Tobacco comment: 1-2  cigarettes a day when stressed   Substance and Sexual Activity    Alcohol  use: No    Drug use: Yes     Types: Marijuana, Cocaine     Comment: once on wednesday    Sexual activity: Not Currently     Review of Systems   Constitution: Negative for chills, decreased appetite, diaphoresis, fever, weight gain and weight loss.   Eyes: Negative for blurred vision.   Cardiovascular: Negative for chest pain, dyspnea on exertion, irregular heartbeat, leg swelling, near-syncope, orthopnea and palpitations.   Respiratory: Negative for cough, shortness of breath, snoring and wheezing.    Gastrointestinal: Negative for abdominal pain, nausea and vomiting.   Genitourinary: Negative for bladder incontinence and urgency.     Objective:     Vital Signs (Most Recent):  Temp: 98.5 °F (36.9 °C) (07/02/19 0700)  Pulse: 71 (07/02/19 0900)  Resp: (!) 31 (07/02/19 0900)  BP: (!) 113/53 (07/02/19 0900)  SpO2: 98 % (07/02/19 0900) Vital Signs (24h Range):  Temp:  [97.7 °F (36.5 °C)-98.5 °F (36.9 °C)] 98.5 °F (36.9 °C)  Pulse:  [] 71  Resp:  [12-55] 31  SpO2:  [95 %-100 %] 98 %  BP: ()/(53-77) 113/53     Weight: 57 kg (125 lb 10.6 oz)  Body mass index is 22.98 kg/m².    SpO2: 98 %  O2 Device (Oxygen Therapy): room air      Intake/Output Summary (Last 24 hours) at 7/2/2019 1026  Last data filed at 7/2/2019 0753  Gross per 24 hour   Intake 1197.03 ml   Output 1070 ml   Net 127.03 ml       Lines/Drains/Airways     Central Venous Catheter Line                 Percutaneous Central Line Insertion/Assessment - triple lumen  06/29/19 2358 right internal jugular 2 days          Peripheral Intravenous Line                 Peripheral IV - Single Lumen 06/29/19 18 G Left Forearm 3 days         Peripheral IV - Single Lumen 06/29/19 2131 18 G Right Antecubital 2 days                Physical Exam   Constitutional: She is oriented to person, place, and time. She appears well-developed and well-nourished. No distress.   HENT:   Head: Normocephalic and atraumatic.   Mouth/Throat: Abnormal dentition. Dental caries  present.   Eyes: Pupils are equal, round, and reactive to light. Conjunctivae are normal.   Neck: Normal range of motion. Neck supple. No thyromegaly present.   Cardiovascular: Normal heart sounds and intact distal pulses. An irregularly irregular rhythm present. Tachycardia present. Exam reveals no gallop and no friction rub.   No murmur heard.  Pulses:       Carotid pulses are 2+ on the right side, and 2+ on the left side.       Radial pulses are 2+ on the right side, and 2+ on the left side.   Pulmonary/Chest: Effort normal and breath sounds normal. No respiratory distress. She has no wheezes.   Abdominal: Soft. Bowel sounds are normal. She exhibits no distension. There is no tenderness.   Neurological: She is alert and oriented to person, place, and time.   Skin: She is not diaphoretic.       Significant Labs:   CMP   Recent Labs   Lab 06/30/19  1930 07/01/19  0300 07/01/19  1131 07/02/19  0206    137  --  136   K 3.2* 3.1* 3.8 4.5   CL 94* 96  --  98   CO2 32* 29  --  29   GLU 93 109  --  96   BUN 11 9  --  11   CREATININE 0.8 0.8  --  0.9   CALCIUM 8.6* 8.8  --  9.9   PROT  --  7.1  --  7.1   ALBUMIN  --  3.7  --  3.5   BILITOT  --  1.7*  --  1.3*   ALKPHOS  --  110  --  115   AST  --  38  --  31   ALT  --  30  --  29   ANIONGAP 10 12  --  9   ESTGFRAFRICA >60.0 >60.0  --  >60.0   EGFRNONAA >60.0 >60.0  --  >60.0   , CBC   Recent Labs   Lab 07/01/19  0300  07/02/19  0206   WBC 5.69  --  6.27   HGB 12.3  --  13.1   HCT 37.0   < > 39.6     --  242    < > = values in this interval not displayed.   , INR No results for input(s): INR, PROTIME in the last 48 hours. and Lipid Panel No results for input(s): CHOL, HDL, LDLCALC, TRIG, CHOLHDL in the last 48 hours.    Significant Imaging: Echocardiogram:   Transthoracic echo (TTE) complete (Cupid Only):   Results for orders placed or performed during the hospital encounter of 06/29/19   Transthoracic echo (TTE) 2D with Color Flow   Result Value Ref Range     Ascending aorta 2.82 cm    STJ 2.48 cm    AV mean gradient 3 mmHg    Ao peak tyrel 1.05 m/s    Ao VTI 13.73 cm    IVS 0.73 0.6 - 1.1 cm    LA size 4.99 cm    Left Atrium Major Axis 7.46 cm    Left Atrium Minor Axis 7.50 cm    LVIDD 6.71 (A) 3.5 - 6.0 cm    LVIDS 6.45 (A) 2.1 - 4.0 cm    LVOT diameter 2.04 cm    LVOT peak VTI 8.83 cm    PW 1.04 0.6 - 1.1 cm    MV Peak A Tyrel 0.36 m/s    E wave decelartion time 110.79 msec    MV Peak E Tyrel 1.08 m/s    RA Major Axis 7.15 cm    RA Width 5.00 cm    RVDD 4.41 cm    Sinus 2.75 cm    TAPSE 1.43 cm    TR Max Tyrel 3.60 m/s    TDI LATERAL 0.11 m/s    TDI SEPTAL 0.05 m/s    LA WIDTH 5.98 cm    LV Diastolic Volume 232.12 mL    LV Systolic Volume 212.32 mL    RV S' 8.51 cm/s    LVOT peak tyrel 0.58 m/s    LV LATERAL E/E' RATIO 9.82 m/s    LV SEPTAL E/E' RATIO 21.60 m/s    FS 4 %    LA volume 189.72 cm3    LV mass 256.60 g    Left Ventricle Relative Wall Thickness 0.31 cm    AV valve area 2.10 cm2    AV Velocity Ratio 0.55     AV index (prosthetic) 0.64     E/A ratio 3.00     Mean e' 0.08 m/s    LVOT area 3.3 cm2    LVOT stroke volume 28.85 cm3    AV peak gradient 4 mmHg    E/E' ratio 13.50 m/s    LV Systolic Volume Index 133.4 mL/m2    LV Diastolic Volume Index 145.83 mL/m2    LA Volume Index 119.2 mL/m2    LV Mass Index 161 g/m2    Triscuspid Valve Regurgitation Peak Gradient 52 mmHg    BSA 1.61 m2    Right Atrial Pressure (from IVC) 3 mmHg    TV rest pulmonary artery pressure 55 mmHg    Narrative    · Irregular rythm  · Severe left ventricular enlargement.  · Severely decreased left ventricular systolic function. The estimated   ejection fraction is 15%  · The following segments are akinetic: basal inferoseptal, mid   inferoseptal, apical septal, apical inferior, apical lateral and apex.   rest of the walls are hypokinetic.  · Eccentric left ventricular hypertrophy.  · Indeterminate left ventricular diastolic function.  · Normal right ventricular systolic function.  · Moderate right  atrial enlargement.  · Severe left atrial enlargement.  · Moderate mitral regurgitation.  · Moderate tricuspid regurgitation.  · The estimated PA systolic pressure is 55 mm Hg. Pulmonary hypertension   present.  · Normal central venous pressure (3 mm Hg).        Assessment and Plan:       Atrial fibrillation with RVR  1. MARY for evaluation of afib with RVR, needing MARY / DCCV.    - Patient is high risk with ICM EF 15%, additionally active cocaine use with recent respiratory failure  -No absolute contraindications of esophageal stricture, tumor, perforation, laceration,or diverticulum and/or active GI bleed  -The risks, benefits & alternatives of the procedure were explained to the patient.   -The risks of transesophageal echo include but are not limited to:  Dental trauma, esophageal trauma/perforation, bleeding, laryngospasm/brochospasm, aspiration, sore throat/hoarseness, & dislodgement of the endotracheal tube/nasogastric tube (where applicable).    -The risks of moderate sedation include hypotension, respiratory depression, arrhythmias, bronchospasm, & death.    -Informed consent was obtained. The patient is agreeable to proceed with the procedure and all questions and concerns addressed.    Case discussed with an attending in echocardiography lab.     Further recommendations per attending addendum    VTE Risk Mitigation (From admission, onward)        Ordered     apixaban tablet 5 mg  2 times daily      06/30/19 0805          Thank you for your consult. I will sign off. Please contact us if you have any additional questions.    Ameya Armstrong MD  Cardiology   [facility]

## 2019-07-02 NOTE — ASSESSMENT & PLAN NOTE
Impression: Pt is a 60 y/o female with PMH of Bipolar disorder, polysubstance abuse (tobacco, cocaine), COPD, CAD s/p PCI in 2006, 2013, ICM (EF 20%), HTN, T2 DM, VT on amiodarone, paroxysmal AF on eliquis who presented to the ED today c/o progressively worsening SOB, abdominal distention. Per pt, she reported cocaine use Thursday prior to admit. Pt is a Full code. Pt is alert, oriented to person, place, time, and situation. Pt has AICD in place.     Goals of care: Met with pt and pt's significant other. Spoke to pt about her understanding of her current medical issues. Pt has limited to moderate understanding of her heart issues. Spoke to pt about her advanced heart issues. Pt is aware that there is no cure for her heart failure but symptoms can be managed for her heart failure. Per pt, she uses weed and cocaine. Pt is aware cocaine use can add to heart failure issues. Pt does not want to get assistance with stopping drug use.     Pt reports she had hospice care in 2018 with [facility]. Pt not on hospice anymore.  Pt not open to hospice care at this time. Pt open to home health and home-based pal care visits. Per Dr. Rojas, pt not ready for hospice care.     Code status: Per pt, at this time she wants to remain a full code. Pt has AICD in place.   Discussion on code status.     Advance Care Planning     Power of   I initiated the process of advance care planning today and explained the importance of this process to the patient.  I introduced the concept of advance directives to the patient, as well. Then the patient received detailed information about the importance of designating a Health Care Power of  (HCPOA). She was also instructed to communicate with this person about their wishes for future healthcare, should she become sick and lose decision-making capacity. The patient has not previously appointed a HCPOA. After our discussion, the patient has not decided to complete a HCPOA.. Pt wants to look  over forms tonight. Per pt, she will end up designating her daughter, Rigoberto TOWNSEND. Aotal time of 20 minutes discussing this issue with the patient. Will see pt in morning to help with completion of form.     Plan:   Pt to have cardioversion today.   Per Dr. Rojas, possible d/c tomorrow with home health and home-based pal care.   Will see pt tomorrow about completing MPOA forms.   Will follow.

## 2019-07-02 NOTE — ASSESSMENT & PLAN NOTE
Severe ICM EF <10%, doing well with diuresis after weaning of . Triggered by combination of atrial fibrillation with RVR and dietary/fluid non-compliance as well as triggering event with cocaine use at a party last week.     Oxygen consumption: 3.5 * 59kg  CVP: 3  CO: 2.8  CI: 1.8  SVR: 2134  SVO2 = 57    - trend SVO2 after DCCV  - Continue candesartan 4qD for afterload reduction (on irbesartan 37.5 at home)  - restart lasix PO 40 BID this evening if CVP >5  - Overall poor prognosis, discussed goals of care, but still limited insight. Palliative care consulted for assistance

## 2019-07-02 NOTE — PROCEDURES
ICD Evaluation Report     2019     : [employer] Secura DR     Reason for evaluation: Arrhythmia report     Initial Parameters  Mode: AAI <=> DDD Lower rate: 60 bpm Upper rate: 130 bpm  AV Delay: 150 ms     Atrial jerome - Capture: 0.25 V  Pulse width: 0.4 ms  Sensin.8 mV  Resistance: 836 ohms   Pacing amplitude: 1.5V @ 0.4ms    RV jerome - Capture: 1 V  Pulse width: 0.4 ms  Sensin.6 mV  Resistance: 684 ohms   Pacing amplitude: 2V @ 0.4ms      AF: %of time in AT/AF <0.1% until 2018, since then 1.8%. This AF episode has lasted the most recent 8-days.    Yair Carlson   PGYIV Cardiovascular Diseases  Pager: [phone]

## 2019-07-02 NOTE — ASSESSMENT & PLAN NOTE
1. MRAY for evaluation of afib with RVR, needing MARY / DCCV   -No absolute contraindications of esophageal stricture, tumor, perforation, laceration,or diverticulum and/or active GI bleed  -The risks, benefits & alternatives of the procedure were explained to the patient.   -The risks of transesophageal echo include but are not limited to:  Dental trauma, esophageal trauma/perforation, bleeding, laryngospasm/brochospasm, aspiration, sore throat/hoarseness, & dislodgement of the endotracheal tube/nasogastric tube (where applicable).    -The risks of moderate sedation include hypotension, respiratory depression, arrhythmias, bronchospasm, & death.    -Informed consent was obtained. The patient is agreeable to proceed with the procedure and all questions and concerns addressed.    Case discussed with an attending in echocardiography lab.     Further recommendations per attending addendum

## 2019-07-02 NOTE — PROGRESS NOTES
[facility]  Cardiology  Progress Note    Patient Name: Polly Kwan  MRN: [id number]  Admission Date: 6/29/2019  Hospital Length of Stay: 3 days  Code Status: Full Code   Attending Physician: Christen Rojas MD   Primary Care Physician: Iza Kwan MD  Expected Discharge Date: 7/5/2019  Principal Problem:Acute on chronic combined systolic and diastolic congestive heart failure    Subjective:     Hospital Course:   No notes on file    Interval History: No events overnight. Heart rate slowed down, patient diuresed well    ROS  Objective:     Vital Signs (Most Recent):  Temp: 98.7 °F (37.1 °C) (07/02/19 1429)  Pulse: 70 (07/02/19 1429)  Resp: (!) 28 (07/02/19 1429)  BP: 99/60 (07/02/19 1400)  SpO2: 100 % (07/02/19 1429) Vital Signs (24h Range):  Temp:  [97.7 °F (36.5 °C)-98.7 °F (37.1 °C)] 98.7 °F (37.1 °C)  Pulse:  [] 70  Resp:  [12-55] 28  SpO2:  [95 %-100 %] 100 %  BP: ()/(53-77) 99/60     Weight: 57 kg (125 lb 10.6 oz)  Body mass index is 22.98 kg/m².     SpO2: 100 %  O2 Device (Oxygen Therapy): room air      Intake/Output Summary (Last 24 hours) at 7/2/2019 1449  Last data filed at 7/2/2019 1200  Gross per 24 hour   Intake 535 ml   Output 1000 ml   Net -465 ml       Lines/Drains/Airways     Central Venous Catheter Line                 Percutaneous Central Line Insertion/Assessment - triple lumen  06/29/19 2358 right internal jugular 2 days          Peripheral Intravenous Line                 Peripheral IV - Single Lumen 06/29/19 18 G Left Forearm 3 days         Peripheral IV - Single Lumen 06/29/19 2131 18 G Right Antecubital 2 days                Physical Exam   Constitutional: She is oriented to person, place, and time. She appears well-developed and well-nourished. No distress.   Cachectic   HENT:   Head: Normocephalic and atraumatic.   Eyes: Pupils are equal, round, and reactive to light. Conjunctivae are normal.   Neck: No JVD present. No tracheal deviation present. No  thyromegaly present.   Cardiovascular: Normal rate, regular rhythm and intact distal pulses. Exam reveals no gallop and no friction rub.   Murmur heard.  Pulses:       Radial pulses are 2+ on the right side, and 2+ on the left side.   Pulmonary/Chest: Effort normal. No respiratory distress. She has no wheezes. She has no rales.   Abdominal: Soft. Bowel sounds are normal. She exhibits no distension. There is no tenderness.   Musculoskeletal: She exhibits no edema or deformity.   Neurological: She is alert and oriented to person, place, and time. No cranial nerve deficit. Coordination normal.   Skin: Skin is warm and dry. She is not diaphoretic.   Psychiatric: She has a normal mood and affect. Her behavior is normal.       Significant Labs:   CMP   Recent Labs   Lab 06/30/19  1930 07/01/19  0300 07/01/19  1131 07/02/19  0206    137  --  136   K 3.2* 3.1* 3.8 4.5   CL 94* 96  --  98   CO2 32* 29  --  29   GLU 93 109  --  96   BUN 11 9  --  11   CREATININE 0.8 0.8  --  0.9   CALCIUM 8.6* 8.8  --  9.9   PROT  --  7.1  --  7.1   ALBUMIN  --  3.7  --  3.5   BILITOT  --  1.7*  --  1.3*   ALKPHOS  --  110  --  115   AST  --  38  --  31   ALT  --  30  --  29   ANIONGAP 10 12  --  9   ESTGFRAFRICA >60.0 >60.0  --  >60.0   EGFRNONAA >60.0 >60.0  --  >60.0   , CBC   Recent Labs   Lab 07/01/19  0300 07/02/19  0206   WBC 5.69  --  6.27   HGB 12.3  --  13.1   HCT 37.0   < > 39.6     --  242    < > = values in this interval not displayed.   , Lipid Panel No results for input(s): CHOL, HDL, LDLCALC, TRIG, CHOLHDL in the last 48 hours. and Troponin No results for input(s): TROPONINI in the last 48 hours.      Assessment and Plan:       * Acute on chronic combined systolic and diastolic congestive heart failure  Severe ICM EF <10%, doing well with diuresis after weaning of . Triggered by combination of atrial fibrillation with RVR and dietary/fluid non-compliance as well as triggering event with cocaine use at a party  last week.     Oxygen consumption: 3.5 * 59kg  CVP: 3  CO: 2.8  CI: 1.8  SVR: 2134  SVO2 = 57    - trend SVO2 after DCCV  - Continue candesartan 4qD for afterload reduction (on irbesartan 37.5 at home)  - restart lasix PO 40 BID this evening if CVP >5  - Overall poor prognosis, discussed goals of care, but still limited insight. Palliative care consulted for assistance    Cocaine use disorder, mild, abuse  UDS positive for cocaine, patient uses and has poor insight despite re-iteration of its effects on her health  - Poor prognostic indicator, have counseled extensively on cessation    Atrial fibrillation with RVR  Known pAF ([id number] = 3), likely triggered HF or vice versa. Persisting throughout admission (although patient has been on ). Telemetry shows persisting AF/AFL overnight.   - plan on MARY/DCCV today  - continue amio to PO 400mg qD  - Continuing eliquis 5mg BID        VTE Risk Mitigation (From admission, onward)        Ordered     apixaban tablet 5 mg  2 times daily      06/30/19 0805          Yair Carlson MD  Cardiology  [facility]

## 2019-07-02 NOTE — PLAN OF CARE
Problem: Adult Inpatient Plan of Care  Goal: Plan of Care Review  Outcome: Ongoing (interventions implemented as appropriate)  Pt AAO x 4, follows commands, aefebrile. On RA, O2 sats > 95%, atrial flutter/afib noted, HR 70-100s, -110s/50-70s, CVP 8/10/5, SVO2 57. No BM this shift, voids spontaneously via bedside commode. Accuchecks AC/HS, normoglycemic. CHG bath given 7/2/19 @ 0500. Patient progressing towards goals as tolerated, plan of care communicated and reviewed with Polly Kwan and family. All concerns addressed. Will continue to monitor.

## 2019-07-03 VITALS
RESPIRATION RATE: 40 BRPM | HEART RATE: 60 BPM | SYSTOLIC BLOOD PRESSURE: 94 MMHG | DIASTOLIC BLOOD PRESSURE: 65 MMHG | WEIGHT: 125.69 LBS | BODY MASS INDEX: 23.13 KG/M2 | TEMPERATURE: 98 F | HEIGHT: 62 IN | OXYGEN SATURATION: 100 %

## 2019-07-03 LAB
ALBUMIN SERPL BCP-MCNC: 3.2 G/DL (ref 3.5–5.2)
ALP SERPL-CCNC: 113 U/L (ref 55–135)
ALT SERPL W/O P-5'-P-CCNC: 21 U/L (ref 10–44)
ANION GAP SERPL CALC-SCNC: 9 MMOL/L (ref 8–16)
AST SERPL-CCNC: 22 U/L (ref 10–40)
BASOPHILS # BLD AUTO: 0.02 K/UL (ref 0–0.2)
BASOPHILS NFR BLD: 0.3 % (ref 0–1.9)
BILIRUB SERPL-MCNC: 1.3 MG/DL (ref 0.1–1)
BUN SERPL-MCNC: 13 MG/DL (ref 6–20)
CALCIUM SERPL-MCNC: 9.5 MG/DL (ref 8.7–10.5)
CHLORIDE SERPL-SCNC: 99 MMOL/L (ref 95–110)
CO2 SERPL-SCNC: 28 MMOL/L (ref 23–29)
CREAT SERPL-MCNC: 0.9 MG/DL (ref 0.5–1.4)
DIFFERENTIAL METHOD: ABNORMAL
EOSINOPHIL # BLD AUTO: 0.1 K/UL (ref 0–0.5)
EOSINOPHIL NFR BLD: 1.2 % (ref 0–8)
ERYTHROCYTE [DISTWIDTH] IN BLOOD BY AUTOMATED COUNT: 16.3 % (ref 11.5–14.5)
EST. GFR  (AFRICAN AMERICAN): >60 ML/MIN/1.73 M^2
EST. GFR  (NON AFRICAN AMERICAN): >60 ML/MIN/1.73 M^2
GLUCOSE SERPL-MCNC: 125 MG/DL (ref 70–110)
HCT VFR BLD AUTO: 41.5 % (ref 37–48.5)
HGB BLD-MCNC: 13.1 G/DL (ref 12–16)
IMM GRANULOCYTES # BLD AUTO: 0.02 K/UL (ref 0–0.04)
IMM GRANULOCYTES NFR BLD AUTO: 0.3 % (ref 0–0.5)
LYMPHOCYTES # BLD AUTO: 2.5 K/UL (ref 1–4.8)
LYMPHOCYTES NFR BLD: 42.2 % (ref 18–48)
MAGNESIUM SERPL-MCNC: 2.1 MG/DL (ref 1.6–2.6)
MAGNESIUM SERPL-MCNC: 2.1 MG/DL (ref 1.6–2.6)
MCH RBC QN AUTO: 28.9 PG (ref 27–31)
MCHC RBC AUTO-ENTMCNC: 31.6 G/DL (ref 32–36)
MCV RBC AUTO: 91 FL (ref 82–98)
MONOCYTES # BLD AUTO: 0.7 K/UL (ref 0.3–1)
MONOCYTES NFR BLD: 11.5 % (ref 4–15)
NEUTROPHILS # BLD AUTO: 2.6 K/UL (ref 1.8–7.7)
NEUTROPHILS NFR BLD: 44.5 % (ref 38–73)
NRBC BLD-RTO: 0 /100 WBC
PLATELET # BLD AUTO: 251 K/UL (ref 150–350)
PMV BLD AUTO: 10.1 FL (ref 9.2–12.9)
POCT GLUCOSE: 100 MG/DL (ref 70–110)
POTASSIUM SERPL-SCNC: 4.7 MMOL/L (ref 3.5–5.1)
PROT SERPL-MCNC: 6.6 G/DL (ref 6–8.4)
RBC # BLD AUTO: 4.54 M/UL (ref 4–5.4)
SODIUM SERPL-SCNC: 136 MMOL/L (ref 136–145)
WBC # BLD AUTO: 5.83 K/UL (ref 3.9–12.7)

## 2019-07-03 PROCEDURE — 83735 ASSAY OF MAGNESIUM: CPT | Mod: HCNC

## 2019-07-03 PROCEDURE — 25000003 PHARM REV CODE 250: Mod: HCNC | Performed by: INTERNAL MEDICINE

## 2019-07-03 PROCEDURE — 99232 SBSQ HOSP IP/OBS MODERATE 35: CPT | Mod: HCNC,,, | Performed by: CLINICAL NURSE SPECIALIST

## 2019-07-03 PROCEDURE — 85025 COMPLETE CBC W/AUTO DIFF WBC: CPT | Mod: HCNC

## 2019-07-03 PROCEDURE — 94640 AIRWAY INHALATION TREATMENT: CPT | Mod: HCNC

## 2019-07-03 PROCEDURE — 99232 PR SUBSEQUENT HOSPITAL CARE,LEVL II: ICD-10-PCS | Mod: HCNC,,, | Performed by: CLINICAL NURSE SPECIALIST

## 2019-07-03 PROCEDURE — 25000003 PHARM REV CODE 250: Mod: HCNC | Performed by: STUDENT IN AN ORGANIZED HEALTH CARE EDUCATION/TRAINING PROGRAM

## 2019-07-03 PROCEDURE — 80053 COMPREHEN METABOLIC PANEL: CPT | Mod: HCNC

## 2019-07-03 PROCEDURE — 99239 PR HOSPITAL DISCHARGE DAY,>30 MIN: ICD-10-PCS | Mod: HCNC,GC,, | Performed by: INTERNAL MEDICINE

## 2019-07-03 PROCEDURE — 25000003 PHARM REV CODE 250: Mod: HCNC

## 2019-07-03 PROCEDURE — 99900035 HC TECH TIME PER 15 MIN (STAT): Mod: HCNC

## 2019-07-03 PROCEDURE — 94761 N-INVAS EAR/PLS OXIMETRY MLT: CPT | Mod: HCNC

## 2019-07-03 PROCEDURE — 25000242 PHARM REV CODE 250 ALT 637 W/ HCPCS: Mod: HCNC | Performed by: INTERNAL MEDICINE

## 2019-07-03 PROCEDURE — 99239 HOSP IP/OBS DSCHRG MGMT >30: CPT | Mod: HCNC,GC,, | Performed by: INTERNAL MEDICINE

## 2019-07-03 RX ORDER — CARVEDILOL 3.12 MG/1
3.12 TABLET ORAL 2 TIMES DAILY WITH MEALS
Qty: 30 TABLET | Refills: 2 | Status: SHIPPED | OUTPATIENT
Start: 2019-07-03 | End: 2019-08-17

## 2019-07-03 RX ADMIN — AMIODARONE HYDROCHLORIDE 400 MG: 200 TABLET ORAL at 08:07

## 2019-07-03 RX ADMIN — IPRATROPIUM BROMIDE AND ALBUTEROL SULFATE 3 ML: .5; 3 SOLUTION RESPIRATORY (INHALATION) at 07:07

## 2019-07-03 RX ADMIN — APIXABAN 5 MG: 5 TABLET, FILM COATED ORAL at 08:07

## 2019-07-03 RX ADMIN — ATORVASTATIN CALCIUM 40 MG: 20 TABLET, FILM COATED ORAL at 08:07

## 2019-07-03 RX ADMIN — CANDESARTAN CILEXETIL 4 MG: 4 TABLET ORAL at 08:07

## 2019-07-03 RX ADMIN — PANTOPRAZOLE SODIUM 40 MG: 40 TABLET, DELAYED RELEASE ORAL at 08:07

## 2019-07-03 RX ADMIN — ASPIRIN 81 MG: 81 TABLET, COATED ORAL at 08:07

## 2019-07-03 NOTE — PLAN OF CARE
Palliative Care Social Work   Assessment  Name: Polly Kwan  MRN: [id number]  Date of Birth/Age:  1960  Sex: female  Ethnicity: Black or     Primary Language:English   Needed: no    Attending Physician: Dr. Rojas  Reason for Referral: assistance with advance directives  Consult Order Date: 7/1/19 1344  Primary CM/SW: Andreina Quinones    Palliative Care Provider: BRICE Franks, APRDIA, AGCNS    Present during Interview: pt, pt's SO, Pal Care APRN and this SW    Past & Current Medical Situation:   Diagnosis: Acute on chronic combined systolic and diastolic congestive heart failure  PMH:   Past Medical History:   Diagnosis Date    Asthma     Bipolar 1 disorder     Cardiomyopathy     Ischemic Cardiomyopathy with EF 25-30% by echo 5/17/16    Chronic systolic (congestive) heart failure     Cocaine use     COPD (chronic obstructive pulmonary disease)     emphysema    Coronary artery disease     ROCHELLE 2006 and 2013    Defibrillator discharge     Depression     Diabetes mellitus     H/O echocardiogram 05/17/2016    EF 25-30%. LV diastolic dysfxn. Severe LAE. mod MR. PASP 86.    Hypertension     ICD (implantable cardioverter-defibrillator) in place 07/09/2013    MI (myocardial infarction)     x 3    PTSD (post-traumatic stress disorder)     Schizophrenia     Seizures      Mental Health/Substance Use History: bipolar, schizophrenia, cocaine use  Non-traditional Health practices:     Understanding of diagnosis and prognosis: Will benefit from continued education and support regarding prognosis  Experience/Comfort level with health care system:    Patients Mental Status: alert and oriented    Socio-Economic Factors/Resources:  Address: [address] [place] [address]  Phone Number: [phone] (home) [phone] (work)    Marital Status: Been with SO for 26 years  Household Composition: Lives with SO  Children: 4 children  Relationships with Family: Pt stated that  "she has supportive SO and children. Pt stated that she is closest with her dtr Danayelías, despite Danayangela not knowing about her drug use.     Pt's son John is 31 and her "miracle baby" as he was born at 5 months.     Pt has strong support from her SO and SO's nephew and family.     Pt has multiple grandchildren.     Emergency Contacts:     Activities of Daily Living: independent prior  Support Systems-Family & Community (Home Health, HME etc): Hospice through [facility] in past. Standard walker    Transportation:  relies on others    Work/Education History: disability   History: no SO is in [employer].    Financial Resources:Medicare. Medicaid      Advanced Care Planning & Legal Concerns:   Advanced Directives/Living Will: no   Planning:  no    Power of : yes Completed today. Lists George Teofilo  Surrogate Decision Maker:       Spirituality, Culture & Coping Mechanisms:  F- Renate and Belief: Antonio     I - Importance:     C - Community/Culture Values:     A - Address in Care: Spiritual Care to follow.       Strengths/Coping Strategies:    Self-Care Activities/Hobbies: family    Goals/Hopes/Expectations: Wants to go home to be able to go [place] with family  Fears/Anxiety/Concerns: Doesn't want her children to know about her cocaine use        Preferences about EOL Environment: (own bed, family nearby, pets, music, etc)  tbd-      Spoke with pt about making the decisions and speaking with family             Discharge Planning Needs/Plan of Care:       Spoke with pt today. Made visit yesterday to begin conversations regarding POA and advance care planning.    Pt excited to go home. Spoke with pt about importance of completing POA. Completed form. Pt named SO George Lawsdinand as primary, 2nd Mick ram and 3rd Elza Kwan.    Encouraged pt to speak with ehr family about her EOL wishes. Emotional Support provided.            Jes Schulz LCSW, ACHP-SW  "

## 2019-07-03 NOTE — NURSING
Pt discharged home. All IVs and central line removed. Documents were reviewed and pushed out by RN in wheelchair.

## 2019-07-03 NOTE — PROGRESS NOTES
[facility]  Palliative Medicine  Progress Note    Patient Name: Polly Kwan  MRN: [id number]  Admission Date: 6/29/2019  Hospital Length of Stay: 4 days  Code Status: Full Code   Attending Provider: Christen Rojas MD  Consulting Provider: ERIN Andujar  Primary Care Physician: Iza Kwan MD  Principal Problem:Acute on chronic combined systolic and diastolic congestive heart failure    Patient information was obtained from patient and ER records.      Assessment/Plan:     Palliative care encounter  Impression: Pt is a 58 y/o female with PMH of Bipolar disorder, polysubstance abuse (tobacco, cocaine), COPD, CAD s/p PCI in 2006, 2013, ICM (EF 20%), HTN, T2 DM, VT on amiodarone, paroxysmal AF on eliquis who presented to the ED today c/o progressively worsening SOB, abdominal distention. Per pt, she reported cocaine use Thursday prior to admit. Pt is a Full code. Pt is alert, oriented to person, place, time, and situation. Pt has AICD in place.     Goals of care:     Today: Met with pt. Pt to d/c home with home health today. Jes Schulz and this EMANUEL [facility]. Pt made her significant other of 26 years, George Red first [facility]. Paperwork in chart. Pt received original and copies.   Education provided on MPOA paperwork.    Plan: Pal care will sign off at this time.     7-3-19 Met with pt and pt's significant other. Spoke to pt about her understanding of her current medical issues. Pt has limited to moderate understanding of her heart issues. Spoke to pt about her advanced heart issues. Pt is aware that there is no cure for her heart failure but symptoms can be managed for her heart failure. Per pt, she uses weed and cocaine. Pt is aware cocaine use can add to heart failure issues. Pt does not want to get assistance with stopping drug use.     Pt reports she had hospice care in 2018 with [facility]. Pt not on hospice anymore.  Pt not open to hospice care at this  time. Pt open to home health and home-based pal care visits. Per Dr. Rojas, pt not ready for hospice care.     Code status: Per pt, at this time she wants to remain a full code. Pt has AICD in place.   Discussion on code status.     Care Planning     Power of   I initiated the process of advance care planning today and explained the importance of this process to the patient.  I introduced the concept of advance directives to the patient, as well. Then the patient received detailed information about the importance of designating a Health Care Power of  (HCPOA). She was also instructed to communicate with this person about their wishes for future healthcare, should she become sick and lose decision-making capacity. The patient has not previously appointed a HCPOA. After our discussion, the patient has not decided to complete a HCPOA.. Pt wants to look over forms tonight. Per pt, she will end up designating her daughter, Diane and Jenna TOWNSEND. Aotal time of 20 minutes discussing this issue with the patient. Will see pt in morning to help with completion of form.             I will sign off. Please contact us if you have any additional questions.    Subjective:     Chief Complaint:   Chief Complaint   Patient presents with    Shortness of Breath     SOB, COPD, A Fib       HPI:   Pt is a 59 y/o female with PMH of Bipolar disorder, polysubstance abuse (tobacco, cocaine), COPD, CAD s/p PCI in 2006, 2013, ICM (EF 20%), HTN, T2 DM. Pt has  VT on amiodarone, paroxysmal AF on eliquis who presented to the ED today with c/o progressively worsening SOB, abdominal distention, LE swelling for 2 days. Associated with dry cough, orthopnea and PND episodes.     Per chart review, pt's symptoms started late Thursday evening with lightheadedness. Throughout Friday, she experienced progressively worsening exertional dyspnea, relieved only by rest and putting her face in front of the AC. The dyspnea is associated  with a cough that was productive with thick white sputum, new for her, as well as chest tightness. No palpitations or syncope. No fevers, chills, or night sweats. She had some relief last night and was able to sleep, but this morning the symptoms were more severe prompting her arrival here.     Per chart review, in the ED her workup was significant for lactate 3.1, trop 0.038, BNP 2739, total bili 2.4, CXR with early congestive changes. ECG shows AF RVR. HR variable but mostly 130-140 on tele. The ED administered 80 mg IV lasix, three duonebs, and requested admission for HF.    Hospital Course:  No notes on file    Interval History: Pt to have cardioversion today    Past Medical History:   Diagnosis Date    Asthma     Bipolar 1 disorder     Cardiomyopathy     Ischemic Cardiomyopathy with EF 25-30% by echo 5/17/16    Chronic systolic (congestive) heart failure     Cocaine use     COPD (chronic obstructive pulmonary disease)     emphysema    Coronary artery disease     ROCHELLE 2006 and 2013    Defibrillator discharge     Depression     Diabetes mellitus     H/O echocardiogram 05/17/2016    EF 25-30%. LV diastolic dysfxn. Severe LAE. mod MR. PASP 86.    Hypertension     ICD (implantable cardioverter-defibrillator) in place 07/09/2013    MI (myocardial infarction)     x 3    PTSD (post-traumatic stress disorder)     Schizophrenia     Seizures        Past Surgical History:   Procedure Laterality Date    CARDIAC CATHETERIZATION      CARDIAC DEFIBRILLATOR PLACEMENT      CARDIOVERSION N/A 7/2/2019    Performed by [facility]deep Slaughter [facility] at [facility]    [place]ON[place]  2006    [place]    CORONARY STENT PLACEMENT      ECHOCARDIOGRAM, TRANSESOPHAGEAL N/A 7/2/2019    Performed by [facility] Diagnostic Provider at [facility] EP [facility]    INSERT / REPLACE / REMOVE PACEMAKER  2013     ICD [facility]       Review of patient's allergies indicates:  No Known Allergies    Medications:  Continuous Infusions:  Scheduled  Meds:   albuterol-ipratropium  3 mL Nebulization Q12H    amiodarone  400 mg Oral Daily    apixaban  5 mg Oral BID    aspirin  81 mg Oral Daily    atorvastatin  40 mg Oral Daily    candesartan  4 mg Oral Daily    pantoprazole  40 mg Oral Daily     PRN Meds:acetaminophen, albuterol, albuterol-ipratropium, calcium gluconate IVPB, calcium gluconate IVPB, calcium gluconate IVPB, Dextrose 10% Bolus, Dextrose 10% Bolus, glucagon (human recombinant), glucagon (human recombinant), glucose, glucose, glucose, glucose, insulin aspart U-100, magnesium sulfate IVPB, magnesium sulfate IVPB, potassium chloride in water **AND** potassium chloride in water **AND** potassium chloride in water, silver sulfADIAZINE 1%, sodium chloride 0.9%, sodium chloride 0.9%, sodium phosphate IVPB, sodium phosphate IVPB, sodium phosphate IVPB    Family History     None        Tobacco Use    Smoking status: Current Some Day Smoker     Packs/day: 3.00     Years: 30.00     Pack years: 90.00    Smokeless tobacco: Current User    Tobacco comment: 1-2  cigarettes a day when stressed   Substance and Sexual Activity    Alcohol use: No    Drug use: Yes     Types: Marijuana, Cocaine     Comment: once on wednesday    Sexual activity: Not Currently       Review of Systems   Constitutional: Positive for activity change and fatigue.   Respiratory: Positive for cough.    Cardiovascular: Positive for palpitations. Negative for chest pain.   Neurological: Positive for weakness.     Objective:     Vital Signs (Most Recent):  Temp: 97.7 °F (36.5 °C) (07/03/19 0700)  Pulse: 60 (07/03/19 1200)  Resp: (!) 40 (07/03/19 1200)  BP: 94/65 (07/03/19 1100)  SpO2: 100 % (07/03/19 1200) Vital Signs (24h Range):  Temp:  [97.6 °F (36.4 °C)-98.7 °F (37.1 °C)] 97.7 °F (36.5 °C)  Pulse:  [] 60  Resp:  [18-53] 40  SpO2:  [90 %-100 %] 100 %  BP: ()/(50-74) 94/65     Weight: 57 kg (125 lb 10.6 oz)  Body mass index is 23.12 kg/m².    Review of Symptoms  Symptom  Assessment (ESAS 0-10 scale)   ESAS 0 1 2 3 4 5 6 7 8 9 10   Pain X             Dyspnea X             Anxiety              Nausea X             Depression               Anorexia              Fatigue    X          Insomnia              Restlessness               Agitation              CAM / Delirium __ --  ___+   Constipation     __ --  ___+   Diarrhea           __ --  ___+  Bowel Management Plan (BMP): No    Pain Assessment: No pain noted per pt.     OME in 24 hours: 0    Performance Status: 60    ECOG Performance Status Grade: 2 - Ambulates, capable of self care only    Physical Exam   Constitutional: She is oriented to person, place, and time. She is cooperative.   HENT:   Head: Normocephalic.   Pulmonary/Chest: Effort normal.   Neurological: She is alert and oriented to person, place, and time.   Skin: Skin is warm and dry.   Psychiatric: She has a normal mood and affect. Her speech is normal and behavior is normal. She is attentive.       Significant Labs: All pertinent labs within the past 24 hours have been reviewed.  CBC:   Recent Labs   Lab 07/03/19  0336   WBC 5.83   HGB 13.1   HCT 41.5   MCV 91        BMP:  Recent Labs   Lab 07/03/19  0336   *      K 4.7   CL 99   CO2 28   BUN 13   CREATININE 0.9   CALCIUM 9.5   MG 2.1  2.1     LFT:  Lab Results   Component Value Date    AST 22 07/03/2019    ALKPHOS 113 07/03/2019    BILITOT 1.3 (H) 07/03/2019     Albumin:   Albumin   Date Value Ref Range Status   07/03/2019 3.2 (L) 3.5 - 5.2 g/dL Final     Protein:   Total Protein   Date Value Ref Range Status   07/03/2019 6.6 6.0 - 8.4 g/dL Final     Lactic acid:   Lab Results   Component Value Date    LACTATE 1.3 06/30/2019    LACTATE 2.9 (H) 06/29/2019       Significant Imaging: I have reviewed all pertinent imaging results/findings within the past 24 hours.    Advance Care Planning   Advanced Directives::  Living Will: No  LaPOST: No  Do Not Resuscitate Status: No  Medical Power of : Pt's  adult children are next of kin.     Decision-Making Capacity: Patient answered questions       Living Arrangements: Lives with significant otherGeorge for past 26 years. Pt has four adult children. Pt is not .     Psychosocial/Cultural:  Pt lives with George, significant other of 26 years. Pt has three adult children. Per pt, she has been using illegal drugs since a teenager that includes weed, cocaine, acid.     Spiritual: somewhat    F- Renate and Belief: Sheela    I - Importance:   .  C - Community:     A - Address in Care: pt does not want  visit.       > 50% of 25 min visit spent in chart review, face to face discussion of goals of care,  symptom assessment, coordination of care and emotional support.    Anjelica Casanova, CNS  Palliative Medicine  [facility]

## 2019-07-03 NOTE — ASSESSMENT & PLAN NOTE
Impression: Pt is a 58 y/o female with PMH of Bipolar disorder, polysubstance abuse (tobacco, cocaine), COPD, CAD s/p PCI in 2006, 2013, ICM (EF 20%), HTN, T2 DM, VT on amiodarone, paroxysmal AF on eliquis who presented to the ED today c/o progressively worsening SOB, abdominal distention. Per pt, she reported cocaine use Thursday prior to admit. Pt is a Full code. Pt is alert, oriented to person, place, time, and situation. Pt has AICD in place.     Goals of care:     Today: Met with pt. Pt to d/c home with home health today. Jes Schulz and this EMANUEL assited pt with MPOA paperwork. Pt made her significant other of 26 years, George Red first [facility]. Paperwork in chart. Pt received original and copies.   Education provided on MPOA paperwork.    Plan: Pal care will sign off at this time.     7-3-19 Met with pt and pt's significant other. Spoke to pt about her understanding of her current medical issues. Pt has limited to moderate understanding of her heart issues. Spoke to pt about her advanced heart issues. Pt is aware that there is no cure for her heart failure but symptoms can be managed for her heart failure. Per pt, she uses weed and cocaine. Pt is aware cocaine use can add to heart failure issues. Pt does not want to get assistance with stopping drug use.     Pt reports she had hospice care in 2018 with [facility]. Pt not on hospice anymore.  Pt not open to hospice care at this time. Pt open to home health and home-based pal care visits. Per Dr. Rojas, pt not ready for hospice care.     Code status: Per pt, at this time she wants to remain a full code. Pt has AICD in place.   Discussion on code status.     Advance Care Planning     Power of   I initiated the process of advance care planning today and explained the importance of this process to the patient.  I introduced the concept of advance directives to the patient, as well. Then the patient received detailed information about the importance of  designating a Health Care Power of  (HCPOA). She was also instructed to communicate with this person about their wishes for future healthcare, should she become sick and lose decision-making capacity. The patient has not previously appointed a HCPOA. After our discussion, the patient has not decided to complete a HCPOA.. Pt wants to look over forms tonight. Per pt, she will end up designating her daughter, Rigoberto TOWNSEND. Aotal time of 20 minutes discussing this issue with the patient. Will see pt in morning to help with completion of form.

## 2019-07-03 NOTE — PHYSICIAN QUERY
PT Name: Polly Kwan  MR #: [id number]    Physician Query Form - CardioPulmonary Clarification      CDS/: Mesha Haider               Contact information: [email or web address]      This form is a permanent document in the medical record.    Query Date: July 3, 2019    By submitting this query, we are merely seeking further clarification of documentation. Please utilize your independent clinical judgment when addressing the question(s) below.    The Medical record contains the following:   Indicators   Supporting Clinical Findings Location in Medical Record   x Pulmonary Hypertension documented Pulmonary hypertension  H&P 6/29    x Acute/Chronic Illness * Acute on chronic combined systolic and diastolic congestive heart failure  Atrial fibrillation with RVR    PMH of Bipolar disorder, polysubstance abuse (tobacco, cocaine), COPD, CAD s/p PCI in 2006, 2013, ICM (EF 20%), HTN, T2DM, VT on amiodarone, paroxysmal AF on eliquis  Cardiology note negra/Crystal  7/2         H&P - Cardiology    x Echo and/or Heart Cath Findings · Irregular rythm  · Severe left ventricular enlargement.  · Severely decreased left ventricular systolic function. The estimated ejection fraction is 15%  · The following segments are akinetic: basal inferoseptal, mid inferoseptal, apical septal, apical inferior, apical lateral and apex. rest of the walls are hypokinetic.  · Eccentric left ventricular hypertrophy.  · Indeterminate left ventricular diastolic function.  · Normal right ventricular systolic function.  · Moderate right atrial enlargement.  · Severe left atrial enlargement.  · Moderate mitral regurgitation.  · Moderate tricuspid regurgitation.  · The estimated PA systolic pressure is 55 mm Hg. Pulmonary hypertension present.  · Normal central venous pressure (3 mm Hg). TTE 6/30     BiPAP/Intubation/Supplemental O2      SOB, VIEYRA, Fatigue, Dizziness, LE Edema, Cyanosis, Chest Pain, Respiratory Distress, Hypoxia, etc.      Treatment          Medication      Other     Provider, please specify the type of pulmonary hypertension:  [ x  ] Group 2:  Pulmonary Hypertension due to Left Heart Disease, including left heart failure and/or left heart valve disease     [   ] Group 3:  Pulmonary Hypertension due to Lung Disease     [   ] Group 5:  Pulmonary Hypertension due to other, multifactorial, or unclear mechanisms     [   ] Pulmonary Hypertension, unspecified     [   ] Other Cardiopulmonary Condition (please specify):     [  ] Clinically Undetermined         Please document in your progress notes daily for the duration of treatment, until resolved, and include in your discharge summary.

## 2019-07-03 NOTE — PHYSICIAN QUERY
PT Name: Polly Kwan  MR #: [id number]    Physician Query Form - Consultant Diagnosis Clarification     CDS/: Mesha Haider               Contact information: [email or web address]    This form is a permanent document in the medical record.     Query Date: July 3, 2019      By submitting this query, we are merely seeking further clarification of documentation.  Please utilize your independent clinical judgment when addressing the question(s) below.      The Medical record contains the following:   Diagnosis Supporting Clinical Information Location in Medical Record   Cardiogenic Shock  presented with ADHF symptoms found to be in cardiogenic shock (lactate 3.1, trop 0.038, BNP 2739, total bili 2.4), ECG shows AF RVR, needing . Patient admitted to CCU, completed MARY found to be in Afib with RVR, EF 15% with diffuse akinesis / hypokinesis, overall diuresed and  weaned off CVP: 7 CO: 4.0  CI: 2.5  SVR: 1535  SVO2 = 67. Case discussed with EP by CCU team and recommended MARY / DCCV to allow improve atrial filling. Cards consult Nathan/Kristie 7/2          Do you agree with the Consultants diagnosis of __Cardiogenic Shock __?    [ x ] Yes   [  ] Yes, but it resolved prior to my assessment of the patient   [  ] No   [  ] Clinically insignificant   [  ] Other/Clarification of findings:   [  ] Clinically undetermined

## 2019-07-03 NOTE — DISCHARGE SUMMARY
[facility]  Cardiology  Discharge Summary      Patient Name: Polly Kwan  MRN: [id number]  Admission Date: 6/29/2019  Hospital Length of Stay: 4 days  Discharge Date and Time:  07/03/2019 3:26 PM  Attending Physician: No att. providers found    Discharging Provider: Yair Carlson MD  Primary Care Physician: Iza Kwan MD    HPI:   Ms. Polly Kwan is a 58 yoF, planned for MARY / DCCV for afib. PMHx of Bipolar disorder, polysubstance abuse (tobacco, cocaine), COPD, CAD s/p PCI in 2006, 2013, ICM (EF 15%), HTN, T2DM, VT on amiodarone, paroxysmal AF on eliquis presented with ADHF symptoms found to be in cardiogenic shock (lactate 3.1, trop 0.038, BNP 2739, total bili 2.4), ECG shows AF RVR, needing . Patient admitted to CCU, completed MARY found to be in Afib with RVR, EF 15% with diffuse akinesis / hypokinesis, overall diuresed and  weaned off CVP: 7 CO: 4.0  CI: 2.5  SVR: 1535  SVO2 = 67. Case discussed with EP by CCU team and recommended MARY / DCCV to allow improve atrial filling. In meantime, transition amio to PO 400mg qD with eliquis 5mg BID. Device interrogation noted increased Afib burden.     Significant Imaging:   ECHO:  · Irregular rythm  · Severe left ventricular enlargement.  · Severely decreased left ventricular systolic function. The estimated ejection fraction is 15%  · The following segments are akinetic: basal inferoseptal, mid inferoseptal, apical septal, apical inferior, apical lateral and apex. rest of the walls are hypokinetic.  · Eccentric left ventricular hypertrophy.  · Indeterminate left ventricular diastolic function.  · Normal right ventricular systolic function.  · Moderate right atrial enlargement.  · Severe left atrial enlargement.  · Moderate mitral regurgitation.  · Moderate tricuspid regurgitation.  · The estimated PA systolic pressure is 55 mm Hg. Pulmonary hypertension present.  · Normal central venous pressure (3 mm Hg).    Cr 0.9   CBC  "unremarkable  Bili 1.3  No MARY on file      Procedure(s) (LRB):  CARDIOVERSION (N/A)  ECHOCARDIOGRAM, TRANSESOPHAGEAL (N/A)     Indwelling Lines/Drains at time of discharge:  Lines/Drains/Airways          None          Hospital Course:  Came in with cardiogenic shock requiring  5mcg/kg/min and lasix drip for hemodynamic optimization, diuressed 4L to euvolemia and was able to be weaned off of . Throughout the admission she was in AF with RVR (in the low 100s), device interrogation showed very low AF burden prior to this, patient posited that inciting event was cocaine use the week prior (matched with duration of AF). She was cardioverted on 07/02 and discharged on 07/03. With heart failure instructions.    Of note, we had several discussions on the high risk of mortality and poor prognosis based on her cardiogenic shock, ischemic cardiomyopathy, and lifestyle choices/cocaine use. Attempted to address advanced directives, but patient had persistently poor insight into her disease process.       BP 94/65 (BP Location: Left arm, Patient Position: Lying)   Pulse 60   Temp 97.7 °F (36.5 °C) (Axillary)   Resp (!) 40   Ht 5' 1.81" (1.57 m)   Wt 57 kg (125 lb 10.6 oz)   SpO2 100%   Breastfeeding? No   BMI 23.12 kg/m²    Constitutional: No distress, cachectic, conversant  HEENT: Sclera anicteric, PERRLA, EOMI. Temporal wasting present  Neck: No JVD, no masses, good movement  CV: RRR, S1 and S2 normal, no additional heart sounds or murmurs. Pulses 2+ and equal bilaterally in radial arteries.  Pulm: Clear to auscultation bilaterally with symmetrical expansion.  GI: Abdomen soft, non-tender, good bowel sounds  Extremities: Both extremities intact and grossly normal, skin is warm, no edema noted  Skin: No ecchymosis, erythema, or ulcers  Psych: AOx3, appropriate affect  Neuro: CNII-XII intact, no focal deficits        Consults:   Consults (From admission, onward)        Status Ordering Provider     Inpatient consult " to Palliative Care  Once     Provider:  (Not yet assigned)    Completed XIN WANG          Significant Diagnostic Studies: Labs:   BMP:   Recent Labs   Lab 07/02/19  0206 07/03/19  0336   GLU 96 125*    136   K 4.5 4.7   CL 98 99   CO2 29 28   BUN 11 13   CREATININE 0.9 0.9   CALCIUM 9.9 9.5   MG 2.1  2.1 2.1  2.1   , CBC   Recent Labs   Lab 07/02/19  0206 07/03/19  0336   WBC 6.27 5.83   HGB 13.1 13.1   HCT 39.6 41.5    251    and Lipid Panel   Lab Results   Component Value Date    CHOL 109 (L) 08/20/2018    HDL 56 08/20/2018    LDLCALC 46.8 (L) 08/20/2018    TRIG 31 08/20/2018    CHOLHDL 51.4 (H) 08/20/2018       Pending Diagnostic Studies:     Procedure Component Value Units Date/Time    Lactic acid, plasma [[id number]] Collected:  07/01/19 1337    Order Status:  Sent Lab Status:  In process Updated:  07/01/19 1338    Specimen:  Blood           Final Active Diagnoses:    Diagnosis Date Noted POA    PRINCIPAL PROBLEM:  Acute on chronic combined systolic and diastolic congestive heart failure [I50.43] 10/05/2018 Yes    Palliative care encounter [Z51.5] 07/02/2019 Not Applicable    Goals of care, counseling/discussion [Z71.89]  Not Applicable    Advance care planning [Z71.89]  Not Applicable    UTI (urinary tract infection) [N39.0] 06/30/2019 Unknown    Acute on chronic systolic congestive heart failure [I50.23] 06/29/2019 Yes    Cocaine use disorder, mild, abuse [F14.10] 08/30/2018 Yes    Atrial fibrillation with RVR [I48.91] 01/02/2018 Yes    Pulmonary hypertension [I27.20] 07/05/2017 Yes    Tobacco use disorder [F17.200] 07/05/2017 Yes    Chronic obstructive pulmonary disease with acute exacerbation [J44.1] 06/05/2017 Yes    Coronary artery disease involving native coronary artery of native heart without angina pectoris [I25.10] 03/06/2014 Yes    Ischemic cardiomyopathy [I25.5] 03/06/2014 Yes    Diabetes mellitus with diabetic neuropathy [E11.40] 02/25/2014 Yes     Essential hypertension [I10] 02/25/2014 Yes    Schizophrenia [F20.9] 02/25/2014 Yes    ICD (implantable cardioverter-defibrillator) in place [Z95.810] 01/01/2013 Yes      Problems Resolved During this Admission:    Diagnosis Date Noted Date Resolved POA    Acute and chronic respiratory failure with hypoxia [J96.21] 06/29/2019 07/02/2019 Unknown     No new Assessment & Plan notes have been filed under this hospital service since the last note was generated.  Service: Cardiology      Discharged Condition: stable    Disposition: Home or Self Care    Follow Up:    Patient Instructions:   No discharge procedures on file.  Medications:  Reconciled Home Medications:      Medication List      START taking these medications    carvedilol 3.125 MG tablet  Commonly known as:  COREG  Take 1 tablet (3.125 mg total) by mouth 2 (two) times daily with meals.        CONTINUE taking these medications    amiodarone 400 MG tablet  Commonly known as:  PACERONE  TAKE 1 TABLET (400 MG TOTAL) BY MOUTH ONCE DAILY.     ammonium lactate 12 % Crea  Apply twice daily to affected parts both feet as needed.     apixaban 5 mg Tab  Commonly known as:  ELIQUIS  Take 1 tablet (5 mg total) by mouth 2 (two) times daily.     aspirin 81 MG EC tablet  Commonly known as:  ECOTRIN  TAKE 1 TABLET BY MOUTH EVERY DAY     atorvastatin 40 MG tablet  Commonly known as:  LIPITOR  TAKE 1 TABLET (40 MG TOTAL) BY MOUTH ONCE DAILY.     baclofen 10 MG tablet  Commonly known as:  LIORESAL  take 1 to 2 TABLETS BY MOUTH every 6 hours as needed for muscle spasms     ciclopirox 8 % Soln  Commonly known as:  CICLODAN  Apply topically nightly.     clobetasol 0.05 % external solution  Commonly known as:  TEMOVATE  Use on scalp one - two times daily as needed for scaling or itching     cyproheptadine 4 mg tablet  Commonly known as:  PERIACTIN  Take 1 tablet (4 mg total) by mouth 2 (two) times daily as needed.     fluticasone furoate 100 mcg/actuation Dsdv  Commonly known as:   ARNUITY ELLIPTA  Inhale 100 mcg into the lungs once daily. Controller     furosemide 80 MG tablet  Commonly known as:  LASIX  Take 1 tablet (80 mg total) by mouth 2 (two) times daily.     gabapentin 300 MG capsule  Commonly known as:  NEURONTIN  Take 1 capsule (300 mg total) by mouth 2 (two) times daily.     irbesartan 75 MG tablet  Commonly known as:  AVAPRO  Take 0.5 tablets (37.5 mg total) by mouth every evening.     loratadine 10 mg tablet  Commonly known as:  CLARITIN  Take 1 tablet (10 mg total) by mouth once daily.     magnesium oxide 400 mg (241.3 mg magnesium) tablet  Commonly known as:  MAG-OX  Take 0.5 tablets (200 mg total) by mouth once daily.     nitroGLYCERIN 0.4 MG SL tablet  Commonly known as:  NITROSTAT  PLACE 1 TABLET (0.4 MG TOTAL) UNDER THE TONGUE EVERY 5 (FIVE) MINUTES AS NEEDED FOR CHEST PAIN.     potassium chloride SA 20 MEQ tablet  Commonly known as:  K-DUR,KLOR-CON  Take 1 tablet (20 mEq total) by mouth once daily.     traZODone 150 MG tablet  Commonly known as:  DESYREL  Take 1 tablet (150 mg total) by mouth every evening.     triamcinolone acetonide 0.1% 0.1 % ointment  Commonly known as:  KENALOG  AAA bid     * VENTOLIN HFA 90 mcg/actuation inhaler  Generic drug:  albuterol  INHALE 2 PUFFS INTO THE LUNGS EVERY 6 HOURS AS NEEDED FOR WHEEZING OR SHORTNESS OF BREATH     * albuterol 90 mcg/actuation inhaler  Commonly known as:  VENTOLIN HFA  INHALE 2 PUFFS INTO THE LUNGS EVERY 6 HOURS AS NEEDED FOR WHEEZING OR SHORTNESS OF BREATH         * This list has 2 medication(s) that are the same as other medications prescribed for you. Read the directions carefully, and ask your doctor or other care provider to review them with you.            STOP taking these medications    metoprolol succinate 25 MG 24 hr tablet  Commonly known as:  TOPROL-XL            Time spent on the discharge of patient: 39 minutes    Yair Carlson MD  Cardiology  [facility]

## 2019-07-03 NOTE — PLAN OF CARE
Problem: Adult Inpatient Plan of Care  Goal: Plan of Care Review  Outcome: Ongoing (interventions implemented as appropriate)  Post cardioversion, patient remains NSR/Paced on continuous telemetry, O2 Sat 100% on Room Air, and afebrile. See flowsheets for details on VS and assessments.  No falls. No complaints of pain, nausea, or vomiting. See MAR for meds given this shift. Up with assist to bedside commode, void x 1 this shift. Possible d/c today.     **This POC reviewed with patient, Polly Kwan, who verbalized understanding.

## 2019-07-03 NOTE — DISCHARGE INSTRUCTIONS
FOR YOUR HEART DYSFUNCTION: This is the result of the heart not working efficiently, and fluid building up in the body (legs, belly, and lungs)  1. To control symptoms of leg swelling, please check your weight when you get home. Record this weight -- it will be your DRY WEIGHT.    2. After, check your weight every morning, first thing. If within 2-3lbs, take 80 mg furosemide twice dailyas prescribed        a. If your weight is 2-3 lbs higher than your dry weight, take a double dose of the furosemide prescribed.         b. if your weight continues to be 2-3 lbs higher the next day, take a double dose        c. if the higher dose does not reduce your weight (you are not peeing a lot after taking), then please call the clinic at [phone]        d. If your weight is 2-3lbs lower, then hold the dose of diuretic (furosemide) for that day. Resume once your weight returns to your dry weight    3. Eat a LOW-SALT diet, less than 2000-3000mg of salt per day. Check any food packaging to keep track of your salt, avoid eating fast food whenever possible    4. Keep track of fluid intake, try not to exceed 1500mL, or approximately 2 quarts of fluid daily, if you are gaining weight, reduce this to 1000mL or 1-1.5 quarts. Remember that fluids include water, soda, tea, coffee, soups.    5. Please exercise at least 30 minutes per day 5 times per week, this makes your heart stronger, and follow up with cardiac rehabilitation    6. Follow up with your cardiologist after vacation

## 2019-07-03 NOTE — PHYSICIAN QUERY
PT Name: Polly Kwan  MR #: [id number]    Physician Query Form - Emergency Medicine Diagnosis Clarification     CDS/: Mesha Haider               Contact information: [email or web address]    This form is a permanent document in the medical record.     Query Date: July 3, 2019    By submitting this query, we are merely seeking further clarification of documentation.  Please utilize your independent clinical judgment when addressing the question(s) below.      The Medical record contains the following:     Diagnosis Supporting Clinical Information Location in Medical Record   NSTEMI   She has ischemic cardiomyopathy, with an initial elevated troponin that is elevated to 0.38.  Discussed with Cardiology, who feels this is rate related, and as patient has long history ischemic cardiomyopathy as well as EF of 8-10%, unlikely acute ACS event.  Plan is to admit to cardiology Co-Management service, trend troponins, and diurese.  Per Cardiology, recommend Lasix 80 mg IV for diuresing, and managing heart failure exacerbation.  I feel this is reasonable, discussed case with hospital medicine team, the patient is admitted for observation status for acute heart failure exacerbation associated with dyspnea, NSTEMI with a history of ischemic cardiomyopathy.  Patient was given aspirin in the ED.  She is currently on Eliquis for her atrial fibrillation. ED provider note Deepthi     Do you agree with the Emergency Medicine diagnosis of __NSTEMI ___?    [   ] Yes   [   ] Yes, but it resolved prior to my assessment of the patient   [   ] No   [   ] Clinically Insignificant   [ x  ] Other/Clarification of Findings: Elevated TNI due to acute CHF   [  ] Clinically Undetermined         Please document in your progress notes daily for the duration of treatment until resolved and include in your discharge summary.

## 2019-07-03 NOTE — SUBJECTIVE & OBJECTIVE
Interval History: Pt to have cardioversion today    Past Medical History:   Diagnosis Date    Asthma     Bipolar 1 disorder     Cardiomyopathy     Ischemic Cardiomyopathy with EF 25-30% by echo 5/17/16    Chronic systolic (congestive) heart failure     Cocaine use     COPD (chronic obstructive pulmonary disease)     emphysema    Coronary artery disease     ROCHELLE 2006 and 2013    Defibrillator discharge     Depression     Diabetes mellitus     H/O echocardiogram 05/17/2016    EF 25-30%. LV diastolic dysfxn. Severe LAE. mod MR. PASP 86.    Hypertension     ICD (implantable cardioverter-defibrillator) in place 07/09/2013    MI (myocardial infarction)     x 3    PTSD (post-traumatic stress disorder)     Schizophrenia     Seizures        Past Surgical History:   Procedure Laterality Date    CARDIAC CATHETERIZATION      CARDIAC DEFIBRILLATOR PLACEMENT      CARDIOVERSION N/A 7/2/2019    Performed by Sukhdeep Slaughter MD at [facility] EP LAB    [place]ON[place]  2006    [place]    CORONARY STENT PLACEMENT      ECHOCARDIOGRAM, TRANSESOPHAGEAL N/A 7/2/2019    Performed by [facility] Provider at [facility] EP LAB    INSERT / REPLACE / REMOVE PACEMAKER  2013     ICD [facility]       Review of patient's allergies indicates:  No Known Allergies    Medications:  Continuous Infusions:  Scheduled Meds:   albuterol-ipratropium  3 mL Nebulization Q12H    amiodarone  400 mg Oral Daily    apixaban  5 mg Oral BID    aspirin  81 mg Oral Daily    atorvastatin  40 mg Oral Daily    candesartan  4 mg Oral Daily    pantoprazole  40 mg Oral Daily     PRN Meds:acetaminophen, albuterol, albuterol-ipratropium, calcium gluconate IVPB, calcium gluconate IVPB, calcium gluconate IVPB, Dextrose 10% Bolus, Dextrose 10% Bolus, glucagon (human recombinant), glucagon (human recombinant), glucose, glucose, glucose, glucose, insulin aspart U-100, magnesium sulfate IVPB, magnesium sulfate IVPB, potassium chloride in water **AND**  potassium chloride in water **AND** potassium chloride in water, silver sulfADIAZINE 1%, sodium chloride 0.9%, sodium chloride 0.9%, sodium phosphate IVPB, sodium phosphate IVPB, sodium phosphate IVPB    Family History     None        Tobacco Use    Smoking status: Current Some Day Smoker     Packs/day: 3.00     Years: 30.00     Pack years: 90.00    Smokeless tobacco: Current User    Tobacco comment: 1-2  cigarettes a day when stressed   Substance and Sexual Activity    Alcohol use: No    Drug use: Yes     Types: Marijuana, Cocaine     Comment: once on wednesday    Sexual activity: Not Currently       Review of Systems   Constitutional: Positive for activity change and fatigue.   Respiratory: Positive for cough.    Cardiovascular: Positive for palpitations. Negative for chest pain.   Neurological: Positive for weakness.     Objective:     Vital Signs (Most Recent):  Temp: 97.7 °F (36.5 °C) (07/03/19 0700)  Pulse: 60 (07/03/19 1200)  Resp: (!) 40 (07/03/19 1200)  BP: 94/65 (07/03/19 1100)  SpO2: 100 % (07/03/19 1200) Vital Signs (24h Range):  Temp:  [97.6 °F (36.4 °C)-98.7 °F (37.1 °C)] 97.7 °F (36.5 °C)  Pulse:  [] 60  Resp:  [18-53] 40  SpO2:  [90 %-100 %] 100 %  BP: ()/(50-74) 94/65     Weight: 57 kg (125 lb 10.6 oz)  Body mass index is 23.12 kg/m².    Review of Symptoms  Symptom Assessment (ESAS 0-10 scale)   ESAS 0 1 2 3 4 5 6 7 8 9 10   Pain X             Dyspnea X             Anxiety              Nausea X             Depression               Anorexia              Fatigue    X          Insomnia              Restlessness               Agitation              CAM / Delirium __ --  ___+   Constipation     __ --  ___+   Diarrhea           __ --  ___+  Bowel Management Plan (BMP): No    Pain Assessment: No pain noted per pt.     OME in 24 hours: 0    Performance Status: 60    ECOG Performance Status Grade: 2 - Ambulates, capable of self care only    Physical Exam   Constitutional: She is oriented to  person, place, and time. She is cooperative.   HENT:   Head: Normocephalic.   Pulmonary/Chest: Effort normal.   Neurological: She is alert and oriented to person, place, and time.   Skin: Skin is warm and dry.   Psychiatric: She has a normal mood and affect. Her speech is normal and behavior is normal. She is attentive.       Significant Labs: All pertinent labs within the past 24 hours have been reviewed.  CBC:   Recent Labs   Lab 07/03/19  0336   WBC 5.83   HGB 13.1   HCT 41.5   MCV 91        BMP:  Recent Labs   Lab 07/03/19  0336   *      K 4.7   CL 99   CO2 28   BUN 13   CREATININE 0.9   CALCIUM 9.5   MG 2.1  2.1     LFT:  Lab Results   Component Value Date    AST 22 07/03/2019    ALKPHOS 113 07/03/2019    BILITOT 1.3 (H) 07/03/2019     Albumin:   Albumin   Date Value Ref Range Status   07/03/2019 3.2 (L) 3.5 - 5.2 g/dL Final     Protein:   Total Protein   Date Value Ref Range Status   07/03/2019 6.6 6.0 - 8.4 g/dL Final     Lactic acid:   Lab Results   Component Value Date    LACTATE 1.3 06/30/2019    LACTATE 2.9 (H) 06/29/2019       Significant Imaging: I have reviewed all pertinent imaging results/findings within the past 24 hours.    Advance Care Planning   Advanced Directives::  Living Will: No  LaPOST: No  Do Not Resuscitate Status: No  Medical Power of : Pt's adult children are next of kin.     Decision-Making Capacity: Patient answered questions       Living Arrangements: Lives with significant otherGeorge for past 26 years. Pt has four adult children. Pt is not .     Psychosocial/Cultural:  Pt lives with George significant other of 26 years. Pt has three adult children. Per pt, she has been using illegal drugs since a teenager that includes weed, cocaine, acid.     Spiritual: somewhat    F- Renate and Belief: Sheela    I - Importance:   .  C - Community:     A - Address in Care: pt does not want  visit.

## 2019-07-05 ENCOUNTER — PATIENT OUTREACH (OUTPATIENT)
Dept: ADMINISTRATIVE | Facility: CLINIC | Age: 59
End: 2019-07-05

## 2019-07-05 DIAGNOSIS — I25.5 ISCHEMIC CARDIOMYOPATHY: ICD-10-CM

## 2019-07-05 DIAGNOSIS — I50.42 CHRONIC COMBINED SYSTOLIC AND DIASTOLIC CHF, NYHA CLASS 4: ICD-10-CM

## 2019-07-05 DIAGNOSIS — M47.816 LUMBAR FACET ARTHROPATHY: Primary | ICD-10-CM

## 2019-07-05 DIAGNOSIS — Z95.810 ICD (IMPLANTABLE CARDIOVERTER-DEFIBRILLATOR) IN PLACE: ICD-10-CM

## 2019-07-05 DIAGNOSIS — I10 ESSENTIAL HYPERTENSION: ICD-10-CM

## 2019-07-05 DIAGNOSIS — I48.0 PAF (PAROXYSMAL ATRIAL FIBRILLATION): ICD-10-CM

## 2019-07-05 DIAGNOSIS — I50.9 CONGESTIVE HEART FAILURE, UNSPECIFIED HF CHRONICITY, UNSPECIFIED HEART FAILURE TYPE: Primary | ICD-10-CM

## 2019-07-05 LAB
BACTERIA BLD CULT: NORMAL
BACTERIA BLD CULT: NORMAL

## 2019-07-05 RX ORDER — IRBESARTAN 75 MG/1
37.5 TABLET ORAL NIGHTLY
Qty: 45 TABLET | Refills: 0 | Status: SHIPPED | OUTPATIENT
Start: 2019-07-05 | End: 2020-07-04

## 2019-07-05 RX ORDER — BACLOFEN 10 MG/1
TABLET ORAL
Qty: 60 TABLET | Refills: 0 | Status: SHIPPED | OUTPATIENT
Start: 2019-07-05

## 2019-07-05 NOTE — PROGRESS NOTES
States out of Eliquis. Message routed to pcp to send in refill today. Pt instructed to call office if not heard from anyone by 3 pm.  Pt also out of irbesartan and baclofen. Going out of town for 3 weeks. Message to pcp to refill.    Pt is taking Flagyl for 10 days. Insists that she got Rx on discharge. Not listed on med rec. Message to d\c provider, Gauri Carlson to see if ordered.

## 2019-07-05 NOTE — TELEPHONE ENCOUNTER
----- Message from Dahlia Vazquez RN sent at 7/5/2019 12:53 PM CDT -----  Hi,  I did tcc call with pt. She is currently out of [facility]. Please send to [facility]. Please send today so pt can  today. She's going out of town tomorrow.    Dahlia Vazquez RN

## 2019-07-05 NOTE — TELEPHONE ENCOUNTER
----- Message from Dahlia Vazquez RN sent at 7/5/2019 12:53 PM CDT -----  Hi,  I did tcc call with pt. She is currently out of [facility]. Please send to [place]. Please send today so pt can  today. She's going out of town tomorrow.    Dahlia Vazquez RN

## 2019-07-05 NOTE — TELEPHONE ENCOUNTER
----- Message from Dahlia Vazquez RN sent at 7/5/2019  1:24 PM CDT -----  Sorry for another message. I wanted to hurry and get that Eliquis message out. Pt is leaving to go out of town for 3 weeks tomorrow and needs Irbesartan and Baclofen refills. Please send in for her.  She did agree to NP home visit upon return from vacation.  Thanks,  Dahlia Vazquez, RN

## 2019-07-05 NOTE — PHYSICIAN QUERY
"PT Name: Polly Kwan  MR #: [id number]    Physician Query Form - Heart  Condition Clarification     CDS/: Mesha Haider               Contact information: [email or web address]    This form is a permanent document in the medical record.     Query Date: July 5, 2019    By submitting this query, we are merely seeking further clarification of documentation. Please utilize your independent clinical judgment when addressing the question(s) below.    The medical record contains the following   Indicators     Supporting Clinical Findings Location in Medical Record   x BNP 2739 Labs 6/29    x EF · The estimated ejection fraction is 15%   TTE 6/30     Radiology findings     x Echo Results · Irregular rythm  · Severe left ventricular enlargement.  · Severely decreased left ventricular systolic function. The estimated ejection fraction is 15%  · The following segments are akinetic: basal inferoseptal, mid inferoseptal, apical septal, apical inferior, apical lateral and apex. rest of the walls are hypokinetic.  · Eccentric left ventricular hypertrophy.  · Indeterminate left ventricular diastolic function.  · Normal right ventricular systolic function.  · Moderate right atrial enlargement.  · Severe left atrial enlargement.  · Moderate mitral regurgitation.  · Moderate tricuspid regurgitation.  · The estimated PA systolic pressure is 55 mm Hg. Pulmonary hypertension present.  · Normal central venous pressure (3 mm Hg). TTE 6/30     "Ascites" documented      "SOB" or "VIEYRA" documented      "Hypoxia" documented     x Heart Failure documented Acute on chronic combined systolic and diastolic congestive heart failure    Acute on chronic systolic congestive heart failure  Discharge summary     "Edema" documented     x Diuretics/Meds furosemide injection 40 mg   Dose: 40 mg  Freq: Once Route: IV  Start: 07/01/19 1715 End: 07/01/19 1618    furosemide injection 80 mg   Dose: 80 mg  Freq: ED 1 Time Route: IV  Start: 06/29/19 9885 End: " 06/29/19 1936    furosemide (LASIX) 2 mg/mL in sodium chloride 0.9% 100 mL infusion (conc: 2 mg/mL)   Rate: 10 mL/hr Dose: 20 mg/hr  Freq: Continuous Route: IV  Start: 06/29/19 2245 End: 07/01/19 0843 MAR           MAR           MAR     Treatment:      Other:      Heart failure (HF) can be acute, chronic or both. It is generally further specificed as systolic, diastolic, or combined. Lastly, it is important to identify an underlying etiology if known or suspected.     Common clues to acute exacerbation:  Rapidly progressive symptoms (w/in 2 weeks of presentation), using IV diuretics to treat, using supplemental O2, pulmonary edema on Xray, MI w/in 4 weeks, and/or BNP >500    Systolic Heart Failure: is defined as chart documentation of a left ventricular ejection fraction (LVEF) less than 40%     Diastolic Heart Failure: is defined as a left ventricular ejection fraction (LVEF) greater than 40%   +      Evidence of diastolic dysfunction on echocardiography OR    Right heart catheterization wedge pressure above 12 mm Hg OR    Left heart catheterization left ventricular end diastolic pressure 18 mm Hg or above.    References: *American Heart Association    The clinical guidelines noted below are only system guidelines, and do not replace the providers clinical judgment.     Provider, please specify the diagnosis associated with above clinical findings    Please clarify the conflicting documentation on the heart failure:    [   ] Acute on Chronic Systolic Heart Failure- Pre-existing systolic HF diagnosis.  EF < 40%  and acute HF symptoms documented  [ x  ] Acute on Chronic Combined Systolic and Diastolic Heart Failure                   [   ] Other Type of Heart Failure (please specify type): _________________________  [   ] Other (please specify): ___________________________________  [   ] Clinically Undetermined                          Please document in your progress notes daily for the duration of treatment  until resolved and include in your discharge summary.

## 2019-07-05 NOTE — PATIENT INSTRUCTIONS

## 2019-07-17 DIAGNOSIS — J44.9 CHRONIC OBSTRUCTIVE PULMONARY DISEASE, UNSPECIFIED COPD TYPE: ICD-10-CM

## 2019-07-17 DIAGNOSIS — M54.16 CHRONIC LEFT LUMBAR RADICULOPATHY: ICD-10-CM

## 2019-07-17 RX ORDER — ALBUTEROL SULFATE 90 UG/1
AEROSOL, METERED RESPIRATORY (INHALATION)
Qty: 18 INHALER | Refills: 0 | Status: SHIPPED | OUTPATIENT
Start: 2019-07-17

## 2019-07-18 RX ORDER — GABAPENTIN 300 MG/1
CAPSULE ORAL
Qty: 60 CAPSULE | Refills: 2 | OUTPATIENT
Start: 2019-07-18

## 2019-08-19 DIAGNOSIS — E78.9 ABNORMAL CHOLESTEROL TEST: ICD-10-CM

## 2019-08-19 DIAGNOSIS — I10 ESSENTIAL HYPERTENSION: ICD-10-CM

## 2019-08-19 RX ORDER — ATORVASTATIN CALCIUM 40 MG/1
40 TABLET, FILM COATED ORAL DAILY
Qty: 90 TABLET | Refills: 1 | Status: SHIPPED | OUTPATIENT
Start: 2019-08-19

## 2019-08-23 DIAGNOSIS — E11.9 TYPE 2 DIABETES MELLITUS WITHOUT COMPLICATION: ICD-10-CM

## 2019-09-20 DIAGNOSIS — I25.10 CORONARY ARTERY DISEASE INVOLVING NATIVE CORONARY ARTERY OF NATIVE HEART WITHOUT ANGINA PECTORIS: ICD-10-CM

## 2019-09-20 DIAGNOSIS — I25.5 ISCHEMIC CARDIOMYOPATHY: ICD-10-CM

## 2019-09-20 RX ORDER — ASPIRIN 81 MG/1
TABLET ORAL
Qty: 90 TABLET | Refills: 0 | Status: SHIPPED | OUTPATIENT
Start: 2019-09-20

## 2019-09-30 DIAGNOSIS — I25.5 ISCHEMIC CARDIOMYOPATHY: ICD-10-CM

## 2019-09-30 DIAGNOSIS — Z95.810 ICD (IMPLANTABLE CARDIOVERTER-DEFIBRILLATOR) IN PLACE: ICD-10-CM

## 2019-09-30 DIAGNOSIS — I50.42 CHRONIC COMBINED SYSTOLIC AND DIASTOLIC CHF, NYHA CLASS 4: ICD-10-CM

## 2019-09-30 DIAGNOSIS — I48.0 PAF (PAROXYSMAL ATRIAL FIBRILLATION): ICD-10-CM

## 2019-09-30 RX ORDER — APIXABAN 5 MG/1
TABLET, FILM COATED ORAL
Qty: 180 TABLET | Refills: 0 | Status: SHIPPED | OUTPATIENT
Start: 2019-09-30

## 2019-10-16 DIAGNOSIS — M54.16 CHRONIC LEFT LUMBAR RADICULOPATHY: ICD-10-CM

## 2019-10-16 RX ORDER — GABAPENTIN 300 MG/1
CAPSULE ORAL
Qty: 60 CAPSULE | Refills: 2 | OUTPATIENT
Start: 2019-10-16

## 2019-11-19 NOTE — ASSESSMENT & PLAN NOTE
Significant signs of acute decompensation. Net negative 800 ml in 24 hours.   1. Furosemide 80 mg IV BID.  2. Fluid restriction: 1,500 ml  3. Strict I&O. Weight daily.   Partial Purse String (Simple) Text: Given the location of the defect and the characteristics of the surrounding skin a simple purse string closure was deemed most appropriate.  Undermining was performed circumfirentially around the surgical defect.  A purse string suture was then placed and tightened. Wound tension only allowed a partial closure of the circular defect.

## 2019-12-02 DIAGNOSIS — Z12.11 COLON CANCER SCREENING: ICD-10-CM

## 2020-01-14 ENCOUNTER — PES CALL (OUTPATIENT)
Dept: ADMINISTRATIVE | Facility: CLINIC | Age: 60
End: 2020-01-14

## 2020-01-24 DIAGNOSIS — E11.9 TYPE 2 DIABETES MELLITUS WITHOUT COMPLICATION: ICD-10-CM

## 2020-05-29 ENCOUNTER — TELEPHONE (OUTPATIENT)
Dept: CARDIOLOGY | Facility: HOSPITAL | Age: 60
End: 2020-05-29

## 2020-05-29 NOTE — TELEPHONE ENCOUNTER
Attempted to contact patient regarding home monitoring and ICD interrogation. I left a message on voicemail to return my call. New monitor was ordered and delivered in March.

## 2020-08-18 ENCOUNTER — TELEPHONE (OUTPATIENT)
Dept: PRIMARY CARE CLINIC | Facility: CLINIC | Age: 60
End: 2020-08-18

## 2020-09-02 ENCOUNTER — TELEPHONE (OUTPATIENT)
Dept: PRIMARY CARE CLINIC | Facility: CLINIC | Age: 60
End: 2020-09-02

## 2020-09-02 NOTE — TELEPHONE ENCOUNTER
Call pt number in system not pt but someone answer the phone and stated pt is  about a year now. Not sure what the case with pt call back to get a name for documention and know one answer SM

## 2021-01-21 ENCOUNTER — TELEPHONE (OUTPATIENT)
Dept: INTERNAL MEDICINE | Facility: CLINIC | Age: 61
End: 2021-01-21

## 2021-04-09 NOTE — ASSESSMENT & PLAN NOTE
Stable and without anginal symptoms.  1. Aspirin 81 mg daily.  2. Atorvastatin 40 mg daily.  3. Antihypertensive therapy.     c/s 4/6/21,gest HTN,PRom

## 2021-11-16 NOTE — ASSESSMENT & PLAN NOTE
Chronic in nature. No intention for cessation at this time.  1. Encouraged cocaine cessation.      no concerns

## 2023-09-22 NOTE — PLAN OF CARE
05/26/17 0728   Discharge Reassessment   Assessment Type Discharge Planning Reassessment   Can the patient answer the patient profile reliably? Yes, cognitively intact   How does the patient rate their overall health at the present time? Fair   Describe the patient's ability to walk at the present time. Minor restrictions or changes   How often would a person be available to care for the patient? Never   Number of comorbid conditions (as recorded on the chart) Five or more   During the past month, has the patient often been bothered by feeling down, depressed or hopeless? Yes   During the past month, has the patient often been bothered by little interest or pleasure in doing things? No   Discharge plan remains the same: Yes   Discharge Plan A Home with family      Post-Care Instructions: I reviewed with the patient in detail post-care instructions. Patient is to wear sunprotection, and avoid picking at any of the treated lesions. Pt may apply Vaseline to crusted or scabbing areas. Price (Use Numbers Only, No Special Characters Or $): 219.00 Total Number Of Lesions Treated: 15 Detail Level: Zone Consent: The patient's consent was obtained including but not limited to risks of crusting, scabbing, blistering, scarring, darker or lighter pigmentary change, recurrence, incomplete removal and infection. Anesthesia Volume In Cc: 0.5

## 2024-11-02 NOTE — TELEPHONE ENCOUNTER
Spoke to the pharmacist at [facility], too early to fill some of the patients medications.     Pharmacist confirmed with Dr Kwan, patient should be taking   potassium chloride SA (K-DUR,KLOR-CON) 20 MEQ tablet  furosemide (LASIX) 40 MG tablet  spironolactone (ALDACTONE) 25 MG tablet    Attempted to schedule labs sooner, per patient she has a trip scheduled and is unable to do them sooner.      Speaking Coherently

## 2025-04-28 NOTE — LETTER
April 11, 2019      Iza Kwan MD  [address] [place] [address]  [address]  [place] [address]           [facility]  [address] [place] [address]  [place] [address]  Phone: [phone]  Fax: [phone]          Patient: Polly Kwan   MR Number: [id number]   YOB: 1960   Date of Visit: 4/11/2019       Dear Dr. Iza Kwan:    Thank you for referring Polly Kwan to me for evaluation. Attached you will find relevant portions of my assessment and plan of care.    If you have questions, please do not hesitate to call me. I look forward to following Polly Kwan along with you.    Sincerely,    Lizette Hazel MD    Enclosure  CC:  No Recipients    If you would like to receive this communication electronically, please contact [email or web address] or [phone] to request more information on [employer] access.    For providers and/or their staff who would like to refer a patient to [facility], please contact us through our one-stop-shop provider referral line, [facility] , at [phone].    If you feel you have received this communication in error or would no longer like to receive these types of communications, please e-mail [email or web address]         
hide

## (undated) DEVICE — PAD DEFIB CADENCE ADULT R2